# Patient Record
Sex: MALE | Race: WHITE | NOT HISPANIC OR LATINO | Employment: FULL TIME | URBAN - METROPOLITAN AREA
[De-identification: names, ages, dates, MRNs, and addresses within clinical notes are randomized per-mention and may not be internally consistent; named-entity substitution may affect disease eponyms.]

---

## 2017-01-03 ENCOUNTER — LAB VISIT (OUTPATIENT)
Dept: LAB | Facility: HOSPITAL | Age: 52
End: 2017-01-03
Attending: INTERNAL MEDICINE
Payer: COMMERCIAL

## 2017-01-03 LAB
ESTIMATED AVG GLUCOSE: 186 MG/DL
HBA1C MFR BLD HPLC: 8.1 %

## 2017-01-03 PROCEDURE — 83036 HEMOGLOBIN GLYCOSYLATED A1C: CPT

## 2017-01-03 PROCEDURE — 36415 COLL VENOUS BLD VENIPUNCTURE: CPT | Mod: PO

## 2017-01-08 ENCOUNTER — TELEPHONE (OUTPATIENT)
Dept: DIABETES | Facility: CLINIC | Age: 52
End: 2017-01-08

## 2017-01-09 NOTE — TELEPHONE ENCOUNTER
Please call patient to schedule diabetes empowerment f/u in 3 months with BMP and A1c 1 week before    Thanks

## 2017-01-09 NOTE — TELEPHONE ENCOUNTER
Left a message for the pt to return my call to schedule his diabetes empowerment and lab appt's. Phone number provided.

## 2017-01-13 ENCOUNTER — TELEPHONE (OUTPATIENT)
Dept: DIABETES | Facility: CLINIC | Age: 52
End: 2017-01-13

## 2017-01-13 ENCOUNTER — OFFICE VISIT (OUTPATIENT)
Dept: INTERNAL MEDICINE | Facility: CLINIC | Age: 52
End: 2017-01-13
Payer: COMMERCIAL

## 2017-01-13 VITALS
DIASTOLIC BLOOD PRESSURE: 84 MMHG | BODY MASS INDEX: 29.75 KG/M2 | WEIGHT: 219.38 LBS | OXYGEN SATURATION: 98 % | HEART RATE: 89 BPM | SYSTOLIC BLOOD PRESSURE: 124 MMHG

## 2017-01-13 DIAGNOSIS — I10 ESSENTIAL HYPERTENSION: Chronic | ICD-10-CM

## 2017-01-13 DIAGNOSIS — Z12.11 COLON CANCER SCREENING: ICD-10-CM

## 2017-01-13 PROCEDURE — 3074F SYST BP LT 130 MM HG: CPT | Mod: S$GLB,,, | Performed by: INTERNAL MEDICINE

## 2017-01-13 PROCEDURE — 3079F DIAST BP 80-89 MM HG: CPT | Mod: S$GLB,,, | Performed by: INTERNAL MEDICINE

## 2017-01-13 PROCEDURE — 2022F DILAT RTA XM EVC RTNOPTHY: CPT | Mod: S$GLB,,, | Performed by: INTERNAL MEDICINE

## 2017-01-13 PROCEDURE — 99999 PR PBB SHADOW E&M-EST. PATIENT-LVL III: CPT | Mod: PBBFAC,,, | Performed by: INTERNAL MEDICINE

## 2017-01-13 PROCEDURE — 3045F PR MOST RECENT HEMOGLOBIN A1C LEVEL 7.0-9.0%: CPT | Mod: S$GLB,,, | Performed by: INTERNAL MEDICINE

## 2017-01-13 PROCEDURE — 99214 OFFICE O/P EST MOD 30 MIN: CPT | Mod: S$GLB,,, | Performed by: INTERNAL MEDICINE

## 2017-01-13 PROCEDURE — 4010F ACE/ARB THERAPY RXD/TAKEN: CPT | Mod: S$GLB,,, | Performed by: INTERNAL MEDICINE

## 2017-01-13 PROCEDURE — 1159F MED LIST DOCD IN RCRD: CPT | Mod: S$GLB,,, | Performed by: INTERNAL MEDICINE

## 2017-01-13 RX ORDER — PEN NEEDLE, DIABETIC 32GX 5/32"
NEEDLE, DISPOSABLE MISCELLANEOUS
COMMUNITY
Start: 2016-12-07 | End: 2017-01-27

## 2017-01-13 NOTE — TELEPHONE ENCOUNTER
----- Message from Deb Li sent at 1/12/2017  4:15 PM CST -----  Contact: Patient  Patient is requesting a call.    Please call patient at [phone].

## 2017-01-13 NOTE — TELEPHONE ENCOUNTER
Spoke with the pt. He stated that he only has one dose left of his Trulicity 0.75 mg which he will take on Tuesday. He stated that his dose should be increasing to Trulicity 1.5 mg after he finishes his Trulicity 0.75 mg.

## 2017-01-13 NOTE — TELEPHONE ENCOUNTER
Left a message for the pt. Tracey sent trulicity 1.5 dose to your pharmacy. Also, please call to obtain BG logs from the past week  so that Tracey can determine if she can decrease your  insulin dose. Phone number provided.

## 2017-01-13 NOTE — MR AVS SNAPSHOT
[facility]  [address]  [place] [address]  Phone: [phone]  Fax: [phone]                  Yon Loyd   2017 1:00 PM   Office Visit    Description:  Male : 1965   Provider:  Audi Gutierrez MD   Department:  [facility]           Reason for Visit     Diabetes           Diagnoses this Visit        Comments    Uncontrolled type 2 diabetes mellitus with diabetic polyneuropathy, with long-term current use of insulin    -  Primary     Essential hypertension         Colon cancer screening                To Do List           Future Appointments        Provider Department Dept Phone    2017 7:00 AM LAB, [facility] [phone]    2017 2:00 PM DIABETES EMPOWERMENT PROGRAM [facility] [phone]      Goals (5 Years of Data)     None      Follow-Up and Disposition     Return in about 6 months (around 2017), or if symptoms worsen or fail to improve.      [facility]     [facility] Nurse Care Line -  Assistance  Registered nurses in the [facility] provide clinical advisement, health education, appointment booking, and other advisory services.  Call for this free service at [phone].             Medications           Message regarding Medications     Verify the changes and/or additions to your medication regime listed below are the same as discussed with your clinician today.  If any of these changes or additions are incorrect, please notify your healthcare provider.             Verify that the below list of medications is an accurate representation of the medications you are currently taking.  If none reported, the list may be blank. If incorrect, please contact your healthcare provider. Carry this list with you in case of emergency.           Current Medications     aspirin (ECOTRIN) 81 MG EC tablet Take 81 mg by mouth once daily.    BD INSULIN PEN NEEDLE UF  "MINI 31 gauge x 3/16" Ndle     blood sugar diagnostic Strp Freestyle Freedom Lite BG test strips & lancets. Check blood sugar 2-3 x/day.    dulaglutide (TRULICITY) 1.5 mg/0.5 mL PnIj Inject 1.5 mg into the skin every 7 days.    insulin detemir (LEVEMIR FLEXPEN) 100 unit/mL (3 mL) SubQ InPn pen Inject 70 Units into the skin once daily.    losartan (COZAAR) 100 MG tablet Take 1 tablet (100 mg total) by mouth once daily.    metformin (GLUCOPHAGE) 1000 MG tablet Take 1 tablet (1,000 mg total) by mouth 2 (two) times daily with meals.    simvastatin (ZOCOR) 40 MG tablet Take 1 tablet (40 mg total) by mouth once daily.    ULTICARE PEN NEEDLE 31 gauge x 1/4" Ndle            Clinical Reference Information           Vital Signs - Last Recorded  Most recent update: 1/13/2017  1:13 PM by Erica Cosby MA    BP Pulse Wt SpO2 BMI    124/84 89 99.5 kg (219 lb 5.7 oz) 98% 29.75 kg/m2      Blood Pressure          Most Recent Value    BP  124/84      Allergies as of 1/13/2017     Ace Inhibitors      Immunizations Administered on Date of Encounter - 1/13/2017     None      Orders Placed During Today's Visit      Normal Orders This Visit    Ambulatory consult to Gastroenterology       [facility] Sign-Up     Activating your [facility] account is as easy as 1-2-3!     1) Visit [email or web address], select Sign Up Now, enter this activation code and your date of birth, then select Next.  [email or web address]  Expires: 2/6/2017  3:03 PM      2) Create a username and password to use when you visit [facility] in the future and select a security question in case you lose your password and select Next.    3) Enter your e-mail address and click Sign Up!    Additional Information  If you have questions, please e-mail [email or web address] or call [phone] to talk to our [facility] staff. Remember, [facility] is NOT to be used for urgent needs. For medical emergencies, dial 911.         "

## 2017-01-13 NOTE — TELEPHONE ENCOUNTER
Sent trulicity 1.5 dose. Please obtain BG logs from the past week from pt so that I can determine if I can decrease his insulin dose.    Thanks

## 2017-01-13 NOTE — PROGRESS NOTES
Subjective:       Patient ID: Yon Loyd is a 51 y.o. male.    Chief Complaint: Diabetes    HPI Comments:   Pt here for f/u DM2 and other issues.     He is doing well with Trulicity.  No side effects.     He has not been tracking his blood sugars consistently lately.  He didn't bring his log in today.     No new c/o.  Feeling well today.             Diabetes       Review of Systems   Constitutional: Negative.    HENT: Negative.    Eyes: Negative.    Respiratory: Negative.    Cardiovascular: Negative.    Gastrointestinal: Negative.    Genitourinary: Negative.    Musculoskeletal: Negative.    Skin: Negative.    Neurological: Negative.    Psychiatric/Behavioral: Negative.        Objective:       Vitals:    01/13/17 1309   BP: 124/84   Pulse: 89   SpO2: 98%   Weight: 99.5 kg (219 lb 5.7 oz)     Physical Exam   Constitutional: He appears well-developed and well-nourished. No distress.   HENT:   Head: Normocephalic and atraumatic.   Eyes: Conjunctivae and EOM are normal. Pupils are equal, round, and reactive to light.   Skin: No rash noted. He is not diaphoretic.   Psychiatric: He has a normal mood and affect. His behavior is normal. Thought content normal.       Assessment:       1. Uncontrolled type 2 diabetes mellitus with diabetic polyneuropathy, with long-term current use of insulin    2. Essential hypertension    3. Colon cancer screening        Plan:           HTN - Adequate control.  Cont current tx.    DM2 - Uncontrolled but with A1c improving on recently started Trulicity.  Cont this and other meds.      HM - Referred for colon CA screening.

## 2017-01-23 ENCOUNTER — LAB VISIT (OUTPATIENT)
Dept: LAB | Facility: HOSPITAL | Age: 52
End: 2017-01-23
Attending: NURSE PRACTITIONER
Payer: COMMERCIAL

## 2017-01-23 LAB
ANION GAP SERPL CALC-SCNC: 9 MMOL/L
BUN SERPL-MCNC: 13 MG/DL
CALCIUM SERPL-MCNC: 9.8 MG/DL
CHLORIDE SERPL-SCNC: 102 MMOL/L
CHOLEST/HDLC SERPL: 3.6 {RATIO}
CO2 SERPL-SCNC: 27 MMOL/L
CREAT SERPL-MCNC: 1.1 MG/DL
EST. GFR  (AFRICAN AMERICAN): >60 ML/MIN/1.73 M^2
EST. GFR  (NON AFRICAN AMERICAN): >60 ML/MIN/1.73 M^2
GLUCOSE SERPL-MCNC: 216 MG/DL
HDL/CHOLESTEROL RATIO: 28 %
HDLC SERPL-MCNC: 161 MG/DL
HDLC SERPL-MCNC: 45 MG/DL
LDLC SERPL CALC-MCNC: 75.4 MG/DL
NONHDLC SERPL-MCNC: 116 MG/DL
POTASSIUM SERPL-SCNC: 4.3 MMOL/L
SODIUM SERPL-SCNC: 138 MMOL/L
TRIGL SERPL-MCNC: 203 MG/DL

## 2017-01-23 PROCEDURE — 80061 LIPID PANEL: CPT

## 2017-01-23 PROCEDURE — 36415 COLL VENOUS BLD VENIPUNCTURE: CPT | Mod: PO

## 2017-01-23 PROCEDURE — 80048 BASIC METABOLIC PNL TOTAL CA: CPT

## 2017-01-27 ENCOUNTER — CLINICAL SUPPORT (OUTPATIENT)
Dept: DIABETES | Facility: CLINIC | Age: 52
End: 2017-01-27
Payer: COMMERCIAL

## 2017-01-27 VITALS
WEIGHT: 220.44 LBS | DIASTOLIC BLOOD PRESSURE: 74 MMHG | BODY MASS INDEX: 29.86 KG/M2 | HEART RATE: 84 BPM | SYSTOLIC BLOOD PRESSURE: 130 MMHG | HEIGHT: 72 IN

## 2017-01-27 DIAGNOSIS — E66.9 OBESITY (BMI 30-39.9): Chronic | ICD-10-CM

## 2017-01-27 DIAGNOSIS — Z79.4 TYPE 2 DIABETES MELLITUS WITH HYPERGLYCEMIA, WITH LONG-TERM CURRENT USE OF INSULIN: Chronic | ICD-10-CM

## 2017-01-27 DIAGNOSIS — E11.42 DIABETIC POLYNEUROPATHY ASSOCIATED WITH TYPE 2 DIABETES MELLITUS: Primary | ICD-10-CM

## 2017-01-27 DIAGNOSIS — E11.29 PROTEINURIA DUE TO TYPE 2 DIABETES MELLITUS: Chronic | ICD-10-CM

## 2017-01-27 DIAGNOSIS — Z79.4 TYPE 2 DIABETES MELLITUS WITH DIABETIC NEPHROPATHY, WITH LONG-TERM CURRENT USE OF INSULIN: Chronic | ICD-10-CM

## 2017-01-27 DIAGNOSIS — E11.21 TYPE 2 DIABETES MELLITUS WITH DIABETIC NEPHROPATHY, WITH LONG-TERM CURRENT USE OF INSULIN: Chronic | ICD-10-CM

## 2017-01-27 DIAGNOSIS — E78.5 HYPERLIPIDEMIA LDL GOAL <100: Chronic | ICD-10-CM

## 2017-01-27 DIAGNOSIS — E11.65 TYPE 2 DIABETES MELLITUS WITH HYPERGLYCEMIA, WITH LONG-TERM CURRENT USE OF INSULIN: Chronic | ICD-10-CM

## 2017-01-27 DIAGNOSIS — R80.9 PROTEINURIA DUE TO TYPE 2 DIABETES MELLITUS: Chronic | ICD-10-CM

## 2017-01-27 DIAGNOSIS — I10 ESSENTIAL HYPERTENSION: Chronic | ICD-10-CM

## 2017-01-27 PROCEDURE — 99214 OFFICE O/P EST MOD 30 MIN: CPT | Mod: S$GLB,,, | Performed by: NURSE PRACTITIONER

## 2017-01-27 PROCEDURE — 99999 PR PBB SHADOW E&M-EST. PATIENT-LVL III: CPT | Mod: PBBFAC,,,

## 2017-01-27 PROCEDURE — G0108 DIAB MANAGE TRN  PER INDIV: HCPCS | Mod: S$GLB,,, | Performed by: DIETITIAN, REGISTERED

## 2017-01-27 RX ORDER — METFORMIN HYDROCHLORIDE 500 MG/1
2000 TABLET, EXTENDED RELEASE ORAL
Qty: 360 TABLET | Refills: 3 | Status: SHIPPED | OUTPATIENT
Start: 2017-01-27 | End: 2017-04-07

## 2017-01-27 RX ORDER — PEN NEEDLE, DIABETIC 30 GX3/16"
NEEDLE, DISPOSABLE MISCELLANEOUS
Qty: 60 EACH | Refills: 12 | Status: SHIPPED | OUTPATIENT
Start: 2017-01-27 | End: 2017-08-11 | Stop reason: SDUPTHER

## 2017-01-27 NOTE — PROGRESS NOTES
"   01/27/17 0000   Diabetes Type   Diabetes Type  Type II  (Pt present for empowerment visit #2 )   Nutrition   Meal Planning (reports no changes in deit, continues to eat out frequently, reports not eating large portions, but other DM edu notes reveal overwise)   Meal Plan 24 Hour Recall - Breakfast coffee, eggs, toast   Meal Plan 24 Hour Recall - Lunch plate lunch or sandwich   Meal Plan 24 Hour Recall - Dinner similar to lunch or burger   Meal Plan 24 Hour Recall - Snack snacks on popcorn   Monitoring    Blood Glucose Logs Yes  (see NP note)   Exercise    Frequency Never   Current Diabetes Treatment    Current Treatment Insulin;Oral Medicaiton;Injectable  (Levemir 70 units daily, Trulicity 1.5 mg weekly, Metformin 1000 mg BID)   Social History   Preferred Learning Method Face to Face;Demonstration;Hands On;Reading Materials   Primary Support Self   Barriers to Change   Barriers to Change None   Learning Challenges  Literacy  (Low HC literacy)   Readiness to Learn    Readiness to Learn  Acceptance   Diabetes Education Visit   Diabetes Education Record Assessment/Progress Initial/Comprehensive  (Seen for DSME in 2013 & 2014)   Diabetes Education Assessment/Progress   Acute Complications (preventing, detecting, and treating acute complications) DC;1;5;W;IS  (reviewed s/s of hypo and proper tx with "rule of 15")   Diabetes Disease Process (diabetes disease process and treatment options) DC;1;IS;5;W  (Per NP recommendation discussed and provided info on v-go insulin delivery device)   Medications (states correct name, dose, onset, peak, duration, side effects & timing of meds) DC;1;IS;5;W;D;R  (reviewed timing, dosing, and MOA of all meds. Also instructed pt that per NP that if BG is not WNL at visit in 6 weeks that pt will either need to start v-go or prandial insulin. Encouraged pt to have made a decision about what one he would prefer )   Monitoring (monitoring blood glucose/other parameters &using results) " "DC;1;IS;5;W  (Instructed to SMBG BID at alternating times and to bring logs to next visit)   Goal Setting and Problem Solving (verbalizes behavior change strategies & sets realistic goals) DC   Goals   Monitoring % Met  (SMBG BID & fax log to DMP weekly)   Medications In Progress  (I WILL TAKE LEVEMIR DOSES 12 HRS APART & NOVOLOG BOLUS 10-15" PRE DINNER MEALS, CONT. TO WORK ON  TAKING ALL DOSES PER RX.)   Reducing Risks Set   Other % Met  (pt will take new med as prescribed. Pt will continue and will determine next course of action he would prefer if BG is not at goal in 6 wks)   Met Percentage  100%   Start Date 01/27/17   Diabetes Self-Management Support Plan   Review Status Patient reviewed and agreed to support plan.   Diabetes Care Plan/Intervention   Education Plan/Intervention Diabetes Empowerment Program;In F/U DSMT   Education Units of Time    Time Spent 30 min     "

## 2017-01-27 NOTE — MR AVS SNAPSHOT
"    [facility]  [address]  [place] [address]  Phone: [phone]                  Yon Loyd   2017 2:00 PM   Clinical Support    Description:  Male : 1965   Provider:  DIABETES EMPOWERMENT PROGRAM   Department:  [facility]           Reason for Visit     Diabetes Mellitus           Diagnoses this Visit        Comments    Uncontrolled type 2 diabetes mellitus with diabetic polyneuropathy, with long-term current use of insulin         Type 2 diabetes mellitus with hyperglycemia, with long-term current use of insulin         Type 2 diabetes mellitus with diabetic nephropathy, with long-term current use of insulin         Proteinuria due to type 2 diabetes mellitus         Obesity (BMI 30-39.9)         Hyperlipidemia LDL goal <100         Essential hypertension                To Do List           Future Appointments        Provider Department Dept Phone    3/9/2017 1:00 PM DIABETES EDUCATOR, INT MED 1 [facility] [phone]    4/3/2017 9:15 AM LAB, [facility] [phone]    4/3/2017 9:30 AM LAB, [facility] [phone]    2017 3:30 PM DIABETES EMPOWERMENT PROGRAM [facility] [phone]      Goals (5 Years of Data)     None      Follow-Up and Disposition     Return in about 3 months (around 2017).       These Medications        Disp Refills Start End    pen needle, diabetic (BD ULTRA-FINE BLACK PEN NEEDLES) 32 gauge x 5/32" Ndle 60 each 12 2017     Uses 2  times daily, on injections. Dispense 4 mm needles only    Pharmacy: [email or web address] [address] Ph #: [phone]       metformin (GLUCOPHAGE-XR) 500 MG 24 hr tablet 360 tablet 3 2017     Take 4 tablets (2,000 mg total) by mouth daily with breakfast. - Oral    Pharmacy: [email or web address] [address] Ph #: [phone]       " "canagliflozin (INVOKANA) 100 mg Tab 30 tablet 0 1/27/2017     Take 1 tablet (100 mg total) by mouth once daily. - Oral    Pharmacy: [facility] [place], [place] - [address] Ph #: [phone]         [facility]     [facility] Care [facility] - 24/7 Assistance  Registered nurses in the [facility] provide clinical advisement, health education, appointment booking, and other advisory services.  Call for this free service at [phone].             Medications           Message regarding Medications     Verify the changes and/or additions to your medication regime listed below are the same as discussed with your clinician today.  If any of these changes or additions are incorrect, please notify your healthcare provider.        START taking these NEW medications        Refills    pen needle, diabetic (BD ULTRA-FINE BLACK PEN NEEDLES) 32 gauge x 5/32" Ndle 12    Sig: Uses 2  times daily, on injections. Dispense 4 mm needles only    Class: Print    metformin (GLUCOPHAGE-XR) 500 MG 24 hr tablet 3    Sig: Take 4 tablets (2,000 mg total) by mouth daily with breakfast.    Class: Print    Route: Oral    canagliflozin (INVOKANA) 100 mg Tab 0    Sig: Take 1 tablet (100 mg total) by mouth once daily.    Class: Normal    Route: Oral      STOP taking these medications     ULTICARE PEN NEEDLE 31 gauge x 1/4" Ndle     BD INSULIN PEN NEEDLE UF MINI 31 gauge x 3/16" Ndle            Verify that the below list of medications is an accurate representation of the medications you are currently taking.  If none reported, the list may be blank. If incorrect, please contact your healthcare provider. Carry this list with you in case of emergency.           Current Medications     aspirin (ECOTRIN) 81 MG EC tablet Take 81 mg by mouth once daily.    blood sugar diagnostic Strp Freestyle Freedom Lite BG test strips & lancets. Check blood sugar 2-3 x/day.    dulaglutide (TRULICITY) 1.5 mg/0.5 mL " "PnIj Inject 1.5 mg into the skin every 7 days.    insulin detemir (LEVEMIR FLEXPEN) 100 unit/mL (3 mL) SubQ InPn pen Inject 70 Units into the skin once daily.    losartan (COZAAR) 100 MG tablet Take 1 tablet (100 mg total) by mouth once daily.    metformin (GLUCOPHAGE) 1000 MG tablet Take 1 tablet (1,000 mg total) by mouth 2 (two) times daily with meals.    simvastatin (ZOCOR) 40 MG tablet Take 1 tablet (40 mg total) by mouth once daily.    canagliflozin (INVOKANA) 100 mg Tab Take 1 tablet (100 mg total) by mouth once daily.    metformin (GLUCOPHAGE-XR) 500 MG 24 hr tablet Take 4 tablets (2,000 mg total) by mouth daily with breakfast.    pen needle, diabetic (BD ULTRA-FINE BLACK PEN NEEDLES) 32 gauge x 5/32" Ndle Uses 2  times daily, on injections. Dispense 4 mm needles only           Clinical Reference Information           Vital Signs - Last Recorded  Most recent update: 1/27/2017  2:18 PM by Sanna Cheney MA    BP Pulse Ht Wt BMI    130/74 84 6' (1.829 m) 100 kg (220 lb 7.4 oz) 29.9 kg/m2      Blood Pressure          Most Recent Value    BP  130/74      Allergies as of 1/27/2017     Ace Inhibitors      Immunizations Administered on Date of Encounter - 1/27/2017     None      Orders Placed During Today's Visit     Future Labs/Procedures Expected by Expires    Basic metabolic panel  1/27/2017 1/27/2018    Hemoglobin A1c  1/27/2017 1/27/2018    Microalbumin/creatinine urine ratio  1/27/2017 3/28/2018      [facility] Sign-Up     Activating your [facility] account is as easy as 1-2-3!     1) Visit [email or web address], select Sign Up Now, enter this activation code and your date of birth, then select Next.  [email or web address]  Expires: 2/6/2017  3:03 PM      2) Create a username and password to use when you visit [facility] in the future and select a security question in case you lose your password and select Next.    3) Enter your e-mail address and click Sign Up!    Additional Information  If you have questions, please " e-mail [email or web address] or call [phone] to talk to our [facility] staff. Remember, [facility] is NOT to be used for urgent needs. For medical emergencies, dial 911.         Instructions    Continue Trulicity 1.5 mg weekly    Change to Metformin XR 2000 mg daily (if you wish) OR continue Metformin 1000 mg twice daily    For now, continue Levemir 70 units daily     Start Invokana 100 mg once a day  x1 month, then increase to 300 mg once a day indefinitely. Discussed MOA, possible side effects including yeast infection, UTI, dehydration and ketoacidosis, importance of maintaining hydration and avoiding low carb diets.    Once all of your readings are less than 140, email me to decrease your Levemir     See diabetes education in 6 weeks - if you are still on more than 40 units of Levemir at this time, I recommend considering the Vgo (patch)

## 2017-01-27 NOTE — PROGRESS NOTES
CC:  Diabetes.     HPI: Yon Loyd is a 51 y.o. male presents for visit in [facility]. The patient has had diabetes along with associated comorbidities indicated in the Visit Diagnosis section of this encounter. The patient was diagnosed with Type 2 diabetes in ~1993.  Been on insulin since ~2013.     VISIT #: 1    1st visit - Presents with no BG logs, not checking often, reports last BG 3 days after after meal was 185. Recently started on Novolog with largest meal by PCP    2nd visit - Today, presents with BG logs. BG remain elevated fasting.  Using much less insulin currently, BG remains slightly elevated.       DIABETES COMPLICATIONS: nephropathy and peripheral neuropathy     HOSPITALIZATIONS FOR DIABETES OR RELATED COMPLICATIONS:  No    CURRENT A1C:    Hemoglobin A1C   Date Value Ref Range Status   01/03/2017 8.1 (H) 4.5 - 6.2 % Final     Comment:     According to ADA guidelines, hemoglobin A1C <7.0% represents  optimal control in non-pregnant diabetic patients.  Different  metrics may apply to specific populations.   Standards of Medical Care in Diabetes - 2016.  For the purpose of screening for the presence of diabetes:  <5.7%     Consistent with the absence of diabetes  5.7-6.4%  Consistent with increasing risk for diabetes   (prediabetes)  >or=6.5%  Consistent with diabetes  Currently no consensus exists for use of hemoglobin A1C  for diagnosis of diabetes for children.     11/21/2016 8.4 (H) 4.5 - 6.2 % Final     Comment:     According to ADA guidelines, hemoglobin A1C <7.0% represents  optimal control in non-pregnant diabetic patients.  Different  metrics may apply to specific populations.   Standards of Medical Care in Diabetes - 2016.  For the purpose of screening for the presence of diabetes:  <5.7%     Consistent with the absence of diabetes  5.7-6.4%  Consistent with increasing risk for diabetes   (prediabetes)  >or=6.5%  Consistent with diabetes  Currently no consensus exists for  use of hemoglobin A1C  for diagnosis of diabetes for children.     09/26/2016 9.8 (H) 4.5 - 6.2 % Final     Comment:     According to ADA guidelines, hemoglobin A1C <7.0% represents  optimal control in non-pregnant diabetic patients.  Different  metrics may apply to specific populations.   Standards of Medical Care in Diabetes - 2016.  For the purpose of screening for the presence of diabetes:  <5.7%     Consistent with the absence of diabetes  5.7-6.4%  Consistent with increasing risk for diabetes   (prediabetes)  >or=6.5%  Consistent with diabetes  Currently no consensus exists for use of hemoglobin A1C  for diagnosis of diabetes for children.         GOAL A1C: <7%    DM MEDICATIONS USED IN THE PAST: Glyburide, Metformin, Januvia, Novolog, Levemir, Trulicity    MEDICATIONS UPON START OF PROGRAM: Levemir 65 units BID, Novolog 10 units with largest meal, Januvia 100 mg daily, Metformin 1000 mg BID  Rarely missing Levemir  Only taking Novolog once daily with largest meal    CURRENT DIABETES MEDICATIONS: Levemir 70 units daily, Trulicity 1.5 mg weekly, Metformin 1000 mg BID  Denies missing any doses of trulicity   Missing PM dose of Metformin     ANY DIFFICULTY AFFORDING YOUR CURRENT MEDICATION REGIMEN?  No    CGMS interpretation:  None     DO YOU USE THE [employer] EMAIL SYSTEM? No    STANDARDS of CARE:  Statin:  Yes - Simvastatin   ACEI or ARB:  Yes - Losartan   ASA:  Yes - 81 mg   Last eye exam - needs updated eye exam, pt to schedule at [facility] - still hasn't scheduled, instructed to fax results once done   Microalbumin/Creatinine ratio:  Lab Results   Component Value Date    MICALBCREAT 539.3 (H) 12/23/2016       BLOOD GLUCOSE MONITORING:  Checking BG 1-2 times daily                 HYPOGLYCEMIA:  No    CURRENT DAILY ROUTINE (meals/meds):   Eating 3 meals daily  Snacking on peanut butter crackers    Drinks diet coke    CURRENT EXERCISE:  No    OCCUPATION:      MEDICATIONS, ALLERGIES, LABS, VS's, MEDICAL,  SURGICAL, SOCIAL, AND FAMILY HISTORY reviewed and updated in the appropriate sections during this encounter    ROS:     Constitutional: Negative for weight change  Endocrine:  Denies polyuria, polydipsia, nocturia.  HENT: Denies neck swelling, lumps, horseness or trouble swallowing. Denies any personal or family history of thyroid cancer.    Eyes: Negative for visual disturbance.   Respiratory: Negative for cough or shortness or breath.  Cardiovascular: Negative for chest pain or claudication.   Gastrointestinal: Negative for nausea, diarrhea, vomiting, bloating.  No history of pancreatitis or gastroparesis.  Genitourinary: Negative for urgency, frequency, frequent urinary tract infections.  Skin: Denies prolonged wound healing.  Neurological: Negative for syncope, + improved numbness/burning of extremities.  Psychiatric/Behavioral: Negative for depression or anxiety      All other systems reviewed and are negative.      PE:  Constitutional:   Visit Vitals    /74    Pulse 84    Ht 6' (1.829 m)    Wt 100 kg (220 lb 7.4 oz)    BMI 29.9 kg/m2      Well developed, well nourished, NAD.    HENT:   External ears, nose: no masses. No significant findings.   Hearing: normal     Neck:  No trachial deviation present, No neck masses noticed   Thyroid:  No thyromegaly present.  No thyroid tenderness.      Cardiovascular:    Auscultation:  No murmurs or abnormal sounds   Lower extremities:  No edema or cyanosis.     Respiratory:    Effort:  Normal, no use of accessory muscles.   Auscultation: breath sounds normal, no adventitious sounds.  Abdomen:     Exam:  Soft, non-tender, no masses.      No hernia noted.  Skin:    Inspection: no rashes, lesion or ulcers, + acanthosis nigracans.   Palpation: no induration or nodules.   Insulin injection sites: no lipoatrophy or lipohypertrophy.  Psychiatric:  Judgement and insight good with normal mood and affect.  Musculoskeletal:  Gait steady. No gross abnormalities.        FOOT  EXAMINATION:   Protective Sensation (w/ 10 gram monofilament):  Right: Decreased  Left: Decreased    Visual Inspection:  Normal -  Bilateral, Nails Intact - without Evidence of Foot Deformity- Bilateral and Callus -  Bilateral    Pedal Pulses:   Right: Present  Left: Present    Posterior tibialis:   Right:Present  Left: Present    Decreased vibratory response to 128 Hz tuning fork bilaterally.     ______________________________________________________________________     ASSESSMENT and PLAN:    1- Type 2 diabetes with proteinuria neuropathy and hyperglycemia - A1c improving. Discussed tx options, including SGLT-2, and/or Vgo device. For now, for compliance reasons, change Metformin to XR 2000 mg daily, Levemir 70 units daily, Trulicity 1.5 mg weekly. Start Invokana 100 mg once a day  x1 month, then increase to 300 mg once a day indefinitely. Discussed MOA, possible side effects including yeast infection, UTI, dehydration and ketoacidosis, importance of maintaining hydration and avoiding low carb diets..     Instructed to monitor BG twice daily, document on BG logs, and bring complete BG logs to all visits - instructed to notify me when bG <140 so that Levemir can start to be decreased    Diabetes education in 6 weeks for log review, possible Vgo discussion - discussed with patient that if still requiring more than ~40 units of Levemir at this visit after on max dose Trulicity and Invokana, recommend changing to Vgo or adding prandial insulin with largest meal  Empowerment f/u in 3 months with BMP, A1c, and MAC before    2- Proteinuria - MAC worsened, if not improved significantly in 3 months, recommend nephrology referral. On max dose Losartan. Discussed correlation between uncontrolled DM and worsening nephropathy.     3 - Peripheral neuropathy - Educated patient to check feet daily for any foreign objects and/or wounds. Discussed with patient the importance of wearing appropriate footwear at all times, not to walk  barefoot ever, and to check shoes before putting them on feet. Instructed patient to keep feet dry by regularly changing shoes and socks and drying feet after baths and exercises. Also, instructed patient to report any new lesions, discolorations, or swelling to a healthcare professional.     4- Hypertension - goal < 140/80 mmHg -  At goal, on ARB, continue current medications   BP Readings from Last 3 Encounters:   01/27/17 130/74   01/13/17 124/84   12/23/16 130/72       5 Hyperlipidemia -  LFT's WNL. LDL at goal of <100. On Zocor. Continue current medications.     Lab Results   Component Value Date    LDLCALC 75.4 01/23/2017       6- Body mass index is 29.9 kg/(m^2). = Overweight. Modest weight loss (5-10%) may greatly improve BG control. May benefit from weight loss properties of Trulicity + Invokana

## 2017-01-27 NOTE — PATIENT INSTRUCTIONS
Continue Trulicity 1.5 mg weekly    Change to Metformin XR 2000 mg daily (if you wish) OR continue Metformin 1000 mg twice daily    For now, continue Levemir 70 units daily     Start Invokana 100 mg once a day  x1 month, then increase to 300 mg once a day indefinitely. Discussed MOA, possible side effects including yeast infection, UTI, dehydration and ketoacidosis, importance of maintaining hydration and avoiding low carb diets.    Once all of your readings are less than 140, email me to decrease your Levemir     See diabetes education in 6 weeks - if you are still on more than 40 units of Levemir at this time, I recommend considering the Vgo (patch)

## 2017-02-21 DIAGNOSIS — E11.65 TYPE 2 DIABETES MELLITUS WITH HYPERGLYCEMIA, WITH LONG-TERM CURRENT USE OF INSULIN: Chronic | ICD-10-CM

## 2017-02-21 DIAGNOSIS — Z79.4 TYPE 2 DIABETES MELLITUS WITH HYPERGLYCEMIA, WITH LONG-TERM CURRENT USE OF INSULIN: Chronic | ICD-10-CM

## 2017-02-21 NOTE — TELEPHONE ENCOUNTER
----- Message from Anushka Davidson sent at 2/21/2017 11:08 AM CST -----  Contact: Patient [phone]  Patient is asking for his refill to be called into the pharmacy on his canagliflozin (INVOKANA) 100 mg Tab.    Pharmacy is [address] [phone] (Phone) [phone] (Fax).    Patient can be reached at [phone].  Thanks

## 2017-02-21 NOTE — TELEPHONE ENCOUNTER
Please call patient, remind him Invokana dose to increase to 300 mg daily after 1st month. Rx for 300 mg sent to [facility].

## 2017-02-22 NOTE — TELEPHONE ENCOUNTER
Spoke to the patient. Reminded him Invokana dose to increase to 300 mg daily after 1st month. Tracey sent Rx for 300 mg to [facility]. Pt verbalized understanding.

## 2017-03-09 ENCOUNTER — TELEPHONE (OUTPATIENT)
Dept: DIABETES | Facility: CLINIC | Age: 52
End: 2017-03-09

## 2017-03-09 ENCOUNTER — CLINICAL SUPPORT (OUTPATIENT)
Dept: DIABETES | Facility: CLINIC | Age: 52
End: 2017-03-09
Payer: COMMERCIAL

## 2017-03-09 DIAGNOSIS — E11.42 DIABETIC POLYNEUROPATHY ASSOCIATED WITH TYPE 2 DIABETES MELLITUS: ICD-10-CM

## 2017-03-09 DIAGNOSIS — E11.21 TYPE 2 DIABETES MELLITUS WITH DIABETIC NEPHROPATHY, WITH LONG-TERM CURRENT USE OF INSULIN: Primary | Chronic | ICD-10-CM

## 2017-03-09 DIAGNOSIS — Z79.4 TYPE 2 DIABETES MELLITUS WITH DIABETIC NEPHROPATHY, WITH LONG-TERM CURRENT USE OF INSULIN: Chronic | ICD-10-CM

## 2017-03-09 DIAGNOSIS — Z79.4 TYPE 2 DIABETES MELLITUS WITH DIABETIC NEPHROPATHY, WITH LONG-TERM CURRENT USE OF INSULIN: Primary | Chronic | ICD-10-CM

## 2017-03-09 DIAGNOSIS — E11.21 TYPE 2 DIABETES MELLITUS WITH DIABETIC NEPHROPATHY, WITH LONG-TERM CURRENT USE OF INSULIN: Chronic | ICD-10-CM

## 2017-03-09 PROCEDURE — G0108 DIAB MANAGE TRN  PER INDIV: HCPCS | Mod: S$GLB,,, | Performed by: DIETITIAN, REGISTERED

## 2017-03-09 RX ORDER — INSULIN LISPRO 200 [IU]/ML
10 INJECTION, SOLUTION SUBCUTANEOUS
Qty: 3 SYRINGE | Refills: 3
Start: 2017-03-09 | End: 2017-04-07

## 2017-03-09 NOTE — PROGRESS NOTES
"   03/09/17 0000   Diabetes Education Visit   Diabetes Education Record Assessment/Progress Post Program/Follow-up  (Pt present for BG log review following empowerment visit. At last empowerment visit, Levemir was increased, Novolog and Jardiance were d/c and Trulicity was started)   Diabetes Type   Diabetes Type  Type II   Monitoring    Self Monitoring  (Per last visit with NP, based on BG logs today pt needs to decide between v-go insulin delivery device or prandial insulin)   Blood Glucose Logs Yes  (Logs present at visit show SMBG 1-2 times per day. Fasting readings mostly WNL, very few lunch readings but these are the lowest, dinner is mostly WNL and before bed is highest. See NP note for photo of logs)   Current Diabetes Treatment    Current Treatment Insulin;Injectable;Oral Medication  (Pt currently taking Levemir 70 units in the AM, Invokana 300 mg, and Trulicity weekly. Pt denies missed doses of meds)   Social History   Preferred Learning Method Face to Face;Demonstration;Hands On;Reading Materials   Primary Support Self   Barriers to Change   Barriers to Change None   Learning Challenges  Literacy  (Low HC literacy)   Diabetes Education Assessment/Progress   Acute Complications (preventing, detecting, and treating acute complications) DC;1;5;W;IS  (reviewed s/s of hypo and proper tx with "rule of 15")   Medications (states correct name, dose, onset, peak, duration, side effects & timing of meds) DC;1;IS;5  (Per NP review of BG logs she recommended pt d/c levemir and switch to v-go insulin delivery device - when this was presented to pt he stated that he was not ready to make that decision and that he would like another option - as a result, NP recommended to decrease Levemir to 60 units daily and add Novolog 10 units with biggest meal, which is dinner)   Monitoring (monitoring blood glucose/other parameters & using results) DC;1;IS;5;W  (Instructed to SMBG BID at alternating times and to bring logs to next " "visit. Pt was also scheduled for CGMS test to be performed 1 week before empowerment visit)   Goal Setting and Problem Solving (verbalizes behavior change strategies & sets realistic goals) DC   Goals   Monitoring % Met  (SMBG BID & fax log to DMP weekly)   Met Percentage  100%   Medications % Met  (I WILL TAKE LEVEMIR DOSES 12 HRS APART & NOVOLOG BOLUS 10-15" PRE DINNER MEALS, CONT. TO WORK ON  TAKING ALL DOSES PER RX.)   Met Percentage  100%   Other % Met  (pt will take new med as prescribed. Pt will continue and will determine next course of action he would prefer if BG is not at goal in 6 wks)   Met Percentage  75%   Diabetes Self-Management Support Plan   Review Status Patient has selected and agrees to support plan.   Diabetes Care Plan/Intervention   Education Plan/Intervention Diabetes Empowerment Program;In F/U DSMT   Education Units of Time    Time Spent 30 min     "

## 2017-03-09 NOTE — TELEPHONE ENCOUNTER
Received BG logs. Prandial excursions noted, will decrease Levemir to 60 units daily, start Humalog 10 units with largest meal. CGMS before empowerment f/u. Pt not interested in Vgo per diabetes educator MARLA Ro.

## 2017-03-29 ENCOUNTER — LAB VISIT (OUTPATIENT)
Dept: LAB | Facility: HOSPITAL | Age: 52
End: 2017-03-29
Attending: NURSE PRACTITIONER
Payer: COMMERCIAL

## 2017-03-29 ENCOUNTER — CLINICAL SUPPORT (OUTPATIENT)
Dept: ENDOCRINOLOGY | Facility: CLINIC | Age: 52
End: 2017-03-29
Payer: COMMERCIAL

## 2017-03-29 DIAGNOSIS — E11.21 TYPE 2 DIABETES MELLITUS WITH DIABETIC NEPHROPATHY, WITH LONG-TERM CURRENT USE OF INSULIN: Chronic | ICD-10-CM

## 2017-03-29 DIAGNOSIS — E11.65 TYPE 2 DIABETES MELLITUS WITH HYPERGLYCEMIA, WITH LONG-TERM CURRENT USE OF INSULIN: Chronic | ICD-10-CM

## 2017-03-29 DIAGNOSIS — Z79.4 TYPE 2 DIABETES MELLITUS WITH DIABETIC NEPHROPATHY, WITH LONG-TERM CURRENT USE OF INSULIN: Chronic | ICD-10-CM

## 2017-03-29 DIAGNOSIS — Z79.4 TYPE 2 DIABETES MELLITUS WITH HYPERGLYCEMIA, WITH LONG-TERM CURRENT USE OF INSULIN: Chronic | ICD-10-CM

## 2017-03-29 LAB
CREAT UR-MCNC: 100 MG/DL
MICROALBUMIN UR DL<=1MG/L-MCNC: 379 UG/ML
MICROALBUMIN/CREATININE RATIO: 379 UG/MG

## 2017-03-29 PROCEDURE — 95250 CONT GLUC MNTR PHYS/QHP EQP: CPT | Mod: S$GLB,,, | Performed by: NURSE PRACTITIONER

## 2017-03-29 PROCEDURE — 82570 ASSAY OF URINE CREATININE: CPT

## 2017-03-29 NOTE — PROGRESS NOTES
"DIABETES EDUCATOR NOTE   PLACEMENT OF FREESTYLE JERO PRO SENSOR  CONTINOUS GLUCOSE MONITORING SYSTEM (CGMS)    Patient is here in clinic today for placement of continuous glucose monitoring sensor.      Each patient verified that they were here for CGMS procedure ordered by their provider and that they have a working glucose meter and supplies at home.   Patient will be provided with a Freestyle Jero Sensor and a copy of the Continuous Glucose Monitoring Patient Log to fill out during the study.   A detailed  explanation of Continuous Glucose Monitoring was  provided. Patient informed that this is a blind procedure and that they will not actually see the blood sugar tracing in real time.  Reviewed with patient the [employer] patient education handout called "Your Freestyle Jero Pro sensor: What you need to know" to review self-care during the study to avoid sensor loosening or removal ie... Bathing, Swimming, dressing, and exercising.   Instructed patient to check blood sugar using home glucometer and to record the following on provided patient log sheets:Blood sugar taken at home, Meals and snacks, Activity, and Diabetes medications taken and dosage    Patient was brought to a private location.  Arm for insertion was selected and prepared and allowed to dry. Glucose Sensor Serial Number [id number]  was inserted to back of patient's left upper arm.    The following forms  were given and reviewed in detail with patient and all questions answered.   · Continuous Glucose Monitoring Patient Log #[id number]  · Freestyle [employer] Patient Handout "Your Freestyle Jero Pro Sensor: What you need to know"     Instructions held in a group: Time: 15 min   Insertion of sensor done individually in private:  Time: 5 minutes       "

## 2017-04-04 ENCOUNTER — CLINICAL SUPPORT (OUTPATIENT)
Dept: ENDOCRINOLOGY | Facility: CLINIC | Age: 52
End: 2017-04-04
Payer: COMMERCIAL

## 2017-04-04 DIAGNOSIS — Z79.4 TYPE 2 DIABETES MELLITUS WITH DIABETIC NEPHROPATHY, WITH LONG-TERM CURRENT USE OF INSULIN: Chronic | ICD-10-CM

## 2017-04-04 DIAGNOSIS — E11.21 TYPE 2 DIABETES MELLITUS WITH DIABETIC NEPHROPATHY, WITH LONG-TERM CURRENT USE OF INSULIN: Chronic | ICD-10-CM

## 2017-04-04 PROCEDURE — 95251 CONT GLUC MNTR ANALYSIS I&R: CPT | Mod: S$GLB,,, | Performed by: NURSE PRACTITIONER

## 2017-04-07 ENCOUNTER — CLINICAL SUPPORT (OUTPATIENT)
Dept: DIABETES | Facility: CLINIC | Age: 52
End: 2017-04-07
Payer: COMMERCIAL

## 2017-04-07 VITALS
DIASTOLIC BLOOD PRESSURE: 82 MMHG | SYSTOLIC BLOOD PRESSURE: 126 MMHG | WEIGHT: 211.63 LBS | HEART RATE: 86 BPM | HEIGHT: 72 IN | BODY MASS INDEX: 28.66 KG/M2

## 2017-04-07 DIAGNOSIS — Z79.4 TYPE 2 DIABETES MELLITUS WITH DIABETIC NEPHROPATHY, WITH LONG-TERM CURRENT USE OF INSULIN: Chronic | ICD-10-CM

## 2017-04-07 DIAGNOSIS — E78.5 HYPERLIPIDEMIA LDL GOAL <100: Chronic | ICD-10-CM

## 2017-04-07 DIAGNOSIS — R80.9 PROTEINURIA DUE TO TYPE 2 DIABETES MELLITUS: Chronic | ICD-10-CM

## 2017-04-07 DIAGNOSIS — I10 ESSENTIAL HYPERTENSION: Chronic | ICD-10-CM

## 2017-04-07 DIAGNOSIS — Z79.4 TYPE 2 DIABETES MELLITUS WITH DIABETIC POLYNEUROPATHY, WITH LONG-TERM CURRENT USE OF INSULIN: Primary | Chronic | ICD-10-CM

## 2017-04-07 DIAGNOSIS — E66.3 OVERWEIGHT (BMI 25.0-29.9): Chronic | ICD-10-CM

## 2017-04-07 DIAGNOSIS — E11.21 TYPE 2 DIABETES MELLITUS WITH DIABETIC NEPHROPATHY, WITH LONG-TERM CURRENT USE OF INSULIN: Chronic | ICD-10-CM

## 2017-04-07 DIAGNOSIS — E11.29 PROTEINURIA DUE TO TYPE 2 DIABETES MELLITUS: Chronic | ICD-10-CM

## 2017-04-07 DIAGNOSIS — E11.42 TYPE 2 DIABETES MELLITUS WITH DIABETIC POLYNEUROPATHY, WITH LONG-TERM CURRENT USE OF INSULIN: Primary | Chronic | ICD-10-CM

## 2017-04-07 PROCEDURE — 99214 OFFICE O/P EST MOD 30 MIN: CPT | Mod: S$GLB,,, | Performed by: NURSE PRACTITIONER

## 2017-04-07 PROCEDURE — 99999 PR PBB SHADOW E&M-EST. PATIENT-LVL III: CPT | Mod: PBBFAC,,,

## 2017-04-07 RX ORDER — INSULIN ASPART 100 [IU]/ML
10 INJECTION, SOLUTION INTRAVENOUS; SUBCUTANEOUS
Qty: 1 BOX | Refills: 4
Start: 2017-04-07 | End: 2017-08-11 | Stop reason: SDUPTHER

## 2017-04-07 RX ORDER — METFORMIN HYDROCHLORIDE 1000 MG/1
1000 TABLET ORAL 2 TIMES DAILY WITH MEALS
Qty: 180 TABLET | Refills: 2
Start: 2017-04-07 | End: 2017-08-11 | Stop reason: SDUPTHER

## 2017-04-07 RX ORDER — METFORMIN HYDROCHLORIDE 1000 MG/1
1000 TABLET ORAL 2 TIMES DAILY WITH MEALS
Qty: 180 TABLET | Refills: 2 | Status: SHIPPED | OUTPATIENT
Start: 2017-04-07 | End: 2017-04-07 | Stop reason: SDUPTHER

## 2017-04-07 RX ORDER — INSULIN ASPART 100 [IU]/ML
10 INJECTION, SOLUTION INTRAVENOUS; SUBCUTANEOUS
Qty: 1 BOX | Refills: 4 | Status: SHIPPED | OUTPATIENT
Start: 2017-04-07 | End: 2017-04-07 | Stop reason: SDUPTHER

## 2017-04-07 NOTE — PATIENT INSTRUCTIONS
Continue your Metformin twice daily, Invokana once daily and Trulicity 1.5 mg once weekly    Decrease your Levemir to 45 units daily    Start giving Novolog 10 units before breakfast AND before dinner    Diabetes education in 1 month for blood sugar logs review and teach you a scale

## 2017-04-07 NOTE — PROGRESS NOTES
CC:  Diabetes.     HPI: Yon Loyd is a 51 y.o. male presents for visit in [facility]. The patient has had diabetes along with associated comorbidities indicated in the Visit Diagnosis section of this encounter. The patient was diagnosed with Type 2 diabetes in ~1993.  Been on insulin since ~2013.     VISIT #: 1    1st visit - Presents with no BG logs, not checking often, reports last BG 3 days after after meal was 185. Recently started on Novolog with largest meal by PCP    2nd visit - Presented with BG logs. High FBG. At this visit, Metformin continued, Invokana started, Trulicity and basal insulin continued    3rd visit - Today, he presents post CGMS. Some hypo noted on CGMS, some uncontrolled prandial excursions. Missing Novolog doses       DIABETES COMPLICATIONS: nephropathy and peripheral neuropathy     HOSPITALIZATIONS FOR DIABETES OR RELATED COMPLICATIONS:  No    CURRENT A1C:    Hemoglobin A1C   Date Value Ref Range Status   03/29/2017 6.9 (H) 4.5 - 6.2 % Final     Comment:     According to ADA guidelines, hemoglobin A1C <7.0% represents  optimal control in non-pregnant diabetic patients.  Different  metrics may apply to specific populations.   Standards of Medical Care in Diabetes - 2016.  For the purpose of screening for the presence of diabetes:  <5.7%     Consistent with the absence of diabetes  5.7-6.4%  Consistent with increasing risk for diabetes   (prediabetes)  >or=6.5%  Consistent with diabetes  Currently no consensus exists for use of hemoglobin A1C  for diagnosis of diabetes for children.     01/03/2017 8.1 (H) 4.5 - 6.2 % Final     Comment:     According to ADA guidelines, hemoglobin A1C <7.0% represents  optimal control in non-pregnant diabetic patients.  Different  metrics may apply to specific populations.   Standards of Medical Care in Diabetes - 2016.  For the purpose of screening for the presence of diabetes:  <5.7%     Consistent with the absence of diabetes  5.7-6.4%   Consistent with increasing risk for diabetes   (prediabetes)  >or=6.5%  Consistent with diabetes  Currently no consensus exists for use of hemoglobin A1C  for diagnosis of diabetes for children.     11/21/2016 8.4 (H) 4.5 - 6.2 % Final     Comment:     According to ADA guidelines, hemoglobin A1C <7.0% represents  optimal control in non-pregnant diabetic patients.  Different  metrics may apply to specific populations.   Standards of Medical Care in Diabetes - 2016.  For the purpose of screening for the presence of diabetes:  <5.7%     Consistent with the absence of diabetes  5.7-6.4%  Consistent with increasing risk for diabetes   (prediabetes)  >or=6.5%  Consistent with diabetes  Currently no consensus exists for use of hemoglobin A1C  for diagnosis of diabetes for children.         GOAL A1C: <7%    DM MEDICATIONS USED IN THE PAST: Glyburide, Metformin, Januvia, Novolog, Levemir, Trulicity    MEDICATIONS UPON START OF PROGRAM: Levemir 65 units BID, Novolog 10 units with largest meal, Januvia 100 mg daily, Metformin 1000 mg BID  Rarely missing Levemir  Only taking Novolog once daily with largest meal    CURRENT DIABETES MEDICATIONS: Levemir 60 units daily, Trulicity 1.5 mg weekly, Metformin 1000 mg BID, Invokana 300 mg daily, Novolog 10 units with dinner   Missing Novolog dose intermittently  Did not change to Metformin XR    ANY DIFFICULTY AFFORDING YOUR CURRENT MEDICATION REGIMEN?  No    CGMS interpretation:  Done 4/2017  1. FBG at goal, some hypo overnight  2. Prandial excursions uncontrolled with some meals  3. Unbalanced basal/prandial doses    DO YOU USE THE [facility] EMAIL SYSTEM? No    STANDARDS of CARE:  Statin:  Yes - Simvastatin   ACEI or ARB:  Yes - Losartan   ASA:  Yes - 81 mg   Last eye exam - needs updated eye exam  Microalbumin/Creatinine ratio:  Lab Results   Component Value Date    MICALBCREAT 379.0 (H) 03/29/2017       BLOOD GLUCOSE MONITORING:  Checking BG 1-2 times daily, no logs, see CGMS  report    HYPOGLYCEMIA:  No    CURRENT DAILY ROUTINE (meals/meds):   Eating 3 meals daily  Snacking on peanut butter crackers, peanuts, chips    Drinks diet coke    CURRENT EXERCISE:  No    OCCUPATION:      MEDICATIONS, ALLERGIES, LABS, VS's, MEDICAL, SURGICAL, SOCIAL, AND FAMILY HISTORY reviewed and updated in the appropriate sections during this encounter    ROS:     Constitutional: Negative for weight change  Endocrine:  Denies polyuria, polydipsia, nocturia.  HENT: Denies neck swelling, lumps, horseness or trouble swallowing. Denies any personal or family history of thyroid cancer.    Eyes: Negative for visual disturbance.   Respiratory: Negative for cough or shortness or breath.  Cardiovascular: Negative for chest pain or claudication.   Gastrointestinal: Negative for nausea, diarrhea, vomiting, bloating.  No history of pancreatitis or gastroparesis.  Genitourinary: Negative for urgency, frequency, frequent urinary tract infections.  Skin: Denies prolonged wound healing.  Neurological: Negative for syncope, + intermittent numbness/burning of extremities.  Psychiatric/Behavioral: Negative for depression or anxiety      All other systems reviewed and are negative.      PE:  Constitutional:   /82  Pulse 86  Ht 6' (1.829 m)  Wt 96 kg (211 lb 10.3 oz)  BMI 28.7 kg/m2   Well developed, well nourished, NAD.    HENT:   External ears, nose: no masses. No significant findings.   Hearing: normal     Neck:  No trachial deviation present, No neck masses noticed   Thyroid:  No thyromegaly present.  No thyroid tenderness.      Cardiovascular:    Auscultation:  No murmurs or abnormal sounds   Lower extremities:  No edema or cyanosis.     Respiratory:    Effort:  Normal, no use of accessory muscles.   Auscultation: breath sounds normal, no adventitious sounds.  Abdomen:     Exam:  Soft, non-tender, no masses.      No hernia noted.  Skin:    Inspection: no rashes, lesion or ulcers, + acanthosis  nigracans.   Palpation: no induration or nodules.   Insulin injection sites: no lipoatrophy or lipohypertrophy.  Psychiatric:  Judgement and insight good with normal mood and affect.  Musculoskeletal:  Gait steady. No gross abnormalities.        FOOT EXAMINATION:   Protective Sensation (w/ 10 gram monofilament):  Right: Decreased  Left: Decreased    Visual Inspection:  Normal -  Bilateral, Nails Intact - without Evidence of Foot Deformity- Bilateral and Callus -  Bilateral    Pedal Pulses:   Right: Present  Left: Present    Posterior tibialis:   Right:Present  Left: Present    Decreased vibratory response to 128 Hz tuning fork bilaterally.     ______________________________________________________________________     ASSESSMENT and PLAN:    1- Type 2 diabetes with proteinuria, neuropathy and hyperglycemia - A1c at goal, but basal/prandial doses unbalanced and suspect overtreatment of basal insulin. Discussed tx options, including highly suggested changing to Vgo insulin delivery device to increase long term compliance and deliver balanced basal/prandial insulin doses. Patient does not wish to try Vgo at this time. Continue Metformin 1000 mg BID (did not change to XR at last visit), Trulicity 1.5 mg weekly, Invokana 300 mg daily. Decrease Levemir to 45 units daily, increase Novolog to be given 10 units with breakfast and with dinner     Instructed to monitor BG 3 times daily (before breakfast, dinner and bedtime), document on BG logs, and bring complete BG logs to all visits    Diabetes education in 4 weeks for log review, MDI titration, and instruction of correction scale (150 +1)  Reinforce Vgo suggestion at every visit - not sure patient will be able to administer MDI with long term success due to missed doses on current once daily Novolog   Empowerment f/u in 4 months with BMP, A1c, and MAC before    2- Proteinuria - MAC improving but remains elevated, if not improved significantly in 3 months, recommend  nephrology referral. On max dose Losartan. Discussed correlation between uncontrolled DM and worsening nephropathy.     3 - Peripheral neuropathy - Educated patient to check feet daily for any foreign objects and/or wounds. Discussed with patient the importance of wearing appropriate footwear at all times, not to walk barefoot ever, and to check shoes before putting them on feet. Instructed patient to keep feet dry by regularly changing shoes and socks and drying feet after baths and exercises. Also, instructed patient to report any new lesions, discolorations, or swelling to a healthcare professional.     4- Hypertension - goal < 140/80 mmHg -  At goal, on ARB, continue current medications   BP Readings from Last 3 Encounters:   04/07/17 126/82   01/27/17 130/74   01/13/17 124/84       5 Hyperlipidemia -  LFT's WNL. LDL at goal of <100. On Zocor. Continue current medications.     Lab Results   Component Value Date    LDLCALC 75.4 01/23/2017       6- Body mass index is 28.7 kg/(m^2). = Overweight. Modest weight loss (5-10%) may greatly improve BG control. May benefit from weight loss properties of Trulicity + Invokana

## 2017-04-07 NOTE — MR AVS SNAPSHOT
[facility]  [address]  [facility] [address]  Phone: [phone]                  Yon Loyd   2017 3:30 PM   Clinical Support    Description:  Male : 1965   Provider:  [facility]   Department:  [facility]           Reason for Visit     Diabetes           Diagnoses this Visit        Comments    Type 2 diabetes mellitus with diabetic polyneuropathy, with long-term current use of insulin    -  Primary     Type 2 diabetes mellitus with hyperglycemia, with long-term current use of insulin         Uncontrolled type 2 diabetes mellitus with polyneuropathy                To Do List           Future Appointments        Provider Department Dept Phone    2017 1:00 PM Rosa Mcrae RD [facility] [address] - Diabetes Management [phone]    2017 1:30 PM LAB, [facility] [phone]    2017 1:45 PM LAB, [facility] [phone]    2017 1:00 PM [facility] [phone]      Goals (5 Years of Data)     None       These Medications        Disp Refills Start End    canagliflozin (INVOKANA) 300 mg Tab tablet 30 tablet 5 2017     Take 1 tablet (300 mg total) by mouth once daily. - Oral    Pharmacy: [facility] [address] Ph #: [phone]       dulaglutide (TRULICITY) 1.5 mg/0.5 mL PnIj 2 mL 4 2017     Inject 1.5 mg into the skin every 7 days. - Subcutaneous    Pharmacy: [facility] [place], [place] - [address] Ph #: [phone]       metformin (GLUCOPHAGE) 1000 MG tablet 180 tablet 2 2017     Take 1 tablet (1,000 mg total) by mouth 2 (two) times daily with meals. - Oral    Pharmacy: [facility] [place], [place] - [address] Ph #: [phone]       insulin aspart (NOVOLOG FLEXPEN) 100 unit/mL InPn pen 1 Box 4 2017     Inject 10 Units into the skin 3 (three)  times daily with meals. Plus correction scale 45 units daily max - Subcutaneous    Pharmacy: [facility] [place], [place] - [address] Ph #: [phone]       insulin detemir (LEVEMIR FLEXPEN) 100 unit/mL (3 mL) SubQ InPn pen 2 Box 5 4/7/2017     Inject 45 Units into the skin once daily. - Subcutaneous    Pharmacy: [facility], [place] - [address] Ph #: [phone]         [facility]     [facility] Nurse Care Line - 24/7 Assistance  Unless otherwise directed by your provider, please contact [facility], our nurse care line that is available for 24/7 assistance.     Registered nurses in the [facility] provide: appointment scheduling, clinical advisement, health education, and other advisory services.  Call: [phone] (toll free)               Medications           Message regarding Medications     Verify the changes and/or additions to your medication regime listed below are the same as discussed with your clinician today.  If any of these changes or additions are incorrect, please notify your healthcare provider.        START taking these NEW medications        Refills    insulin aspart (NOVOLOG FLEXPEN) 100 unit/mL InPn pen 4    Sig: Inject 10 Units into the skin 3 (three) times daily with meals. Plus correction scale 45 units daily max    Class: Normal    Route: Subcutaneous      CHANGE how you are taking these medications     Start Taking Instead of    insulin detemir (LEVEMIR FLEXPEN) 100 unit/mL (3 mL) SubQ InPn pen insulin detemir (LEVEMIR FLEXPEN) 100 unit/mL (3 mL) SubQ InPn pen    Dosage:  Inject 45 Units into the skin once daily. Dosage:  Inject 60 Units into the skin once daily.    Reason for Change:  Reorder       STOP taking these medications     metformin (GLUCOPHAGE-XR) 500 MG 24 hr tablet Take 4 tablets (2,000 mg total) by mouth daily with breakfast.    HUMALOG KWIKPEN 200 unit/mL (3 mL) InPn Inject 10 Units into the skin daily with  "dinner or evening meal.    blood sugar diagnostic Strp Freestyle Freedom Lite BG test strips & lancets. Check blood sugar 2-3 x/day.           Verify that the below list of medications is an accurate representation of the medications you are currently taking.  If none reported, the list may be blank. If incorrect, please contact your healthcare provider. Carry this list with you in case of emergency.           Current Medications     aspirin (ECOTRIN) 81 MG EC tablet Take 81 mg by mouth once daily.    canagliflozin (INVOKANA) 300 mg Tab tablet Take 1 tablet (300 mg total) by mouth once daily.    dulaglutide (TRULICITY) 1.5 mg/0.5 mL PnIj Inject 1.5 mg into the skin every 7 days.    insulin detemir (LEVEMIR FLEXPEN) 100 unit/mL (3 mL) SubQ InPn pen Inject 45 Units into the skin once daily.    losartan (COZAAR) 100 MG tablet Take 1 tablet (100 mg total) by mouth once daily.    metformin (GLUCOPHAGE) 1000 MG tablet Take 1 tablet (1,000 mg total) by mouth 2 (two) times daily with meals.    pen needle, diabetic (BD ULTRA-FINE BLACK PEN NEEDLES) 32 gauge x 5/32" Ndle Uses 2  times daily, on injections. Dispense 4 mm needles only    simvastatin (ZOCOR) 40 MG tablet Take 1 tablet (40 mg total) by mouth once daily.    insulin aspart (NOVOLOG FLEXPEN) 100 unit/mL InPn pen Inject 10 Units into the skin 3 (three) times daily with meals. Plus correction scale 45 units daily max           Clinical Reference Information           Your Vitals Were     BP Pulse Height Weight BMI    126/82 86 6' (1.829 m) 96 kg (211 lb 10.3 oz) 28.7 kg/m2      Blood Pressure          Most Recent Value    BP  126/82      Allergies as of 4/7/2017     Ace Inhibitors      Immunizations Administered on Date of Encounter - 4/7/2017     None      Orders Placed During Today's Visit     Future Labs/Procedures Expected by Expires    Basic metabolic panel  4/7/2017 4/7/2018    Hemoglobin A1c  4/7/2017 4/7/2018    Microalbumin/creatinine urine ratio  4/7/2017 " 6/6/2018      [facility] Sign-Up     Activating your [facility] account is as easy as 1-2-3!     1) Visit [email or web address], select Sign Up Now, enter this activation code and your date of birth, then select Next.  [email or web address]  Expires: 5/22/2017  4:15 PM      2) Create a username and password to use when you visit [facility] in the future and select a security question in case you lose your password and select Next.    3) Enter your e-mail address and click Sign Up!    Additional Information  If you have questions, please e-mail [email or web address] or call [phone] to talk to our [facility] staff. Remember, [facility] is NOT to be used for urgent needs. For medical emergencies, dial 911.         Instructions    Continue your Metformin twice daily, Invokana once daily and Trulicity 1.5 mg once weekly    Decrease your Levemir to 45 units daily    Start giving Novolog 10 units before breakfast AND before dinner    Diabetes education in 1 month for blood sugar logs review and teach you a scale        Language Assistance Services     ATTENTION: Language assistance services are available, free of charge. Please call [phone].      ATENCIÓN: Si shelia beltránsulma, tiene a dawn disposición servicios gratuitos de asistencia lingüística. Llame al [phone].     JODY Ý: N?u b?n nói Ti?ng Vi?t, có các d?ch v? h? tr? ngôn ng? mi?n phí dành cho b?n. G?i s? [phone].         [facility]  [facility] complies with applicable Federal civil rights laws and does not discriminate on the basis of race, color, national origin, age, disability, or sex.

## 2017-04-10 RX ORDER — SIMVASTATIN 40 MG/1
TABLET, FILM COATED ORAL
Qty: 90 TABLET | Refills: 3 | Status: SHIPPED | OUTPATIENT
Start: 2017-04-10 | End: 2018-06-19 | Stop reason: SDUPTHER

## 2017-04-10 RX ORDER — LOSARTAN POTASSIUM 100 MG/1
TABLET ORAL
Qty: 90 TABLET | Refills: 3 | Status: SHIPPED | OUTPATIENT
Start: 2017-04-10 | End: 2018-06-19 | Stop reason: SDUPTHER

## 2017-05-19 ENCOUNTER — CLINICAL SUPPORT (OUTPATIENT)
Dept: DIABETES | Facility: CLINIC | Age: 52
End: 2017-05-19
Payer: COMMERCIAL

## 2017-05-19 DIAGNOSIS — Z79.4 TYPE 2 DIABETES MELLITUS WITH DIABETIC NEPHROPATHY, WITH LONG-TERM CURRENT USE OF INSULIN: Chronic | ICD-10-CM

## 2017-05-19 DIAGNOSIS — E11.21 TYPE 2 DIABETES MELLITUS WITH DIABETIC NEPHROPATHY, WITH LONG-TERM CURRENT USE OF INSULIN: Chronic | ICD-10-CM

## 2017-05-19 PROCEDURE — G0108 DIAB MANAGE TRN  PER INDIV: HCPCS | Mod: S$GLB,,, | Performed by: DIETITIAN, REGISTERED

## 2017-05-19 NOTE — PROGRESS NOTES
"Diabetes Education  Author: Rosa Mcrae RD  Date: 5/19/2017    Diabetes Education Visit  Diabetes Education Record Assessment/Progress: Post Program/Follow-up (Here for follow-up from empowerment. Vikram Webb, starting correction scale with Novolog at visit today. )    Diabetes Type  Diabetes Type : Type II         Nutrition  Meal Planning: 3 meals per day, water    Monitoring   Self Monitoring : SMBG 3 times daily fasting, before dinner and HS - breakfast 101-188, dinner - , and -240  Blood Glucose Logs: Yes         Current Diabetes Treatment   Current Treatment: Oral Medication, Injectable, Insulin (Metformin 1000mg BID, Invokana 300mg once daily, Trulicity 1.5mg once weekly, Novolog 10 units before breakfast and dinner (adding correction 1:50 target 150), Levemir 45 units once daily.)    Social History  Preferred Learning Method: Face to Face, Reading Materials  Primary Support: Self                             Barriers to Change  Barriers to Change: None    Readiness to Learn   Readiness to Learn : Acceptance    Cultural Influences  Cultural Influences: No    Diabetes Education Assessment/Progress    Acute Complications (preventing, detecting, and treating acute complications): Discussion, Individual Session, Written Materials Provided, Competent (verbalizes/demonstrates), Instructed (Reviewed s/s of hypoglycemia and appropriate treatment with "rule of 15.")    Nutrition (Incorporating nutritional management into one's lifestyle): Discussion, Individual Session, Written Materials Provided, Instructed, Requires Assistance (Pt is eating large snacks after dinner resulting in high HS readings. Sometimes snacks on 2 waffles or 1 bagel. Has been trying to strictly limit CHO at meal. Explained d/t insulin regimen, better to eat CHO at meal instead of at snacks. Discussed sources of CHO, serving sizes, and rec'd eating pattern with 45-60g CHO/meal, 3 meals daily and choosing mostly 0-5g CHO snacks. " Discussed example non-CHO snacks and provided sample meal plan.)    Medications (states correct name, dose, onset, peak, duration, side effects & timing of meds): Discussion, Individual Session, Written Materials Provided, Competent (verbalizes/demonstrates), Instructed (Reviewed timing, dosage and MOA of DM meds. Reports he has improved on taking meds - misses maybe 1 Novolog injection per week. Instructed on using correction scale 1:50 with target 150 with Novolog per Tracey Abigail, NP. Pt was able to appropriately verbalize correct usage of scale based on examples. )    Monitoring (monitoring blood glucose/other parameters & using results): Discussion, Individual Session, Written Materials Provided, Competent (verbalizes/demonstrates), Instructed (Brought logs today - reviewed with pt and will share with NP for her review and titration of meds. Encouraged continuing to SMBG 3-4 times daily and bring logs to appt. )    Goal Setting and Problem Solving (verbalizes behavior change strategies & sets realistic goals): Discussion, Individual Session    Behavior Change (developing personal strategies to health & behavior change): Discussion, Individual Session              Diabetes Care Plan/Intervention  Education Plan/Intervention: Diabetes Empowerment Program (Next empowerment appt 8/11.)    Diabetes Meal Plan  Carbohydrate Per Meal: 45-60g  Carbohydrate Per Snack :  (0-5g CHO)    Education Units of Time   Time Spent: 45 min      Health Maintenance Topics with due status: Not Due       Topic Last Completion Date    Pneumococcal PPSV23 (Medium Risk) 06/09/2016    Foot Exam 12/23/2016    Influenza Vaccine 01/13/2017    Lipid Panel 01/23/2017    Hemoglobin A1c 03/29/2017     Health Maintenance Due   Topic Date Due    TETANUS VACCINE  05/16/1982    Colonoscopy  05/16/2014    Eye Exam  06/09/2017

## 2017-05-25 ENCOUNTER — PATIENT OUTREACH (OUTPATIENT)
Dept: DIABETES | Facility: CLINIC | Age: 52
End: 2017-05-25
Payer: COMMERCIAL

## 2017-05-25 ENCOUNTER — TELEPHONE (OUTPATIENT)
Dept: DIABETES | Facility: CLINIC | Age: 52
End: 2017-05-25

## 2017-05-25 DIAGNOSIS — E11.42 TYPE 2 DIABETES MELLITUS WITH DIABETIC POLYNEUROPATHY, WITH LONG-TERM CURRENT USE OF INSULIN: Primary | Chronic | ICD-10-CM

## 2017-05-25 DIAGNOSIS — Z79.4 TYPE 2 DIABETES MELLITUS WITH DIABETIC POLYNEUROPATHY, WITH LONG-TERM CURRENT USE OF INSULIN: Primary | Chronic | ICD-10-CM

## 2017-05-25 NOTE — PROGRESS NOTES
Called pt regarding instructions per Tracey Hayes NP. Reviewed decrease Levemir to 40 units once daily and continue current dosages of Trulicity and Metformin as well as continue Novolog 10 units before breakfast and before dinner. Mr. Loyd repeated med change and verbalized acceptance. Also advised to SMBG 3 times daily before breakfast, before dinner and HS and send log in 2 weeks for NP to review and titrate insulin if needed. He verbalized understanding of all.

## 2017-05-25 NOTE — PROGRESS NOTES
Called pt regarding instructions for adjusting insulin per Tracey Hayes NP. No answer. Left voicemail message for pt to return call with my direct phone number to review instructions.

## 2017-05-25 NOTE — TELEPHONE ENCOUNTER
Bg logs received, not checking BG as frequently as he should, given he should be taking Novolog insulin twice daily before breakfast and before dinner.    Please instruct patient to decrease Levemir to 40 units daily. Continue Metformin and Trulicity at current doses, continue Novolog 10 units before breakfast and before dinner. Please instruct pt to send logs via mail in 2 weeks to me. If he does not have logs to mail, let me know and I can get my staff to mail him logs with my address    Thanks

## 2017-07-10 DIAGNOSIS — Z12.11 COLON CANCER SCREENING: ICD-10-CM

## 2017-08-01 ENCOUNTER — LAB VISIT (OUTPATIENT)
Dept: LAB | Facility: HOSPITAL | Age: 52
End: 2017-08-01
Attending: NURSE PRACTITIONER
Payer: COMMERCIAL

## 2017-08-01 DIAGNOSIS — Z79.4 TYPE 2 DIABETES MELLITUS WITH DIABETIC POLYNEUROPATHY, WITH LONG-TERM CURRENT USE OF INSULIN: Chronic | ICD-10-CM

## 2017-08-01 DIAGNOSIS — E11.42 TYPE 2 DIABETES MELLITUS WITH DIABETIC POLYNEUROPATHY, WITH LONG-TERM CURRENT USE OF INSULIN: Chronic | ICD-10-CM

## 2017-08-01 LAB
ANION GAP SERPL CALC-SCNC: 13 MMOL/L
BUN SERPL-MCNC: 20 MG/DL
CALCIUM SERPL-MCNC: 9.5 MG/DL
CHLORIDE SERPL-SCNC: 102 MMOL/L
CO2 SERPL-SCNC: 27 MMOL/L
CREAT SERPL-MCNC: 1.04 MG/DL
EST. GFR  (AFRICAN AMERICAN): >60 ML/MIN/1.73 M^2
EST. GFR  (NON AFRICAN AMERICAN): >60 ML/MIN/1.73 M^2
GLUCOSE SERPL-MCNC: 115 MG/DL
POTASSIUM SERPL-SCNC: 3.9 MMOL/L
SODIUM SERPL-SCNC: 142 MMOL/L

## 2017-08-01 PROCEDURE — 80048 BASIC METABOLIC PNL TOTAL CA: CPT

## 2017-08-01 PROCEDURE — 36415 COLL VENOUS BLD VENIPUNCTURE: CPT | Mod: PO

## 2017-08-01 PROCEDURE — 83036 HEMOGLOBIN GLYCOSYLATED A1C: CPT | Mod: PO

## 2017-08-02 LAB
ESTIMATED AVG GLUCOSE: 128 MG/DL
HBA1C MFR BLD HPLC: 6.1 %

## 2017-08-04 DIAGNOSIS — Z12.11 COLON CANCER SCREENING: ICD-10-CM

## 2017-08-11 ENCOUNTER — CLINICAL SUPPORT (OUTPATIENT)
Dept: DIABETES | Facility: CLINIC | Age: 52
End: 2017-08-11
Payer: COMMERCIAL

## 2017-08-11 ENCOUNTER — INITIAL CONSULT (OUTPATIENT)
Dept: OPTOMETRY | Facility: CLINIC | Age: 52
End: 2017-08-11
Payer: COMMERCIAL

## 2017-08-11 VITALS
DIASTOLIC BLOOD PRESSURE: 78 MMHG | HEART RATE: 88 BPM | HEIGHT: 72 IN | BODY MASS INDEX: 28.54 KG/M2 | WEIGHT: 210.75 LBS | SYSTOLIC BLOOD PRESSURE: 118 MMHG

## 2017-08-11 DIAGNOSIS — R80.9 PROTEINURIA DUE TO TYPE 2 DIABETES MELLITUS: Chronic | ICD-10-CM

## 2017-08-11 DIAGNOSIS — H25.13 NUCLEAR SCLEROTIC CATARACT OF BOTH EYES: ICD-10-CM

## 2017-08-11 DIAGNOSIS — Z79.4 TYPE 2 DIABETES MELLITUS WITH DIABETIC NEPHROPATHY, WITH LONG-TERM CURRENT USE OF INSULIN: Primary | Chronic | ICD-10-CM

## 2017-08-11 DIAGNOSIS — Z79.4 CONTROLLED TYPE 2 DIABETES MELLITUS WITH BOTH EYES AFFECTED BY MILD NONPROLIFERATIVE RETINOPATHY WITHOUT MACULAR EDEMA, WITH LONG-TERM CURRENT USE OF INSULIN: Primary | ICD-10-CM

## 2017-08-11 DIAGNOSIS — E66.3 OVERWEIGHT (BMI 25.0-29.9): Chronic | ICD-10-CM

## 2017-08-11 DIAGNOSIS — H52.223 REGULAR ASTIGMATISM OF BOTH EYES: ICD-10-CM

## 2017-08-11 DIAGNOSIS — Z79.4 TYPE 2 DIABETES MELLITUS WITH HYPERGLYCEMIA, WITH LONG-TERM CURRENT USE OF INSULIN: Chronic | ICD-10-CM

## 2017-08-11 DIAGNOSIS — E11.29 PROTEINURIA DUE TO TYPE 2 DIABETES MELLITUS: Chronic | ICD-10-CM

## 2017-08-11 DIAGNOSIS — E78.5 HYPERLIPIDEMIA LDL GOAL <100: Chronic | ICD-10-CM

## 2017-08-11 DIAGNOSIS — I10 ESSENTIAL HYPERTENSION: Chronic | ICD-10-CM

## 2017-08-11 DIAGNOSIS — E11.21 TYPE 2 DIABETES MELLITUS WITH DIABETIC NEPHROPATHY, WITH LONG-TERM CURRENT USE OF INSULIN: Primary | Chronic | ICD-10-CM

## 2017-08-11 DIAGNOSIS — E11.3293 CONTROLLED TYPE 2 DIABETES MELLITUS WITH BOTH EYES AFFECTED BY MILD NONPROLIFERATIVE RETINOPATHY WITHOUT MACULAR EDEMA, WITH LONG-TERM CURRENT USE OF INSULIN: Primary | ICD-10-CM

## 2017-08-11 DIAGNOSIS — E11.42 TYPE 2 DIABETES MELLITUS WITH DIABETIC POLYNEUROPATHY, WITH LONG-TERM CURRENT USE OF INSULIN: Chronic | ICD-10-CM

## 2017-08-11 DIAGNOSIS — Z79.4 TYPE 2 DIABETES MELLITUS WITH DIABETIC POLYNEUROPATHY, WITH LONG-TERM CURRENT USE OF INSULIN: Chronic | ICD-10-CM

## 2017-08-11 DIAGNOSIS — E11.65 TYPE 2 DIABETES MELLITUS WITH HYPERGLYCEMIA, WITH LONG-TERM CURRENT USE OF INSULIN: Chronic | ICD-10-CM

## 2017-08-11 PROCEDURE — 92015 DETERMINE REFRACTIVE STATE: CPT | Mod: S$GLB,,, | Performed by: OPTOMETRIST

## 2017-08-11 PROCEDURE — 92004 COMPRE OPH EXAM NEW PT 1/>: CPT | Mod: S$GLB,,, | Performed by: OPTOMETRIST

## 2017-08-11 PROCEDURE — 99214 OFFICE O/P EST MOD 30 MIN: CPT | Mod: S$GLB,,, | Performed by: NURSE PRACTITIONER

## 2017-08-11 PROCEDURE — 99999 PR PBB SHADOW E&M-EST. PATIENT-LVL II: CPT | Mod: PBBFAC,,, | Performed by: OPTOMETRIST

## 2017-08-11 PROCEDURE — 99999 PR PBB SHADOW E&M-EST. PATIENT-LVL IV: CPT | Mod: PBBFAC,,, | Performed by: NURSE PRACTITIONER

## 2017-08-11 RX ORDER — METFORMIN HYDROCHLORIDE 1000 MG/1
1000 TABLET ORAL 2 TIMES DAILY WITH MEALS
Qty: 180 TABLET | Refills: 2 | Status: SHIPPED | OUTPATIENT
Start: 2017-08-11 | End: 2017-11-15 | Stop reason: SDUPTHER

## 2017-08-11 RX ORDER — PEN NEEDLE, DIABETIC 30 GX3/16"
NEEDLE, DISPOSABLE MISCELLANEOUS
Qty: 200 EACH | Refills: 3 | Status: SHIPPED | OUTPATIENT
Start: 2017-08-11 | End: 2018-09-28 | Stop reason: SDUPTHER

## 2017-08-11 RX ORDER — INSULIN ASPART 100 [IU]/ML
INJECTION, SOLUTION INTRAVENOUS; SUBCUTANEOUS
Qty: 1 BOX | Refills: 4 | Status: SHIPPED | OUTPATIENT
Start: 2017-08-11 | End: 2017-11-15 | Stop reason: SDUPTHER

## 2017-08-11 NOTE — PATIENT INSTRUCTIONS
There are a few stages of diabetic retinopathy: mild, moderate, severe, and proliferative. You have mild stage in both eyes.    ==============================================        What Is Diabetic Retinopathy?  Diabetic retinopathy is the leading cause of blindness in adults. It occurs when diabetes harms blood vessels inside the eye. These weak vessels leak fluid into an area of the eye called the retina. Weak new vessels can break and bleed into the retina. Old vessels can leak and cause swelling. These vessels can damage areas of the retina, causing blurry, distorted vision.    What causes diabetic retinopathy?  Diabetes is the cause of this eye disease. Over time, diabetes makes blood vessels weaken all over the body, including in the eyes. Poor blood sugar control can make retinopathy worse. So can smoking, high cholesterol, or poorly controlled high blood pressure. Pregnancy can also cause retinopathy to worsen.  What are the symptoms?  You can have diabetic retinopathy without knowing it. Usually, there is no pain and no outward sign. Over time, you may notice gradual blurring or some vision loss. Symptoms may come and go. Early treatment and good control of risk factors may help prevent vision loss or blindness.  What you can do  Have your eyes examined regularly by an eye specialist. Your healthcare provider will tell you when and how often you need these exams. You can also help control your diabetes through exercise, diet, and medicine, as instructed by your healthcare provider. These same steps may also help control diabetic retinopathy.           Treating Diabetic Retinopathy  Treatment may help slow the progress of diabetic retinopathy. Your treatment plan depends on your condition. It may include frequent exams to monitor your condition, laser treatment, and other procedures.      Laser is one type of treatment that may help limit vision loss from diabetic retinopathy.      Monitoring Your  Vision  At first, your doctor may simply want to monitor your vision. In some cases, you may also have an angiogram. This test uses a special dye to create detailed images of the retina. These images help your doctor decide whether special treatments are needed. You may also have ocular coherence tomography (OCT) testing. This uses light waves to see if there is fluid leaking into certain parts of the eye. It can also measure the thickness of the retina.  Types of Treatment  Special treatments can help stop bleeding, slow or stop new vessel growth, and preserve vision. The type of treatment you get depends on your condition:  · Laser treatment can help stop leaks and limit abnormal vessel growth.  · Surgery can remove the vitreous or repair a retina that is damaged by scar tissue formation. This may help if the vitreous becomes filled with blood and obscures your vision.  · Cryotherapy shrinks blood vessels and repairs the retina.  · Medications injected in the eye can help decrease swelling of the retina or slow the abnormal growth of blood vessels.   ·   © 2415-8403 [employer]. [address] [place] [address]. All rights reserved. This information is not intended as a substitute for professional medical care. Always follow your healthcare professional's instructions.         Managing Type 2 Diabetes  Type 2 diabetes is a long-term (chronic) condition. Managing your diabetes means making some changes that may be hard. Your health care provider, nurse, diabetes educator, and others can help you.  Managing type 2 diabetes means balancing your medicine with diet and activity. Managing your diabetes may also include checking your blood sugar. And, working with your health care provider to prevent complications.  Take your medicine as prescribed  You may take pills (oral medicine) or give yourself shots (insulin injections) for diabetes. Or you may use both. Taking your medicines or giving  yourself insulin at the right times will help you control your blood sugar. Think about ways that will help you remember to take your medicines the right way every day. Ask your health care provider, nurse, or diabetes educator for ideas.  Although you may only take pills for your diabetes, this may change. Over time, most people with type 2 diabetes also use insulin.  Eat healthy  A healthy, well-planned diet helps control the amount of sugar in your blood. It also helps you stay at a healthy weight or lose weight, if you are overweight. Extra weight makes it harder to control diabetes.  Your health care provider, nurse, a dietitian, or diabetes educator will help you create a plan that works for you. You don't have to give up all the foods you like. Having meals and snacks with vegetables, fruits, lean meats, or other healthy proteins, whole grains, and low- or no-fat dairy products will help control your blood sugar.  Be physically active  Being active helps lower your blood sugar. It does this by helping your body use insulin to turn food into energy. Activity also helps you manage your weight:  Ask your health care provider to work with you to create an activity program that's right for you. Your activity programs is based on your age, general health, and types of activity you enjoy. You should start slowly, but aim for at least 30 minutes of exercise or activity on most days.  Check your blood sugar  Checking your own blood sugar may be a regular part of your care. Or you may only check your blood sugar from time to time. Your health care provider will give you instructions about checking your blood sugar at home. Checking it tells you if your blood sugar is in your target range. A target range means that you are managing your diabetes well.  If your blood sugar levels are too high or too low, your health care provider may suggest changes to your diet or activity level. He or she may also adjust your  medication.  Your health care provider may also tell you to check your blood sugar more often when you are sick. At certain times, for example, when you have a cold or the flu, you may need to check it more often.  Take care of yourself  When you have diabetes, you may be more likely to develop other health problems. They include foot, eye, heart, and kidney problems. By controlling your blood sugar, and taking good care of yourself, you can help prevent these problems. Your health care provider, nurse, diabetes educator, and others can assist you.  · Checkups. You should have regular checkups with your health care provider. At those visits, you will have a physical exam that includes checking your feet. Your health care provider will also check your blood pressure and weight.  · Other exams. You should also have complete eye, foot, and dental exams at least once every year.  · Lab tests. You will have blood and urine tests.  ¨ At least 2 times a year, your health care provider will check your hemoglobin A1C. This blood test shows how well you have been controlling your blood sugar over 2 to 3 months. The results help your health care provider manage your diabetes.    ¨ You will also have other lab tests. For example, to check for kidney problems and abnormal cholesterol levels.  · Smoking. If you smoke, you will need to quit. Smoking increases the chance that you will develop complications from diabetes. Ask your health care provider about ways to quit.  · Vaccines. Get a yearly flu shot. And, ask your health care provider about vaccines to prevent pneumonia and hepatitis B.  Stress and depression  Most people have challenges throughout their lives. Living with diabetes, or any serious condition, can increase your stress and make you feel a lot of different emotions. In diabetes, feeling stressed or depressed can actually affect your blood sugar levels.  If you are having trouble dealing with diabetes, tell your  health care provider. He or she can help or refer you to other health care providers or programs.  Support and resources  Know where you can get help. You can try the following:  · Support. Ask family and friends to support your effects to take care of yourself. Or, look for a diabetes support group locally or on the internet. (Check the Connect with Others link on www.diabetes.org)  · Counseling. Talk with a , psychologist, psychiatrist, or other counselor.  · Information. Contact the American Diabetes Association at 676-334-4368 or www.diabetes.org      © 0979-7197 [employer]. [address] [place] [address]. All rights reserved. This information is not intended as a substitute for professional medical care. Always follow your healthcare professional's instructions.    ==============================================    CATARACT    Symptoms and Signs:  A cataract starts out small, and at first has little effect on your vision. You may notice that your vision is blurred a little, like looking through a cloudy piece of glass or viewing an impressionist painting. A cataract may make light from the sun or a lamp seem too bright or glaring. Or you may notice when you drive at night that the oncoming headlights cause more glare than before. Colors may not appear as bright as they once did.  The type of cataract you have will affect exactly which symptoms you experience and how soon they will occur. When a nuclear cataract first develops it can bring about a temporary improvement in your near vision, called second sight. Unfortunately, the improved vision is short-lived and will disappear as the cataract worsens. Meanwhile, a sub-capsular cataract may not produce any symptoms until it's well-developed.    Causes:  No one knows for sure why the eye's lens changes as we age, forming cataracts. Researchers are gradually identifying factors that may cause cataracts - and information that  may help to prevent them.  Many studies suggest that exposure to ultraviolet light is associated with cataracts, so eye care practitioners recommend wearing sunglasses and a wide-brimmed hat to lessen your exposure.  Other studies suggest people with diabetes are at risk for developing a cataract.   Some eye care practitioners believe that a diet high in antioxidants, such as beta-carotene (vitamin A), selenium and vitamins C and E, may forestall cataracts.  The most important of these is probably vitamin C; it might be helpful to supplement the diet with an extra Vitamin C tablet.  Meanwhile, eating a lot of salt may increase your risk.  Other risk factors include cigarette smoke, air pollution and heavy alcohol consumption.  We simply recommend that you be careful to use sunglasses and to take Vitamin C.    Treatment:  When symptoms begin to appear, we can improve your vision for a while using new glasses, strong bifocals, magnification, appropriate lighting or other visual aids.  This is true in your case; your cataract does not impact your vision very much at this time. If you experience any of the symptoms we described you can return at any time. Otherwise it is fine to see you in 1 year.

## 2017-08-11 NOTE — LETTER
August 11, 2017      Tracey Hayes, NP  [address]  [facility] [address]           [facility]  [address]  [facility] [address]  Phone: [phone]          Patient: Yon Loyd   MR Number: [id number]   YOB: 1965   Date of Visit: 8/11/2017       Dear Tracey Hayes:    Thank you for referring Yon Loyd to me for evaluation. Attached you will find relevant portions of my assessment and plan of care.    If you have questions, please do not hesitate to call me. I look forward to following Yon Loyd along with you.    Sincerely,    Nathaly Malhotra, OD    Enclosure  CC:  No Recipients    If you would like to receive this communication electronically, please contact [email or web address] or [phone] to request more information on [employer] Link access.    For providers and/or their staff who would like to refer a patient to [facility], please contact us through our one-stop-shop provider referral line, [facility], at [phone].    If you feel you have received this communication in error or would no longer like to receive these types of communications, please e-mail [email or web address]

## 2017-08-11 NOTE — Clinical Note
Dear Tracey,  Thank you for referring Mr. Loyd for a diabetic eye examination. He has mild non-proliferative diabetic retinopathy in both eyes. I will check him again in 6 months. Please let me know if you have questions.  Sincerely, Nathaly Malhotra OD

## 2017-08-11 NOTE — PROGRESS NOTES
CC:  Diabetes.     HPI: Yon Loyd is a 52 y.o. male [facility]. The patient has had diabetes along with associated comorbidities indicated in the Visit Diagnosis section of this encounter. The patient was diagnosed with Type 2 diabetes in ~1993.  Been on insulin since ~2013.     VISIT #: 4    Last visit - Novolog changed to before breakfast and before dinner.     Today, presents alone. A1c at goal      DIABETES COMPLICATIONS: nephropathy and peripheral neuropathy     HOSPITALIZATIONS FOR DIABETES OR RELATED COMPLICATIONS:  No    CURRENT A1C:    Hemoglobin A1C   Date Value Ref Range Status   08/01/2017 6.1 (H) 4.0 - 5.6 % Final     Comment:     According to ADA guidelines, hemoglobin A1c <7.0% represents  optimal control in non-pregnant diabetic patients. Different  metrics may apply to specific patient populations.   Standards of Medical Care in Diabetes-2016.  For the purpose of screening for the presence of diabetes:  <5.7%     Consistent with the absence of diabetes  5.7-6.4%  Consistent with increasing risk for diabetes   (prediabetes)  >or=6.5%  Consistent with diabetes  Currently, no consensus exists for use of hemoglobin A1c  for diagnosis of diabetes for children.  This Hemoglobin A1c assay has significant interference with fetal   hemoglobin   (HbF). The results are invalid for patients with abnormal amounts of   HbF,   including those with known Hereditary Persistence   of Fetal Hemoglobin. Heterozygous hemoglobin variants (HbAS, HbAC,   HbAD, HbAE, HbA2) do not significantly interfere with this assay;   however, presence of multiple variants in a sample may impact the %   interference.     03/29/2017 6.9 (H) 4.5 - 6.2 % Final     Comment:     According to ADA guidelines, hemoglobin A1C <7.0% represents  optimal control in non-pregnant diabetic patients.  Different  metrics may apply to specific populations.   Standards of Medical Care in Diabetes - 2016.  For  the purpose of screening for the presence of diabetes:  <5.7%     Consistent with the absence of diabetes  5.7-6.4%  Consistent with increasing risk for diabetes   (prediabetes)  >or=6.5%  Consistent with diabetes  Currently no consensus exists for use of hemoglobin A1C  for diagnosis of diabetes for children.     01/03/2017 8.1 (H) 4.5 - 6.2 % Final     Comment:     According to ADA guidelines, hemoglobin A1C <7.0% represents  optimal control in non-pregnant diabetic patients.  Different  metrics may apply to specific populations.   Standards of Medical Care in Diabetes - 2016.  For the purpose of screening for the presence of diabetes:  <5.7%     Consistent with the absence of diabetes  5.7-6.4%  Consistent with increasing risk for diabetes   (prediabetes)  >or=6.5%  Consistent with diabetes  Currently no consensus exists for use of hemoglobin A1C  for diagnosis of diabetes for children.         GOAL A1C: <7%    DM MEDICATIONS USED IN THE PAST: Glyburide, Metformin, Januvia, Novolog, Levemir, Trulicity    MEDICATIONS UPON START OF PROGRAM: Levemir 65 units BID, Novolog 10 units with largest meal, Januvia 100 mg daily, Metformin 1000 mg BID  Rarely missing Levemir  Only taking Novolog once daily with largest meal    CURRENT DIABETES MEDICATIONS: Levemir 40 units daily, Trulicity 1.5 mg weekly, Metformin 1000 mg BID, Invokana 300 mg daily, Novolog 10 units with breakfast and dinner   Missing Novolog dose intermittently  Did not change to Metformin XR    ANY DIFFICULTY AFFORDING YOUR CURRENT MEDICATION REGIMEN?  No    CGMS interpretation:  Done 4/2017  1. FBG at goal, some hypo overnight  2. Prandial excursions uncontrolled with some meals  3. Unbalanced basal/prandial doses    DO YOU USE THE [facility] EMAIL SYSTEM? No    STANDARDS of CARE:  Statin:  Yes - Simvastatin   ACEI or ARB:  Yes - Losartan   ASA:  Yes - 81 mg   Last eye exam - needs updated eye exam  Microalbumin/Creatinine ratio:  Lab Results   Component  Value Date    MICALBCREAT 254.4 (H) 08/01/2017       BLOOD GLUCOSE MONITORING:  Checking BG 3 times daily, will send logs                 HYPOGLYCEMIA:  No    CURRENT DAILY ROUTINE (meals/meds):   Eating 3 meals daily  Snacking on peanut butter crackers, peanuts, chips    Drinks diet coke    CURRENT EXERCISE:  No    OCCUPATION:      MEDICATIONS, ALLERGIES, LABS, VS's, MEDICAL, SURGICAL, SOCIAL, AND FAMILY HISTORY reviewed and updated in the appropriate sections during this encounter    ROS:     Constitutional: Negative for weight change  Endocrine:  Denies polyuria, polydipsia, nocturia.  HENT: Denies neck swelling, lumps, horseness or trouble swallowing. Denies any personal or family history of thyroid cancer.    Eyes: Negative for visual disturbance.   Respiratory: Negative for cough or shortness or breath.  Cardiovascular: Negative for chest pain or claudication.   Gastrointestinal: Negative for nausea, diarrhea, vomiting, bloating.  No history of pancreatitis or gastroparesis.  Genitourinary: Negative for urgency, frequency, frequent urinary tract infections.  Skin: Denies prolonged wound healing.  Neurological: Negative for syncope, + improved numbness/burning of extremities.  Psychiatric/Behavioral: Negative for depression or anxiety      All other systems reviewed and are negative.      PE:  Constitutional:   Ht 6' (1.829 m)   Wt 95.6 kg (210 lb 12.2 oz)   BMI 28.58 kg/m²    Well developed, well nourished, NAD.    HENT:   External ears, nose: no masses. No significant findings.   Hearing: normal     Neck:  No trachial deviation present, No neck masses noticed   Thyroid:  No thyromegaly present.  No thyroid tenderness.      Cardiovascular:    Auscultation:  No murmurs or abnormal sounds   Lower extremities:  No edema or cyanosis.     Respiratory:    Effort:  Normal, no use of accessory muscles.   Auscultation: breath sounds normal, no adventitious sounds.  Abdomen:     Exam:  Soft, non-tender, no  masses.      No hernia noted.  Skin:    Inspection: no rashes, lesion or ulcers, + acanthosis nigracans.   Palpation: no induration or nodules.   Insulin injection sites: no lipoatrophy or lipohypertrophy.  Psychiatric:  Judgement and insight good with normal mood and affect.  Musculoskeletal:  Gait steady. No gross abnormalities.        FOOT EXAMINATION:   Protective Sensation (w/ 10 gram monofilament):  Right: Decreased  Left: Decreased    Visual Inspection:  Normal -  Bilateral, Nails Intact - without Evidence of Foot Deformity- Bilateral and Callus -  Bilateral    Pedal Pulses:   Right: Present  Left: Present    Posterior tibialis:   Right:Present  Left: Present    Decreased vibratory response to 128 Hz tuning fork bilaterally.     ______________________________________________________________________     ASSESSMENT and PLAN:    1- Type 2 diabetes with proteinuria, retinopathy, neuropathy and hyperglycemia - A1c at goal, Patient does not wish to try Vgo at this time. Continue Metformin 1000 mg BID, Trulicity 1.5 mg weekly, Levemir 40 units daily, Novolog 10 units before breakfast and dinner. Change Invokana to Jardiance 25 mg daily if insurance allows.   If not, discussed possible increased amputation risk with Invokana use    Instructed to monitor BG 3 times daily (before breakfast, dinner and bedtime), document on BG logs, and bring complete BG logs to all visits - will email me logs     RTC in chronic DM clinic with ME in 3 months with BMP, A1c, urine MAC before    Patient has completed the [facility] but would benefit from chronic DM care in the Endocrine department. Transition of care discussed with patient.      2 - Mild NPDR bilaterally - continue improved BG control     2- Proteinuria - MAC improving but remains elevated, nephrology referral. On max dose Losartan. Discussed correlation between uncontrolled DM and worsening nephropathy.     3 - Peripheral neuropathy - Educated patient to  check feet daily for any foreign objects and/or wounds. Discussed with patient the importance of wearing appropriate footwear at all times, not to walk barefoot ever, and to check shoes before putting them on feet. Instructed patient to keep feet dry by regularly changing shoes and socks and drying feet after baths and exercises. Also, instructed patient to report any new lesions, discolorations, or swelling to a healthcare professional.     4- Hypertension - goal < 140/80 mmHg -  At goal, on ARB, continue current medications   BP Readings from Last 3 Encounters:   04/07/17 126/82   01/27/17 130/74   01/13/17 124/84     5 Hyperlipidemia -  LFT's WNL. LDL at goal of <100. On Zocor. Continue current medications.     Lab Results   Component Value Date    LDLCALC 75.4 01/23/2017       6- Body mass index is 28.58 kg/m². = Overweight. Modest weight loss (5-10%) may greatly improve BG control. May benefit from weight loss properties of Trulicity + Invokana/Jardiance

## 2017-08-11 NOTE — Clinical Note
Yon Lannyham has completed the Diabetes Empowerment program and would benefit from chronic Endocrine follow up (with myself) for frequent insulin titration. An appointment has been scheduled in 3 months with an Endocrine NP for chronic management.   Thanks TOM Almeida Endocrinology Nurse Practitioner

## 2017-10-27 ENCOUNTER — OFFICE VISIT (OUTPATIENT)
Dept: NEPHROLOGY | Facility: CLINIC | Age: 52
End: 2017-10-27
Payer: COMMERCIAL

## 2017-10-27 VITALS
DIASTOLIC BLOOD PRESSURE: 80 MMHG | SYSTOLIC BLOOD PRESSURE: 140 MMHG | HEIGHT: 72 IN | OXYGEN SATURATION: 95 % | BODY MASS INDEX: 28.58 KG/M2 | HEART RATE: 91 BPM | WEIGHT: 211 LBS

## 2017-10-27 DIAGNOSIS — E11.29 CONTROLLED TYPE 2 DIABETES MELLITUS WITH MICROALBUMINURIA, WITHOUT LONG-TERM CURRENT USE OF INSULIN: Primary | ICD-10-CM

## 2017-10-27 DIAGNOSIS — R80.9 CONTROLLED TYPE 2 DIABETES MELLITUS WITH MICROALBUMINURIA, WITHOUT LONG-TERM CURRENT USE OF INSULIN: Primary | ICD-10-CM

## 2017-10-27 PROCEDURE — 99999 PR PBB SHADOW E&M-EST. PATIENT-LVL III: CPT | Mod: PBBFAC,,, | Performed by: INTERNAL MEDICINE

## 2017-10-27 PROCEDURE — 99203 OFFICE O/P NEW LOW 30 MIN: CPT | Mod: S$GLB,,, | Performed by: INTERNAL MEDICINE

## 2017-10-27 NOTE — LETTER
November 3, 2017      Tracey Hayes, NP  [address]  [facility] [address]           [facility]  [address]  [facility] [address]  Phone: [phone]  Fax: [phone]          Patient: Yon Loyd   MR Number: [id number]   YOB: 1965   Date of Visit: 10/27/2017       Dear Tracey Hayes:    Thank you for referring Yon Loyd to me for evaluation. Attached you will find relevant portions of my assessment and plan of care.    If you have questions, please do not hesitate to call me. I look forward to following Yon Loyd along with you.    Sincerely,    John Vasquez MD    Enclosure  CC:  No Recipients    If you would like to receive this communication electronically, please contact [email or web address] or [phone] to request more information on [employer] Link access.    For providers and/or their staff who would like to refer a patient to [facility], please contact us through our one-stop-shop provider referral line, [facility], at [phone].    If you feel you have received this communication in error or would no longer like to receive these types of communications, please e-mail [email or web address]

## 2017-11-03 NOTE — PROGRESS NOTES
Subjective:       Patient ID: Yon Loyd is a 52 y.o. White male who presents for new evaluation of Proteinuria    HPI  Mr. Loyd is a 52 year old man with medical history of diabetes, hypertension presenting for evaluation of microalbuminuria.  Patient with microalbuminuria up to ~530mg 12.16, now ~250mg.  Patient reports blood sugars much better controlled since the beginning of the year.  He denies any NSAID use.  He otherwise denies any fever, chest pain, shortness of breath, abdominal pain, diarrhea, dysuria/hematuria.     Review of Systems   Constitutional: Negative for appetite change, fatigue and fever.   Respiratory: Negative for cough and shortness of breath.    Cardiovascular: Negative for chest pain and leg swelling.   Gastrointestinal: Negative for abdominal pain, constipation, diarrhea, nausea and vomiting.   Genitourinary: Negative for dysuria, flank pain, frequency, hematuria and urgency.   Musculoskeletal: Negative for arthralgias, back pain and joint swelling.   Skin: Negative for rash.   Neurological: Negative for dizziness and light-headedness.   All other systems reviewed and are negative.      Objective:      Physical Exam   Constitutional: He appears well-developed and well-nourished.   Cardiovascular: Normal rate and regular rhythm.  Exam reveals no gallop and no friction rub.    No murmur heard.  Pulmonary/Chest: Effort normal and breath sounds normal. No respiratory distress. He has no wheezes. He has no rales.   Musculoskeletal: He exhibits no edema.   Neurological: He is alert.   Skin: Skin is warm and dry. No rash noted.   Vitals reviewed.      Assessment:       1. Controlled type 2 diabetes mellitus with microalbuminuria, without long-term current use of insulin        Plan:      Mr. Loyd is a 52 year old man with medical history of diabetes, hypertension presenting for evaluation of microalbuminuria.  Patient with microalbuminuria up to ~530mg 12.16, now ~250mg, suspect  continued improvement with continued glycemic control.  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  Stressed importance of blood pressure/glycemic control to prevent any further progression of kidney disease, patient voiced understanding.     Return to clinic in 12 months with renal/heme panel, iron/TIBC/ferritin, urinalysis/culture, urine protein/creatinine ratio prior to next visit

## 2017-11-09 ENCOUNTER — LAB VISIT (OUTPATIENT)
Dept: LAB | Facility: HOSPITAL | Age: 52
End: 2017-11-09
Attending: NURSE PRACTITIONER
Payer: COMMERCIAL

## 2017-11-09 DIAGNOSIS — E11.21 TYPE 2 DIABETES MELLITUS WITH DIABETIC NEPHROPATHY, WITH LONG-TERM CURRENT USE OF INSULIN: Chronic | ICD-10-CM

## 2017-11-09 DIAGNOSIS — Z79.4 TYPE 2 DIABETES MELLITUS WITH DIABETIC NEPHROPATHY, WITH LONG-TERM CURRENT USE OF INSULIN: Chronic | ICD-10-CM

## 2017-11-09 LAB
CREAT UR-MCNC: 65 MG/DL
MICROALBUMIN UR DL<=1MG/L-MCNC: 193 UG/ML
MICROALBUMIN/CREATININE RATIO: 296.9 UG/MG

## 2017-11-09 PROCEDURE — 82570 ASSAY OF URINE CREATININE: CPT | Mod: PO

## 2017-11-15 ENCOUNTER — OFFICE VISIT (OUTPATIENT)
Dept: ENDOCRINOLOGY | Facility: CLINIC | Age: 52
End: 2017-11-15
Payer: COMMERCIAL

## 2017-11-15 VITALS
SYSTOLIC BLOOD PRESSURE: 122 MMHG | BODY MASS INDEX: 28.63 KG/M2 | HEART RATE: 74 BPM | HEIGHT: 72 IN | WEIGHT: 211.38 LBS | DIASTOLIC BLOOD PRESSURE: 83 MMHG

## 2017-11-15 DIAGNOSIS — E78.5 HYPERLIPIDEMIA LDL GOAL <100: Chronic | ICD-10-CM

## 2017-11-15 DIAGNOSIS — R80.9 PROTEINURIA DUE TO TYPE 2 DIABETES MELLITUS: Chronic | ICD-10-CM

## 2017-11-15 DIAGNOSIS — E66.3 OVERWEIGHT (BMI 25.0-29.9): Chronic | ICD-10-CM

## 2017-11-15 DIAGNOSIS — Z79.4 TYPE 2 DIABETES MELLITUS WITH DIABETIC NEPHROPATHY, WITH LONG-TERM CURRENT USE OF INSULIN: Primary | Chronic | ICD-10-CM

## 2017-11-15 DIAGNOSIS — E11.3293 TYPE 2 DIABETES MELLITUS WITH BOTH EYES AFFECTED BY MILD NONPROLIFERATIVE RETINOPATHY WITHOUT MACULAR EDEMA, WITH LONG-TERM CURRENT USE OF INSULIN: Chronic | ICD-10-CM

## 2017-11-15 DIAGNOSIS — E11.21 TYPE 2 DIABETES MELLITUS WITH DIABETIC NEPHROPATHY, WITH LONG-TERM CURRENT USE OF INSULIN: Primary | Chronic | ICD-10-CM

## 2017-11-15 DIAGNOSIS — I10 ESSENTIAL HYPERTENSION: Chronic | ICD-10-CM

## 2017-11-15 DIAGNOSIS — Z79.4 TYPE 2 DIABETES MELLITUS WITH DIABETIC POLYNEUROPATHY, WITH LONG-TERM CURRENT USE OF INSULIN: Chronic | ICD-10-CM

## 2017-11-15 DIAGNOSIS — Z79.4 TYPE 2 DIABETES MELLITUS WITH BOTH EYES AFFECTED BY MILD NONPROLIFERATIVE RETINOPATHY WITHOUT MACULAR EDEMA, WITH LONG-TERM CURRENT USE OF INSULIN: Chronic | ICD-10-CM

## 2017-11-15 DIAGNOSIS — E11.29 PROTEINURIA DUE TO TYPE 2 DIABETES MELLITUS: Chronic | ICD-10-CM

## 2017-11-15 DIAGNOSIS — E11.42 TYPE 2 DIABETES MELLITUS WITH DIABETIC POLYNEUROPATHY, WITH LONG-TERM CURRENT USE OF INSULIN: Chronic | ICD-10-CM

## 2017-11-15 PROCEDURE — 99999 PR PBB SHADOW E&M-EST. PATIENT-LVL III: CPT | Mod: PBBFAC,,, | Performed by: NURSE PRACTITIONER

## 2017-11-15 PROCEDURE — 99213 OFFICE O/P EST LOW 20 MIN: CPT | Mod: S$GLB,,, | Performed by: NURSE PRACTITIONER

## 2017-11-15 RX ORDER — INSULIN ASPART 100 [IU]/ML
INJECTION, SOLUTION INTRAVENOUS; SUBCUTANEOUS
Qty: 1 BOX | Refills: 6 | Status: SHIPPED | OUTPATIENT
Start: 2017-11-15 | End: 2018-05-07 | Stop reason: SDUPTHER

## 2017-11-15 RX ORDER — METFORMIN HYDROCHLORIDE 1000 MG/1
1000 TABLET ORAL 2 TIMES DAILY WITH MEALS
Qty: 180 TABLET | Refills: 3 | Status: SHIPPED | OUTPATIENT
Start: 2017-11-15 | End: 2018-03-16

## 2017-11-15 NOTE — PROGRESS NOTES
CC:  Diabetes.     HPI: Yon Loyd is a 52 y.o. male presents for visit for Diabetes. The patient has had diabetes along with associated comorbidities indicated in the Visit Diagnosis section of this encounter. The patient was diagnosed with Type 2 diabetes in ~1993.  Been on insulin since ~2013.     Previous visits - added Novolog with a meal, Novolog changed to before breakfast and before dinner.     Today, presents alone. A1c at goal. Doing fabulously with DM care      DIABETES COMPLICATIONS: nephropathy, retinopathy and peripheral neuropathy     HOSPITALIZATIONS FOR DIABETES OR RELATED COMPLICATIONS:  No    CURRENT A1C:    Hemoglobin A1C   Date Value Ref Range Status   11/09/2017 6.2 (H) 4.0 - 5.6 % Final     Comment:     According to ADA guidelines, hemoglobin A1c <7.0% represents  optimal control in non-pregnant diabetic patients. Different  metrics may apply to specific patient populations.   Standards of Medical Care in Diabetes-2016.  For the purpose of screening for the presence of diabetes:  <5.7%     Consistent with the absence of diabetes  5.7-6.4%  Consistent with increasing risk for diabetes   (prediabetes)  >or=6.5%  Consistent with diabetes  Currently, no consensus exists for use of hemoglobin A1c  for diagnosis of diabetes for children.  This Hemoglobin A1c assay has significant interference with fetal   hemoglobin   (HbF). The results are invalid for patients with abnormal amounts of   HbF,   including those with known Hereditary Persistence   of Fetal Hemoglobin. Heterozygous hemoglobin variants (HbAS, HbAC,   HbAD, HbAE, HbA2) do not significantly interfere with this assay;   however, presence of multiple variants in a sample may impact the %   interference.     08/01/2017 6.1 (H) 4.0 - 5.6 % Final     Comment:     According to ADA guidelines, hemoglobin A1c <7.0% represents  optimal control in non-pregnant diabetic patients. Different  metrics may apply to specific patient populations.    Standards of Medical Care in Diabetes-2016.  For the purpose of screening for the presence of diabetes:  <5.7%     Consistent with the absence of diabetes  5.7-6.4%  Consistent with increasing risk for diabetes   (prediabetes)  >or=6.5%  Consistent with diabetes  Currently, no consensus exists for use of hemoglobin A1c  for diagnosis of diabetes for children.  This Hemoglobin A1c assay has significant interference with fetal   hemoglobin   (HbF). The results are invalid for patients with abnormal amounts of   HbF,   including those with known Hereditary Persistence   of Fetal Hemoglobin. Heterozygous hemoglobin variants (HbAS, HbAC,   HbAD, HbAE, HbA2) do not significantly interfere with this assay;   however, presence of multiple variants in a sample may impact the %   interference.     03/29/2017 6.9 (H) 4.5 - 6.2 % Final     Comment:     According to ADA guidelines, hemoglobin A1C <7.0% represents  optimal control in non-pregnant diabetic patients.  Different  metrics may apply to specific populations.   Standards of Medical Care in Diabetes - 2016.  For the purpose of screening for the presence of diabetes:  <5.7%     Consistent with the absence of diabetes  5.7-6.4%  Consistent with increasing risk for diabetes   (prediabetes)  >or=6.5%  Consistent with diabetes  Currently no consensus exists for use of hemoglobin A1C  for diagnosis of diabetes for children.         GOAL A1C: <7%    DM MEDICATIONS USED IN THE PAST: Glyburide, Metformin, Januvia, Novolog, Levemir, Trulicity    MEDICATIONS UPON START OF PROGRAM: Levemir 65 units BID, Novolog 10 units with largest meal, Januvia 100 mg daily, Metformin 1000 mg BID  Rarely missing Levemir  Only taking Novolog once daily with largest meal    CURRENT DIABETES MEDICATIONS: Levemir 40 units daily, Trulicity 1.5 mg weekly, Metformin 1000 mg BID, Jardiance 25 mg daily, Novolog 10 units with breakfast and dinner  Denies missing any doses of medications     ANY DIFFICULTY  AFFORDING YOUR CURRENT MEDICATION REGIMEN?  No    CGMS interpretation:  Done 4/2017  1. FBG at goal, some hypo overnight  2. Prandial excursions uncontrolled with some meals  3. Unbalanced basal/prandial doses    STANDARDS of CARE:  Statin:  Yes - Simvastatin   ACEI or ARB:  Yes - Losartan 100 mg   ASA:  Yes - 81 mg   Last eye exam - done 8/2017 + mild NPDR   Microalbumin/Creatinine ratio:  Lab Results   Component Value Date    MICALBCREAT 296.9 (H) 11/09/2017       BLOOD GLUCOSE MONITORING:  Checking BG 3 times daily              HYPOGLYCEMIA:  No    CURRENT DAILY ROUTINE (meals/meds):   Eating 3 meals daily  Snacking on bread, peanut butter crackers, peanuts, pickles or olives    Drinks diet coke    CURRENT EXERCISE:  No    OCCUPATION:      MEDICATIONS, ALLERGIES, LABS, VS's, MEDICAL, SURGICAL, SOCIAL, AND FAMILY HISTORY reviewed and updated in the appropriate sections during this encounter    ROS:     Constitutional: Negative for weight change  Endocrine:  Denies polyuria, polydipsia, nocturia.  HENT: Denies neck swelling, lumps, horseness or trouble swallowing. Denies any personal or family history of thyroid cancer.    Eyes: Negative for visual disturbance.   Gastrointestinal: Negative for nausea, diarrhea, vomiting, bloating.  No history of pancreatitis or gastroparesis.  Genitourinary: Negative for urgency, frequency, frequent urinary tract infections.  Neurological: Negative for syncope, + improved numbness/burning of extremitie      All other systems reviewed and are negative.      PE:  Constitutional:   /83 (BP Location: Right arm, Patient Position: Sitting)   Pulse 74   Ht 6' (1.829 m)   Wt 95.9 kg (211 lb 6.4 oz)   BMI 28.67 kg/m²    Well developed, well nourished, NAD.    Neck:  No trachial deviation present, No neck masses noticed   Thyroid:  No thyromegaly present.  No thyroid tenderness.      Cardiovascular:    Auscultation:  No murmurs or abnormal sounds   Lower extremities:  No  edema or cyanosis.     Respiratory:    Effort:  Normal, no use of accessory muscles.   Auscultation: breath sounds normal, no adventitious sounds.  Skin:    Inspection: no rashes, lesion or ulcers, + acanthosis nigracans.   Palpation: no induration or nodules.    Insulin injection sites: no lipoatrophy or lipohypertrophy.  Psychiatric:  Judgement and insight good with normal mood and affect.      FOOT EXAMINATION:   Protective Sensation (w/ 10 gram monofilament):  Right: Decreased  Left: Decreased    Visual Inspection:  Normal -  Bilateral, Nails Intact - without Evidence of Foot Deformity- Bilateral and Callus -  Bilateral    Pedal Pulses:   Right: Present  Left: Present    Posterior tibialis:   Right:Present  Left: Present    Decreased vibratory response to 128 Hz tuning fork bilaterally.     ______________________________________________________________________     ASSESSMENT and PLAN:    1- Type 2 diabetes with proteinuria, retinopathy, neuropathy  - A1c at goal, Patient does not wish to try Vgo at this time. Continue Metformin 1000 mg BID, Trulicity 1.5 mg weekly, Levemir 40 units daily, Novolog 10 units before breakfast and dinner, Jardiance 25 mg daily    Instructed to monitor BG 2-3 times daily (before breakfast, dinner and bedtime), document on BG logs, and bring complete BG logs to all visits     Return in about 4 months (around 3/15/2018). in [facility] (b/c of pts work schedule) with below labs before  Orders Placed This Encounter   Procedures    Comprehensive metabolic panel    Hemoglobin A1c    Lipid panel    TSH     2 - Mild NPDR bilaterally - continue improved BG control     3- Proteinuria - nephrology following. On max dose Losartan. Discussed correlation between uncontrolled DM and worsening nephropathy.     4 - Peripheral neuropathy - Educated patient to check feet daily for any foreign objects and/or wounds. Discussed with patient the importance of wearing appropriate footwear at all times,  not to walk barefoot ever, and to check shoes before putting them on feet. Instructed patient to keep feet dry by regularly changing shoes and socks and drying feet after baths and exercises. Also, instructed patient to report any new lesions, discolorations, or swelling to a healthcare professional.     5- Hypertension - goal < 140/80 mmHg -  At goal, on ARB, continue current medications   BP Readings from Last 3 Encounters:   11/15/17 122/83   10/27/17 (!) 140/80   08/11/17 118/78     6- Hyperlipidemia -  LFT's WNL. LDL at goal of <100. On Zocor. Continue current medications.     Lab Results   Component Value Date    LDLCALC 75.4 01/23/2017     7- Body mass index is 28.67 kg/m². = Overweight. Modest weight loss (5-10%) may greatly improve BG control. May benefit from weight loss properties of Trulicity + Jardiance

## 2017-11-16 ENCOUNTER — TELEPHONE (OUTPATIENT)
Dept: DIABETES | Facility: CLINIC | Age: 52
End: 2017-11-16

## 2017-11-16 NOTE — TELEPHONE ENCOUNTER
4 month follow up appointment with Tracey Hayes NP scheduled for March 16, 2017 in [facility].  Blood work scheduled for 03/13/2017 fasting.  Appointment reminder letters mailed to patient's home address on file.

## 2017-11-16 NOTE — TELEPHONE ENCOUNTER
Please call patient to schedule [facility] f/u in 4 months with CMP, lipid, A1c and TSH before     Pt wishes to be seen on a Friday in [facility] instead of chronic clinic because of work schedule    Thanks

## 2018-03-09 ENCOUNTER — LAB VISIT (OUTPATIENT)
Dept: LAB | Facility: HOSPITAL | Age: 53
End: 2018-03-09
Attending: NURSE PRACTITIONER
Payer: COMMERCIAL

## 2018-03-09 DIAGNOSIS — Z79.4 TYPE 2 DIABETES MELLITUS WITH DIABETIC NEPHROPATHY, WITH LONG-TERM CURRENT USE OF INSULIN: Chronic | ICD-10-CM

## 2018-03-09 DIAGNOSIS — E11.21 TYPE 2 DIABETES MELLITUS WITH DIABETIC NEPHROPATHY, WITH LONG-TERM CURRENT USE OF INSULIN: Chronic | ICD-10-CM

## 2018-03-09 LAB
ALBUMIN SERPL BCP-MCNC: 4.4 G/DL
ALP SERPL-CCNC: 92 U/L
ALT SERPL W/O P-5'-P-CCNC: 35 U/L
ANION GAP SERPL CALC-SCNC: 12 MMOL/L
AST SERPL-CCNC: 25 U/L
BILIRUB SERPL-MCNC: 0.3 MG/DL
BUN SERPL-MCNC: 16 MG/DL
CALCIUM SERPL-MCNC: 9.9 MG/DL
CHLORIDE SERPL-SCNC: 101 MMOL/L
CHOLEST SERPL-MCNC: 168 MG/DL
CHOLEST/HDLC SERPL: 3.3 {RATIO}
CO2 SERPL-SCNC: 28 MMOL/L
CREAT SERPL-MCNC: 1.01 MG/DL
EST. GFR  (AFRICAN AMERICAN): >60 ML/MIN/1.73 M^2
EST. GFR  (NON AFRICAN AMERICAN): >60 ML/MIN/1.73 M^2
ESTIMATED AVG GLUCOSE: 134 MG/DL
GLUCOSE SERPL-MCNC: 121 MG/DL
HBA1C MFR BLD HPLC: 6.3 %
HDLC SERPL-MCNC: 51 MG/DL
HDLC SERPL: 30.4 %
LDLC SERPL CALC-MCNC: 87.6 MG/DL
NONHDLC SERPL-MCNC: 117 MG/DL
POTASSIUM SERPL-SCNC: 4.6 MMOL/L
PROT SERPL-MCNC: 7.7 G/DL
SODIUM SERPL-SCNC: 141 MMOL/L
TRIGL SERPL-MCNC: 147 MG/DL
TSH SERPL DL<=0.005 MIU/L-ACNC: 0.7 UIU/ML

## 2018-03-09 PROCEDURE — 80053 COMPREHEN METABOLIC PANEL: CPT | Mod: PO

## 2018-03-09 PROCEDURE — 83036 HEMOGLOBIN GLYCOSYLATED A1C: CPT

## 2018-03-09 PROCEDURE — 36415 COLL VENOUS BLD VENIPUNCTURE: CPT | Mod: PO

## 2018-03-09 PROCEDURE — 80061 LIPID PANEL: CPT

## 2018-03-09 PROCEDURE — 84443 ASSAY THYROID STIM HORMONE: CPT | Mod: PO

## 2018-03-16 ENCOUNTER — OFFICE VISIT (OUTPATIENT)
Dept: ENDOCRINOLOGY | Facility: CLINIC | Age: 53
End: 2018-03-16
Payer: COMMERCIAL

## 2018-03-16 VITALS
BODY MASS INDEX: 28.31 KG/M2 | SYSTOLIC BLOOD PRESSURE: 123 MMHG | WEIGHT: 209 LBS | DIASTOLIC BLOOD PRESSURE: 84 MMHG | HEART RATE: 70 BPM | HEIGHT: 72 IN

## 2018-03-16 DIAGNOSIS — E11.21 TYPE 2 DIABETES MELLITUS WITH DIABETIC NEPHROPATHY, WITH LONG-TERM CURRENT USE OF INSULIN: Chronic | ICD-10-CM

## 2018-03-16 DIAGNOSIS — I10 ESSENTIAL HYPERTENSION: Chronic | ICD-10-CM

## 2018-03-16 DIAGNOSIS — E11.42 TYPE 2 DIABETES MELLITUS WITH DIABETIC POLYNEUROPATHY, WITH LONG-TERM CURRENT USE OF INSULIN: Chronic | ICD-10-CM

## 2018-03-16 DIAGNOSIS — R80.9 PROTEINURIA DUE TO TYPE 2 DIABETES MELLITUS: Chronic | ICD-10-CM

## 2018-03-16 DIAGNOSIS — E78.5 HYPERLIPIDEMIA LDL GOAL <100: Chronic | ICD-10-CM

## 2018-03-16 DIAGNOSIS — Z79.4 TYPE 2 DIABETES MELLITUS WITH BOTH EYES AFFECTED BY MILD NONPROLIFERATIVE RETINOPATHY WITHOUT MACULAR EDEMA, WITH LONG-TERM CURRENT USE OF INSULIN: Primary | Chronic | ICD-10-CM

## 2018-03-16 DIAGNOSIS — Z79.4 TYPE 2 DIABETES MELLITUS WITH DIABETIC POLYNEUROPATHY, WITH LONG-TERM CURRENT USE OF INSULIN: Chronic | ICD-10-CM

## 2018-03-16 DIAGNOSIS — E66.3 OVERWEIGHT (BMI 25.0-29.9): Chronic | ICD-10-CM

## 2018-03-16 DIAGNOSIS — E11.3293 TYPE 2 DIABETES MELLITUS WITH BOTH EYES AFFECTED BY MILD NONPROLIFERATIVE RETINOPATHY WITHOUT MACULAR EDEMA, WITH LONG-TERM CURRENT USE OF INSULIN: Primary | Chronic | ICD-10-CM

## 2018-03-16 DIAGNOSIS — E11.29 PROTEINURIA DUE TO TYPE 2 DIABETES MELLITUS: Chronic | ICD-10-CM

## 2018-03-16 DIAGNOSIS — Z79.4 TYPE 2 DIABETES MELLITUS WITH DIABETIC NEPHROPATHY, WITH LONG-TERM CURRENT USE OF INSULIN: Chronic | ICD-10-CM

## 2018-03-16 PROCEDURE — 3079F DIAST BP 80-89 MM HG: CPT | Mod: CPTII,S$GLB,, | Performed by: NURSE PRACTITIONER

## 2018-03-16 PROCEDURE — 3074F SYST BP LT 130 MM HG: CPT | Mod: CPTII,S$GLB,, | Performed by: NURSE PRACTITIONER

## 2018-03-16 PROCEDURE — 99999 PR PBB SHADOW E&M-EST. PATIENT-LVL IV: CPT | Mod: PBBFAC,,, | Performed by: NURSE PRACTITIONER

## 2018-03-16 PROCEDURE — 99214 OFFICE O/P EST MOD 30 MIN: CPT | Mod: S$GLB,,, | Performed by: NURSE PRACTITIONER

## 2018-03-16 PROCEDURE — 3044F HG A1C LEVEL LT 7.0%: CPT | Mod: CPTII,S$GLB,, | Performed by: NURSE PRACTITIONER

## 2018-03-16 NOTE — PROGRESS NOTES
CC:  Diabetes with complications    HPI: Yon Loyd is a 52 y.o. male presents for visit for Diabetes. The patient has had diabetes along with associated comorbidities indicated in the Visit Diagnosis section of this encounter. The patient was diagnosed with Type 2 diabetes in ~1993.  Been on insulin since ~2013.     Previous visits - added Novolog with a meal, Novolog changed to before breakfast and before dinner.   previously seen by BLAKE Hayes NP 11/2017 but is new to me today.     Today, presents alone. A1c at goal. Doing fabulously with DM care      DIABETES COMPLICATIONS: nephropathy, retinopathy and peripheral neuropathy     HOSPITALIZATIONS FOR DIABETES OR RELATED COMPLICATIONS:  No    CURRENT A1C:    Hemoglobin A1C   Date Value Ref Range Status   03/09/2018 6.3 (H) 4.0 - 5.6 % Final     Comment:     According to ADA guidelines, hemoglobin A1c <7.0% represents  optimal control in non-pregnant diabetic patients. Different  metrics may apply to specific patient populations.   Standards of Medical Care in Diabetes-2016.  For the purpose of screening for the presence of diabetes:  <5.7%     Consistent with the absence of diabetes  5.7-6.4%  Consistent with increasing risk for diabetes   (prediabetes)  >or=6.5%  Consistent with diabetes  Currently, no consensus exists for use of hemoglobin A1c  for diagnosis of diabetes for children.  This Hemoglobin A1c assay has significant interference with fetal   hemoglobin   (HbF). The results are invalid for patients with abnormal amounts of   HbF,   including those with known Hereditary Persistence   of Fetal Hemoglobin. Heterozygous hemoglobin variants (HbAS, HbAC,   HbAD, HbAE, HbA2) do not significantly interfere with this assay;   however, presence of multiple variants in a sample may impact the %   interference.     11/09/2017 6.2 (H) 4.0 - 5.6 % Final     Comment:     According to ADA guidelines, hemoglobin A1c <7.0% represents  optimal control in non-pregnant  diabetic patients. Different  metrics may apply to specific patient populations.   Standards of Medical Care in Diabetes-2016.  For the purpose of screening for the presence of diabetes:  <5.7%     Consistent with the absence of diabetes  5.7-6.4%  Consistent with increasing risk for diabetes   (prediabetes)  >or=6.5%  Consistent with diabetes  Currently, no consensus exists for use of hemoglobin A1c  for diagnosis of diabetes for children.  This Hemoglobin A1c assay has significant interference with fetal   hemoglobin   (HbF). The results are invalid for patients with abnormal amounts of   HbF,   including those with known Hereditary Persistence   of Fetal Hemoglobin. Heterozygous hemoglobin variants (HbAS, HbAC,   HbAD, HbAE, HbA2) do not significantly interfere with this assay;   however, presence of multiple variants in a sample may impact the %   interference.     08/01/2017 6.1 (H) 4.0 - 5.6 % Final     Comment:     According to ADA guidelines, hemoglobin A1c <7.0% represents  optimal control in non-pregnant diabetic patients. Different  metrics may apply to specific patient populations.   Standards of Medical Care in Diabetes-2016.  For the purpose of screening for the presence of diabetes:  <5.7%     Consistent with the absence of diabetes  5.7-6.4%  Consistent with increasing risk for diabetes   (prediabetes)  >or=6.5%  Consistent with diabetes  Currently, no consensus exists for use of hemoglobin A1c  for diagnosis of diabetes for children.  This Hemoglobin A1c assay has significant interference with fetal   hemoglobin   (HbF). The results are invalid for patients with abnormal amounts of   HbF,   including those with known Hereditary Persistence   of Fetal Hemoglobin. Heterozygous hemoglobin variants (HbAS, HbAC,   HbAD, HbAE, HbA2) do not significantly interfere with this assay;   however, presence of multiple variants in a sample may impact the %   interference.         GOAL A1C: <7%    DM MEDICATIONS  USED IN THE PAST: Glyburide, Metformin, Januvia, Novolog, Levemir, Trulicity    MEDICATIONS UPON START OF PROGRAM: Levemir 65 units BID, Novolog 10 units with largest meal, Januvia 100 mg daily, Metformin 1000 mg BID  Rarely missing Levemir  Only taking Novolog once daily with largest meal    CURRENT DIABETES MEDICATIONS: Levemir 40 units daily, Trulicity 1.5 mg weekly, Metformin 1000 mg BID, Jardiance 25 mg daily, Novolog 10 units with breakfast and dinner  Denies missing any doses of medications     ANY DIFFICULTY AFFORDING YOUR CURRENT MEDICATION REGIMEN?  No    CGMS interpretation:  Done 4/2017  1. FBG at goal, some hypo overnight  2. Prandial excursions uncontrolled with some meals  3. Unbalanced basal/prandial doses    STANDARDS of CARE:  Statin:  Yes - Simvastatin   ACEI or ARB:  Yes - Losartan 100 mg, cough on ACEi   ASA:  Yes - 81 mg   Last eye exam - done 8/2017 + mild NPDR   Microalbumin/Creatinine ratio:  Lab Results   Component Value Date    MICALBCREAT 296.9 (H) 11/09/2017       BLOOD GLUCOSE MONITORING:  Checking BG 3 times daily                    HYPOGLYCEMIA:  No    CURRENT DAILY ROUTINE (meals):   Eating 3 meals daily  Breakfast: sausage biscuits, removes 1/2 bread prior to eating  Lunch: Varies. Ham sand which, roast beef sandwiches, Hamburger  Dinner: Restaurants and fast food. Mexican tacos and soha male and chips.   Snacking on bread: Chips and popcorn, and pickles  Drinks diet coke    CURRENT EXERCISE:  No, walks at work    OCCUPATION:      ROS:   Constitutional: Lost two pounds of weight  Endocrine:  Denies polyuria, polydipsia, nocturia.  HENT: Denies neck swelling, lumps, horseness or trouble swallowing. Denies any personal or family history of thyroid cancer. Mouth is dry and wakes up thirsty.    Eyes: Negative for visual disturbance.   Gastrointestinal: Negative for diarrhea, vomiting, bloating.  No history of pancreatitis or gastroparesis. +nausea  Genitourinary: Negative for  urgency, frequency, frequent urinary tract infections.  Neurological: Negative for syncope, + improved numbness/burning of extremities  All other systems reviewed and are negative.    PE:  Constitutional:   /84 (BP Location: Left arm, Patient Position: Sitting)   Pulse 70   Ht 6' (1.829 m)   Wt 94.8 kg (209 lb)   BMI 28.35 kg/m²    Well developed, well nourished, NAD.    Neck:  No trachial deviation present, No neck masses noticed   Thyroid:  No thyromegaly present.  No thyroid tenderness.      Cardiovascular:    Auscultation:  No murmurs or abnormal sounds   Lower extremities:  No edema or cyanosis.     Respiratory:    Effort:  Normal, no use of accessory muscles.   Auscultation: breath sounds normal, no adventitious sounds.  Skin:    Inspection: no rashes, lesion or ulcers, + acanthosis nigracans.   Palpation: no induration or nodules.    Insulin injection sites: no lipoatrophy or lipohypertrophy.  Psychiatric:  Judgement and insight good with normal mood and affect.    Protective Sensation (w/ 10 gram monofilament):  Right: Decreased  Left: Decreased    Visual Inspection:  Dry Skin -  Bilateral    Pedal Pulses:   Right: Present  Left: Present    Posterior tibialis:   Right:Present  Left: Present    ______________________________________________________________________     ASSESSMENT and PLAN:    1- Type 2 diabetes with proteinuria, retinopathy, neuropathy  - A1c at goal, Patient does not wish to try Vgo. Change to Synjardy BID due to financial convience, continue Trulicity 1.5 mg weekly, Levemir 40 units daily, Novolog 10 units before breakfast and dinner, Instructed to monitor BG 2-3 times daily (before breakfast, dinner and bedtime), document on BG logs, and bring complete BG logs to all visits     2 - Mild NPDR bilaterally - continue improved BG control     3- Proteinuria - nephrology following. On max dose Losartan. Discussed correlation between uncontrolled DM and worsening nephropathy.     4 -  Peripheral neuropathy - Foot Exam preformed. Educated patient to check feet daily for any foreign objects and/or wounds. Discussed with patient the importance of wearing appropriate footwear at all times, not to walk barefoot ever, and to check shoes before putting them on feet. Instructed patient to keep feet dry by regularly changing shoes and socks and drying feet after baths and exercises. Also, instructed patient to report any new lesions, discolorations, or swelling to a healthcare professional.     5- Hypertension - goal < 140/80 mmHg -  At goal, on ARB, continue current medications   BP Readings from Last 3 Encounters:   03/16/18 123/84   11/15/17 122/83   10/27/17 (!) 140/80     6- Hyperlipidemia -  LFT's WNL. LDL at goal of <100. On Zocor. Continue current medications.     Lab Results   Component Value Date    LDLCALC 87.6 03/09/2018     7- Body mass index is 28.35 kg/m². = Overweight. Modest weight loss (5-10%) may greatly improve BG control. May benefit from weight loss properties of Trulicity + Jardiance        RTC in 6 months, prefers Friday afternoon appts.   U[facility] [place] [place] [facility]

## 2018-05-07 DIAGNOSIS — Z79.4 TYPE 2 DIABETES MELLITUS WITH DIABETIC NEPHROPATHY, WITH LONG-TERM CURRENT USE OF INSULIN: Chronic | ICD-10-CM

## 2018-05-07 DIAGNOSIS — E11.21 TYPE 2 DIABETES MELLITUS WITH DIABETIC NEPHROPATHY, WITH LONG-TERM CURRENT USE OF INSULIN: Chronic | ICD-10-CM

## 2018-05-07 RX ORDER — INSULIN ASPART 100 [IU]/ML
INJECTION, SOLUTION INTRAVENOUS; SUBCUTANEOUS
Qty: 1 BOX | Refills: 6 | Status: SHIPPED | OUTPATIENT
Start: 2018-05-07 | End: 2019-04-11 | Stop reason: SDUPTHER

## 2018-05-07 NOTE — TELEPHONE ENCOUNTER
----- Message from Rima Cha sent at 5/7/2018 11:20 AM CDT -----  Rx Refill/Request    Who Called: Pt    Refill or New Rx:  Refill    RX Name and Strength: losartan (COZAAR) 100 MG tablet , simvastatin (ZOCOR) 40 MG tablet ,  TRULICITY) 1.5 mg/0.5 ,  LEVEMIR FLEXPEN) , NOVOLOG FLEXPEN) , SYNJARDY) 12.5-1,000   How is the patient currently taking it? (ex. 1XDay):  Is this a 30 day or 90 day RX:  Preferred Pharmacy with phone number: [facility]   [phone]  Local or Mail Order:  Local    Ordering Provider: Mahsa   Communication Preference (Ginette response to Pt. (or) Call Back # and timeframe): [phone]  Additional Information:  Pt requesting an refill on medication , left several of message  . Pt is completely out

## 2018-06-06 ENCOUNTER — TELEPHONE (OUTPATIENT)
Dept: ENDOCRINOLOGY | Facility: CLINIC | Age: 53
End: 2018-06-06

## 2018-06-06 NOTE — TELEPHONE ENCOUNTER
----- Message from Bry Orantes sent at 6/6/2018 11:41 AM CDT -----  Contact: Pharmacy Ms. Wilson  Pt would like to be called back regarding refill on Rx simvastatin (ZOCOR) 40 MG tablet at [facility]     Pt can be reached at [phone]. [facility]    Thank You.

## 2018-06-19 DIAGNOSIS — E11.21 TYPE 2 DIABETES MELLITUS WITH DIABETIC NEPHROPATHY, WITH LONG-TERM CURRENT USE OF INSULIN: Chronic | ICD-10-CM

## 2018-06-19 DIAGNOSIS — Z79.4 TYPE 2 DIABETES MELLITUS WITH DIABETIC NEPHROPATHY, WITH LONG-TERM CURRENT USE OF INSULIN: Chronic | ICD-10-CM

## 2018-06-19 NOTE — TELEPHONE ENCOUNTER
----- Message from Lynn Ochoa sent at 6/19/2018  4:18 PM CDT -----  Contact: Self [phone]  PT is requesting a call from Tre to discuss a note she sent to the pharmacy for simvastatin (ZOCOR) 40 MG tablet & losartan (COZAAR) 100 MG tablet

## 2018-06-19 NOTE — TELEPHONE ENCOUNTER
Spoke with pt. Pt is asking if Tracey can fill this one time for him until he's able to get a new PCP. Informed pt that I would send her a message and get back with him on her recommendations.

## 2018-06-20 RX ORDER — SIMVASTATIN 40 MG/1
40 TABLET, FILM COATED ORAL DAILY
Qty: 90 TABLET | Refills: 3 | Status: SHIPPED | OUTPATIENT
Start: 2018-06-20 | End: 2018-11-16 | Stop reason: SDUPTHER

## 2018-06-20 RX ORDER — LOSARTAN POTASSIUM 100 MG/1
100 TABLET ORAL DAILY
Qty: 90 TABLET | Refills: 3 | Status: SHIPPED | OUTPATIENT
Start: 2018-06-20 | End: 2018-11-16 | Stop reason: SDUPTHER

## 2018-09-17 ENCOUNTER — TELEPHONE (OUTPATIENT)
Dept: ENDOCRINOLOGY | Facility: CLINIC | Age: 53
End: 2018-09-17

## 2018-09-17 NOTE — TELEPHONE ENCOUNTER
----- Message from Kym Franklin sent at 9/17/2018  1:49 PM CDT -----  Contact: [phone]  ..Needs Advice    Reason for call:        Communication Preference:phone     Additional Information: pt states would like to go to new facility in [facility] that do lab work he needs to be placed his apt is 09/28/2018 call patient when orders are placed.

## 2018-09-21 ENCOUNTER — LAB VISIT (OUTPATIENT)
Dept: LAB | Facility: HOSPITAL | Age: 53
End: 2018-09-21
Attending: NURSE PRACTITIONER
Payer: COMMERCIAL

## 2018-09-21 DIAGNOSIS — Z79.4 TYPE 2 DIABETES MELLITUS WITH BOTH EYES AFFECTED BY MILD NONPROLIFERATIVE RETINOPATHY WITHOUT MACULAR EDEMA, WITH LONG-TERM CURRENT USE OF INSULIN: Chronic | ICD-10-CM

## 2018-09-21 DIAGNOSIS — E11.3293 TYPE 2 DIABETES MELLITUS WITH BOTH EYES AFFECTED BY MILD NONPROLIFERATIVE RETINOPATHY WITHOUT MACULAR EDEMA, WITH LONG-TERM CURRENT USE OF INSULIN: Chronic | ICD-10-CM

## 2018-09-21 LAB
COMPLEXED PSA SERPL-MCNC: 0.57 NG/ML
ESTIMATED AVG GLUCOSE: 134 MG/DL
HBA1C MFR BLD HPLC: 6.3 %

## 2018-09-21 PROCEDURE — 84153 ASSAY OF PSA TOTAL: CPT

## 2018-09-21 PROCEDURE — 36415 COLL VENOUS BLD VENIPUNCTURE: CPT | Mod: PO

## 2018-09-21 PROCEDURE — 83036 HEMOGLOBIN GLYCOSYLATED A1C: CPT

## 2018-09-28 ENCOUNTER — OFFICE VISIT (OUTPATIENT)
Dept: ENDOCRINOLOGY | Facility: CLINIC | Age: 53
End: 2018-09-28
Payer: COMMERCIAL

## 2018-09-28 VITALS
HEART RATE: 84 BPM | SYSTOLIC BLOOD PRESSURE: 126 MMHG | DIASTOLIC BLOOD PRESSURE: 78 MMHG | WEIGHT: 209 LBS | BODY MASS INDEX: 28.31 KG/M2 | HEIGHT: 72 IN

## 2018-09-28 DIAGNOSIS — E11.42 TYPE 2 DIABETES MELLITUS WITH DIABETIC POLYNEUROPATHY, WITH LONG-TERM CURRENT USE OF INSULIN: Chronic | ICD-10-CM

## 2018-09-28 DIAGNOSIS — E11.3293 TYPE 2 DIABETES MELLITUS WITH BOTH EYES AFFECTED BY MILD NONPROLIFERATIVE RETINOPATHY WITHOUT MACULAR EDEMA, WITH LONG-TERM CURRENT USE OF INSULIN: Chronic | ICD-10-CM

## 2018-09-28 DIAGNOSIS — R80.9 PROTEINURIA DUE TO TYPE 2 DIABETES MELLITUS: Chronic | ICD-10-CM

## 2018-09-28 DIAGNOSIS — I10 ESSENTIAL HYPERTENSION: Chronic | ICD-10-CM

## 2018-09-28 DIAGNOSIS — Z79.4 TYPE 2 DIABETES MELLITUS WITH BOTH EYES AFFECTED BY MILD NONPROLIFERATIVE RETINOPATHY WITHOUT MACULAR EDEMA, WITH LONG-TERM CURRENT USE OF INSULIN: Chronic | ICD-10-CM

## 2018-09-28 DIAGNOSIS — Z79.4 TYPE 2 DIABETES MELLITUS WITH DIABETIC POLYNEUROPATHY, WITH LONG-TERM CURRENT USE OF INSULIN: Chronic | ICD-10-CM

## 2018-09-28 DIAGNOSIS — Z79.4 TYPE 2 DIABETES MELLITUS WITH DIABETIC NEPHROPATHY, WITH LONG-TERM CURRENT USE OF INSULIN: Primary | Chronic | ICD-10-CM

## 2018-09-28 DIAGNOSIS — E11.21 TYPE 2 DIABETES MELLITUS WITH DIABETIC NEPHROPATHY, WITH LONG-TERM CURRENT USE OF INSULIN: Primary | Chronic | ICD-10-CM

## 2018-09-28 DIAGNOSIS — E78.5 HYPERLIPIDEMIA LDL GOAL <100: Chronic | ICD-10-CM

## 2018-09-28 DIAGNOSIS — E11.29 PROTEINURIA DUE TO TYPE 2 DIABETES MELLITUS: Chronic | ICD-10-CM

## 2018-09-28 LAB
ALBUMIN/CREAT UR: 240 UG/MG
CREAT UR-MCNC: 70 MG/DL
MICROALBUMIN UR DL<=1MG/L-MCNC: 168 UG/ML

## 2018-09-28 PROCEDURE — 99999 PR PBB SHADOW E&M-EST. PATIENT-LVL III: CPT | Mod: PBBFAC,,, | Performed by: NURSE PRACTITIONER

## 2018-09-28 PROCEDURE — 82043 UR ALBUMIN QUANTITATIVE: CPT

## 2018-09-28 PROCEDURE — 99214 OFFICE O/P EST MOD 30 MIN: CPT | Mod: S$GLB,,, | Performed by: NURSE PRACTITIONER

## 2018-09-28 RX ORDER — PEN NEEDLE, DIABETIC 30 GX3/16"
NEEDLE, DISPOSABLE MISCELLANEOUS
Qty: 200 EACH | Refills: 3 | Status: SHIPPED | OUTPATIENT
Start: 2018-09-28 | End: 2021-03-05 | Stop reason: SDUPTHER

## 2018-09-28 NOTE — PROGRESS NOTES
CC:  Diabetes with complications    HPI: Yon Loyd is a 53 y.o. male presents for visit for Diabetes. The patient has had diabetes along with associated comorbidities indicated in the Visit Diagnosis section of this encounter. The patient was diagnosed with Type 2 diabetes in ~1993.  Been on insulin since ~2013. Started Trulicity 12/2016.     CGMS interpretation:  Done 4/2017  1. FBG at goal, some hypo overnight  2. Prandial excursions uncontrolled with some meals  3. Unbalanced basal/prandial doses    previously seen by BLAKE Hayes NP 11/2017, last seen by me 3/2018     Today, presents alone. A1c at goal.     DIABETES COMPLICATIONS: nephropathy, retinopathy and peripheral neuropathy     HOSPITALIZATIONS FOR DIABETES OR RELATED COMPLICATIONS:  No    CURRENT A1C:    Hemoglobin A1C   Date Value Ref Range Status   09/21/2018 6.3 (H) 4.0 - 5.6 % Final     Comment:     ADA Screening Guidelines:  5.7-6.4%  Consistent with prediabetes  >or=6.5%  Consistent with diabetes  High levels of fetal hemoglobin interfere with the HbA1C  assay. Heterozygous hemoglobin variants (HbS, HgC, etc)do  not significantly interfere with this assay.   However, presence of multiple variants may affect accuracy.     03/09/2018 6.3 (H) 4.0 - 5.6 % Final     Comment:     According to ADA guidelines, hemoglobin A1c <7.0% represents  optimal control in non-pregnant diabetic patients. Different  metrics may apply to specific patient populations.   Standards of Medical Care in Diabetes-2016.  For the purpose of screening for the presence of diabetes:  <5.7%     Consistent with the absence of diabetes  5.7-6.4%  Consistent with increasing risk for diabetes   (prediabetes)  >or=6.5%  Consistent with diabetes  Currently, no consensus exists for use of hemoglobin A1c  for diagnosis of diabetes for children.  This Hemoglobin A1c assay has significant interference with fetal   hemoglobin   (HbF). The results are invalid for patients with abnormal  amounts of   HbF,   including those with known Hereditary Persistence   of Fetal Hemoglobin. Heterozygous hemoglobin variants (HbAS, HbAC,   HbAD, HbAE, HbA2) do not significantly interfere with this assay;   however, presence of multiple variants in a sample may impact the %   interference.     11/09/2017 6.2 (H) 4.0 - 5.6 % Final     Comment:     According to ADA guidelines, hemoglobin A1c <7.0% represents  optimal control in non-pregnant diabetic patients. Different  metrics may apply to specific patient populations.   Standards of Medical Care in Diabetes-2016.  For the purpose of screening for the presence of diabetes:  <5.7%     Consistent with the absence of diabetes  5.7-6.4%  Consistent with increasing risk for diabetes   (prediabetes)  >or=6.5%  Consistent with diabetes  Currently, no consensus exists for use of hemoglobin A1c  for diagnosis of diabetes for children.  This Hemoglobin A1c assay has significant interference with fetal   hemoglobin   (HbF). The results are invalid for patients with abnormal amounts of   HbF,   including those with known Hereditary Persistence   of Fetal Hemoglobin. Heterozygous hemoglobin variants (HbAS, HbAC,   HbAD, HbAE, HbA2) do not significantly interfere with this assay;   however, presence of multiple variants in a sample may impact the %   interference.       GOAL A1C: <7%    DM MEDICATIONS USED IN THE PAST: Glyburide, Metformin, Januvia, Novolog, Levemir, Trulicity    CURRENT DIABETES MEDICATIONS:  Novolog 10 units with breakfast and dinner, Levemir 40 units daily, Trulicity 1.5 mg weekly, synjardy 12.5/ 1000mg BID  Denies missing any doses of medications     ANY DIFFICULTY AFFORDING YOUR CURRENT MEDICATION REGIMEN?  No    STANDARDS of CARE:  Statin:  Yes - Simvastatin   ACEI or ARB:  Yes - Losartan 100 mg, cough on ACEi   ASA:  Yes - 81 mg   Last eye exam - done 8/2017 + mild NPDR   Microalbumin/Creatinine ratio:  Lab Results   Component Value Date    MICALBCREAT  296.9 (H) 11/09/2017     BLOOD GLUCOSE MONITORING:  Not regularly since last appt    HYPOGLYCEMIA:  No    CURRENT DAILY ROUTINE (meals):   Eating 3 meals daily  Breakfast: sausage biscuits, removes 1/2 bread prior to eating  Lunch: sandwiches  Dinner: Restaurants and fast food.    Snacking peanut butter crackers during day and popcorn at night  Drinks diet coke    CURRENT EXERCISE:  No, walks at work    OCCUPATION:      ROS:   Constitutional: weight stable; initially lost wt with Trulicity  Endocrine:  Denies polyuria, polydipsia, nocturia.  HENT: Denies neck swelling, lumps, horseness or trouble swallowing. Denies any personal or family history of thyroid cancer. Mouth is dry and wakes up thirsty.    Eyes: Negative for visual disturbance.   Gastrointestinal: Negative for diarrhea, vomiting, bloating.  No history of pancreatitis or gastroparesis. + nausea  Genitourinary: Negative for urgency, frequency, frequent urinary tract infections.  Neurological: Negative for syncope, + improved numbness/burning of extremities  All other systems reviewed and are negative.    PE:  Constitutional:   /78 (BP Location: Right arm, Patient Position: Sitting, BP Method: Medium (Manual))   Pulse 84   Ht 6' (1.829 m)   Wt 94.8 kg (208 lb 15.9 oz)   BMI 28.34 kg/m²    Well developed, well nourished, NAD.    Neck:  No trachial deviation present, No neck masses noticed      Cardiovascular:    Auscultation:  No murmurs or abnormal sounds   Lower extremities:  No edema or cyanosis.     Respiratory:    Effort:  Normal, no use of accessory muscles.   Auscultation: breath sounds normal, no adventitious sounds.  Skin:    Inspection: no rashes, lesion or ulcers, + acanthosis nigracans.   Palpation: no induration or nodules.    Insulin injection sites: no lipoatrophy or lipohypertrophy.  Psychiatric:  Judgement and insight good with normal mood and affect.  As of 3/2018:   Protective Sensation (w/ 10 gram monofilament):  Right:  Decreased  Left: Decreased    Visual Inspection:  Dry Skin -  Bilateral    Pedal Pulses:   Right: Present  Left: Present    Posterior tibialis:   Right:Present  Left: Present    ______________________________________________________________________     ASSESSMENT and PLAN:    1- Type 2 diabetes with proteinuria, retinopathy, neuropathy  - A1c at goal, Continue Synjardy BID, Trulicity 1.5 mg weekly, Levemir 40 units daily, Novolog 10 units before breakfast and dinner.  Instructed to monitor BG 4 times daily (before meals and bedtime), document on BG logs, and bring complete BG logs to all visits   - takes ASA, ARB, statin  - noted allergy to ACEi    2 - Mild NPDR bilaterally - continue improved BG control     3- Proteinuria - nephrology following. On Losartan. Discussed correlation between uncontrolled DM and worsening nephropathy.     4 - Peripheral neuropathy - optimize DM control. Not currently on Rx    5- Hypertension - goal < 140/80 mmHg -  At goal, on ARB, continue current medications   BP Readings from Last 3 Encounters:   09/28/18 126/78   03/16/18 123/84   11/15/17 122/83     6- Hyperlipidemia -  LFT's WNL. LDL at goal of <100. On Zocor. Continue current medications.     Lab Results   Component Value Date    LDLCALC 87.6 03/09/2018     7- Body mass index is 28.34 kg/m². = Overweight. Modest weight loss (5-10%) may greatly improve BG control. May benefit from weight loss properties of Trulicity + Jardiance    RTC prn  May f/u with PCP for long term DM mgmt.

## 2018-11-16 ENCOUNTER — OFFICE VISIT (OUTPATIENT)
Dept: INTERNAL MEDICINE | Facility: CLINIC | Age: 53
End: 2018-11-16
Payer: COMMERCIAL

## 2018-11-16 ENCOUNTER — TELEPHONE (OUTPATIENT)
Dept: ENDOCRINOLOGY | Facility: CLINIC | Age: 53
End: 2018-11-16

## 2018-11-16 VITALS
WEIGHT: 211.63 LBS | SYSTOLIC BLOOD PRESSURE: 120 MMHG | DIASTOLIC BLOOD PRESSURE: 82 MMHG | HEIGHT: 72 IN | HEART RATE: 82 BPM | BODY MASS INDEX: 28.66 KG/M2

## 2018-11-16 DIAGNOSIS — Z00.00 ANNUAL PHYSICAL EXAM: Primary | ICD-10-CM

## 2018-11-16 DIAGNOSIS — E11.59 HYPERTENSION ASSOCIATED WITH DIABETES: Chronic | ICD-10-CM

## 2018-11-16 DIAGNOSIS — Z12.11 COLON CANCER SCREENING: ICD-10-CM

## 2018-11-16 DIAGNOSIS — Z23 NEED FOR INFLUENZA VACCINATION: ICD-10-CM

## 2018-11-16 DIAGNOSIS — I15.2 HYPERTENSION ASSOCIATED WITH DIABETES: Chronic | ICD-10-CM

## 2018-11-16 DIAGNOSIS — E78.5 HYPERLIPIDEMIA ASSOCIATED WITH TYPE 2 DIABETES MELLITUS: Chronic | ICD-10-CM

## 2018-11-16 DIAGNOSIS — E11.9 TYPE 2 DIABETES MELLITUS WITHOUT COMPLICATION, WITH LONG-TERM CURRENT USE OF INSULIN: Primary | ICD-10-CM

## 2018-11-16 DIAGNOSIS — E11.3293 TYPE 2 DIABETES MELLITUS WITH BOTH EYES AFFECTED BY MILD NONPROLIFERATIVE RETINOPATHY WITHOUT MACULAR EDEMA, WITH LONG-TERM CURRENT USE OF INSULIN: Chronic | ICD-10-CM

## 2018-11-16 DIAGNOSIS — Z79.4 TYPE 2 DIABETES MELLITUS WITH BOTH EYES AFFECTED BY MILD NONPROLIFERATIVE RETINOPATHY WITHOUT MACULAR EDEMA, WITH LONG-TERM CURRENT USE OF INSULIN: Chronic | ICD-10-CM

## 2018-11-16 DIAGNOSIS — E11.69 HYPERLIPIDEMIA ASSOCIATED WITH TYPE 2 DIABETES MELLITUS: Chronic | ICD-10-CM

## 2018-11-16 DIAGNOSIS — Z79.4 TYPE 2 DIABETES MELLITUS WITHOUT COMPLICATION, WITH LONG-TERM CURRENT USE OF INSULIN: Primary | ICD-10-CM

## 2018-11-16 PROCEDURE — 3044F HG A1C LEVEL LT 7.0%: CPT | Mod: CPTII,S$GLB,, | Performed by: INTERNAL MEDICINE

## 2018-11-16 PROCEDURE — 3079F DIAST BP 80-89 MM HG: CPT | Mod: CPTII,S$GLB,, | Performed by: INTERNAL MEDICINE

## 2018-11-16 PROCEDURE — 3074F SYST BP LT 130 MM HG: CPT | Mod: CPTII,S$GLB,, | Performed by: INTERNAL MEDICINE

## 2018-11-16 PROCEDURE — 99999 PR PBB SHADOW E&M-EST. PATIENT-LVL III: CPT | Mod: PBBFAC,,, | Performed by: INTERNAL MEDICINE

## 2018-11-16 PROCEDURE — 99396 PREV VISIT EST AGE 40-64: CPT | Mod: S$GLB,,, | Performed by: INTERNAL MEDICINE

## 2018-11-16 RX ORDER — LOSARTAN POTASSIUM 100 MG/1
100 TABLET ORAL DAILY
Qty: 90 TABLET | Refills: 3 | Status: SHIPPED | OUTPATIENT
Start: 2018-11-16 | End: 2019-11-18 | Stop reason: SDUPTHER

## 2018-11-16 RX ORDER — SIMVASTATIN 40 MG/1
40 TABLET, FILM COATED ORAL DAILY
Qty: 90 TABLET | Refills: 3 | Status: SHIPPED | OUTPATIENT
Start: 2018-11-16 | End: 2019-06-25 | Stop reason: SDUPTHER

## 2018-11-16 NOTE — Clinical Note
Wilmer Andersen,I'm having the schedulers make Mr Loyd a follow up visit with you. He asks if it would be covered under medical insurance since he doesn't have vision coverage. Given his dx of retinopathy I figured everything would be okay there, but I told him I'd give you a heads up in case there were other considerations from your end. Thanks!Nikos

## 2018-11-16 NOTE — PROGRESS NOTES
Subjective:       Patient ID: Yon Loyd is a 53 y.o. male.    Chief Complaint: Establish Care    HPI    Seen by internal medicine in the past.  Over the last year seen by Endocrinology for diabetes.    No new problems or issues. Pt in usual state of health.    Reviewed PMH, PSH, SH, FH, allergies, and medications.     Review of Systems   All other systems reviewed and are negative.      Objective:      Physical Exam   Constitutional: He is oriented to person, place, and time. No distress.   HENT:   Head: Atraumatic.   Right Ear: Tympanic membrane normal. No tenderness.   Left Ear: Tympanic membrane normal. No tenderness.   Mouth/Throat: Oropharynx is clear and moist. No oropharyngeal exudate.   Eyes: Pupils are equal, round, and reactive to light. Right eye exhibits no discharge. Left eye exhibits no discharge.   Neck: Normal range of motion. No thyromegaly present.   Cardiovascular: Normal rate, regular rhythm and normal heart sounds.   Pulmonary/Chest: Effort normal and breath sounds normal. No stridor. He has no wheezes. He has no rales.   Abdominal: Soft. There is no tenderness. There is no guarding.   Musculoskeletal: He exhibits no edema or tenderness.   Lymphadenopathy:     He has no cervical adenopathy.   Neurological: He is alert and oriented to person, place, and time.   Skin: Skin is warm and dry. No rash noted.   Psychiatric: He has a normal mood and affect. His behavior is normal.   Nursing note and vitals reviewed.      Vitals:    11/16/18 1515   BP: 120/82   BP Location: Right arm   Patient Position: Sitting   BP Method: Large (Manual)   Pulse: 82   Weight: 96 kg (211 lb 10.3 oz)   Height: 6' (1.829 m)     Body mass index is 28.7 kg/m².    RESULTS: Reviewed labs from last 12 months    Assessment:       1. Annual physical exam    2. Type 2 diabetes mellitus with both eyes affected by mild nonproliferative retinopathy without macular edema, with long-term current use of insulin    3. Colon cancer  screening    4. Hyperlipidemia associated with type 2 diabetes mellitus    5. Hypertension associated with diabetes    6. Need for influenza vaccination        Plan:   Yon was seen today for establish care.    Diagnoses and all orders for this visit:    Annual physical exam:  Age-appropriate health screening reviewed, indicated tests ordered.     Type 2 diabetes mellitus with both eyes affected by mild nonproliferative retinopathy without macular edema, with long-term current use of insulin:  Prior diagnosis, stable, well controlled on current management. No changes at this time, will continue to monitor. schedule follow up with Optometry.    Colon cancer screening  -     Case request GI: COLONOSCOPY    Hyperlipidemia associated with type 2 diabetes mellitus:  Prior diagnosis, stable, well controlled on current management. No changes at this time, will continue to monitor.   -     simvastatin (ZOCOR) 40 MG tablet; Take 1 tablet (40 mg total) by mouth once daily.    Hypertension associated with diabetes:  Prior diagnosis, stable, well controlled on current management. No changes at this time, will continue to monitor.   -     losartan (COZAAR) 100 MG tablet; Take 1 tablet (100 mg total) by mouth once daily.    Need for influenza vaccination:  Pt defers today but plans to get at local pharmacy.    Follow-up in about 1 year (around 11/16/2019) for EPP annual exam.  Nikos Norman MD  Internal Medicine    Portions of this note were completed using medical dictation software. Please excuse typographical or syntax errors that were missed on review.

## 2018-11-16 NOTE — TELEPHONE ENCOUNTER
----- Message from Lynn Ochoa sent at 11/16/2018  4:15 PM CST -----  Contact: Self [phone]  Pt is requesting to speak with Tre. No other information provided. He can be reached at [phone].

## 2018-11-20 NOTE — TELEPHONE ENCOUNTER
Last seen by me 9/2018. Needs RTC 6 months with endocrine NP.   Obtain A1c at [facility] lab prior to appt.

## 2018-11-28 ENCOUNTER — TELEPHONE (OUTPATIENT)
Dept: ENDOCRINOLOGY | Facility: CLINIC | Age: 53
End: 2018-11-28

## 2018-11-28 NOTE — TELEPHONE ENCOUNTER
----- Message from Kaiser Duval sent at 11/28/2018 12:08 PM CST -----  Contact: Patient [phone]  Patient calling stating would like to speak with Nurse regarding new nurses and Synjardy, requesting a call back.    Please call an advise  Thank you

## 2018-12-04 DIAGNOSIS — Z79.4 TYPE 2 DIABETES MELLITUS WITH DIABETIC NEPHROPATHY, WITH LONG-TERM CURRENT USE OF INSULIN: Chronic | ICD-10-CM

## 2018-12-04 DIAGNOSIS — E11.21 TYPE 2 DIABETES MELLITUS WITH DIABETIC NEPHROPATHY, WITH LONG-TERM CURRENT USE OF INSULIN: Chronic | ICD-10-CM

## 2018-12-04 NOTE — TELEPHONE ENCOUNTER
Spoke with patient he stated that he would like to speak you regarding a provider your recommended to him also needs a refill.

## 2018-12-05 NOTE — TELEPHONE ENCOUNTER
----- Message from Le Dillard sent at 12/5/2018 11:47 AM CST -----  Contact: Self  .Needs Advice    Reason for call: Pt would like to discuss who will be his provider.        Communication Preference: [phone]    Additional Information:

## 2018-12-05 NOTE — TELEPHONE ENCOUNTER
Spoke with patient he stated the provider on the letter he received does not match the providers Tracey recommended. Patient would like to speak with provider about this please address and advise.

## 2019-01-11 ENCOUNTER — OFFICE VISIT (OUTPATIENT)
Dept: OPTOMETRY | Facility: CLINIC | Age: 54
End: 2019-01-11
Payer: COMMERCIAL

## 2019-01-11 DIAGNOSIS — H52.223 REGULAR ASTIGMATISM WITH PRESBYOPIA, BILATERAL: ICD-10-CM

## 2019-01-11 DIAGNOSIS — H25.13 NUCLEAR SCLEROTIC CATARACT OF BOTH EYES: ICD-10-CM

## 2019-01-11 DIAGNOSIS — Z79.4 TYPE 2 DIABETES MELLITUS WITH RIGHT EYE AFFECTED BY MILD NONPROLIFERATIVE RETINOPATHY WITHOUT MACULAR EDEMA, WITH LONG-TERM CURRENT USE OF INSULIN: Primary | ICD-10-CM

## 2019-01-11 DIAGNOSIS — H52.4 REGULAR ASTIGMATISM WITH PRESBYOPIA, BILATERAL: ICD-10-CM

## 2019-01-11 DIAGNOSIS — E11.3291 TYPE 2 DIABETES MELLITUS WITH RIGHT EYE AFFECTED BY MILD NONPROLIFERATIVE RETINOPATHY WITHOUT MACULAR EDEMA, WITH LONG-TERM CURRENT USE OF INSULIN: Primary | ICD-10-CM

## 2019-01-11 PROCEDURE — 92015 PR REFRACTION: ICD-10-PCS | Mod: S$GLB,,, | Performed by: OPTOMETRIST

## 2019-01-11 PROCEDURE — 92014 COMPRE OPH EXAM EST PT 1/>: CPT | Mod: S$GLB,,, | Performed by: OPTOMETRIST

## 2019-01-11 PROCEDURE — 99999 PR PBB SHADOW E&M-EST. PATIENT-LVL III: ICD-10-PCS | Mod: PBBFAC,,, | Performed by: OPTOMETRIST

## 2019-01-11 PROCEDURE — 92015 DETERMINE REFRACTIVE STATE: CPT | Mod: S$GLB,,, | Performed by: OPTOMETRIST

## 2019-01-11 PROCEDURE — 99999 PR PBB SHADOW E&M-EST. PATIENT-LVL III: CPT | Mod: PBBFAC,,, | Performed by: OPTOMETRIST

## 2019-01-11 PROCEDURE — 92014 PR EYE EXAM, EST PATIENT,COMPREHESV: ICD-10-PCS | Mod: S$GLB,,, | Performed by: OPTOMETRIST

## 2019-01-11 NOTE — PATIENT INSTRUCTIONS
About once per week, please check your vision in each eye (cover one eye at a time). Please let me know if you notice any decline in your vision or blurriness.       What Is Diabetic Retinopathy?  Diabetic retinopathy is the leading cause of blindness in adults. It occurs when diabetes harms blood vessels inside the eye. These weak vessels leak fluid into an area of the eye called the retina. Weak new vessels can break and bleed into the retina. Old vessels can leak and cause swelling. These vessels can damage areas of the retina, causing blurry, distorted vision.    What causes diabetic retinopathy?  Diabetes is the cause of this eye disease. Over time, diabetes makes blood vessels weaken all over the body, including in the eyes. Poor blood sugar control can make retinopathy worse. So can smoking, high cholesterol, or poorly controlled high blood pressure. Pregnancy can also cause retinopathy to worsen.  What are the symptoms?  You can have diabetic retinopathy without knowing it. Usually, there is no pain and no outward sign. Over time, you may notice gradual blurring or some vision loss. Symptoms may come and go. Early treatment and good control of risk factors may help prevent vision loss or blindness.  What you can do  Have your eyes examined regularly by an eye specialist. Your healthcare provider will tell you when and how often you need these exams. You can also help control your diabetes through exercise, diet, and medicine, as instructed by your healthcare provider. These same steps may also help control diabetic retinopathy.           Treating Diabetic Retinopathy  Treatment may help slow the progress of diabetic retinopathy. Your treatment plan depends on your condition. It may include frequent exams to monitor your condition, laser treatment, and other procedures.      Laser is one type of treatment that may help limit vision loss from diabetic retinopathy.      Monitoring Your Vision  At first,  your doctor may simply want to monitor your vision. In some cases, you may also have an angiogram. This test uses a special dye to create detailed images of the retina. These images help your doctor decide whether special treatments are needed. You may also have ocular coherence tomography (OCT) testing. This uses light waves to see if there is fluid leaking into certain parts of the eye. It can also measure the thickness of the retina.  Types of Treatment  Special treatments can help stop bleeding, slow or stop new vessel growth, and preserve vision. The type of treatment you get depends on your condition:  · Laser treatment can help stop leaks and limit abnormal vessel growth.  · Surgery can remove the vitreous or repair a retina that is damaged by scar tissue formation. This may help if the vitreous becomes filled with blood and obscures your vision.  · Cryotherapy shrinks blood vessels and repairs the retina.  · Medications injected in the eye can help decrease swelling of the retina or slow the abnormal growth of blood vessels.   ·   © 1729-9361 [employer]. [address] [place] [address], [place] [address]. All rights reserved. This information is not intended as a substitute for professional medical care. Always follow your healthcare professional's instructions.         Managing Type 2 Diabetes  Type 2 diabetes is a long-term (chronic) condition. Managing your diabetes means making some changes that may be hard. Your health care provider, nurse, diabetes educator, and others can help you.  Managing type 2 diabetes means balancing your medicine with diet and activity. Managing your diabetes may also include checking your blood sugar. And, working with your health care provider to prevent complications.  Take your medicine as prescribed  You may take pills (oral medicine) or give yourself shots (insulin injections) for diabetes. Or you may use both. Taking your medicines or giving yourself insulin at the  right times will help you control your blood sugar. Think about ways that will help you remember to take your medicines the right way every day. Ask your health care provider, nurse, or diabetes educator for ideas.  Although you may only take pills for your diabetes, this may change. Over time, most people with type 2 diabetes also use insulin.  Eat healthy  A healthy, well-planned diet helps control the amount of sugar in your blood. It also helps you stay at a healthy weight or lose weight, if you are overweight. Extra weight makes it harder to control diabetes.  Your health care provider, nurse, a dietitian, or diabetes educator will help you create a plan that works for you. You don't have to give up all the foods you like. Having meals and snacks with vegetables, fruits, lean meats, or other healthy proteins, whole grains, and low- or no-fat dairy products will help control your blood sugar.  Be physically active  Being active helps lower your blood sugar. It does this by helping your body use insulin to turn food into energy. Activity also helps you manage your weight:  Ask your health care provider to work with you to create an activity program that's right for you. Your activity programs is based on your age, general health, and types of activity you enjoy. You should start slowly, but aim for at least 30 minutes of exercise or activity on most days.  Check your blood sugar  Checking your own blood sugar may be a regular part of your care. Or you may only check your blood sugar from time to time. Your health care provider will give you instructions about checking your blood sugar at home. Checking it tells you if your blood sugar is in your target range. A target range means that you are managing your diabetes well.  If your blood sugar levels are too high or too low, your health care provider may suggest changes to your diet or activity level. He or she may also adjust your medication.  Your health care  provider may also tell you to check your blood sugar more often when you are sick. At certain times, for example, when you have a cold or the flu, you may need to check it more often.  Take care of yourself  When you have diabetes, you may be more likely to develop other health problems. They include foot, eye, heart, and kidney problems. By controlling your blood sugar, and taking good care of yourself, you can help prevent these problems. Your health care provider, nurse, diabetes educator, and others can assist you.  · Checkups. You should have regular checkups with your health care provider. At those visits, you will have a physical exam that includes checking your feet. Your health care provider will also check your blood pressure and weight.  · Other exams. You should also have complete eye, foot, and dental exams at least once every year.  · Lab tests. You will have blood and urine tests.  ¨ At least 2 times a year, your health care provider will check your hemoglobin A1C. This blood test shows how well you have been controlling your blood sugar over 2 to 3 months. The results help your health care provider manage your diabetes.    ¨ You will also have other lab tests. For example, to check for kidney problems and abnormal cholesterol levels.  · Smoking. If you smoke, you will need to quit. Smoking increases the chance that you will develop complications from diabetes. Ask your health care provider about ways to quit.  · Vaccines. Get a yearly flu shot. And, ask your health care provider about vaccines to prevent pneumonia and hepatitis B.  Stress and depression  Most people have challenges throughout their lives. Living with diabetes, or any serious condition, can increase your stress and make you feel a lot of different emotions. In diabetes, feeling stressed or depressed can actually affect your blood sugar levels.  If you are having trouble dealing with diabetes, tell your health care provider. He or she  can help or refer you to other health care providers or programs.  Support and resources  Know where you can get help. You can try the following:  · Support. Ask family and friends to support your effects to take care of yourself. Or, look for a diabetes support group locally or on the internet. (Check the Connect with Others link on www.diabetes.org)  · Counseling. Talk with a , psychologist, psychiatrist, or other counselor.  · Information. Contact the American Diabetes Association at 166-695-5030 or www.diabetes.org      © 6751-3458 [employer]. [address] [place] [address]. All rights reserved. This information is not intended as a substitute for professional medical care. Always follow your healthcare professional's instructions.     ==============================================    CATARACT    Symptoms and Signs:  A cataract starts out small, and at first has little effect on your vision. You may notice that your vision is blurred a little, like looking through a cloudy piece of glass or viewing an impressionist painting. A cataract may make light from the sun or a lamp seem too bright or glaring. Or you may notice when you drive at night that the oncoming headlights cause more glare than before. Colors may not appear as bright as they once did.  The type of cataract you have will affect exactly which symptoms you experience and how soon they will occur. When a nuclear cataract first develops it can bring about a temporary improvement in your near vision, called second sight. Unfortunately, the improved vision is short-lived and will disappear as the cataract worsens. Meanwhile, a sub-capsular cataract may not produce any symptoms until it's well-developed.    Causes:  No one knows for sure why the eye's lens changes as we age, forming cataracts. Researchers are gradually identifying factors that may cause cataracts - and information that may help to prevent them.  Many  studies suggest that exposure to ultraviolet light is associated with cataracts, so eye care practitioners recommend wearing sunglasses and a wide-brimmed hat to lessen your exposure.  Other studies suggest people with diabetes are at risk for developing a cataract.   Some eye care practitioners believe that a diet high in antioxidants, such as beta-carotene (vitamin A), selenium and vitamins C and E, may forestall cataracts.  The most important of these is probably vitamin C; it might be helpful to supplement the diet with an extra Vitamin C tablet.  Meanwhile, eating a lot of salt may increase your risk.  Other risk factors include cigarette smoke, air pollution and heavy alcohol consumption.  We simply recommend that you be careful to use sunglasses and to take Vitamin C.    Treatment:  When symptoms begin to appear, we can improve your vision for a while using new glasses, strong bifocals, magnification, appropriate lighting or other visual aids.  This is true in your case; your cataract does not impact your vision very much at this time. If you experience any of the symptoms we described you can return at any time. Otherwise it is fine to see you in 1 year.

## 2019-01-11 NOTE — PROGRESS NOTES
HPI     MrJim Loyd was referred by Nikos Norman MD for a diabetic eye   exam.    No visual/ocular concerns.     Would patient like a refraction today? yes    (-)drops  (ongoing Ou)flashes  (Ongoing Ou)floaters  (-)diplopia    Diabetic: yes  Hemoglobin A1C       Date                     Value               Ref Range             Status           09/21/2018               6.3 (H)             4.0 - 5.6 %         Final  03/09/2018               6.3 (H)             4.0 - 5.6 %         Final  11/09/2017               6.2 (H)             4.0 - 5.6 %         Final    OCULAR HISTORY  Last Eye Exam: 08/11/17 with Dr. Malhotra  (-)eye surgery   Mild NPDR OU  Cataracts OU    FAMILY HISTORY  (-)Glaucoma         Last edited by Nathaly Malhotra, OD on 1/11/2019  3:24 PM. (History)            Assessment /Plan     For exam results, see Encounter Report.    Type 2 diabetes mellitus with right eye affected by mild nonproliferative retinopathy without macular edema, with long-term current use of insulin   Previously noted mild NPDR OS now resolved, OD stable. Monitor in 6 months, or RTC sooner with any vision changes.    Nuclear sclerotic cataract of both eyes   Not visually significant OU. Monitor.      Regular astigmatism with presbyopia, bilateral   Pt understands that refractive error changes with blood glucose fluctuations; pt opts to proceed with refraction today.    Hyperopic shift OU. New glasses prescription released, adaptation expected.    Eyeglass Final Rx     Eyeglass Final Rx       Sphere Cylinder Axis Add    Right -0.50 +2.25 010 +2.00    Left -0.25 +1.75 165 +2.00    Expiration Date:  1/12/2020                 RTC 6 months to recheck diabetic retinopathy (DFE OU)

## 2019-03-22 DIAGNOSIS — E11.9 TYPE 2 DIABETES MELLITUS WITHOUT COMPLICATION: ICD-10-CM

## 2019-04-02 ENCOUNTER — LAB VISIT (OUTPATIENT)
Dept: LAB | Facility: HOSPITAL | Age: 54
End: 2019-04-02
Attending: NURSE PRACTITIONER
Payer: COMMERCIAL

## 2019-04-02 DIAGNOSIS — E11.9 TYPE 2 DIABETES MELLITUS WITHOUT COMPLICATION, WITH LONG-TERM CURRENT USE OF INSULIN: ICD-10-CM

## 2019-04-02 DIAGNOSIS — Z79.4 TYPE 2 DIABETES MELLITUS WITHOUT COMPLICATION, WITH LONG-TERM CURRENT USE OF INSULIN: ICD-10-CM

## 2019-04-02 LAB
ESTIMATED AVG GLUCOSE: 174 MG/DL (ref 68–131)
HBA1C MFR BLD HPLC: 7.7 % (ref 4–5.6)

## 2019-04-02 PROCEDURE — 36415 COLL VENOUS BLD VENIPUNCTURE: CPT | Mod: PO

## 2019-04-02 PROCEDURE — 83036 HEMOGLOBIN GLYCOSYLATED A1C: CPT

## 2019-04-09 NOTE — PROGRESS NOTES
Subjective:      Patient ID: Yon Loyd is a 53 y.o. male.    Chief Complaint:  Diabetes    History of Present Illness  Yon Loyd presents today for follow up of T2DM. Previous patient of BLAKE Toledo NP. Last seen 9/28/18. This is his first visit with me.     With regards to the diabetes:    Diagnosed with Type 2 diabetes in ~1993.  Been on insulin since ~2013. Started Trulicity 12/2016.     DIABETES COMPLICATIONS: nephropathy, retinopathy and peripheral neuropathy     Current regimen:  Synjardy BID  Trulicity 1.5 mg weekly  Levemir 40 units daily in the morning  Novolog 10 units before breakfast and dinner    Missed doses: occasionally missing Novolog dinner dose (10x monthly) and missed Trulicity once.     Other medications tried:  None    0 times a day testing  Log reviewed: none    Eats 3 meals a day.   Snacks: crackers, pickles, olives        Drinks: Diet soda    Exercise: walks often for work    Hypoglycemic event? None   Knows how to correct with 15 grams of carbs- juice, coke, or a peppermint.      Education - last visit: none    Diabetes Management Status  Statin: Taking  ACE/ARB: Taking  Screening or Prevention Patient's value Goal Complete/Controlled?   HgA1C Testing and Control   Lab Results   Component Value Date    HGBA1C 7.7 (H) 04/02/2019      Annually/Less than 8% Yes   Lipid profile : 03/09/2018 Annually No   LDL control Lab Results   Component Value Date    LDLCALC 87.6 03/09/2018    Annually/Less than 100 mg/dl  No   Nephropathy screening Lab Results   Component Value Date    LABMICR 168.0 09/28/2018     No results found for: PROTEINUA Annually Yes   Blood pressure BP Readings from Last 1 Encounters:   04/11/19 (!) 140/82    Less than 140/90 Yes   Dilated retinal exam : 01/11/2019 Annually Yes   Foot exam   : 03/16/2018 Annually No     Review of Systems   Constitutional: Negative for unexpected weight change.   Eyes: Negative for visual disturbance.   Respiratory: Negative for shortness  of breath.    Cardiovascular: Negative for chest pain.   Gastrointestinal: Negative for abdominal pain.   Endocrine: Negative for cold intolerance, heat intolerance, polydipsia, polyphagia and polyuria.   Musculoskeletal: Negative for arthralgias.   Skin: Negative for wound.   Neurological: Negative for headaches.   Hematological: Does not bruise/bleed easily.   Psychiatric/Behavioral: Negative for sleep disturbance.     Objective:   Physical Exam   Neck: No thyromegaly present.   Cardiovascular: Normal rate.   No edema present   Pulmonary/Chest: Effort normal.   Abdominal: Soft.   Vitals reviewed.  Diabetes Foot Exam:   Appropriate footwear, Foot exam deferred, done by PCP. No ulcers or wounds    Body mass index is 28.85 kg/m².    Lab Review:   Lab Results   Component Value Date    HGBA1C 7.7 (H) 04/02/2019     Lab Results   Component Value Date    CHOL 168 03/09/2018    HDL 51 03/09/2018    LDLCALC 87.6 03/09/2018    TRIG 147 03/09/2018    CHOLHDL 30.4 03/09/2018     Lab Results   Component Value Date     03/09/2018    K 4.6 03/09/2018     03/09/2018    CO2 28 03/09/2018     (H) 03/09/2018    BUN 16 03/09/2018    CREATININE 1.01 03/09/2018    CALCIUM 9.9 03/09/2018    PROT 7.7 03/09/2018    ALBUMIN 4.4 03/09/2018    BILITOT 0.3 03/09/2018    ALKPHOS 92 03/09/2018    AST 25 03/09/2018    ALT 35 03/09/2018    ANIONGAP 12 03/09/2018    ESTGFRAFRICA >60.0 03/09/2018    EGFRNONAA >60.0 03/09/2018    TSH 0.699 03/09/2018       Assessment and Plan     1. Type 2 diabetes mellitus with diabetic nephropathy, with long-term current use of insulin  Hemoglobin A1c    Comprehensive metabolic panel   2. Overweight (BMI 25.0-29.9)     3. Hypertension associated with diabetes     4. Hyperlipidemia associated with type 2 diabetes mellitus       Type 2 diabetes mellitus with diabetic nephropathy, with long-term current use of insulin  -- Reviewed goals of therapy are to get the best control we can without  hypoglycemia  -- Labs prior to next appt.   Medication changes:   Continue: current medications.    -- In depth education on medication compliance.   -- Reviewed patient's current insulin regimen. Clarified proper insulin dose and timing in relation to meals, etc. Insulin injection sites and proper rotation instructed.    -- Advised frequent self blood glucose monitoring.  Patient encouraged to document glucose results and bring them to every clinic visit    -- Hypoglycemia precautions discussed. Instructed on precautions before driving.    -- Call for Bg repeatedly < 90 or > 180.   -- Close adherence to lifestyle changes recommended.   -- Periodic follow ups for eye evaluations, foot care and dental care suggested.    Overweight (BMI 25.0-29.9)  -- encouraged dietary and lifestyle modifications   -- emphasized weight loss goals     Hypertension associated with diabetes  -- on ARB  -- Controlled  -- Blood pressure goals discussed with patient    Hyperlipidemia associated with type 2 diabetes mellitus  -- Controlled   -- On statin per ADA recommendations      Follow up in about 3 months (around 7/11/2019).  Labs prior.

## 2019-04-11 ENCOUNTER — OFFICE VISIT (OUTPATIENT)
Dept: ENDOCRINOLOGY | Facility: CLINIC | Age: 54
End: 2019-04-11
Payer: COMMERCIAL

## 2019-04-11 VITALS
BODY MASS INDEX: 28.82 KG/M2 | SYSTOLIC BLOOD PRESSURE: 140 MMHG | WEIGHT: 212.75 LBS | HEART RATE: 78 BPM | HEIGHT: 72 IN | DIASTOLIC BLOOD PRESSURE: 82 MMHG

## 2019-04-11 DIAGNOSIS — E78.5 HYPERLIPIDEMIA ASSOCIATED WITH TYPE 2 DIABETES MELLITUS: Chronic | ICD-10-CM

## 2019-04-11 DIAGNOSIS — E11.59 HYPERTENSION ASSOCIATED WITH DIABETES: Chronic | ICD-10-CM

## 2019-04-11 DIAGNOSIS — E66.3 OVERWEIGHT (BMI 25.0-29.9): Chronic | ICD-10-CM

## 2019-04-11 DIAGNOSIS — E11.21 TYPE 2 DIABETES MELLITUS WITH DIABETIC NEPHROPATHY, WITH LONG-TERM CURRENT USE OF INSULIN: Primary | Chronic | ICD-10-CM

## 2019-04-11 DIAGNOSIS — E11.69 HYPERLIPIDEMIA ASSOCIATED WITH TYPE 2 DIABETES MELLITUS: Chronic | ICD-10-CM

## 2019-04-11 DIAGNOSIS — I15.2 HYPERTENSION ASSOCIATED WITH DIABETES: Chronic | ICD-10-CM

## 2019-04-11 DIAGNOSIS — Z79.4 TYPE 2 DIABETES MELLITUS WITH DIABETIC NEPHROPATHY, WITH LONG-TERM CURRENT USE OF INSULIN: Primary | Chronic | ICD-10-CM

## 2019-04-11 PROCEDURE — 3045F PR MOST RECENT HEMOGLOBIN A1C LEVEL 7.0-9.0%: CPT | Mod: CPTII,S$GLB,, | Performed by: NURSE PRACTITIONER

## 2019-04-11 PROCEDURE — 99999 PR PBB SHADOW E&M-EST. PATIENT-LVL III: ICD-10-PCS | Mod: PBBFAC,,, | Performed by: NURSE PRACTITIONER

## 2019-04-11 PROCEDURE — 3008F BODY MASS INDEX DOCD: CPT | Mod: CPTII,S$GLB,, | Performed by: NURSE PRACTITIONER

## 2019-04-11 PROCEDURE — 3077F SYST BP >= 140 MM HG: CPT | Mod: CPTII,S$GLB,, | Performed by: NURSE PRACTITIONER

## 2019-04-11 PROCEDURE — 3045F PR MOST RECENT HEMOGLOBIN A1C LEVEL 7.0-9.0%: ICD-10-PCS | Mod: CPTII,S$GLB,, | Performed by: NURSE PRACTITIONER

## 2019-04-11 PROCEDURE — 3079F DIAST BP 80-89 MM HG: CPT | Mod: CPTII,S$GLB,, | Performed by: NURSE PRACTITIONER

## 2019-04-11 PROCEDURE — 99214 OFFICE O/P EST MOD 30 MIN: CPT | Mod: S$GLB,,, | Performed by: NURSE PRACTITIONER

## 2019-04-11 PROCEDURE — 3008F PR BODY MASS INDEX (BMI) DOCUMENTED: ICD-10-PCS | Mod: CPTII,S$GLB,, | Performed by: NURSE PRACTITIONER

## 2019-04-11 PROCEDURE — 3077F PR MOST RECENT SYSTOLIC BLOOD PRESSURE >= 140 MM HG: ICD-10-PCS | Mod: CPTII,S$GLB,, | Performed by: NURSE PRACTITIONER

## 2019-04-11 PROCEDURE — 99214 PR OFFICE/OUTPT VISIT, EST, LEVL IV, 30-39 MIN: ICD-10-PCS | Mod: S$GLB,,, | Performed by: NURSE PRACTITIONER

## 2019-04-11 PROCEDURE — 3079F PR MOST RECENT DIASTOLIC BLOOD PRESSURE 80-89 MM HG: ICD-10-PCS | Mod: CPTII,S$GLB,, | Performed by: NURSE PRACTITIONER

## 2019-04-11 PROCEDURE — 99999 PR PBB SHADOW E&M-EST. PATIENT-LVL III: CPT | Mod: PBBFAC,,, | Performed by: NURSE PRACTITIONER

## 2019-04-11 RX ORDER — INSULIN ASPART 100 [IU]/ML
INJECTION, SOLUTION INTRAVENOUS; SUBCUTANEOUS
Qty: 1 BOX | Refills: 6 | Status: SHIPPED | OUTPATIENT
Start: 2019-04-11 | End: 2019-08-07 | Stop reason: SDUPTHER

## 2019-04-11 NOTE — ASSESSMENT & PLAN NOTE
-- Reviewed goals of therapy are to get the best control we can without hypoglycemia  -- Labs prior to next appt.   Medication changes:   Continue: current medications.    -- In depth education on medication compliance.   -- Reviewed patient's current insulin regimen. Clarified proper insulin dose and timing in relation to meals, etc. Insulin injection sites and proper rotation instructed.    -- Advised frequent self blood glucose monitoring.  Patient encouraged to document glucose results and bring them to every clinic visit    -- Hypoglycemia precautions discussed. Instructed on precautions before driving.    -- Call for Bg repeatedly < 90 or > 180.   -- Close adherence to lifestyle changes recommended.   -- Periodic follow ups for eye evaluations, foot care and dental care suggested.

## 2019-06-18 ENCOUNTER — TELEPHONE (OUTPATIENT)
Dept: ENDOCRINOLOGY | Facility: CLINIC | Age: 54
End: 2019-06-18

## 2019-06-18 NOTE — TELEPHONE ENCOUNTER
----- Message from Lynn Ochoa sent at 6/18/2019  3:04 PM CDT -----  Contact: Self [phone]  PT was seen in April and was asked to return in July, however we have a waitlist. Pt wants to know how Lizette wants to handle it.

## 2019-06-25 DIAGNOSIS — E78.5 HYPERLIPIDEMIA ASSOCIATED WITH TYPE 2 DIABETES MELLITUS: Chronic | ICD-10-CM

## 2019-06-25 DIAGNOSIS — E11.69 HYPERLIPIDEMIA ASSOCIATED WITH TYPE 2 DIABETES MELLITUS: Chronic | ICD-10-CM

## 2019-06-25 RX ORDER — SIMVASTATIN 40 MG/1
40 TABLET, FILM COATED ORAL DAILY
Qty: 90 TABLET | Refills: 3 | Status: SHIPPED | OUTPATIENT
Start: 2019-06-25 | End: 2019-11-18 | Stop reason: SDUPTHER

## 2019-07-19 ENCOUNTER — OFFICE VISIT (OUTPATIENT)
Dept: OPTOMETRY | Facility: CLINIC | Age: 54
End: 2019-07-19
Payer: COMMERCIAL

## 2019-07-19 DIAGNOSIS — H25.13 NUCLEAR SCLEROTIC CATARACT OF BOTH EYES: ICD-10-CM

## 2019-07-19 DIAGNOSIS — Z79.4 TYPE 2 DIABETES MELLITUS WITH BOTH EYES AFFECTED BY MILD NONPROLIFERATIVE RETINOPATHY WITHOUT MACULAR EDEMA, WITH LONG-TERM CURRENT USE OF INSULIN: Primary | ICD-10-CM

## 2019-07-19 DIAGNOSIS — E11.3293 TYPE 2 DIABETES MELLITUS WITH BOTH EYES AFFECTED BY MILD NONPROLIFERATIVE RETINOPATHY WITHOUT MACULAR EDEMA, WITH LONG-TERM CURRENT USE OF INSULIN: Primary | ICD-10-CM

## 2019-07-19 DIAGNOSIS — H52.7 REFRACTIVE ERROR: ICD-10-CM

## 2019-07-19 PROCEDURE — 99999 PR PBB SHADOW E&M-EST. PATIENT-LVL III: ICD-10-PCS | Mod: PBBFAC,,, | Performed by: OPTOMETRIST

## 2019-07-19 PROCEDURE — 92014 PR EYE EXAM, EST PATIENT,COMPREHESV: ICD-10-PCS | Mod: S$GLB,,, | Performed by: OPTOMETRIST

## 2019-07-19 PROCEDURE — 99999 PR PBB SHADOW E&M-EST. PATIENT-LVL III: CPT | Mod: PBBFAC,,, | Performed by: OPTOMETRIST

## 2019-07-19 PROCEDURE — 92014 COMPRE OPH EXAM EST PT 1/>: CPT | Mod: S$GLB,,, | Performed by: OPTOMETRIST

## 2019-07-19 NOTE — PATIENT INSTRUCTIONS
What Is Diabetic Retinopathy?  Diabetic retinopathy is the leading cause of blindness in adults. It occurs when diabetes harms blood vessels inside the eye. These weak vessels leak fluid into an area of the eye called the retina. Weak new vessels can break and bleed into the retina. Old vessels can leak and cause swelling. These vessels can damage areas of the retina, causing blurry, distorted vision.    What causes diabetic retinopathy?  Diabetes is the cause of this eye disease. Over time, diabetes makes blood vessels weaken all over the body, including in the eyes. Poor blood sugar control can make retinopathy worse. So can smoking, high cholesterol, or poorly controlled high blood pressure. Pregnancy can also cause retinopathy to worsen.  What are the symptoms?  You can have diabetic retinopathy without knowing it. Usually, there is no pain and no outward sign. Over time, you may notice gradual blurring or some vision loss. Symptoms may come and go. Early treatment and good control of risk factors may help prevent vision loss or blindness.  What you can do  Have your eyes examined regularly by an eye specialist. Your healthcare provider will tell you when and how often you need these exams. You can also help control your diabetes through exercise, diet, and medicine, as instructed by your healthcare provider. These same steps may also help control diabetic retinopathy.           Treating Diabetic Retinopathy  Treatment may help slow the progress of diabetic retinopathy. Your treatment plan depends on your condition. It may include frequent exams to monitor your condition, laser treatment, and other procedures.      Laser is one type of treatment that may help limit vision loss from diabetic retinopathy.      Monitoring Your Vision  At first, your doctor may simply want to monitor your vision. In some cases, you may also have an angiogram. This test uses a special dye to create detailed images of the retina.  These images help your doctor decide whether special treatments are needed. You may also have ocular coherence tomography (OCT) testing. This uses light waves to see if there is fluid leaking into certain parts of the eye. It can also measure the thickness of the retina.  Types of Treatment  Special treatments can help stop bleeding, slow or stop new vessel growth, and preserve vision. The type of treatment you get depends on your condition:  · Laser treatment can help stop leaks and limit abnormal vessel growth.  · Surgery can remove the vitreous or repair a retina that is damaged by scar tissue formation. This may help if the vitreous becomes filled with blood and obscures your vision.  · Cryotherapy shrinks blood vessels and repairs the retina.  · Medications injected in the eye can help decrease swelling of the retina or slow the abnormal growth of blood vessels.   ·   © 0092-3854 [employer]. [address] [place] [id number]. All rights reserved. This information is not intended as a substitute for professional medical care. Always follow your healthcare professional's instructions.         Managing Type 2 Diabetes  Type 2 diabetes is a long-term (chronic) condition. Managing your diabetes means making some changes that may be hard. Your health care provider, nurse, diabetes educator, and others can help you.  Managing type 2 diabetes means balancing your medicine with diet and activity. Managing your diabetes may also include checking your blood sugar. And, working with your health care provider to prevent complications.  Take your medicine as prescribed  You may take pills (oral medicine) or give yourself shots (insulin injections) for diabetes. Or you may use both. Taking your medicines or giving yourself insulin at the right times will help you control your blood sugar. Think about ways that will help you remember to take your medicines the right way every day. Ask your health care  provider, nurse, or diabetes educator for ideas.  Although you may only take pills for your diabetes, this may change. Over time, most people with type 2 diabetes also use insulin.  Eat healthy  A healthy, well-planned diet helps control the amount of sugar in your blood. It also helps you stay at a healthy weight or lose weight, if you are overweight. Extra weight makes it harder to control diabetes.  Your health care provider, nurse, a dietitian, or diabetes educator will help you create a plan that works for you. You don't have to give up all the foods you like. Having meals and snacks with vegetables, fruits, lean meats, or other healthy proteins, whole grains, and low- or no-fat dairy products will help control your blood sugar.  Be physically active  Being active helps lower your blood sugar. It does this by helping your body use insulin to turn food into energy. Activity also helps you manage your weight:  Ask your health care provider to work with you to create an activity program that's right for you. Your activity programs is based on your age, general health, and types of activity you enjoy. You should start slowly, but aim for at least 30 minutes of exercise or activity on most days.  Check your blood sugar  Checking your own blood sugar may be a regular part of your care. Or you may only check your blood sugar from time to time. Your health care provider will give you instructions about checking your blood sugar at home. Checking it tells you if your blood sugar is in your target range. A target range means that you are managing your diabetes well.  If your blood sugar levels are too high or too low, your health care provider may suggest changes to your diet or activity level. He or she may also adjust your medication.  Your health care provider may also tell you to check your blood sugar more often when you are sick. At certain times, for example, when you have a cold or the flu, you may need to check  it more often.  Take care of yourself  When you have diabetes, you may be more likely to develop other health problems. They include foot, eye, heart, and kidney problems. By controlling your blood sugar, and taking good care of yourself, you can help prevent these problems. Your health care provider, nurse, diabetes educator, and others can assist you.  · Checkups. You should have regular checkups with your health care provider. At those visits, you will have a physical exam that includes checking your feet. Your health care provider will also check your blood pressure and weight.  · Other exams. You should also have complete eye, foot, and dental exams at least once every year.  · Lab tests. You will have blood and urine tests.  ¨ At least 2 times a year, your health care provider will check your hemoglobin A1C. This blood test shows how well you have been controlling your blood sugar over 2 to 3 months. The results help your health care provider manage your diabetes.    ¨ You will also have other lab tests. For example, to check for kidney problems and abnormal cholesterol levels.  · Smoking. If you smoke, you will need to quit. Smoking increases the chance that you will develop complications from diabetes. Ask your health care provider about ways to quit.  · Vaccines. Get a yearly flu shot. And, ask your health care provider about vaccines to prevent pneumonia and hepatitis B.  Stress and depression  Most people have challenges throughout their lives. Living with diabetes, or any serious condition, can increase your stress and make you feel a lot of different emotions. In diabetes, feeling stressed or depressed can actually affect your blood sugar levels.  If you are having trouble dealing with diabetes, tell your health care provider. He or she can help or refer you to other health care providers or programs.  Support and resources  Know where you can get help. You can try the following:  · Support. Ask family  and friends to support your effects to take care of yourself. Or, look for a diabetes support group locally or on the internet. (Check the Connect with Others link on www.diabetes.org)  · Counseling. Talk with a , psychologist, psychiatrist, or other counselor.  · Information. Contact the American Diabetes Association at 090-431-5173 or www.diabetes.org      © 1303-1745 [employer]. [address] [place] [address], [place] [address]. All rights reserved. This information is not intended as a substitute for professional medical care. Always follow your healthcare professional's instructions.

## 2019-07-19 NOTE — PROGRESS NOTES
HPI     Mr. Yon Loyd is here for a 6 month diabetic retinopathy check.    Patient c/o of difficulty reading with current PALs (6yrs old). He had not   yet filled his rx from January 2019.     Would patient like a refraction today? No    (-)drops  (-)flashes  (+)floaters  (-)diplopia    (+)Diabetes  LBS: doesn't check   Hemoglobin A1C       Date                     Value               Ref Range             Status           04/02/2019               7.7 (H)             4.0 - 5.6 %         Final  09/21/2018               6.3 (H)             4.0 - 5.6 %         Final  03/09/2018               6.3 (H)             4.0 - 5.6 %         Final     OCULAR HISTORY  Last Eye Exam: 01/11/19 with Dr. Malhotra  (-)eye surgery   Mild NPDR OU  Cataracts OU    FAMILY HISTORY  (-)Glaucoma         Last edited by Nathaly Malhotra, OD on 7/19/2019  2:32 PM. (History)            Assessment /Plan     For exam results, see Encounter Report.    Type 2 diabetes mellitus with both eyes affected by mild nonproliferative retinopathy without macular edema, with long-term current use of insulin   Slight progression OU. Last A1c was elevated. Stressed blood glucose control, including diet/exercise. Recheck in 6 months, or RTC sooner with any monocular vision changes.    Nuclear sclerotic cataract of both eyes   Not visually significant OU. Monitor.      Refractive error   Hyperopic shift as previously noted. SRx from last visit re-printed.         RTC 6 months to recheck diabetic retinopathy (pt requests appointment in 4 months on same day as other appointments, 11/18/19)

## 2019-08-07 DIAGNOSIS — E11.21 TYPE 2 DIABETES MELLITUS WITH DIABETIC NEPHROPATHY, WITH LONG-TERM CURRENT USE OF INSULIN: Chronic | ICD-10-CM

## 2019-08-07 DIAGNOSIS — Z79.4 TYPE 2 DIABETES MELLITUS WITH DIABETIC NEPHROPATHY, WITH LONG-TERM CURRENT USE OF INSULIN: Chronic | ICD-10-CM

## 2019-08-07 RX ORDER — INSULIN ASPART 100 [IU]/ML
INJECTION, SOLUTION INTRAVENOUS; SUBCUTANEOUS
Qty: 4 BOX | Refills: 6 | Status: SHIPPED | OUTPATIENT
Start: 2019-08-07 | End: 2019-11-18 | Stop reason: SDUPTHER

## 2019-11-01 ENCOUNTER — LAB VISIT (OUTPATIENT)
Dept: LAB | Facility: HOSPITAL | Age: 54
End: 2019-11-01
Attending: NURSE PRACTITIONER
Payer: COMMERCIAL

## 2019-11-01 DIAGNOSIS — Z79.4 TYPE 2 DIABETES MELLITUS WITH DIABETIC NEPHROPATHY, WITH LONG-TERM CURRENT USE OF INSULIN: Chronic | ICD-10-CM

## 2019-11-01 DIAGNOSIS — E11.21 TYPE 2 DIABETES MELLITUS WITH DIABETIC NEPHROPATHY, WITH LONG-TERM CURRENT USE OF INSULIN: Chronic | ICD-10-CM

## 2019-11-01 DIAGNOSIS — Z12.39 BREAST CANCER SCREENING: ICD-10-CM

## 2019-11-01 LAB
ALBUMIN SERPL BCP-MCNC: 4.5 G/DL (ref 3.5–5.2)
ALP SERPL-CCNC: 99 U/L (ref 38–126)
ALT SERPL W/O P-5'-P-CCNC: 29 U/L (ref 10–44)
ANION GAP SERPL CALC-SCNC: 11 MMOL/L (ref 8–16)
AST SERPL-CCNC: 32 U/L (ref 15–46)
BILIRUB SERPL-MCNC: 0.6 MG/DL (ref 0.1–1)
BUN SERPL-MCNC: 16 MG/DL (ref 2–20)
CALCIUM SERPL-MCNC: 9.6 MG/DL (ref 8.7–10.5)
CHLORIDE SERPL-SCNC: 100 MMOL/L (ref 95–110)
CO2 SERPL-SCNC: 29 MMOL/L (ref 23–29)
CREAT SERPL-MCNC: 1.03 MG/DL (ref 0.5–1.4)
EST. GFR  (AFRICAN AMERICAN): >60 ML/MIN/1.73 M^2
EST. GFR  (NON AFRICAN AMERICAN): >60 ML/MIN/1.73 M^2
ESTIMATED AVG GLUCOSE: 146 MG/DL (ref 68–131)
GLUCOSE SERPL-MCNC: 94 MG/DL (ref 70–110)
HBA1C MFR BLD HPLC: 6.7 % (ref 4–5.6)
POTASSIUM SERPL-SCNC: 5 MMOL/L (ref 3.5–5.1)
PROT SERPL-MCNC: 7.8 G/DL (ref 6–8.4)
SODIUM SERPL-SCNC: 140 MMOL/L (ref 136–145)

## 2019-11-01 PROCEDURE — 36415 COLL VENOUS BLD VENIPUNCTURE: CPT | Mod: PO

## 2019-11-01 PROCEDURE — 83036 HEMOGLOBIN GLYCOSYLATED A1C: CPT

## 2019-11-01 PROCEDURE — 80053 COMPREHEN METABOLIC PANEL: CPT | Mod: PO

## 2019-11-04 ENCOUNTER — PATIENT OUTREACH (OUTPATIENT)
Dept: ADMINISTRATIVE | Facility: HOSPITAL | Age: 54
End: 2019-11-04

## 2019-11-14 NOTE — PROGRESS NOTES
"Subjective:      Patient ID: Yon Loyd is a 54 y.o. male.    Chief Complaint:  Diabetes    History of Present Illness  Yon Loyd presents today for follow up of T2DM.     With regards to the diabetes:    Diagnosed with Type 2 diabetes in ~1993.  Been on insulin since ~2013. Started Trulicity 12/2016.   Family history of type 2 diabetes in mother and father.     DIABETES COMPLICATIONS: nephropathy, retinopathy and peripheral neuropathy.     Current regimen:  Synjardy 12.5/1000 mg BID  Trulicity 1.5 mg weekly (Tuesday's)  Levemir 40 units daily in the morning  Novolog 10 units before breakfast and dinner    Missed doses: occasionally missing Novolog dinner dose about once weekly; he also missed Trulicity once since his last visit    Other medications tried:  Invokana but stopped due to fear of side effects    He is checking BG infrequently.   His glucometer is >2 years old.   Log reviewed: none    Hypoglycemic event? None; reports he has felt "low" BG recently but checked BG and was 115.   Knows how to correct with 15 grams of carbs- juice, coke, or a peppermint.      Eats 3 meals a day.   B: sausage biscuit from [employer] or at home  L: sandwich  D: eating out a lot; varies -burgers or chicken with fries  He does not feel that he is following diabetic diet but is portion control.   Snacks: peanut butter crackers, pickles, olives        Drinks: Diet soda and coffee with equal    Exercise: walks often for work. No formal exercise.    Education - last visit: none. He does not wish to go again.    Diabetes Management Status  Statin: Taking  ACE/ARB: Taking     Ref. Range 3/9/2018 07:10 9/21/2018 07:18 4/2/2019 07:33 11/1/2019 07:04   Hemoglobin A1C External Latest Ref Range: 4.0 - 5.6 % 6.3 (H) 6.3 (H) 7.7 (H) 6.7 (H)       Screening or Prevention Patient's value Goal Complete/Controlled?   HgA1C Testing and Control   Lab Results   Component Value Date    HGBA1C 6.7 (H) 11/01/2019      Annually/Less than 8% " Yes   Lipid profile : 03/09/2018 Annually No   LDL control Lab Results   Component Value Date    LDLCALC 87.6 03/09/2018    Annually/Less than 100 mg/dl  No   Nephropathy screening Lab Results   Component Value Date    LABMICR 168.0 09/28/2018     No results found for: PROTEINUA Annually Yes   Blood pressure BP Readings from Last 1 Encounters:   11/18/19 128/82    Less than 140/90 Yes   Dilated retinal exam : 07/19/2019 Annually Yes   Foot exam   : 11/18/2019 Annually No     Review of Systems   Constitutional: Negative for unexpected weight change.   Eyes: Negative for visual disturbance.   Respiratory: Negative for shortness of breath.    Cardiovascular: Negative for chest pain.   Gastrointestinal: Negative for abdominal pain.   Endocrine: Negative for cold intolerance, heat intolerance, polydipsia, polyphagia and polyuria.   Musculoskeletal: Negative for arthralgias.   Skin: Negative for wound.   Neurological: Negative for headaches.   Hematological: Does not bruise/bleed easily.   Psychiatric/Behavioral: Negative for sleep disturbance.     Objective:   Physical Exam   Neck: No thyromegaly present.   Cardiovascular: Normal rate.   No edema present   Pulmonary/Chest: Effort normal.   Abdominal: Soft.   Vitals reviewed.  Diabetes Foot Exam:  Feet no cuts or scratches  Shoes appropriate  sensation intact to vibration and monofilament  injection sites are ok. No lipo hypertropthy or atrophy      Body mass index is 29.18 kg/m².    Lab Review:   Lab Results   Component Value Date    HGBA1C 6.7 (H) 11/01/2019     Lab Results   Component Value Date    CHOL 168 03/09/2018    HDL 51 03/09/2018    LDLCALC 87.6 03/09/2018    TRIG 147 03/09/2018    CHOLHDL 30.4 03/09/2018     Lab Results   Component Value Date     11/01/2019    K 5.0 11/01/2019     11/01/2019    CO2 29 11/01/2019    GLU 94 11/01/2019    BUN 16 11/01/2019    CREATININE 1.03 11/01/2019    CALCIUM 9.6 11/01/2019    PROT 7.8 11/01/2019    ALBUMIN 4.5  11/01/2019    BILITOT 0.6 11/01/2019    ALKPHOS 99 11/01/2019    AST 32 11/01/2019    ALT 29 11/01/2019    ANIONGAP 11 11/01/2019    ESTGFRAFRICA >60.0 11/01/2019    EGFRNONAA >60.0 11/01/2019    TSH 0.699 03/09/2018       Assessment and Plan     1. Type 2 diabetes mellitus with both eyes affected by mild nonproliferative retinopathy without macular edema, with long-term current use of insulin     2. Type 2 diabetes mellitus with diabetic polyneuropathy, with long-term current use of insulin     3. Type 2 diabetes mellitus with diabetic nephropathy, with long-term current use of insulin  dulaglutide (TRULICITY) 1.5 mg/0.5 mL PnIj    empagliflozin-metformin (SYNJARDY) 12.5-1,000 mg Tab    insulin aspart U-100 (NOVOLOG FLEXPEN U-100 INSULIN) 100 unit/mL (3 mL) InPn pen    blood-glucose meter kit    lancets Misc    blood sugar diagnostic Strp    insulin degludec (TRESIBA FLEXTOUCH U-200) 200 unit/mL (3 mL) InPn    Comprehensive metabolic panel    Hemoglobin A1c   4. Proteinuria due to type 2 diabetes mellitus     5. Hyperlipidemia associated with type 2 diabetes mellitus  simvastatin (ZOCOR) 40 MG tablet    Lipid panel   6. Hypertension associated with diabetes  losartan (COZAAR) 100 MG tablet   7. Overweight (BMI 25.0-29.9)       Type 2 diabetes mellitus with both eyes affected by mild nonproliferative retinopathy without macular edema, with long-term current use of insulin  -- Reviewed goals of therapy are to get the best control we can without hypoglycemia  -- Labs prior to next appt.   Medication changes:   Continue: Synjardy 12.5/1000 mg BID  Trulicity 1.5 mg weekly (Tuesday's)  Novolog 10 units before breakfast and dinner  Change Levemir to Tresiba 40 units daily for less risk of hypoglycemia   --Discussed desirae today and he is not interested yet (does not wish to wear anything); will call in new glucometer as his is >2 years old  -- Discussed importance of checking BG prior to giving insulin to prevent  hypoglycemia   -- In depth education on medication compliance.   -- Reviewed patient's current insulin regimen. Clarified proper insulin dose and timing in relation to meals, etc. Insulin injection sites and proper rotation instructed.    -- Advised frequent self blood glucose monitoring.  Patient encouraged to document glucose results and bring them to every clinic visit    -- Hypoglycemia precautions discussed. Instructed on precautions before driving.    -- Call for Bg repeatedly < 90 or > 180.   -- Close adherence to lifestyle changes recommended.   -- Periodic follow ups for eye evaluations, foot care and dental care suggested.  -- Will bring back in 3 months to assess compliance with BG management       Type 2 diabetes mellitus with diabetic polyneuropathy, with long-term current use of insulin  -Optimize BG control     Type 2 diabetes mellitus with diabetic nephropathy, with long-term current use of insulin  -See above  -Optimize BG control  - Has not seen neprho in 2 years; encouraged to make appt    Proteinuria due to type 2 diabetes mellitus  -Optimize BG control  - Encouraged appt with nephro     Hyperlipidemia associated with type 2 diabetes mellitus  -- Controlled   -- On statin per ADA recommendations      Hypertension associated with diabetes  -- on ARB  -- Controlled  -- Blood pressure goals discussed with patient    Overweight (BMI 25.0-29.9)  -- encouraged dietary and lifestyle modifications   -- emphasized weight loss goals     Follow up in about 3 months (around 2/18/2020).    Labs prior to next appointment with me

## 2019-11-18 ENCOUNTER — OFFICE VISIT (OUTPATIENT)
Dept: ENDOCRINOLOGY | Facility: CLINIC | Age: 54
End: 2019-11-18
Payer: COMMERCIAL

## 2019-11-18 VITALS
HEART RATE: 68 BPM | SYSTOLIC BLOOD PRESSURE: 128 MMHG | BODY MASS INDEX: 29.15 KG/M2 | HEIGHT: 72 IN | WEIGHT: 215.19 LBS | DIASTOLIC BLOOD PRESSURE: 82 MMHG

## 2019-11-18 DIAGNOSIS — E11.59 HYPERTENSION ASSOCIATED WITH DIABETES: Chronic | ICD-10-CM

## 2019-11-18 DIAGNOSIS — E11.69 HYPERLIPIDEMIA ASSOCIATED WITH TYPE 2 DIABETES MELLITUS: Chronic | ICD-10-CM

## 2019-11-18 DIAGNOSIS — I15.2 HYPERTENSION ASSOCIATED WITH DIABETES: Chronic | ICD-10-CM

## 2019-11-18 DIAGNOSIS — Z79.4 TYPE 2 DIABETES MELLITUS WITH BOTH EYES AFFECTED BY MILD NONPROLIFERATIVE RETINOPATHY WITHOUT MACULAR EDEMA, WITH LONG-TERM CURRENT USE OF INSULIN: ICD-10-CM

## 2019-11-18 DIAGNOSIS — E11.21 TYPE 2 DIABETES MELLITUS WITH DIABETIC NEPHROPATHY, WITH LONG-TERM CURRENT USE OF INSULIN: Chronic | ICD-10-CM

## 2019-11-18 DIAGNOSIS — Z79.4 TYPE 2 DIABETES MELLITUS WITH DIABETIC NEPHROPATHY, WITH LONG-TERM CURRENT USE OF INSULIN: Chronic | ICD-10-CM

## 2019-11-18 DIAGNOSIS — E11.3293 TYPE 2 DIABETES MELLITUS WITH BOTH EYES AFFECTED BY MILD NONPROLIFERATIVE RETINOPATHY WITHOUT MACULAR EDEMA, WITH LONG-TERM CURRENT USE OF INSULIN: ICD-10-CM

## 2019-11-18 DIAGNOSIS — E66.3 OVERWEIGHT (BMI 25.0-29.9): Chronic | ICD-10-CM

## 2019-11-18 DIAGNOSIS — E11.29 PROTEINURIA DUE TO TYPE 2 DIABETES MELLITUS: Chronic | ICD-10-CM

## 2019-11-18 DIAGNOSIS — R80.9 PROTEINURIA DUE TO TYPE 2 DIABETES MELLITUS: Chronic | ICD-10-CM

## 2019-11-18 DIAGNOSIS — E11.42 TYPE 2 DIABETES MELLITUS WITH DIABETIC POLYNEUROPATHY, WITH LONG-TERM CURRENT USE OF INSULIN: Chronic | ICD-10-CM

## 2019-11-18 DIAGNOSIS — E78.5 HYPERLIPIDEMIA ASSOCIATED WITH TYPE 2 DIABETES MELLITUS: Chronic | ICD-10-CM

## 2019-11-18 DIAGNOSIS — Z79.4 TYPE 2 DIABETES MELLITUS WITH DIABETIC POLYNEUROPATHY, WITH LONG-TERM CURRENT USE OF INSULIN: Chronic | ICD-10-CM

## 2019-11-18 PROCEDURE — 3044F PR MOST RECENT HEMOGLOBIN A1C LEVEL <7.0%: ICD-10-PCS | Mod: CPTII,S$GLB,, | Performed by: NURSE PRACTITIONER

## 2019-11-18 PROCEDURE — 3008F PR BODY MASS INDEX (BMI) DOCUMENTED: ICD-10-PCS | Mod: CPTII,S$GLB,, | Performed by: NURSE PRACTITIONER

## 2019-11-18 PROCEDURE — 3074F SYST BP LT 130 MM HG: CPT | Mod: CPTII,S$GLB,, | Performed by: NURSE PRACTITIONER

## 2019-11-18 PROCEDURE — 99999 PR PBB SHADOW E&M-EST. PATIENT-LVL III: CPT | Mod: PBBFAC,,, | Performed by: NURSE PRACTITIONER

## 2019-11-18 PROCEDURE — 99214 OFFICE O/P EST MOD 30 MIN: CPT | Mod: S$GLB,,, | Performed by: NURSE PRACTITIONER

## 2019-11-18 PROCEDURE — 3008F BODY MASS INDEX DOCD: CPT | Mod: CPTII,S$GLB,, | Performed by: NURSE PRACTITIONER

## 2019-11-18 PROCEDURE — 3074F PR MOST RECENT SYSTOLIC BLOOD PRESSURE < 130 MM HG: ICD-10-PCS | Mod: CPTII,S$GLB,, | Performed by: NURSE PRACTITIONER

## 2019-11-18 PROCEDURE — 3044F HG A1C LEVEL LT 7.0%: CPT | Mod: CPTII,S$GLB,, | Performed by: NURSE PRACTITIONER

## 2019-11-18 PROCEDURE — 3079F DIAST BP 80-89 MM HG: CPT | Mod: CPTII,S$GLB,, | Performed by: NURSE PRACTITIONER

## 2019-11-18 PROCEDURE — 99214 PR OFFICE/OUTPT VISIT, EST, LEVL IV, 30-39 MIN: ICD-10-PCS | Mod: S$GLB,,, | Performed by: NURSE PRACTITIONER

## 2019-11-18 PROCEDURE — 3079F PR MOST RECENT DIASTOLIC BLOOD PRESSURE 80-89 MM HG: ICD-10-PCS | Mod: CPTII,S$GLB,, | Performed by: NURSE PRACTITIONER

## 2019-11-18 PROCEDURE — 99999 PR PBB SHADOW E&M-EST. PATIENT-LVL III: ICD-10-PCS | Mod: PBBFAC,,, | Performed by: NURSE PRACTITIONER

## 2019-11-18 RX ORDER — SIMVASTATIN 40 MG/1
40 TABLET, FILM COATED ORAL DAILY
Qty: 90 TABLET | Refills: 3 | Status: SHIPPED | OUTPATIENT
Start: 2019-11-18 | End: 2020-06-16 | Stop reason: SDUPTHER

## 2019-11-18 RX ORDER — INSULIN DEGLUDEC 200 U/ML
40 INJECTION, SOLUTION SUBCUTANEOUS DAILY
Qty: 15 SYRINGE | Refills: 3 | Status: SHIPPED | OUTPATIENT
Start: 2019-11-18 | End: 2019-12-18

## 2019-11-18 RX ORDER — INSULIN ASPART 100 [IU]/ML
INJECTION, SOLUTION INTRAVENOUS; SUBCUTANEOUS
Qty: 4 BOX | Refills: 6 | Status: SHIPPED | OUTPATIENT
Start: 2019-11-18 | End: 2020-12-04 | Stop reason: SDUPTHER

## 2019-11-18 RX ORDER — LOSARTAN POTASSIUM 100 MG/1
100 TABLET ORAL DAILY
Qty: 90 TABLET | Refills: 3 | Status: SHIPPED | OUTPATIENT
Start: 2019-11-18 | End: 2020-12-07

## 2019-11-18 RX ORDER — LANCETS
EACH MISCELLANEOUS
Qty: 100 EACH | Refills: 0 | Status: SHIPPED | OUTPATIENT
Start: 2019-11-18

## 2019-11-18 RX ORDER — INSULIN PUMP SYRINGE, 3 ML
EACH MISCELLANEOUS
Qty: 1 EACH | Refills: 0 | Status: SHIPPED | OUTPATIENT
Start: 2019-11-18

## 2019-11-18 NOTE — PATIENT INSTRUCTIONS
Change Levemir to Tresiba 40 units daily for less risk of hypoglycemia   Make appt with Dr. Vasquez        Diabetic drinks we recommend:   Water -BEST  Crystal light sugar free packets  Gatorade zero   Powerade zero  Tea without sweetener    Diabetic drinks we do not recommend:  Coke or any sugary regular soft drink  Coffee with sugar  Milk  Juice  Beer  Liquor    Sweeteners we recommend:  Claudio Rosenbaum    Note: Caffeine can increase your blood sugar   Snacks can be an important part of a balanced, healthy meal plan. They allow you to eat more frequently, feeling full and satisfied throughout the day. Also, they allow you to spread carbohydrates evenly, which may stabilize blood sugars.  Plus, snacks are enjoyable!     The amount of carbohydrate needed at snacks varies. Generally, about 15-30 grams of carbohydrate per snack is recommended.  Below you will find some tasty treats.       0-5 gm carb   Crystal Light   Vitamin Water Zero   Herbal tea, unsweetened   2 tsp peanut butter on celery   1./2 cup sugar-free jell-o   1 sugar-free popsicle   ¼ cup blueberries   8oz Blue Nisa unsweetened almond milk   5 baby carrots & celery sticks, cucumbers, bell peppers dipped in ¼ cup salsa, 2Tbsp light ranch dressing or 2Tbsp plain Greek yogurt   10 Goldfish crackers   ½ oz low-fat cheese or string cheese   1 closed handful of nuts, unsalted   1 Tbsp of sunflower seeds, unsalted   1 cup Smart Pop popcorn   1 whole grain brown rice cake        15 gm carb   1 small piece of fruit or ½ banana or 1/2 cup lite canned fruit   3 boogie cracker squares   3 cups Smart Pop popcorn, top spray butter, Landaverde lite salt or cinnamon and Truvia   5 Vanilla Wafers   ½ cup low fat, no added sugar ice cream or frozen yogurt (Blue bell, Blue Bunny, Weight Watchers, Skinny Cow)   ½ turkey, ham, or chicken sandwich   ½ c fruit with ½ c Cottage cheese   4-6 unsalted wheat crackers with 1 oz low fat cheese or 1 tbsp  peanut butter    30-45 goldfish crackers (depending on flavor)    7-8 Faith mini brown rice cakes (caramel, apple cinnamon, chocolate)    12 Faith mini brown rice cakes (cheddar, bbq, ranch)    1/3 cup hummus dip with raw veg   1/2 whole wheat donna, 1Tbsp hummus   Mini Pizza (1/2 whole wheat English muffin, low-fat  cheese, tomato sauce)   100 calorie snack pack (Oreo, Chips Ahoy, Ritz Mix, Baked Cheetos)   4-6 oz. light or Greek Style yogurt (Chobani, Yoplait, Okios, [place]oneyfield)   ½ cup sugar-free pudding     6 in. wheat tortilla or donna oven toasted chips (topped with spray butter flavoring, cinnamon, Truvia OR spray butter, garlic powder, chili powder)    18 BBQ Popchips (available at [employer], [employer], [employer] Market)

## 2019-11-18 NOTE — ASSESSMENT & PLAN NOTE
-- Reviewed goals of therapy are to get the best control we can without hypoglycemia  -- Labs prior to next appt.   Medication changes:   Continue: Synjardy 12.5/1000 mg BID  Trulicity 1.5 mg weekly (Tuesday's)  Novolog 10 units before breakfast and dinner  Change Levemir to Tresiba 40 units daily for less risk of hypoglycemia   --Discussed desirae today and he is not interested yet (does not wish to wear anything); will call in new glucometer as his is >2 years old  -- Discussed importance of checking BG prior to giving insulin to prevent hypoglycemia   -- In depth education on medication compliance.   -- Reviewed patient's current insulin regimen. Clarified proper insulin dose and timing in relation to meals, etc. Insulin injection sites and proper rotation instructed.    -- Advised frequent self blood glucose monitoring.  Patient encouraged to document glucose results and bring them to every clinic visit    -- Hypoglycemia precautions discussed. Instructed on precautions before driving.    -- Call for Bg repeatedly < 90 or > 180.   -- Close adherence to lifestyle changes recommended.   -- Periodic follow ups for eye evaluations, foot care and dental care suggested.  -- Will bring back in 3 months to assess compliance with BG management

## 2019-11-25 ENCOUNTER — PATIENT OUTREACH (OUTPATIENT)
Dept: ADMINISTRATIVE | Facility: OTHER | Age: 54
End: 2019-11-25

## 2019-11-25 ENCOUNTER — OFFICE VISIT (OUTPATIENT)
Dept: INTERNAL MEDICINE | Facility: CLINIC | Age: 54
End: 2019-11-25
Payer: COMMERCIAL

## 2019-11-25 ENCOUNTER — OFFICE VISIT (OUTPATIENT)
Dept: OPTOMETRY | Facility: CLINIC | Age: 54
End: 2019-11-25
Payer: COMMERCIAL

## 2019-11-25 VITALS
WEIGHT: 211 LBS | HEIGHT: 72 IN | BODY MASS INDEX: 28.58 KG/M2 | DIASTOLIC BLOOD PRESSURE: 82 MMHG | SYSTOLIC BLOOD PRESSURE: 142 MMHG | HEART RATE: 81 BPM

## 2019-11-25 DIAGNOSIS — H52.203 ASTIGMATISM WITH PRESBYOPIA, BILATERAL: ICD-10-CM

## 2019-11-25 DIAGNOSIS — H52.4 ASTIGMATISM WITH PRESBYOPIA, BILATERAL: ICD-10-CM

## 2019-11-25 DIAGNOSIS — Z79.4 TYPE 2 DIABETES MELLITUS WITH BOTH EYES AFFECTED BY MILD NONPROLIFERATIVE RETINOPATHY WITHOUT MACULAR EDEMA, WITH LONG-TERM CURRENT USE OF INSULIN: Primary | ICD-10-CM

## 2019-11-25 DIAGNOSIS — E11.3293 TYPE 2 DIABETES MELLITUS WITH BOTH EYES AFFECTED BY MILD NONPROLIFERATIVE RETINOPATHY WITHOUT MACULAR EDEMA, WITH LONG-TERM CURRENT USE OF INSULIN: Primary | ICD-10-CM

## 2019-11-25 DIAGNOSIS — I15.2 HYPERTENSION ASSOCIATED WITH DIABETES: Chronic | ICD-10-CM

## 2019-11-25 DIAGNOSIS — E11.59 HYPERTENSION ASSOCIATED WITH DIABETES: Chronic | ICD-10-CM

## 2019-11-25 DIAGNOSIS — Z00.00 ANNUAL PHYSICAL EXAM: Primary | ICD-10-CM

## 2019-11-25 DIAGNOSIS — Z12.11 COLON CANCER SCREENING: ICD-10-CM

## 2019-11-25 PROCEDURE — 92250 FUNDUS PHOTOGRAPHY W/I&R: CPT | Mod: S$GLB,,, | Performed by: OPTOMETRIST

## 2019-11-25 PROCEDURE — 99999 PR PBB SHADOW E&M-EST. PATIENT-LVL III: CPT | Mod: PBBFAC,,, | Performed by: INTERNAL MEDICINE

## 2019-11-25 PROCEDURE — 3077F SYST BP >= 140 MM HG: CPT | Mod: CPTII,S$GLB,, | Performed by: INTERNAL MEDICINE

## 2019-11-25 PROCEDURE — 3079F PR MOST RECENT DIASTOLIC BLOOD PRESSURE 80-89 MM HG: ICD-10-PCS | Mod: CPTII,S$GLB,, | Performed by: INTERNAL MEDICINE

## 2019-11-25 PROCEDURE — 99999 PR PBB SHADOW E&M-EST. PATIENT-LVL II: CPT | Mod: PBBFAC,,, | Performed by: OPTOMETRIST

## 2019-11-25 PROCEDURE — 3079F DIAST BP 80-89 MM HG: CPT | Mod: CPTII,S$GLB,, | Performed by: INTERNAL MEDICINE

## 2019-11-25 PROCEDURE — 99396 PR PREVENTIVE VISIT,EST,40-64: ICD-10-PCS | Mod: S$GLB,,, | Performed by: INTERNAL MEDICINE

## 2019-11-25 PROCEDURE — 99396 PREV VISIT EST AGE 40-64: CPT | Mod: S$GLB,,, | Performed by: INTERNAL MEDICINE

## 2019-11-25 PROCEDURE — 92014 PR EYE EXAM, EST PATIENT,COMPREHESV: ICD-10-PCS | Mod: S$GLB,,, | Performed by: OPTOMETRIST

## 2019-11-25 PROCEDURE — 99999 PR PBB SHADOW E&M-EST. PATIENT-LVL II: ICD-10-PCS | Mod: PBBFAC,,, | Performed by: OPTOMETRIST

## 2019-11-25 PROCEDURE — 92250 COLOR FUNDUS PHOTOGRAPHY - OU - BOTH EYES: ICD-10-PCS | Mod: S$GLB,,, | Performed by: OPTOMETRIST

## 2019-11-25 PROCEDURE — 3077F PR MOST RECENT SYSTOLIC BLOOD PRESSURE >= 140 MM HG: ICD-10-PCS | Mod: CPTII,S$GLB,, | Performed by: INTERNAL MEDICINE

## 2019-11-25 PROCEDURE — 3044F HG A1C LEVEL LT 7.0%: CPT | Mod: CPTII,S$GLB,, | Performed by: INTERNAL MEDICINE

## 2019-11-25 PROCEDURE — 99999 PR PBB SHADOW E&M-EST. PATIENT-LVL III: ICD-10-PCS | Mod: PBBFAC,,, | Performed by: INTERNAL MEDICINE

## 2019-11-25 PROCEDURE — 92014 COMPRE OPH EXAM EST PT 1/>: CPT | Mod: S$GLB,,, | Performed by: OPTOMETRIST

## 2019-11-25 PROCEDURE — 3044F PR MOST RECENT HEMOGLOBIN A1C LEVEL <7.0%: ICD-10-PCS | Mod: CPTII,S$GLB,, | Performed by: INTERNAL MEDICINE

## 2019-11-25 NOTE — PATIENT INSTRUCTIONS
The Endoscopy team should be contacting you to schedule your appointment. You can also call them directly at [phone] to schedule your colonoscopy.     When you go for the flu shot, please see if they are also able to give the shingles vaccine (Shingrix).    Blood Pressure Measurement:    -- Please record your blood pressure daily for the next 2 weeks. When checking, make sure you have been sitting for about 5 minutes, your legs are uncrossed, and the blood pressure cuff at the level of your heart. Record your blood pressure with the date and time in a log.  -- Goal blood pressure is top number in the 120s (or lower) and the bottom close to 80 (or below). If the top number is in the 130s, please continue with healthy diet and exercise to try to bring it down. If the top number consistently is 140 or above, or the bottom number consistently 90 or above, we will need to start/adjust medication treatment.  -- Dr Norman's office will call in 2 weeks for recent blood pressure readings. We can determine if any other further steps are needed at that time.

## 2019-11-25 NOTE — LETTER
November 27, 2019      Nathaly Malhotra, OD  [address]  [facility] [address]           [facility]  [address]  [facility] [address]  Phone: [phone]  Fax: [phone]          Patient: Yon Loyd   MR Number: [id number]   YOB: 1965   Date of Visit: 11/25/2019       Dear Dr. Nathaly Malhotra:    Thank you for referring Yon Loyd to me for evaluation. Attached you will find relevant portions of my assessment and plan of care.    If you have questions, please do not hesitate to call me. I look forward to following Yon Loyd along with you.    Sincerely,    Christal Vasquez, OD    Enclosure  CC:  No Recipients    If you would like to receive this communication electronically, please contact [email or web address] or [phone] to request more information on [facility] access.    For providers and/or their staff who would like to refer a patient to [facility], please contact us through our one-stop-shop provider referral line, [facility], at [phone].    If you feel you have received this communication in error or would no longer like to receive these types of communications, please e-mail [email or web address]

## 2019-11-25 NOTE — PROGRESS NOTES
Subjective:       Patient ID: Yon Loyd is a 54 y.o. male.    Chief Complaint: Annual Exam    HPI    Last visit with me 1 year ago.  Since then seen by Optometry and Endocrinology. Pt doing well and in usual state of health.    Reviewed PMH, PSH, SH, FH, allergies, and medications.     Review of Systems   All other systems reviewed and are negative.      Objective:      Physical Exam   Constitutional: He is oriented to person, place, and time. No distress.   HENT:   Head: Atraumatic.   Right Ear: Tympanic membrane normal. No tenderness.   Left Ear: Tympanic membrane normal. No tenderness.   Mouth/Throat: Oropharynx is clear and moist. No oropharyngeal exudate.   Eyes: Pupils are equal, round, and reactive to light. Right eye exhibits no discharge. Left eye exhibits no discharge.   Neck: Normal range of motion. No thyromegaly present.   Cardiovascular: Normal rate, regular rhythm and normal heart sounds.   Pulmonary/Chest: Effort normal and breath sounds normal. No stridor. He has no wheezes. He has no rales.   Abdominal: Soft. There is no tenderness. There is no guarding.   Musculoskeletal: He exhibits no edema or tenderness.   Lymphadenopathy:     He has no cervical adenopathy.   Neurological: He is alert and oriented to person, place, and time.   Skin: Skin is warm and dry. No rash noted.   Psychiatric: He has a normal mood and affect. His behavior is normal.   Nursing note and vitals reviewed.      Vitals:    11/25/19 0836 11/25/19 0929   BP: (!) 142/84 (!) 142/82   BP Location: Right arm Right arm   Patient Position: Sitting Sitting   BP Method: Large (Manual) Large (Manual)   Pulse: 81    Weight: 95.7 kg (211 lb)    Height: 6' (1.829 m)      Body mass index is 28.62 kg/m².    I have personally checked the blood pressure and documented my findings.     RESULTS: Reviewed labs from last 12 months    Assessment:       1. Annual physical exam    2. Colon cancer screening    3. Hypertension associated with  diabetes        Plan:   Yon was seen today for annual exam.    Diagnoses and all orders for this visit:    Annual physical exam:  Age-appropriate health screening reviewed, indicated tests ordered. Pt will go for flu and Shingrix soon.    Colon cancer screening  -     Case request GI: COLONOSCOPY    Hypertension associated with diabetes:  Prior diagnosis, today elevated. Check at home daily for next 2 wks and call to get results.    Follow up in about 11 months (around 10/25/2020) for follow up visit.  Nikos Norman MD  Internal Medicine    Portions of this note were completed using medical dictation software. Please excuse typographical or syntax errors that were missed on review.

## 2019-11-27 NOTE — PROGRESS NOTES
HPI     Last eye exam was 7/19/19 with Dr. Malhotra.  Patient states didn't update glasses after last exam. Wanted to get an   updated glasses rx today. Has noticed vision changed since last exam.   Occasionally sees floaters OU.  Patient denies diplopia, headaches, flashes, and pain.    Hemoglobin A1C       Date                     Value               Ref Range             Status                11/01/2019               6.7 (H)             4.0 - 5.6 %           Final                  Last edited by Carmen Diaz on 11/25/2019  2:37 PM. (History)            Assessment /Plan     For exam results, see Encounter Report.    Type 2 diabetes mellitus with both eyes affected by mild nonproliferative retinopathy without macular edema, with long-term current use of insulin  -     Color Fundus Photography - OU - Both Eyes    Astigmatism with presbyopia, bilateral          1.  Patient reports A1C has improved.  Retinopathy has improved.  Photos taken today.  Monitor 6 months.  2.  Patient wants me to refract him.  Unable to get good refraction due to dilation.  RTC 1 weeks for refraction.

## 2019-12-04 ENCOUNTER — PATIENT OUTREACH (OUTPATIENT)
Dept: ADMINISTRATIVE | Facility: OTHER | Age: 54
End: 2019-12-04

## 2019-12-06 ENCOUNTER — OFFICE VISIT (OUTPATIENT)
Dept: OPTOMETRY | Facility: CLINIC | Age: 54
End: 2019-12-06
Payer: COMMERCIAL

## 2019-12-06 DIAGNOSIS — H52.203 ASTIGMATISM WITH PRESBYOPIA, BILATERAL: Primary | ICD-10-CM

## 2019-12-06 DIAGNOSIS — H52.4 ASTIGMATISM WITH PRESBYOPIA, BILATERAL: Primary | ICD-10-CM

## 2019-12-06 PROCEDURE — 99999 PR PBB SHADOW E&M-EST. PATIENT-LVL II: ICD-10-PCS | Mod: PBBFAC,,, | Performed by: OPTOMETRIST

## 2019-12-06 PROCEDURE — 99999 PR PBB SHADOW E&M-EST. PATIENT-LVL II: CPT | Mod: PBBFAC,,, | Performed by: OPTOMETRIST

## 2019-12-06 PROCEDURE — 99499 NO LOS: ICD-10-PCS | Mod: S$GLB,,, | Performed by: OPTOMETRIST

## 2019-12-06 PROCEDURE — 99499 UNLISTED E&M SERVICE: CPT | Mod: S$GLB,,, | Performed by: OPTOMETRIST

## 2019-12-06 NOTE — PROGRESS NOTES
HPI     Concerns About Ocular Health      Additional comments: 1 wk MR cardozo              Comments     Last eye exam was 11/25/19 with Dr. Vasquez.  Patient here for MR cardozo.          Last edited by Carmen Diaz on 12/6/2019  1:07 PM. (History)            Assessment /Plan     For exam results, see Encounter Report.    Astigmatism with presbyopia, bilateral            1.  Bifocal rx given.  RTC as scheduled.

## 2019-12-09 ENCOUNTER — TELEPHONE (OUTPATIENT)
Dept: INTERNAL MEDICINE | Facility: CLINIC | Age: 54
End: 2019-12-09

## 2019-12-09 NOTE — TELEPHONE ENCOUNTER
----- Message from Nikos Norman MD sent at 11/25/2019  9:31 AM CST -----  (This is an automated message composed on 11/25/2019 that will be delivered at a future date)    Please call the the patient to get the last 3 blood pressure measurements from home and let me know the numbers (patient was asked on 11/25/2019 to record home blood pressure once daily for 2 weeks). Only the most recent 3 values are needed.

## 2020-01-30 ENCOUNTER — TELEPHONE (OUTPATIENT)
Dept: ENDOCRINOLOGY | Facility: CLINIC | Age: 55
End: 2020-01-30

## 2020-02-05 DIAGNOSIS — E11.21 TYPE 2 DIABETES MELLITUS WITH DIABETIC NEPHROPATHY, WITH LONG-TERM CURRENT USE OF INSULIN: Chronic | ICD-10-CM

## 2020-02-05 DIAGNOSIS — Z79.4 TYPE 2 DIABETES MELLITUS WITH DIABETIC NEPHROPATHY, WITH LONG-TERM CURRENT USE OF INSULIN: Chronic | ICD-10-CM

## 2020-02-07 ENCOUNTER — LAB VISIT (OUTPATIENT)
Dept: LAB | Facility: HOSPITAL | Age: 55
End: 2020-02-07
Attending: NURSE PRACTITIONER
Payer: COMMERCIAL

## 2020-02-07 DIAGNOSIS — E11.21 TYPE 2 DIABETES MELLITUS WITH DIABETIC NEPHROPATHY, WITH LONG-TERM CURRENT USE OF INSULIN: Chronic | ICD-10-CM

## 2020-02-07 DIAGNOSIS — E78.5 HYPERLIPIDEMIA ASSOCIATED WITH TYPE 2 DIABETES MELLITUS: Chronic | ICD-10-CM

## 2020-02-07 DIAGNOSIS — E11.69 HYPERLIPIDEMIA ASSOCIATED WITH TYPE 2 DIABETES MELLITUS: Chronic | ICD-10-CM

## 2020-02-07 DIAGNOSIS — Z79.4 TYPE 2 DIABETES MELLITUS WITH DIABETIC NEPHROPATHY, WITH LONG-TERM CURRENT USE OF INSULIN: Chronic | ICD-10-CM

## 2020-02-07 LAB
ALBUMIN SERPL BCP-MCNC: 4.4 G/DL (ref 3.5–5.2)
ALP SERPL-CCNC: 89 U/L (ref 38–126)
ALT SERPL W/O P-5'-P-CCNC: 34 U/L (ref 10–44)
ANION GAP SERPL CALC-SCNC: 8 MMOL/L (ref 8–16)
AST SERPL-CCNC: 32 U/L (ref 15–46)
BILIRUB SERPL-MCNC: 0.5 MG/DL (ref 0.1–1)
BUN SERPL-MCNC: 18 MG/DL (ref 2–20)
CALCIUM SERPL-MCNC: 9.6 MG/DL (ref 8.7–10.5)
CHLORIDE SERPL-SCNC: 102 MMOL/L (ref 95–110)
CHOLEST SERPL-MCNC: 188 MG/DL (ref 120–199)
CHOLEST/HDLC SERPL: 3.2 {RATIO} (ref 2–5)
CO2 SERPL-SCNC: 27 MMOL/L (ref 23–29)
CREAT SERPL-MCNC: 1.1 MG/DL (ref 0.5–1.4)
EST. GFR  (AFRICAN AMERICAN): >60 ML/MIN/1.73 M^2
EST. GFR  (NON AFRICAN AMERICAN): >60 ML/MIN/1.73 M^2
ESTIMATED AVG GLUCOSE: 134 MG/DL (ref 68–131)
GLUCOSE SERPL-MCNC: 111 MG/DL (ref 70–110)
HBA1C MFR BLD HPLC: 6.3 % (ref 4–5.6)
HDLC SERPL-MCNC: 58 MG/DL (ref 40–75)
HDLC SERPL: 30.9 % (ref 20–50)
LDLC SERPL CALC-MCNC: 89.8 MG/DL (ref 63–159)
NONHDLC SERPL-MCNC: 130 MG/DL
POTASSIUM SERPL-SCNC: 5 MMOL/L (ref 3.5–5.1)
PROT SERPL-MCNC: 7.9 G/DL (ref 6–8.4)
SODIUM SERPL-SCNC: 137 MMOL/L (ref 136–145)
TRIGL SERPL-MCNC: 201 MG/DL (ref 30–150)

## 2020-02-07 PROCEDURE — 80053 COMPREHEN METABOLIC PANEL: CPT | Mod: PO

## 2020-02-07 PROCEDURE — 36415 COLL VENOUS BLD VENIPUNCTURE: CPT | Mod: PO

## 2020-02-07 PROCEDURE — 83036 HEMOGLOBIN GLYCOSYLATED A1C: CPT

## 2020-02-07 PROCEDURE — 80061 LIPID PANEL: CPT

## 2020-02-10 ENCOUNTER — PATIENT OUTREACH (OUTPATIENT)
Dept: ADMINISTRATIVE | Facility: OTHER | Age: 55
End: 2020-02-10

## 2020-02-10 NOTE — PROGRESS NOTES
Chart reviewed.   Immunizations: updated  Orders placed: n/a  Upcoming appts to satisfy WINNIE topics: 02/14/2020 Endo appt with Dr. Rodriguez  Open case for Colonoscopy 11/25/2019 Dr. Nikos Norman

## 2020-02-11 NOTE — PROGRESS NOTES
Subjective:      Patient ID: Yon Loyd is a 54 y.o. male.    Chief Complaint:  Diabetes    History of Present Illness  Yon Loyd with Type 2 DM, DLP and HTN presents today for follow up of T2DM.  Last visit in Nov 2019 with DK Oscar NP and myself.     With regards to the diabetes:    Diagnosed with Type 2 diabetes in ~1993.  Been on insulin since ~2013. Started Trulicity 12/2016.   Family history of type 2 diabetes in mother and father.     DIABETES COMPLICATIONS:   Nephropathy +; sees nephrology and needs appt  Retinopathy +; up to date with eye exam  Peripheral neuropathy +; needs appt with podiatry  CAD: -     Current regimen:  Synjardy 12.5/1000 mg BID  Trulicity 1.5 mg weekly (Tuesday's)  Tresiba 40 units daily in the morning  Novolog 10 units before breakfast and dinner     Missed doses: occasionally missing Novolog dinner dose about once weekly     Other medications tried:  Invokana but stopped due to fear of side effects    He is checking BG 2-3 times daily. See log below.       Hypoglycemic event? Denies.   Knows how to correct with 15 grams of carbs- juice, coke, or a peppermint.      Eats 3 meals a day. He feels that he is not always following diabetic diet but better than he has been in the past.   B: sausage biscuit from [employer] or at home  L: sandwich  D: eating out a lot; varies -burgers or chicken with fries  Snacks: peanut butter crackers, pickles, olives        Drinks: Diet soda and coffee with equal    Exercise: walks often for work. No formal exercise.    Education - last visit: none. He does not wish to go again.    Diabetes Management Status  Statin: Taking  ACE/ARB: Taking    Lab Results   Component Value Date    HGBA1C 6.3 (H) 02/07/2020    HGBA1C 6.7 (H) 11/01/2019    HGBA1C 7.7 (H) 04/02/2019     Screening or Prevention Patient's value Goal Complete/Controlled?   HgA1C Testing and Control   Lab Results   Component Value Date    HGBA1C 6.3 (H) 02/07/2020      Annually/Less than  8% Yes   Lipid profile : 02/07/2020 Annually Yes   LDL control Lab Results   Component Value Date    LDLCALC 89.8 02/07/2020    Annually/Less than 100 mg/dl  Yes   Nephropathy screening Lab Results   Component Value Date    LABMICR 168.0 09/28/2018     No results found for: PROTEINUA Annually No   Blood pressure BP Readings from Last 1 Encounters:   02/14/20 (!) 140/80    Less than 140/90 No   Dilated retinal exam : 11/25/2019 Annually Yes   Foot exam   : 11/18/2019 Annually Yes       Review of Systems   Constitutional: Negative for unexpected weight change.   Eyes: Negative for visual disturbance.   Respiratory: Negative for shortness of breath.    Cardiovascular: Negative for chest pain.   Gastrointestinal: Negative for abdominal pain.   Endocrine: Negative for cold intolerance, heat intolerance, polydipsia, polyphagia and polyuria.   Musculoskeletal: Negative for arthralgias.   Skin: Negative for wound.   Neurological: Negative for headaches.   Hematological: Does not bruise/bleed easily.   Psychiatric/Behavioral: Negative for sleep disturbance.     Objective:   Physical Exam   Neck: No thyromegaly present.   Cardiovascular: Normal rate.   No edema present   Pulmonary/Chest: Effort normal.   Abdominal: Soft.   Vitals reviewed.  injection sites are without edema or erythema. No lipo hypertropthy or atrophy  Diabetes Foot Exam:  Feet no cuts or scratches  Shoes appropriate  DM foot exam deferred; done in Nov 2019    Body mass index is 29.91 kg/m².    Lab Review:   Lab Results   Component Value Date    HGBA1C 6.3 (H) 02/07/2020     Lab Results   Component Value Date    CHOL 188 02/07/2020    HDL 58 02/07/2020    LDLCALC 89.8 02/07/2020    TRIG 201 (H) 02/07/2020    CHOLHDL 30.9 02/07/2020     Lab Results   Component Value Date     02/07/2020    K 5.0 02/07/2020     02/07/2020    CO2 27 02/07/2020     (H) 02/07/2020    BUN 18 02/07/2020    CREATININE 1.10 02/07/2020    CALCIUM 9.6 02/07/2020     PROT 7.9 02/07/2020    ALBUMIN 4.4 02/07/2020    BILITOT 0.5 02/07/2020    ALKPHOS 89 02/07/2020    AST 32 02/07/2020    ALT 34 02/07/2020    ANIONGAP 8 02/07/2020    ESTGFRAFRICA >60.0 02/07/2020    EGFRNONAA >60.0 02/07/2020    TSH 0.699 03/09/2018       Assessment and Plan     1. Type 2 diabetes mellitus with both eyes affected by mild nonproliferative retinopathy without macular edema, with long-term current use of insulin  GLUCOSE MONITORING CONTINUOUS MIN 72 HOURS    Comprehensive metabolic panel    Hemoglobin A1c    Ambulatory referral/consult to Nephrology    Lipid panel   2. Type 2 diabetes mellitus with diabetic nephropathy, with long-term current use of insulin     3. Type 2 diabetes mellitus with diabetic polyneuropathy, with long-term current use of insulin     4. Hypertension associated with diabetes     5. Hyperlipidemia associated with type 2 diabetes mellitus     6. Overweight (BMI 25.0-29.9)     7. Proteinuria due to type 2 diabetes mellitus       Type 2 diabetes mellitus with both eyes affected by mild nonproliferative retinopathy without macular edema, with long-term current use of insulin  -- Reviewed goals of therapy are to get the best control we can without hypoglycemia  -- Labs prior to next appt.   Medication changes:   Continue: Synjardy 12.5/1000 mg BID  Trulicity 1.5 mg weekly (Tuesday's)  Novolog 10 units before breakfast and dinner  Decrease Tresiba to 30 units daily   -- Considering he is waking up with frequent BGs in the 80-90's, I believe he is having hypoglycemia overnight. Discussed I would like to do professional CGM to assess trends and patterns, highs, and lows and monitor and adjust treatment plan. Additionally, he was shocked today that his A1c dropped with the holidays. Considering the he has eaten badly over the last 3 months and A1c is still dropping, I believe he is having hypoglycemia. Discussed A1c is an average of BGs. Discussed A1c will average the high and low blood  sugars which may contribute to the decreased A1c this time. He verbalizes understanding and would like to move forward with CGM. He may also require novolog at lunch.    --Discussed desirae and dexcom today and he is not interested yet (does not wish to wear anything)  -- Discussed importance of checking BG prior to giving insulin to prevent hypoglycemia   -- Reviewed patient's current insulin regimen. Clarified proper insulin dose and timing in relation to meals, etc. Insulin injection sites and proper rotation instructed.  -- Advised frequent self blood glucose monitoring.  Patient encouraged to document glucose results and bring them to every clinic visit    -- Hypoglycemia precautions discussed. Instructed on precautions before driving.    -- Call for Bg repeatedly < 90 or > 180.   -- Close adherence to lifestyle changes recommended.   -- Periodic follow ups for eye evaluations, foot care and dental care suggested.      Type 2 diabetes mellitus with diabetic nephropathy, with long-term current use of insulin  -See above  -Optimize BG control  - Has not seen neprho in 2 years; will refer    Type 2 diabetes mellitus with diabetic polyneuropathy, with long-term current use of insulin  -Optimize BG control    Hypertension associated with diabetes  -- on ARB  -- Controlled  -- Blood pressure goals discussed with patient    Hyperlipidemia associated with type 2 diabetes mellitus  -- Controlled   -- On statin per ADA recommendations      Overweight (BMI 25.0-29.9)  -- encouraged dietary and lifestyle modifications   -- emphasized weight loss goals     Proteinuria due to type 2 diabetes mellitus  -Optimize BG control  - Referred to nephro     Follow up in about 6 months (around 8/14/2020).      Betzaida Rodriguez NP

## 2020-02-14 ENCOUNTER — OFFICE VISIT (OUTPATIENT)
Dept: ENDOCRINOLOGY | Facility: CLINIC | Age: 55
End: 2020-02-14
Payer: COMMERCIAL

## 2020-02-14 VITALS
HEART RATE: 84 BPM | DIASTOLIC BLOOD PRESSURE: 80 MMHG | WEIGHT: 220.56 LBS | BODY MASS INDEX: 29.87 KG/M2 | SYSTOLIC BLOOD PRESSURE: 140 MMHG | HEIGHT: 72 IN

## 2020-02-14 DIAGNOSIS — E11.21 TYPE 2 DIABETES MELLITUS WITH DIABETIC NEPHROPATHY, WITH LONG-TERM CURRENT USE OF INSULIN: Chronic | ICD-10-CM

## 2020-02-14 DIAGNOSIS — Z79.4 TYPE 2 DIABETES MELLITUS WITH DIABETIC NEPHROPATHY, WITH LONG-TERM CURRENT USE OF INSULIN: Chronic | ICD-10-CM

## 2020-02-14 DIAGNOSIS — R80.9 PROTEINURIA DUE TO TYPE 2 DIABETES MELLITUS: Chronic | ICD-10-CM

## 2020-02-14 DIAGNOSIS — E11.3293 TYPE 2 DIABETES MELLITUS WITH BOTH EYES AFFECTED BY MILD NONPROLIFERATIVE RETINOPATHY WITHOUT MACULAR EDEMA, WITH LONG-TERM CURRENT USE OF INSULIN: Primary | ICD-10-CM

## 2020-02-14 DIAGNOSIS — E66.3 OVERWEIGHT (BMI 25.0-29.9): Chronic | ICD-10-CM

## 2020-02-14 DIAGNOSIS — Z79.4 TYPE 2 DIABETES MELLITUS WITH BOTH EYES AFFECTED BY MILD NONPROLIFERATIVE RETINOPATHY WITHOUT MACULAR EDEMA, WITH LONG-TERM CURRENT USE OF INSULIN: Primary | ICD-10-CM

## 2020-02-14 DIAGNOSIS — E11.59 HYPERTENSION ASSOCIATED WITH DIABETES: Chronic | ICD-10-CM

## 2020-02-14 DIAGNOSIS — I15.2 HYPERTENSION ASSOCIATED WITH DIABETES: Chronic | ICD-10-CM

## 2020-02-14 DIAGNOSIS — E11.42 TYPE 2 DIABETES MELLITUS WITH DIABETIC POLYNEUROPATHY, WITH LONG-TERM CURRENT USE OF INSULIN: Chronic | ICD-10-CM

## 2020-02-14 DIAGNOSIS — E11.29 PROTEINURIA DUE TO TYPE 2 DIABETES MELLITUS: Chronic | ICD-10-CM

## 2020-02-14 DIAGNOSIS — E11.69 HYPERLIPIDEMIA ASSOCIATED WITH TYPE 2 DIABETES MELLITUS: Chronic | ICD-10-CM

## 2020-02-14 DIAGNOSIS — E78.5 HYPERLIPIDEMIA ASSOCIATED WITH TYPE 2 DIABETES MELLITUS: Chronic | ICD-10-CM

## 2020-02-14 DIAGNOSIS — Z79.4 TYPE 2 DIABETES MELLITUS WITH DIABETIC POLYNEUROPATHY, WITH LONG-TERM CURRENT USE OF INSULIN: Chronic | ICD-10-CM

## 2020-02-14 PROCEDURE — 99214 OFFICE O/P EST MOD 30 MIN: CPT | Mod: S$GLB,,, | Performed by: NURSE PRACTITIONER

## 2020-02-14 PROCEDURE — 99999 PR PBB SHADOW E&M-EST. PATIENT-LVL IV: ICD-10-PCS | Mod: PBBFAC,,, | Performed by: NURSE PRACTITIONER

## 2020-02-14 PROCEDURE — 99999 PR PBB SHADOW E&M-EST. PATIENT-LVL IV: CPT | Mod: PBBFAC,,, | Performed by: NURSE PRACTITIONER

## 2020-02-14 PROCEDURE — 99214 PR OFFICE/OUTPT VISIT, EST, LEVL IV, 30-39 MIN: ICD-10-PCS | Mod: S$GLB,,, | Performed by: NURSE PRACTITIONER

## 2020-02-14 RX ORDER — INSULIN DEGLUDEC 100 U/ML
40 INJECTION, SOLUTION SUBCUTANEOUS DAILY
COMMUNITY
End: 2020-12-04 | Stop reason: SDUPTHER

## 2020-02-14 NOTE — ASSESSMENT & PLAN NOTE
-- Reviewed goals of therapy are to get the best control we can without hypoglycemia  -- Labs prior to next appt.   Medication changes:   Continue: Synjardy 12.5/1000 mg BID  Trulicity 1.5 mg weekly (Tuesday's)  Novolog 10 units before breakfast and dinner  Decrease Tresiba to 30 units daily   -- Considering he is waking up with frequent BGs in the 80-90's, I believe he is having hypoglycemia overnight. Discussed I would like to do professional CGM to assess trends and patterns, highs, and lows and monitor and adjust treatment plan. Additionally, he was shocked today that his A1c dropped with the holidays. Considering the he has eaten badly over the last 3 months and A1c is still dropping, I believe he is having hypoglycemia. Discussed A1c is an average of BGs. Discussed A1c will average the high and low blood sugars which may contribute to the decreased A1c this time. He verbalizes understanding and would like to move forward with CGM. He may also require novolog at lunch.    --Discussed desirae and dexcom today and he is not interested yet (does not wish to wear anything)  -- Discussed importance of checking BG prior to giving insulin to prevent hypoglycemia   -- Reviewed patient's current insulin regimen. Clarified proper insulin dose and timing in relation to meals, etc. Insulin injection sites and proper rotation instructed.  -- Advised frequent self blood glucose monitoring.  Patient encouraged to document glucose results and bring them to every clinic visit    -- Hypoglycemia precautions discussed. Instructed on precautions before driving.    -- Call for Bg repeatedly < 90 or > 180.   -- Close adherence to lifestyle changes recommended.   -- Periodic follow ups for eye evaluations, foot care and dental care suggested.

## 2020-06-11 ENCOUNTER — CLINICAL SUPPORT (OUTPATIENT)
Dept: ENDOCRINOLOGY | Facility: CLINIC | Age: 55
End: 2020-06-11
Payer: COMMERCIAL

## 2020-06-11 DIAGNOSIS — E11.3293 TYPE 2 DIABETES MELLITUS WITH BOTH EYES AFFECTED BY MILD NONPROLIFERATIVE RETINOPATHY WITHOUT MACULAR EDEMA, WITH LONG-TERM CURRENT USE OF INSULIN: ICD-10-CM

## 2020-06-11 DIAGNOSIS — Z79.4 TYPE 2 DIABETES MELLITUS WITH BOTH EYES AFFECTED BY MILD NONPROLIFERATIVE RETINOPATHY WITHOUT MACULAR EDEMA, WITH LONG-TERM CURRENT USE OF INSULIN: ICD-10-CM

## 2020-06-11 NOTE — PROGRESS NOTES
"DIABETES EDUCATOR NOTE   PLACEMENT OF FREESTYLE JERO PRO SENSOR  CONTINOUS GLUCOSE MONITORING SYSTEM (CGMS)    Patient is here in clinic today for placement of continuous glucose monitoring sensor.      Each patient verified that they were here for CGMS procedure ordered by their provider and that they have a working glucose meter and supplies at home.   Patient will be provided with a Freestyle Jero Sensor and a copy of the Continuous Glucose Monitoring Patient Log to fill out during the study.   A detailed  explanation of Continuous Glucose Monitoring was  provided. Patient informed that this is a blind procedure and that they will not actually see the blood sugar tracing in real time.  Reviewed with patient the [employer] patient education handout called "Your Freestyle Jero Pro sensor: What you need to know" to review self-care during the study to avoid sensor loosening or removal ie... Bathing, Swimming, dressing, and exercising.   Instructed patient to check blood sugar using home glucometer and to record the following on provided patient log sheets:Blood sugar taken at home, Meals and snacks, Activity, and Diabetes medications taken and dosage    Patient was brought to a private location.  Arm for insertion was selected and prepared and allowed to dry. Glucose Sensor Serial Number [id number]  was inserted to back of patient's RIGHT upper arm.    The following forms  were given and reviewed in detail with patient and all questions answered.   · Continuous Glucose Monitoring Patient Log #[id number]  · Freestyle [employer] Patient Handout "Your Freestyle Jero Pro Sensor: What you need to know"     Instructions held in a group: Time: 15 min   Insertion of sensor done individually in private:  Time: 5 minutes         "

## 2020-06-16 DIAGNOSIS — E11.69 HYPERLIPIDEMIA ASSOCIATED WITH TYPE 2 DIABETES MELLITUS: Chronic | ICD-10-CM

## 2020-06-16 DIAGNOSIS — E78.5 HYPERLIPIDEMIA ASSOCIATED WITH TYPE 2 DIABETES MELLITUS: Chronic | ICD-10-CM

## 2020-06-16 RX ORDER — SIMVASTATIN 40 MG/1
40 TABLET, FILM COATED ORAL DAILY
Qty: 90 TABLET | Refills: 3 | Status: SHIPPED | OUTPATIENT
Start: 2020-06-16 | End: 2021-03-05

## 2020-06-17 ENCOUNTER — CLINICAL SUPPORT (OUTPATIENT)
Dept: ENDOCRINOLOGY | Facility: CLINIC | Age: 55
End: 2020-06-17
Payer: COMMERCIAL

## 2020-06-17 DIAGNOSIS — Z79.4 TYPE 2 DIABETES MELLITUS WITH BOTH EYES AFFECTED BY MILD NONPROLIFERATIVE RETINOPATHY WITHOUT MACULAR EDEMA, WITH LONG-TERM CURRENT USE OF INSULIN: ICD-10-CM

## 2020-06-17 DIAGNOSIS — E11.3293 TYPE 2 DIABETES MELLITUS WITH BOTH EYES AFFECTED BY MILD NONPROLIFERATIVE RETINOPATHY WITHOUT MACULAR EDEMA, WITH LONG-TERM CURRENT USE OF INSULIN: ICD-10-CM

## 2020-06-17 PROCEDURE — 95251 CONT GLUC MNTR ANALYSIS I&R: CPT | Mod: S$GLB,,, | Performed by: INTERNAL MEDICINE

## 2020-06-17 PROCEDURE — 95250 CONT GLUC MNTR PHYS/QHP EQP: CPT | Mod: S$GLB,,, | Performed by: DIETITIAN, REGISTERED

## 2020-06-17 PROCEDURE — 95250 PR GLUCOSE MONITORING,72 HRS,SUB-Q SENSOR: ICD-10-PCS | Mod: S$GLB,,, | Performed by: DIETITIAN, REGISTERED

## 2020-06-17 PROCEDURE — 95251 PR GLUCOSE MONITOR, 72 HOUR, PHYS INTERP: ICD-10-PCS | Mod: S$GLB,,, | Performed by: INTERNAL MEDICINE

## 2020-06-17 NOTE — PROGRESS NOTES
DIABETES EDUCATOR NOTE   Return of the Freestyle Louis Pro Sensor and Patient Log.    Patient returned to clinic today to return Glucose Sensor and signed patient log form used in [facility] procedure.    The CGMS Sensor will be scanned and downloaded. All reports will be imported into the patient's electronic medical record.    Endocrine Provider will complete data interpretation and make recommendations; will forward recommendations to the ordering provider for follow up with patient.

## 2020-06-17 NOTE — Clinical Note
His download was interesting and with almost no variability. I question accuracy a bit although it matches his previous logs and A1c pretty well. If he is interested he may do well on a personal CGM

## 2020-06-22 ENCOUNTER — TELEPHONE (OUTPATIENT)
Dept: ENDOCRINOLOGY | Facility: CLINIC | Age: 55
End: 2020-06-22

## 2020-06-22 NOTE — TELEPHONE ENCOUNTER
Called patient and let him know results of CGM. We discussed that CGM data shows he needs less Tresiba-will decrease to 26 units daily and almost no variability. We discussed option of personal CGM and he does not wish to wear anything. Will follow up in Sept as scheduled.

## 2020-08-26 ENCOUNTER — TELEPHONE (OUTPATIENT)
Dept: ENDOCRINOLOGY | Facility: CLINIC | Age: 55
End: 2020-08-26

## 2020-08-26 NOTE — TELEPHONE ENCOUNTER
----- Message from Vale Edwards sent at 8/26/2020  9:02 AM CDT -----  Pt needs another order submitted for his lab, he realized he had already ate. Please submit a new order so pt can go and have this done tomorrow. Please call back.      Contact Info [phone]

## 2020-08-27 DIAGNOSIS — E11.9 TYPE 2 DIABETES MELLITUS WITHOUT COMPLICATION: ICD-10-CM

## 2020-08-28 DIAGNOSIS — E11.3293 TYPE 2 DIABETES MELLITUS WITH BOTH EYES AFFECTED BY MILD NONPROLIFERATIVE RETINOPATHY WITHOUT MACULAR EDEMA, WITH LONG-TERM CURRENT USE OF INSULIN: Primary | ICD-10-CM

## 2020-08-28 DIAGNOSIS — Z79.4 TYPE 2 DIABETES MELLITUS WITH BOTH EYES AFFECTED BY MILD NONPROLIFERATIVE RETINOPATHY WITHOUT MACULAR EDEMA, WITH LONG-TERM CURRENT USE OF INSULIN: Primary | ICD-10-CM

## 2020-08-28 DIAGNOSIS — E78.5 HYPERLIPIDEMIA LDL GOAL <100: ICD-10-CM

## 2020-09-01 ENCOUNTER — LAB VISIT (OUTPATIENT)
Dept: LAB | Facility: HOSPITAL | Age: 55
End: 2020-09-01
Attending: NURSE PRACTITIONER
Payer: COMMERCIAL

## 2020-09-01 DIAGNOSIS — Z79.4 TYPE 2 DIABETES MELLITUS WITH BOTH EYES AFFECTED BY MILD NONPROLIFERATIVE RETINOPATHY WITHOUT MACULAR EDEMA, WITH LONG-TERM CURRENT USE OF INSULIN: ICD-10-CM

## 2020-09-01 DIAGNOSIS — E11.3293 TYPE 2 DIABETES MELLITUS WITH BOTH EYES AFFECTED BY MILD NONPROLIFERATIVE RETINOPATHY WITHOUT MACULAR EDEMA, WITH LONG-TERM CURRENT USE OF INSULIN: ICD-10-CM

## 2020-09-01 DIAGNOSIS — E78.5 HYPERLIPIDEMIA LDL GOAL <100: ICD-10-CM

## 2020-09-01 LAB
ALBUMIN SERPL BCP-MCNC: 4.6 G/DL (ref 3.5–5.2)
ALP SERPL-CCNC: 94 U/L (ref 38–126)
ALT SERPL W/O P-5'-P-CCNC: 26 U/L (ref 10–44)
ANION GAP SERPL CALC-SCNC: 10 MMOL/L (ref 8–16)
AST SERPL-CCNC: 30 U/L (ref 15–46)
BILIRUB SERPL-MCNC: 0.6 MG/DL (ref 0.1–1)
BUN SERPL-MCNC: 19 MG/DL (ref 2–20)
CALCIUM SERPL-MCNC: 9.5 MG/DL (ref 8.7–10.5)
CHLORIDE SERPL-SCNC: 104 MMOL/L (ref 95–110)
CHOLEST SERPL-MCNC: 193 MG/DL (ref 120–199)
CHOLEST/HDLC SERPL: 3.7 {RATIO} (ref 2–5)
CO2 SERPL-SCNC: 26 MMOL/L (ref 23–29)
CREAT SERPL-MCNC: 1.06 MG/DL (ref 0.5–1.4)
EST. GFR  (AFRICAN AMERICAN): >60 ML/MIN/1.73 M^2
EST. GFR  (NON AFRICAN AMERICAN): >60 ML/MIN/1.73 M^2
ESTIMATED AVG GLUCOSE: 151 MG/DL (ref 68–131)
GLUCOSE SERPL-MCNC: 109 MG/DL (ref 70–110)
HBA1C MFR BLD HPLC: 6.9 % (ref 4–5.6)
HDLC SERPL-MCNC: 52 MG/DL (ref 40–75)
HDLC SERPL: 26.9 % (ref 20–50)
LDLC SERPL CALC-MCNC: 99.6 MG/DL (ref 63–159)
NONHDLC SERPL-MCNC: 141 MG/DL
POTASSIUM SERPL-SCNC: 4.3 MMOL/L (ref 3.5–5.1)
PROT SERPL-MCNC: 8.2 G/DL (ref 6–8.4)
SODIUM SERPL-SCNC: 140 MMOL/L (ref 136–145)
TRIGL SERPL-MCNC: 207 MG/DL (ref 30–150)

## 2020-09-01 PROCEDURE — 36415 COLL VENOUS BLD VENIPUNCTURE: CPT | Mod: PO

## 2020-09-01 PROCEDURE — 83036 HEMOGLOBIN GLYCOSYLATED A1C: CPT

## 2020-09-01 PROCEDURE — 80053 COMPREHEN METABOLIC PANEL: CPT | Mod: PO

## 2020-09-01 PROCEDURE — 80061 LIPID PANEL: CPT

## 2020-09-02 ENCOUNTER — PATIENT OUTREACH (OUTPATIENT)
Dept: ADMINISTRATIVE | Facility: OTHER | Age: 55
End: 2020-09-02

## 2020-09-02 DIAGNOSIS — Z12.11 ENCOUNTER FOR FIT (FECAL IMMUNOCHEMICAL TEST) SCREENING: Primary | ICD-10-CM

## 2020-09-02 NOTE — PROGRESS NOTES
Care Everywhere:   Immunization: updated  Health Maintenance: updated  Media Review: review for outside colon cancer report  Legacy Review:   Order placed: fecal immunochemical test   Upcoming appts:

## 2020-09-03 NOTE — PROGRESS NOTES
Subjective:      Patient ID: Yon Loyd is a 55 y.o. male.    Chief Complaint:  No chief complaint on file.    History of Present Illness  Yon Loyd with Type 2 DM complicated by retinopathy, CKD and proteinuria, DLP and HTN presents today for follow up of T2DM. Last visit in Feb 2020.     With regards to the diabetes:    Diagnosed with Type 2 diabetes in ~1993.  Been on insulin since ~2013. Started Trulicity 12/2016.   Family history of type 2 diabetes in mother and father.     DIABETES COMPLICATIONS:   Nephropathy +; sees nephrology and needs appt  Retinopathy +; up to date with eye exam  Peripheral neuropathy +; tolerable; does not wish to see podiatry  CAD: -     Current regimen:  Synjardy 12.5/1000 mg BID  Trulicity 1.5 mg weekly (Tuesday's)  Tresiba 26 units daily in the morning  Novolog 10 units before breakfast and dinner     Missed doses: he was missing novolog before dinner a lot over a 2 week period-has been doing better     Other medications tried:  Invokana but stopped due to fear of side effects    He is reports he has not been checking in months.       Hypoglycemic event-Denies.   Knows how to correct with 15 grams of carbs- juice, coke, or a peppermint.      Eats 3 meals a day. He feels that he is not always following diabetic diet but better than he has been in the past.   B: sausage biscuit from [employer] or at home  L: sandwich  D: varies -burgers or chicken with fries   Snacks: peanut butter crackers, pickles, olives        Drinks: Diet soda and coffee with equal    Exercise: walks often for work. No formal exercise.    Education - last visit: none. Politely declines     Diabetes Management Status  Statin: Taking  ACE/ARB: Taking    Lab Results   Component Value Date    HGBA1C 6.9 (H) 09/01/2020    HGBA1C 6.3 (H) 02/07/2020    HGBA1C 6.7 (H) 11/01/2019     Screening or Prevention Patient's value Goal Complete/Controlled?   HgA1C Testing and Control   Lab Results   Component Value Date     HGBA1C 6.9 (H) 09/01/2020      Annually/Less than 8% Yes   Lipid profile : 09/01/2020 Annually Yes   LDL control Lab Results   Component Value Date    LDLCALC 99.6 09/01/2020    Annually/Less than 100 mg/dl  Yes   Nephropathy screening Lab Results   Component Value Date    LABMICR 195.0 09/01/2020     No results found for: PROTEINUA Annually No   Blood pressure BP Readings from Last 1 Encounters:   09/04/20 125/74    Less than 140/90 No   Dilated retinal exam : 11/25/2019 Annually Yes   Foot exam   : 11/18/2019 Annually Yes     Review of Systems   Constitutional: Negative for unexpected weight change.   Eyes: Negative for visual disturbance.   Respiratory: Negative for shortness of breath.    Cardiovascular: Negative for chest pain.   Gastrointestinal: Negative for abdominal pain.   Endocrine: Negative for cold intolerance, heat intolerance, polydipsia, polyphagia and polyuria.   Musculoskeletal: Positive for arthralgias.   Skin: Negative for wound.   Neurological: Negative for headaches.   Hematological: Does not bruise/bleed easily.   Psychiatric/Behavioral: Negative for sleep disturbance.     Objective:   Physical Exam  Vitals signs reviewed.   Neck:      Thyroid: No thyromegaly.   Cardiovascular:      Rate and Rhythm: Normal rate.      Comments: No edema present  Pulmonary:      Effort: Pulmonary effort is normal.   Abdominal:      Palpations: Abdomen is soft.     injection sites are without edema or erythema. No lipo hypertropthy or atrophy  Diabetes Foot Exam:  Feet no cuts or scratches  Shoes appropriate  DM foot exam deferred; done in Nov 2019    Body mass index is 29.06 kg/m².    Lab Review:   Lab Results   Component Value Date    HGBA1C 6.9 (H) 09/01/2020     Lab Results   Component Value Date    CHOL 193 09/01/2020    HDL 52 09/01/2020    LDLCALC 99.6 09/01/2020    TRIG 207 (H) 09/01/2020    CHOLHDL 26.9 09/01/2020     Lab Results   Component Value Date     09/01/2020    K 4.3 09/01/2020    CL  104 09/01/2020    CO2 26 09/01/2020     09/01/2020    BUN 19 09/01/2020    CREATININE 1.06 09/01/2020    CALCIUM 9.5 09/01/2020    PROT 8.2 09/01/2020    ALBUMIN 4.6 09/01/2020    BILITOT 0.6 09/01/2020    ALKPHOS 94 09/01/2020    AST 30 09/01/2020    ALT 26 09/01/2020    ANIONGAP 10 09/01/2020    ESTGFRAFRICA >60.0 09/01/2020    EGFRNONAA >60.0 09/01/2020    TSH 0.699 03/09/2018       Assessment and Plan     1. Type 2 diabetes mellitus with both eyes affected by mild nonproliferative retinopathy without macular edema, with long-term current use of insulin  nateglinide (STARLIX) 60 MG tablet    Comprehensive metabolic panel    Hemoglobin A1C   2. Hyperlipidemia associated with type 2 diabetes mellitus     3. Hypertension associated with diabetes     4. Proteinuria due to type 2 diabetes mellitus     5. Type 2 diabetes mellitus with diabetic polyneuropathy, with long-term current use of insulin     6. Type 2 diabetes mellitus with diabetic nephropathy, with long-term current use of insulin       Type 2 diabetes mellitus with both eyes affected by mild nonproliferative retinopathy without macular edema, with long-term current use of insulin  -- Reviewed goals of therapy are to get the best control we can without hypoglycemia  -- Labs prior to next appt.   -- Discussed spike in A1c likely due to lack of low normal BGs bring A1c average down coupled with missed doses of insulin (novolog at dinner)  Medication changes:   Continue  Synjardy 12.5/1000 mg BID  Trulicity 1.5 mg weekly (Tuesday's)  Tresiba 26 units daily in the morning  Novolog 10 units before breakfast and dinner    Start   Starlix 60 mg before lunch   -- Encouraged to check BG AC and HS.   --Discussed desirae and dexcom today and he is not interested yet (does not wish to wear anything)  -- Discussed importance of checking BG prior to giving insulin to prevent hypoglycemia   -- Reviewed patient's current insulin regimen. Clarified proper insulin dose  and timing in relation to meals, etc. Insulin injection sites and proper rotation instructed.  -- Advised frequent self blood glucose monitoring.  Patient encouraged to document glucose results and bring them to every clinic visit    -- Hypoglycemia precautions discussed. Instructed on precautions before driving.    -- Call for Bg repeatedly < 90 or > 180.   -- Close adherence to lifestyle changes recommended.   -- Periodic follow ups for eye evaluations, foot care and dental care suggested.      Hyperlipidemia associated with type 2 diabetes mellitus  -- Controlled LDL  -- TG above goal-start OTC Fish Oil 2000 IU daily  -- On statin per ADA recommendations      Hypertension associated with diabetes  -- on ARB  -- Controlled  -- Blood pressure goals discussed with patient    Proteinuria due to type 2 diabetes mellitus  -Optimize BG control  - Referred to nephro     Type 2 diabetes mellitus with diabetic polyneuropathy, with long-term current use of insulin  -Optimize BG control    Type 2 diabetes mellitus with diabetic nephropathy, with long-term current use of insulin  -See above  -Optimize BG control  - Has not seen neprho in 2 years; will refer    Follow up in about 3 months (around 12/4/2020).      Betzaida Rodriguez NP

## 2020-09-04 ENCOUNTER — OFFICE VISIT (OUTPATIENT)
Dept: ENDOCRINOLOGY | Facility: CLINIC | Age: 55
End: 2020-09-04
Payer: COMMERCIAL

## 2020-09-04 VITALS
BODY MASS INDEX: 29.03 KG/M2 | HEART RATE: 72 BPM | SYSTOLIC BLOOD PRESSURE: 125 MMHG | HEIGHT: 72 IN | WEIGHT: 214.31 LBS | DIASTOLIC BLOOD PRESSURE: 74 MMHG

## 2020-09-04 DIAGNOSIS — E11.29 PROTEINURIA DUE TO TYPE 2 DIABETES MELLITUS: Chronic | ICD-10-CM

## 2020-09-04 DIAGNOSIS — E11.59 HYPERTENSION ASSOCIATED WITH DIABETES: Chronic | ICD-10-CM

## 2020-09-04 DIAGNOSIS — E11.42 TYPE 2 DIABETES MELLITUS WITH DIABETIC POLYNEUROPATHY, WITH LONG-TERM CURRENT USE OF INSULIN: Chronic | ICD-10-CM

## 2020-09-04 DIAGNOSIS — E11.69 HYPERLIPIDEMIA ASSOCIATED WITH TYPE 2 DIABETES MELLITUS: Chronic | ICD-10-CM

## 2020-09-04 DIAGNOSIS — E78.5 HYPERLIPIDEMIA ASSOCIATED WITH TYPE 2 DIABETES MELLITUS: Chronic | ICD-10-CM

## 2020-09-04 DIAGNOSIS — Z79.4 TYPE 2 DIABETES MELLITUS WITH BOTH EYES AFFECTED BY MILD NONPROLIFERATIVE RETINOPATHY WITHOUT MACULAR EDEMA, WITH LONG-TERM CURRENT USE OF INSULIN: Primary | ICD-10-CM

## 2020-09-04 DIAGNOSIS — Z79.4 TYPE 2 DIABETES MELLITUS WITH DIABETIC NEPHROPATHY, WITH LONG-TERM CURRENT USE OF INSULIN: Chronic | ICD-10-CM

## 2020-09-04 DIAGNOSIS — I15.2 HYPERTENSION ASSOCIATED WITH DIABETES: Chronic | ICD-10-CM

## 2020-09-04 DIAGNOSIS — R80.9 PROTEINURIA DUE TO TYPE 2 DIABETES MELLITUS: Chronic | ICD-10-CM

## 2020-09-04 DIAGNOSIS — E11.3293 TYPE 2 DIABETES MELLITUS WITH BOTH EYES AFFECTED BY MILD NONPROLIFERATIVE RETINOPATHY WITHOUT MACULAR EDEMA, WITH LONG-TERM CURRENT USE OF INSULIN: Primary | ICD-10-CM

## 2020-09-04 DIAGNOSIS — E11.21 TYPE 2 DIABETES MELLITUS WITH DIABETIC NEPHROPATHY, WITH LONG-TERM CURRENT USE OF INSULIN: Chronic | ICD-10-CM

## 2020-09-04 DIAGNOSIS — Z79.4 TYPE 2 DIABETES MELLITUS WITH DIABETIC POLYNEUROPATHY, WITH LONG-TERM CURRENT USE OF INSULIN: Chronic | ICD-10-CM

## 2020-09-04 PROCEDURE — 99214 OFFICE O/P EST MOD 30 MIN: CPT | Mod: S$GLB,,, | Performed by: NURSE PRACTITIONER

## 2020-09-04 PROCEDURE — 99999 PR PBB SHADOW E&M-EST. PATIENT-LVL IV: ICD-10-PCS | Mod: PBBFAC,,, | Performed by: NURSE PRACTITIONER

## 2020-09-04 PROCEDURE — 99999 PR PBB SHADOW E&M-EST. PATIENT-LVL IV: CPT | Mod: PBBFAC,,, | Performed by: NURSE PRACTITIONER

## 2020-09-04 PROCEDURE — 99214 PR OFFICE/OUTPT VISIT, EST, LEVL IV, 30-39 MIN: ICD-10-PCS | Mod: S$GLB,,, | Performed by: NURSE PRACTITIONER

## 2020-09-04 RX ORDER — NATEGLINIDE 60 MG/1
60 TABLET ORAL
Qty: 270 TABLET | Refills: 3 | Status: SHIPPED | OUTPATIENT
Start: 2020-09-04 | End: 2021-10-04

## 2020-09-04 NOTE — ASSESSMENT & PLAN NOTE
-- Reviewed goals of therapy are to get the best control we can without hypoglycemia  -- Labs prior to next appt.   -- Discussed spike in A1c likely due to lack of low normal BGs bring A1c average down coupled with missed doses of insulin (novolog at dinner)  Medication changes:   Continue  Synjardy 12.5/1000 mg BID  Trulicity 1.5 mg weekly (Tuesday's)  Tresiba 26 units daily in the morning  Novolog 10 units before breakfast and dinner    Start   Starlix 60 mg before lunch   -- Encouraged to check BG AC and HS.   --Discussed desirae and dexcom today and he is not interested yet (does not wish to wear anything)  -- Discussed importance of checking BG prior to giving insulin to prevent hypoglycemia   -- Reviewed patient's current insulin regimen. Clarified proper insulin dose and timing in relation to meals, etc. Insulin injection sites and proper rotation instructed.  -- Advised frequent self blood glucose monitoring.  Patient encouraged to document glucose results and bring them to every clinic visit    -- Hypoglycemia precautions discussed. Instructed on precautions before driving.    -- Call for Bg repeatedly < 90 or > 180.   -- Close adherence to lifestyle changes recommended.   -- Periodic follow ups for eye evaluations, foot care and dental care suggested.

## 2020-09-04 NOTE — ASSESSMENT & PLAN NOTE
-- Controlled LDL  -- TG above goal-start OTC Fish Oil 2000 IU daily  -- On statin per ADA recommendations

## 2020-09-04 NOTE — PATIENT INSTRUCTIONS
"Continue  Synjardy 12.5/1000 mg BID  Trulicity 1.5 mg weekly (Tuesday's)  Tresiba 26 units daily in the morning  Novolog 10 units before breakfast and dinner    Start   Starlix 60 mg before lunch     High cholesterol foods to avoid/limit:     C: Cheese, milk, dairy  A: Animal Fats: hot dogs and hamburgers  G: "Getting it away from home": going out to eat a lot  E: Extra Fat: cookies, candy, and sweets  F: Family History    Remember high cholesterol can clog your arteries and increase risk for heart attack or stroke.     Start OTC Fish Oil 2000 IU daily for triglycerides  "

## 2020-09-08 ENCOUNTER — TELEPHONE (OUTPATIENT)
Dept: NEPHROLOGY | Facility: CLINIC | Age: 55
End: 2020-09-08

## 2020-09-08 NOTE — TELEPHONE ENCOUNTER
Prefer Friday appt late afternoon c dr lynch     Spoke to pt in regards to nae that was schd incorrectly c  pt stated that he was just trying to get something but does not want to switch providers, jacinda appt on 9/11. Pt agreed to new d/t

## 2020-09-11 ENCOUNTER — OFFICE VISIT (OUTPATIENT)
Dept: NEPHROLOGY | Facility: CLINIC | Age: 55
End: 2020-09-11
Payer: COMMERCIAL

## 2020-09-11 VITALS
HEIGHT: 72 IN | HEART RATE: 88 BPM | SYSTOLIC BLOOD PRESSURE: 130 MMHG | WEIGHT: 217.81 LBS | BODY MASS INDEX: 29.5 KG/M2 | DIASTOLIC BLOOD PRESSURE: 78 MMHG | OXYGEN SATURATION: 99 %

## 2020-09-11 DIAGNOSIS — R80.1 PERSISTENT PROTEINURIA: Primary | ICD-10-CM

## 2020-09-11 PROCEDURE — 99213 OFFICE O/P EST LOW 20 MIN: CPT | Mod: S$GLB,,, | Performed by: INTERNAL MEDICINE

## 2020-09-11 PROCEDURE — 99213 PR OFFICE/OUTPT VISIT, EST, LEVL III, 20-29 MIN: ICD-10-PCS | Mod: S$GLB,,, | Performed by: INTERNAL MEDICINE

## 2020-09-11 PROCEDURE — 99999 PR PBB SHADOW E&M-EST. PATIENT-LVL III: CPT | Mod: PBBFAC,,, | Performed by: INTERNAL MEDICINE

## 2020-09-11 PROCEDURE — 99999 PR PBB SHADOW E&M-EST. PATIENT-LVL III: ICD-10-PCS | Mod: PBBFAC,,, | Performed by: INTERNAL MEDICINE

## 2020-09-15 NOTE — PROGRESS NOTES
Subjective:       Patient ID: Yon Loyd is a 55 y.o. White male who presents for follow up of Proteinuria    HPI  Mr. Loyd is a 55 year old man with medical history of diabetes, hypertension presenting for follow up of microalbuminuria.  Patient with microalbuminuria up to ~530mg 12.16, now ~234mg.  Patient reports blood sugars well controlled since last visit in 2017.  He denies any NSAID use.  He otherwise denies any fever, chest pain, shortness of breath, abdominal pain, diarrhea, dysuria/hematuria.     Review of Systems   Constitutional: Negative for appetite change, fatigue and fever.   Respiratory: Negative for cough and shortness of breath.    Cardiovascular: Negative for chest pain and leg swelling.   Gastrointestinal: Negative for abdominal pain, constipation, diarrhea, nausea and vomiting.   Genitourinary: Negative for dysuria, flank pain, frequency, hematuria and urgency.   Musculoskeletal: Negative for arthralgias, back pain and joint swelling.   Skin: Negative for rash.   Neurological: Negative for dizziness and light-headedness.   All other systems reviewed and are negative.      Objective:      Physical Exam  Vitals signs reviewed.   Constitutional:       General: He is not in acute distress.     Appearance: He is well-developed.   Pulmonary:      Effort: Pulmonary effort is normal. No respiratory distress.   Musculoskeletal:         General: No swelling.   Neurological:      Mental Status: He is alert.         Assessment:       1. Persistent proteinuria        Plan:      Mr. Loyd is a 55 year old man with medical history of diabetes, hypertension presenting for follow of microalbuminuria.  Patient with microalbuminuria up to ~530mg 12.16, improving since, now ~234mg, suspect continued improvement with continued glycemic control.  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  Stressed importance of blood pressure/glycemic control  to prevent any further progression of kidney disease, patient voiced understanding.     Return to clinic in 12 months with renal/heme panel, iron/TIBC/ferritin, urinalysis/culture, urine protein/creatinine ratio prior to next visit

## 2020-11-23 ENCOUNTER — LAB VISIT (OUTPATIENT)
Dept: LAB | Facility: HOSPITAL | Age: 55
End: 2020-11-23
Attending: NURSE PRACTITIONER
Payer: COMMERCIAL

## 2020-11-23 DIAGNOSIS — Z79.4 TYPE 2 DIABETES MELLITUS WITH BOTH EYES AFFECTED BY MILD NONPROLIFERATIVE RETINOPATHY WITHOUT MACULAR EDEMA, WITH LONG-TERM CURRENT USE OF INSULIN: ICD-10-CM

## 2020-11-23 DIAGNOSIS — E11.3293 TYPE 2 DIABETES MELLITUS WITH BOTH EYES AFFECTED BY MILD NONPROLIFERATIVE RETINOPATHY WITHOUT MACULAR EDEMA, WITH LONG-TERM CURRENT USE OF INSULIN: ICD-10-CM

## 2020-11-23 LAB
ALBUMIN SERPL BCP-MCNC: 4.4 G/DL (ref 3.5–5.2)
ALP SERPL-CCNC: 102 U/L (ref 38–126)
ALT SERPL W/O P-5'-P-CCNC: 32 U/L (ref 10–44)
ANION GAP SERPL CALC-SCNC: 9 MMOL/L (ref 8–16)
AST SERPL-CCNC: 29 U/L (ref 15–46)
BILIRUB SERPL-MCNC: 0.5 MG/DL (ref 0.1–1)
BUN SERPL-MCNC: 16 MG/DL (ref 2–20)
CALCIUM SERPL-MCNC: 9.9 MG/DL (ref 8.7–10.5)
CHLORIDE SERPL-SCNC: 103 MMOL/L (ref 95–110)
CO2 SERPL-SCNC: 29 MMOL/L (ref 23–29)
CREAT SERPL-MCNC: 1.02 MG/DL (ref 0.5–1.4)
EST. GFR  (AFRICAN AMERICAN): >60 ML/MIN/1.73 M^2
EST. GFR  (NON AFRICAN AMERICAN): >60 ML/MIN/1.73 M^2
ESTIMATED AVG GLUCOSE: 146 MG/DL (ref 68–131)
GLUCOSE SERPL-MCNC: 129 MG/DL (ref 70–110)
HBA1C MFR BLD HPLC: 6.7 % (ref 4–5.6)
POTASSIUM SERPL-SCNC: 4.3 MMOL/L (ref 3.5–5.1)
PROT SERPL-MCNC: 7.8 G/DL (ref 6–8.4)
SODIUM SERPL-SCNC: 141 MMOL/L (ref 136–145)

## 2020-11-23 PROCEDURE — 83036 HEMOGLOBIN GLYCOSYLATED A1C: CPT

## 2020-11-23 PROCEDURE — 80053 COMPREHEN METABOLIC PANEL: CPT | Mod: PO

## 2020-11-23 PROCEDURE — 36415 COLL VENOUS BLD VENIPUNCTURE: CPT | Mod: PO

## 2020-12-03 NOTE — PROGRESS NOTES
Subjective:      Patient ID: Yon Loyd is a 55 y.o. male.    Chief Complaint:  Diabetes    History of Present Illness  Yon Loyd with Type 2 DM complicated by retinopathy, CKD and proteinuria, DLP and HTN presents today for follow up of T2DM. Last visit in Sept 2020.     CGM done in June 2020     With regards to the diabetes:    Diagnosed with Type 2 diabetes in ~1993.  Been on insulin since ~2013. Started Trulicity 12/2016.   Family history of type 2 diabetes in mother and father.     DIABETES COMPLICATIONS:   Nephropathy +; sees nephrology   Retinopathy +; up to date with eye exam (saw today)  Peripheral neuropathy +; tolerable; does not wish to see podiatry  CAD: -     Current regimen:  Synjardy 12.5/1000 mg twice daily  Trulicity 1.5 mg weekly (Tuesday's)  Tresiba 26 units daily in the morning  Novolog 10 units before breakfast and dinner  Starlix 60 mg before lunch     Missed doses: he missed an injection for 4 days of Trulicity and has been missing doses of Starlix at lunch at time.    Other medications tried:  Invokana but stopped due to fear of side effects    He is reports he has not been checking in months.       Hypoglycemic event-Denies.   Knows how to correct with 15 grams of carbs- juice, coke, or a peppermint.      Eats 3 meals a day. He feels that he is not always following diabetic diet but better than he has been in the past.   B: sausage biscuit from [employer] or at home  L: sandwich  D: varies -burgers or chicken with fries   Snacks: peanut butter crackers, pickles, olives        Drinks: Diet soda and coffee with equal    Exercise: walks often for work. No formal exercise.    Education - last visit: none. Politely declines     Diabetes Management Status  Statin: Taking  ACE/ARB: Taking    Lab Results   Component Value Date    HGBA1C 6.7 (H) 11/23/2020    HGBA1C 6.9 (H) 09/01/2020    HGBA1C 6.3 (H) 02/07/2020     Screening or Prevention Patient's value Goal Complete/Controlled?   HgA1C  Testing and Control   Lab Results   Component Value Date    HGBA1C 6.7 (H) 11/23/2020      Annually/Less than 8% Yes   Lipid profile : 09/01/2020 Annually Yes   LDL control Lab Results   Component Value Date    LDLCALC 99.6 09/01/2020    Annually/Less than 100 mg/dl  Yes   Nephropathy screening Lab Results   Component Value Date    LABMICR 195.0 09/01/2020     No results found for: PROTEINUA Annually No   Blood pressure BP Readings from Last 1 Encounters:   12/04/20 128/72    Less than 140/90 No   Dilated retinal exam : 11/25/2019 Annually Yes   Foot exam   : 12/04/2020 Annually Yes     Review of Systems   Constitutional: Negative for unexpected weight change.   Eyes: Negative for visual disturbance.   Respiratory: Negative for shortness of breath.    Cardiovascular: Negative for chest pain.   Gastrointestinal: Negative for abdominal pain.   Endocrine: Negative for cold intolerance, heat intolerance, polydipsia, polyphagia and polyuria.   Musculoskeletal: Positive for arthralgias.   Skin: Negative for wound.   Neurological: Negative for headaches.   Hematological: Does not bruise/bleed easily.   Psychiatric/Behavioral: Negative for sleep disturbance.     Objective:   Physical Exam  Vitals signs reviewed.   Neck:      Thyroid: No thyromegaly.   Cardiovascular:      Rate and Rhythm: Normal rate.      Comments: No edema present  Pulmonary:      Effort: Pulmonary effort is normal.   Abdominal:      Palpations: Abdomen is soft.     injection sites are without edema or erythema. No lipo hypertropthy or atrophy  Diabetes Foot Exam:  Feet no cuts or scratches  Shoes appropriate  Sensation and monofilament intact to vibation    Body mass index is 29.02 kg/m².    Lab Review:   Lab Results   Component Value Date    HGBA1C 6.7 (H) 11/23/2020     Lab Results   Component Value Date    CHOL 193 09/01/2020    HDL 52 09/01/2020    LDLCALC 99.6 09/01/2020    TRIG 207 (H) 09/01/2020    CHOLHDL 26.9 09/01/2020     Lab Results    Component Value Date     11/23/2020    K 4.3 11/23/2020     11/23/2020    CO2 29 11/23/2020     (H) 11/23/2020    BUN 16 11/23/2020    CREATININE 1.02 11/23/2020    CALCIUM 9.9 11/23/2020    PROT 7.8 11/23/2020    ALBUMIN 4.4 11/23/2020    BILITOT 0.5 11/23/2020    ALKPHOS 102 11/23/2020    AST 29 11/23/2020    ALT 32 11/23/2020    ANIONGAP 9 11/23/2020    ESTGFRAFRICA >60.0 11/23/2020    EGFRNONAA >60.0 11/23/2020    TSH 0.699 03/09/2018       Assessment and Plan     1. Type 2 diabetes mellitus with diabetic nephropathy, with long-term current use of insulin  empagliflozin-metformin (SYNJARDY) 12.5-1,000 mg Tab    dulaglutide (TRULICITY) 3 mg/0.5 mL pen injector    insulin aspart U-100 (NOVOLOG FLEXPEN U-100 INSULIN) 100 unit/mL (3 mL) InPn pen    insulin degludec (TRESIBA FLEXTOUCH U-100) 100 unit/mL (3 mL) InPn    Lipid Panel    Comprehensive Metabolic Panel    Hemoglobin A1C   2. Hyperlipidemia associated with type 2 diabetes mellitus     3. Hypertension associated with diabetes       Type 2 diabetes mellitus with diabetic nephropathy, with long-term current use of insulin  Complicated by nephropathy and neuropatyhy  -- Reviewed goals of therapy are to get the best control we can without hypoglycemia  -- Labs prior to next appt.   Medication changes:   Continue   Synjardy 12.5/1000 mg twice daily  Tresiba 26 units daily in the morning  Novolog 10 units before breakfast and dinner  Starlix 60 mg before lunch     Increase Trulicity to 3 mg weekly (Tuesday's)    Consider CGMs next time as he may need less insulin with increase of Trulicity     -- Encouraged to check BG AC and HS.   --Discussed desirae and dexcom today and he is not interested yet (does not wish to wear anything)  -- Discussed importance of checking BG prior to giving insulin to prevent hypoglycemia   -- Reviewed patient's current insulin regimen. Clarified proper insulin dose and timing in relation to meals, etc. Insulin  injection sites and proper rotation instructed.  -- Advised frequent self blood glucose monitoring.  Patient encouraged to document glucose results and bring them to every clinic visit    -- Hypoglycemia precautions discussed. Instructed on precautions before driving.    -- Call for Bg repeatedly < 90 or > 180.   -- Close adherence to lifestyle changes recommended.   -- Periodic follow ups for eye evaluations, foot care and dental care suggested.      Hyperlipidemia associated with type 2 diabetes mellitus  -- LDL goal <70  -- Check LP on next visit  -- TG above goal-start OTC Fish Oil 2000 IU daily  -- On statin per ADA recommendations      Hypertension associated with diabetes  -- on ARB  -- Controlled  -- Blood pressure goals discussed with patient    Follow up in about 3 months (around 3/4/2021).      Betzaida Rodriguez NP

## 2020-12-04 ENCOUNTER — OFFICE VISIT (OUTPATIENT)
Dept: OPTOMETRY | Facility: CLINIC | Age: 55
End: 2020-12-04
Payer: COMMERCIAL

## 2020-12-04 ENCOUNTER — OFFICE VISIT (OUTPATIENT)
Dept: ENDOCRINOLOGY | Facility: CLINIC | Age: 55
End: 2020-12-04
Payer: COMMERCIAL

## 2020-12-04 VITALS
SYSTOLIC BLOOD PRESSURE: 128 MMHG | HEIGHT: 72 IN | WEIGHT: 213.94 LBS | DIASTOLIC BLOOD PRESSURE: 72 MMHG | BODY MASS INDEX: 28.98 KG/M2

## 2020-12-04 DIAGNOSIS — E11.59 HYPERTENSION ASSOCIATED WITH DIABETES: Chronic | ICD-10-CM

## 2020-12-04 DIAGNOSIS — H52.4 ASTIGMATISM WITH PRESBYOPIA, BILATERAL: ICD-10-CM

## 2020-12-04 DIAGNOSIS — Z79.4 TYPE 2 DIABETES MELLITUS WITH BOTH EYES AFFECTED BY MILD NONPROLIFERATIVE RETINOPATHY WITHOUT MACULAR EDEMA, WITH LONG-TERM CURRENT USE OF INSULIN: Primary | ICD-10-CM

## 2020-12-04 DIAGNOSIS — Z79.4 TYPE 2 DIABETES MELLITUS WITH DIABETIC NEPHROPATHY, WITH LONG-TERM CURRENT USE OF INSULIN: Primary | Chronic | ICD-10-CM

## 2020-12-04 DIAGNOSIS — E11.3293 TYPE 2 DIABETES MELLITUS WITH BOTH EYES AFFECTED BY MILD NONPROLIFERATIVE RETINOPATHY WITHOUT MACULAR EDEMA, WITH LONG-TERM CURRENT USE OF INSULIN: Primary | ICD-10-CM

## 2020-12-04 DIAGNOSIS — E78.5 HYPERLIPIDEMIA ASSOCIATED WITH TYPE 2 DIABETES MELLITUS: Chronic | ICD-10-CM

## 2020-12-04 DIAGNOSIS — E11.21 TYPE 2 DIABETES MELLITUS WITH DIABETIC NEPHROPATHY, WITH LONG-TERM CURRENT USE OF INSULIN: Primary | Chronic | ICD-10-CM

## 2020-12-04 DIAGNOSIS — I15.2 HYPERTENSION ASSOCIATED WITH DIABETES: Chronic | ICD-10-CM

## 2020-12-04 DIAGNOSIS — E11.69 HYPERLIPIDEMIA ASSOCIATED WITH TYPE 2 DIABETES MELLITUS: Chronic | ICD-10-CM

## 2020-12-04 DIAGNOSIS — H52.203 ASTIGMATISM WITH PRESBYOPIA, BILATERAL: ICD-10-CM

## 2020-12-04 PROCEDURE — 99999 PR PBB SHADOW E&M-EST. PATIENT-LVL III: ICD-10-PCS | Mod: PBBFAC,,, | Performed by: NURSE PRACTITIONER

## 2020-12-04 PROCEDURE — 99214 OFFICE O/P EST MOD 30 MIN: CPT | Mod: S$GLB,,, | Performed by: NURSE PRACTITIONER

## 2020-12-04 PROCEDURE — 99999 PR PBB SHADOW E&M-EST. PATIENT-LVL III: CPT | Mod: PBBFAC,,, | Performed by: OPTOMETRIST

## 2020-12-04 PROCEDURE — 99999 PR PBB SHADOW E&M-EST. PATIENT-LVL III: CPT | Mod: PBBFAC,,, | Performed by: NURSE PRACTITIONER

## 2020-12-04 PROCEDURE — 92014 PR EYE EXAM, EST PATIENT,COMPREHESV: ICD-10-PCS | Mod: S$GLB,,, | Performed by: OPTOMETRIST

## 2020-12-04 PROCEDURE — 92014 COMPRE OPH EXAM EST PT 1/>: CPT | Mod: S$GLB,,, | Performed by: OPTOMETRIST

## 2020-12-04 PROCEDURE — 99999 PR PBB SHADOW E&M-EST. PATIENT-LVL III: ICD-10-PCS | Mod: PBBFAC,,, | Performed by: OPTOMETRIST

## 2020-12-04 PROCEDURE — 99214 PR OFFICE/OUTPT VISIT, EST, LEVL IV, 30-39 MIN: ICD-10-PCS | Mod: S$GLB,,, | Performed by: NURSE PRACTITIONER

## 2020-12-04 RX ORDER — INSULIN ASPART 100 [IU]/ML
INJECTION, SOLUTION INTRAVENOUS; SUBCUTANEOUS
Qty: 4 BOX | Refills: 6 | Status: SHIPPED | OUTPATIENT
Start: 2020-12-04 | End: 2021-06-15

## 2020-12-04 RX ORDER — DULAGLUTIDE 3 MG/.5ML
3 INJECTION, SOLUTION SUBCUTANEOUS
Qty: 2 ML | Refills: 3 | Status: SHIPPED | OUTPATIENT
Start: 2020-12-04 | End: 2021-01-03

## 2020-12-04 RX ORDER — EMPAGLIFLOZIN AND METFORMIN HYDROCHLORIDE 12.5; 1 MG/1; MG/1
1 TABLET ORAL 2 TIMES DAILY
Qty: 60 TABLET | Refills: 5 | Status: SHIPPED | OUTPATIENT
Start: 2020-12-04 | End: 2021-07-13

## 2020-12-04 RX ORDER — INSULIN DEGLUDEC 100 U/ML
40 INJECTION, SOLUTION SUBCUTANEOUS DAILY
Qty: 4 SYRINGE | Refills: 3 | Status: SHIPPED | OUTPATIENT
Start: 2020-12-04 | End: 2021-03-05

## 2020-12-04 NOTE — PROGRESS NOTES
HPI     Last eye exam was 12/6/19 with Dr. Vasquez.  Patient states no vision changes since last exam. Happy with most recent   glasses. Occasionally sees floaters OU.  Patient denies diplopia, headaches, flashes, and pain.    Hemoglobin A1C       Date                     Value               Ref Range             Status                11/23/2020               6.7 (H)             4.0 - 5.6 %           Final                  Last edited by Carmen Diaz on 12/4/2020  1:13 PM. (History)            Assessment /Plan     For exam results, see Encounter Report.    Type 2 diabetes mellitus with both eyes affected by mild nonproliferative retinopathy without macular edema, with long-term current use of insulin    Astigmatism with presbyopia, bilateral            1.  A few random MAs OU otherwise retina unremarkable.   Retina flat and intact OU--no holes, tears, breaks, or RDs.  Monitor yearly.  2.   Continue w/ current rx

## 2020-12-04 NOTE — ASSESSMENT & PLAN NOTE
Complicated by nephropathy and neuropatyhy  -- Reviewed goals of therapy are to get the best control we can without hypoglycemia  -- Labs prior to next appt.   Medication changes:   Continue   Synjardy 12.5/1000 mg twice daily  Tresiba 26 units daily in the morning  Novolog 10 units before breakfast and dinner  Starlix 60 mg before lunch     Increase Trulicity to 3 mg weekly (Tuesday's)    Consider CGMs next time as he may need less insulin with increase of Trulicity     -- Encouraged to check BG AC and HS.   --Discussed desirae and dexcom today and he is not interested yet (does not wish to wear anything)  -- Discussed importance of checking BG prior to giving insulin to prevent hypoglycemia   -- Reviewed patient's current insulin regimen. Clarified proper insulin dose and timing in relation to meals, etc. Insulin injection sites and proper rotation instructed.  -- Advised frequent self blood glucose monitoring.  Patient encouraged to document glucose results and bring them to every clinic visit    -- Hypoglycemia precautions discussed. Instructed on precautions before driving.    -- Call for Bg repeatedly < 90 or > 180.   -- Close adherence to lifestyle changes recommended.   -- Periodic follow ups for eye evaluations, foot care and dental care suggested.

## 2020-12-04 NOTE — PATIENT INSTRUCTIONS
Diabetes  Continue   Synjardy 12.5/1000 mg twice daily  Tresiba 26 units daily in the morning  Novolog 10 units before breakfast and dinner  Starlix 60 mg before lunch     Increase Trulicity to 3 mg weekly (Tuesday's)    Consider CGMs next time as he may need less insulin with increase of Trulicity

## 2020-12-04 NOTE — ASSESSMENT & PLAN NOTE
-- LDL goal <70  -- Check LP on next visit  -- TG above goal-start OTC Fish Oil 2000 IU daily  -- On statin per ADA recommendations

## 2021-02-25 ENCOUNTER — LAB VISIT (OUTPATIENT)
Dept: LAB | Facility: HOSPITAL | Age: 56
End: 2021-02-25
Attending: NURSE PRACTITIONER
Payer: COMMERCIAL

## 2021-02-25 DIAGNOSIS — E11.21 TYPE 2 DIABETES MELLITUS WITH DIABETIC NEPHROPATHY, WITH LONG-TERM CURRENT USE OF INSULIN: Chronic | ICD-10-CM

## 2021-02-25 DIAGNOSIS — Z79.4 TYPE 2 DIABETES MELLITUS WITH DIABETIC NEPHROPATHY, WITH LONG-TERM CURRENT USE OF INSULIN: Chronic | ICD-10-CM

## 2021-02-25 LAB
ALBUMIN SERPL BCP-MCNC: 4.5 G/DL (ref 3.5–5.2)
ALP SERPL-CCNC: 106 U/L (ref 38–126)
ALT SERPL W/O P-5'-P-CCNC: 39 U/L (ref 10–44)
ANION GAP SERPL CALC-SCNC: 8 MMOL/L (ref 8–16)
AST SERPL-CCNC: 34 U/L (ref 15–46)
BILIRUB SERPL-MCNC: 0.8 MG/DL (ref 0.1–1)
CALCIUM SERPL-MCNC: 10.9 MG/DL (ref 8.7–10.5)
CHLORIDE SERPL-SCNC: 102 MMOL/L (ref 95–110)
CHOLEST SERPL-MCNC: 185 MG/DL (ref 120–199)
CHOLEST/HDLC SERPL: 3.4 {RATIO} (ref 2–5)
CO2 SERPL-SCNC: 30 MMOL/L (ref 23–29)
CREAT SERPL-MCNC: 1.03 MG/DL (ref 0.5–1.4)
EST. GFR  (AFRICAN AMERICAN): >60 ML/MIN/1.73 M^2
EST. GFR  (NON AFRICAN AMERICAN): >60 ML/MIN/1.73 M^2
ESTIMATED AVG GLUCOSE: 154 MG/DL (ref 68–131)
GLUCOSE SERPL-MCNC: 119 MG/DL (ref 70–110)
HBA1C MFR BLD: 7 % (ref 4–5.6)
HDLC SERPL-MCNC: 54 MG/DL (ref 40–75)
HDLC SERPL: 29.2 % (ref 20–50)
LDLC SERPL CALC-MCNC: 98 MG/DL (ref 63–159)
NONHDLC SERPL-MCNC: 131 MG/DL
POTASSIUM SERPL-SCNC: 4.4 MMOL/L (ref 3.5–5.1)
PROT SERPL-MCNC: 7.9 G/DL (ref 6–8.4)
SODIUM SERPL-SCNC: 140 MMOL/L (ref 136–145)
TRIGL SERPL-MCNC: 165 MG/DL (ref 30–150)
UUN UR-MCNC: 18 MG/DL (ref 2–20)

## 2021-02-25 PROCEDURE — 36415 COLL VENOUS BLD VENIPUNCTURE: CPT | Mod: PO

## 2021-02-25 PROCEDURE — 83036 HEMOGLOBIN GLYCOSYLATED A1C: CPT

## 2021-02-25 PROCEDURE — 80053 COMPREHEN METABOLIC PANEL: CPT | Mod: PO

## 2021-02-25 PROCEDURE — 80061 LIPID PANEL: CPT

## 2021-03-05 ENCOUNTER — LAB VISIT (OUTPATIENT)
Dept: LAB | Facility: HOSPITAL | Age: 56
End: 2021-03-05
Payer: COMMERCIAL

## 2021-03-05 ENCOUNTER — TELEPHONE (OUTPATIENT)
Dept: ENDOCRINOLOGY | Facility: CLINIC | Age: 56
End: 2021-03-05

## 2021-03-05 ENCOUNTER — OFFICE VISIT (OUTPATIENT)
Dept: ENDOCRINOLOGY | Facility: CLINIC | Age: 56
End: 2021-03-05
Payer: COMMERCIAL

## 2021-03-05 VITALS
HEIGHT: 72 IN | BODY MASS INDEX: 28.93 KG/M2 | SYSTOLIC BLOOD PRESSURE: 134 MMHG | WEIGHT: 213.63 LBS | DIASTOLIC BLOOD PRESSURE: 80 MMHG

## 2021-03-05 DIAGNOSIS — Z79.4 TYPE 2 DIABETES MELLITUS WITH DIABETIC NEPHROPATHY, WITH LONG-TERM CURRENT USE OF INSULIN: Chronic | ICD-10-CM

## 2021-03-05 DIAGNOSIS — E11.69 HYPERLIPIDEMIA ASSOCIATED WITH TYPE 2 DIABETES MELLITUS: Chronic | ICD-10-CM

## 2021-03-05 DIAGNOSIS — E11.42 TYPE 2 DIABETES MELLITUS WITH DIABETIC POLYNEUROPATHY, WITH LONG-TERM CURRENT USE OF INSULIN: Chronic | ICD-10-CM

## 2021-03-05 DIAGNOSIS — E11.21 TYPE 2 DIABETES MELLITUS WITH DIABETIC NEPHROPATHY, WITH LONG-TERM CURRENT USE OF INSULIN: Chronic | ICD-10-CM

## 2021-03-05 DIAGNOSIS — Z79.4 TYPE 2 DIABETES MELLITUS WITH DIABETIC POLYNEUROPATHY, WITH LONG-TERM CURRENT USE OF INSULIN: Chronic | ICD-10-CM

## 2021-03-05 DIAGNOSIS — E78.5 HYPERLIPIDEMIA ASSOCIATED WITH TYPE 2 DIABETES MELLITUS: Chronic | ICD-10-CM

## 2021-03-05 DIAGNOSIS — I15.2 HYPERTENSION ASSOCIATED WITH DIABETES: Chronic | ICD-10-CM

## 2021-03-05 DIAGNOSIS — E11.59 HYPERTENSION ASSOCIATED WITH DIABETES: Chronic | ICD-10-CM

## 2021-03-05 LAB
C PEPTIDE SERPL-MCNC: 2.3 NG/ML (ref 0.78–5.19)
GLUCOSE SERPL-MCNC: 164 MG/DL (ref 70–110)

## 2021-03-05 PROCEDURE — 99214 OFFICE O/P EST MOD 30 MIN: CPT | Mod: S$GLB,,, | Performed by: NURSE PRACTITIONER

## 2021-03-05 PROCEDURE — 84681 ASSAY OF C-PEPTIDE: CPT | Performed by: NURSE PRACTITIONER

## 2021-03-05 PROCEDURE — 82947 ASSAY GLUCOSE BLOOD QUANT: CPT | Performed by: NURSE PRACTITIONER

## 2021-03-05 PROCEDURE — 36415 COLL VENOUS BLD VENIPUNCTURE: CPT | Performed by: NURSE PRACTITIONER

## 2021-03-05 PROCEDURE — 86341 ISLET CELL ANTIBODY: CPT | Performed by: NURSE PRACTITIONER

## 2021-03-05 PROCEDURE — 99214 PR OFFICE/OUTPT VISIT, EST, LEVL IV, 30-39 MIN: ICD-10-PCS | Mod: S$GLB,,, | Performed by: NURSE PRACTITIONER

## 2021-03-05 PROCEDURE — 99999 PR PBB SHADOW E&M-EST. PATIENT-LVL III: ICD-10-PCS | Mod: PBBFAC,,, | Performed by: NURSE PRACTITIONER

## 2021-03-05 PROCEDURE — 99999 PR PBB SHADOW E&M-EST. PATIENT-LVL III: CPT | Mod: PBBFAC,,, | Performed by: NURSE PRACTITIONER

## 2021-03-05 RX ORDER — INSULIN DEGLUDEC 200 U/ML
26 INJECTION, SOLUTION SUBCUTANEOUS DAILY
Qty: 5 SYRINGE | Refills: 3 | Status: SHIPPED | OUTPATIENT
Start: 2021-03-05 | End: 2021-04-04

## 2021-03-05 RX ORDER — PEN NEEDLE, DIABETIC 30 GX3/16"
NEEDLE, DISPOSABLE MISCELLANEOUS
Qty: 200 EACH | Refills: 3 | Status: SHIPPED | OUTPATIENT
Start: 2021-03-05

## 2021-03-05 RX ORDER — DULAGLUTIDE 3 MG/.5ML
0.5 INJECTION, SOLUTION SUBCUTANEOUS WEEKLY
COMMUNITY
Start: 2021-02-17 | End: 2021-03-05

## 2021-03-05 RX ORDER — DULAGLUTIDE 4.5 MG/.5ML
4.5 INJECTION, SOLUTION SUBCUTANEOUS
Qty: 2 ML | Refills: 3 | Status: SHIPPED | OUTPATIENT
Start: 2021-03-05 | End: 2021-04-04

## 2021-03-05 RX ORDER — ATORVASTATIN CALCIUM 20 MG/1
20 TABLET, FILM COATED ORAL DAILY
Qty: 90 TABLET | Refills: 3 | Status: SHIPPED | OUTPATIENT
Start: 2021-03-05 | End: 2021-11-12

## 2021-03-11 LAB — GAD65 AB SER-SCNC: 0 NMOL/L

## 2021-06-15 ENCOUNTER — TELEPHONE (OUTPATIENT)
Dept: ENDOCRINOLOGY | Facility: CLINIC | Age: 56
End: 2021-06-15

## 2021-06-15 DIAGNOSIS — Z79.4 TYPE 2 DIABETES MELLITUS WITH DIABETIC POLYNEUROPATHY, WITH LONG-TERM CURRENT USE OF INSULIN: Primary | ICD-10-CM

## 2021-06-15 DIAGNOSIS — E11.42 TYPE 2 DIABETES MELLITUS WITH DIABETIC POLYNEUROPATHY, WITH LONG-TERM CURRENT USE OF INSULIN: Primary | ICD-10-CM

## 2021-06-15 RX ORDER — INSULIN LISPRO-AABC 200 [IU]/ML
10 INJECTION, SOLUTION SUBCUTANEOUS
Qty: 3 ML | Refills: 3 | Status: SHIPPED | OUTPATIENT
Start: 2021-06-15 | End: 2021-07-09 | Stop reason: SDUPTHER

## 2021-06-15 RX ORDER — INSULIN DEGLUDEC 200 U/ML
26 INJECTION, SOLUTION SUBCUTANEOUS DAILY
Qty: 5 PEN | Refills: 3 | Status: SHIPPED | OUTPATIENT
Start: 2021-06-15 | End: 2021-07-09

## 2021-06-15 RX ORDER — INSULIN GLARGINE 300 [IU]/ML
26 INJECTION, SOLUTION SUBCUTANEOUS NIGHTLY
Qty: 2 PEN | Refills: 3 | Status: SHIPPED | OUTPATIENT
Start: 2021-06-15 | End: 2021-07-09 | Stop reason: SDUPTHER

## 2021-06-24 ENCOUNTER — LAB VISIT (OUTPATIENT)
Dept: LAB | Facility: HOSPITAL | Age: 56
End: 2021-06-24
Attending: NURSE PRACTITIONER
Payer: COMMERCIAL

## 2021-06-24 DIAGNOSIS — E11.42 TYPE 2 DIABETES MELLITUS WITH DIABETIC POLYNEUROPATHY, WITH LONG-TERM CURRENT USE OF INSULIN: Chronic | ICD-10-CM

## 2021-06-24 DIAGNOSIS — Z79.4 TYPE 2 DIABETES MELLITUS WITH DIABETIC POLYNEUROPATHY, WITH LONG-TERM CURRENT USE OF INSULIN: Chronic | ICD-10-CM

## 2021-06-24 LAB
ALBUMIN SERPL BCP-MCNC: 4.4 G/DL (ref 3.5–5.2)
ALP SERPL-CCNC: 99 U/L (ref 38–126)
ALT SERPL W/O P-5'-P-CCNC: 25 U/L (ref 10–44)
ANION GAP SERPL CALC-SCNC: 11 MMOL/L (ref 8–16)
AST SERPL-CCNC: 34 U/L (ref 15–46)
BILIRUB SERPL-MCNC: 0.6 MG/DL (ref 0.1–1)
CALCIUM SERPL-MCNC: 9.5 MG/DL (ref 8.7–10.5)
CHLORIDE SERPL-SCNC: 101 MMOL/L (ref 95–110)
CO2 SERPL-SCNC: 27 MMOL/L (ref 23–29)
CREAT SERPL-MCNC: 1.02 MG/DL (ref 0.5–1.4)
EST. GFR  (AFRICAN AMERICAN): >60 ML/MIN/1.73 M^2
EST. GFR  (NON AFRICAN AMERICAN): >60 ML/MIN/1.73 M^2
ESTIMATED AVG GLUCOSE: 148 MG/DL (ref 68–131)
GLUCOSE SERPL-MCNC: 159 MG/DL (ref 70–110)
HBA1C MFR BLD: 6.8 % (ref 4–5.6)
POTASSIUM SERPL-SCNC: 3.9 MMOL/L (ref 3.5–5.1)
PROT SERPL-MCNC: 7.7 G/DL (ref 6–8.4)
SODIUM SERPL-SCNC: 139 MMOL/L (ref 136–145)
UUN UR-MCNC: 19 MG/DL (ref 2–20)

## 2021-06-24 PROCEDURE — 36415 COLL VENOUS BLD VENIPUNCTURE: CPT | Mod: PO | Performed by: NURSE PRACTITIONER

## 2021-06-24 PROCEDURE — 83036 HEMOGLOBIN GLYCOSYLATED A1C: CPT | Performed by: NURSE PRACTITIONER

## 2021-06-24 PROCEDURE — 80053 COMPREHEN METABOLIC PANEL: CPT | Mod: PO | Performed by: NURSE PRACTITIONER

## 2021-07-09 ENCOUNTER — OFFICE VISIT (OUTPATIENT)
Dept: ENDOCRINOLOGY | Facility: CLINIC | Age: 56
End: 2021-07-09
Payer: COMMERCIAL

## 2021-07-09 VITALS
DIASTOLIC BLOOD PRESSURE: 84 MMHG | HEIGHT: 72 IN | WEIGHT: 216.06 LBS | SYSTOLIC BLOOD PRESSURE: 142 MMHG | BODY MASS INDEX: 29.26 KG/M2

## 2021-07-09 DIAGNOSIS — E11.42 TYPE 2 DIABETES MELLITUS WITH DIABETIC POLYNEUROPATHY, WITH LONG-TERM CURRENT USE OF INSULIN: ICD-10-CM

## 2021-07-09 DIAGNOSIS — E78.5 HYPERLIPIDEMIA ASSOCIATED WITH TYPE 2 DIABETES MELLITUS: Chronic | ICD-10-CM

## 2021-07-09 DIAGNOSIS — E11.59 HYPERTENSION ASSOCIATED WITH DIABETES: Chronic | ICD-10-CM

## 2021-07-09 DIAGNOSIS — Z79.4 TYPE 2 DIABETES MELLITUS WITH DIABETIC NEPHROPATHY, WITH LONG-TERM CURRENT USE OF INSULIN: Chronic | ICD-10-CM

## 2021-07-09 DIAGNOSIS — Z79.4 TYPE 2 DIABETES MELLITUS WITH DIABETIC POLYNEUROPATHY, WITH LONG-TERM CURRENT USE OF INSULIN: ICD-10-CM

## 2021-07-09 DIAGNOSIS — E11.21 TYPE 2 DIABETES MELLITUS WITH DIABETIC NEPHROPATHY, WITH LONG-TERM CURRENT USE OF INSULIN: Chronic | ICD-10-CM

## 2021-07-09 DIAGNOSIS — I15.2 HYPERTENSION ASSOCIATED WITH DIABETES: Chronic | ICD-10-CM

## 2021-07-09 DIAGNOSIS — E11.69 HYPERLIPIDEMIA ASSOCIATED WITH TYPE 2 DIABETES MELLITUS: Chronic | ICD-10-CM

## 2021-07-09 PROCEDURE — 99214 OFFICE O/P EST MOD 30 MIN: CPT | Mod: S$GLB,,, | Performed by: NURSE PRACTITIONER

## 2021-07-09 PROCEDURE — 99999 PR PBB SHADOW E&M-EST. PATIENT-LVL III: ICD-10-PCS | Mod: PBBFAC,,, | Performed by: NURSE PRACTITIONER

## 2021-07-09 PROCEDURE — 99214 PR OFFICE/OUTPT VISIT, EST, LEVL IV, 30-39 MIN: ICD-10-PCS | Mod: S$GLB,,, | Performed by: NURSE PRACTITIONER

## 2021-07-09 PROCEDURE — 99999 PR PBB SHADOW E&M-EST. PATIENT-LVL III: CPT | Mod: PBBFAC,,, | Performed by: NURSE PRACTITIONER

## 2021-07-09 RX ORDER — DULAGLUTIDE 4.5 MG/.5ML
4.5 INJECTION, SOLUTION SUBCUTANEOUS
Qty: 4 PEN | Refills: 8 | Status: SHIPPED | OUTPATIENT
Start: 2021-07-09 | End: 2021-08-08

## 2021-07-09 RX ORDER — INSULIN LISPRO-AABC 200 [IU]/ML
10 INJECTION, SOLUTION SUBCUTANEOUS
Qty: 3 ML | Refills: 3 | Status: SHIPPED | OUTPATIENT
Start: 2021-07-09 | End: 2021-08-08

## 2021-07-09 RX ORDER — INSULIN GLARGINE 300 [IU]/ML
26 INJECTION, SOLUTION SUBCUTANEOUS NIGHTLY
Qty: 2 PEN | Refills: 3 | Status: SHIPPED | OUTPATIENT
Start: 2021-07-09 | End: 2021-08-08

## 2021-07-16 ENCOUNTER — PATIENT OUTREACH (OUTPATIENT)
Dept: ADMINISTRATIVE | Facility: HOSPITAL | Age: 56
End: 2021-07-16

## 2021-07-30 ENCOUNTER — OFFICE VISIT (OUTPATIENT)
Dept: FAMILY MEDICINE | Facility: CLINIC | Age: 56
End: 2021-07-30
Payer: COMMERCIAL

## 2021-07-30 ENCOUNTER — LAB VISIT (OUTPATIENT)
Dept: LAB | Facility: HOSPITAL | Age: 56
End: 2021-07-30
Attending: FAMILY MEDICINE
Payer: COMMERCIAL

## 2021-07-30 VITALS
BODY MASS INDEX: 29.11 KG/M2 | HEIGHT: 72 IN | OXYGEN SATURATION: 95 % | HEART RATE: 87 BPM | WEIGHT: 214.94 LBS | SYSTOLIC BLOOD PRESSURE: 130 MMHG | DIASTOLIC BLOOD PRESSURE: 82 MMHG

## 2021-07-30 DIAGNOSIS — Z79.4 TYPE 2 DIABETES MELLITUS WITH BOTH EYES AFFECTED BY MILD NONPROLIFERATIVE RETINOPATHY WITHOUT MACULAR EDEMA, WITH LONG-TERM CURRENT USE OF INSULIN: ICD-10-CM

## 2021-07-30 DIAGNOSIS — R35.0 URINARY FREQUENCY: ICD-10-CM

## 2021-07-30 DIAGNOSIS — I15.2 HYPERTENSION ASSOCIATED WITH DIABETES: Chronic | ICD-10-CM

## 2021-07-30 DIAGNOSIS — R80.9 PROTEINURIA DUE TO TYPE 2 DIABETES MELLITUS: Chronic | ICD-10-CM

## 2021-07-30 DIAGNOSIS — E11.3293 TYPE 2 DIABETES MELLITUS WITH BOTH EYES AFFECTED BY MILD NONPROLIFERATIVE RETINOPATHY WITHOUT MACULAR EDEMA, WITH LONG-TERM CURRENT USE OF INSULIN: ICD-10-CM

## 2021-07-30 DIAGNOSIS — K42.9 UMBILICAL HERNIA WITHOUT OBSTRUCTION AND WITHOUT GANGRENE: ICD-10-CM

## 2021-07-30 DIAGNOSIS — E11.69 HYPERLIPIDEMIA ASSOCIATED WITH TYPE 2 DIABETES MELLITUS: Chronic | ICD-10-CM

## 2021-07-30 DIAGNOSIS — Z00.00 VISIT FOR WELL MAN HEALTH CHECK: Primary | ICD-10-CM

## 2021-07-30 DIAGNOSIS — E11.59 HYPERTENSION ASSOCIATED WITH DIABETES: Chronic | ICD-10-CM

## 2021-07-30 DIAGNOSIS — E78.5 HYPERLIPIDEMIA ASSOCIATED WITH TYPE 2 DIABETES MELLITUS: Chronic | ICD-10-CM

## 2021-07-30 DIAGNOSIS — Z76.89 ENCOUNTER TO ESTABLISH CARE WITH NEW DOCTOR: ICD-10-CM

## 2021-07-30 DIAGNOSIS — Z12.5 SCREENING PSA (PROSTATE SPECIFIC ANTIGEN): ICD-10-CM

## 2021-07-30 DIAGNOSIS — Z12.11 COLON CANCER SCREENING: ICD-10-CM

## 2021-07-30 DIAGNOSIS — E11.29 PROTEINURIA DUE TO TYPE 2 DIABETES MELLITUS: Chronic | ICD-10-CM

## 2021-07-30 DIAGNOSIS — M62.08 DIASTASIS RECTI: ICD-10-CM

## 2021-07-30 LAB
BACTERIA #/AREA URNS AUTO: NORMAL /HPF
BILIRUB UR QL STRIP: NEGATIVE
CLARITY UR REFRACT.AUTO: CLEAR
COLOR UR AUTO: ABNORMAL
GLUCOSE UR QL STRIP: ABNORMAL
HGB UR QL STRIP: NEGATIVE
KETONES UR QL STRIP: NEGATIVE
LEUKOCYTE ESTERASE UR QL STRIP: NEGATIVE
MICROSCOPIC COMMENT: NORMAL
NITRITE UR QL STRIP: NEGATIVE
PH UR STRIP: 5 [PH] (ref 5–8)
PROT UR QL STRIP: NEGATIVE
RBC #/AREA URNS AUTO: 0 /HPF (ref 0–4)
SP GR UR STRIP: 1.02 (ref 1–1.03)
URN SPEC COLLECT METH UR: ABNORMAL
WBC #/AREA URNS AUTO: 0 /HPF (ref 0–5)
YEAST UR QL AUTO: NORMAL

## 2021-07-30 PROCEDURE — 99999 PR PBB SHADOW E&M-EST. PATIENT-LVL IV: CPT | Mod: PBBFAC,,, | Performed by: FAMILY MEDICINE

## 2021-07-30 PROCEDURE — 87086 URINE CULTURE/COLONY COUNT: CPT | Performed by: FAMILY MEDICINE

## 2021-07-30 PROCEDURE — 99396 PR PREVENTIVE VISIT,EST,40-64: ICD-10-PCS | Mod: S$GLB,,, | Performed by: FAMILY MEDICINE

## 2021-07-30 PROCEDURE — 81001 URINALYSIS AUTO W/SCOPE: CPT | Performed by: FAMILY MEDICINE

## 2021-07-30 PROCEDURE — 99396 PREV VISIT EST AGE 40-64: CPT | Mod: S$GLB,,, | Performed by: FAMILY MEDICINE

## 2021-07-30 PROCEDURE — 99999 PR PBB SHADOW E&M-EST. PATIENT-LVL IV: ICD-10-PCS | Mod: PBBFAC,,, | Performed by: FAMILY MEDICINE

## 2021-07-30 RX ORDER — INSULIN LISPRO 100 [IU]/ML
INJECTION, SOLUTION INTRAVENOUS; SUBCUTANEOUS
COMMUNITY
Start: 2021-05-27 | End: 2021-07-30

## 2021-07-30 RX ORDER — METFORMIN HYDROCHLORIDE 500 MG/1
TABLET ORAL
COMMUNITY
Start: 2021-07-16 | End: 2021-07-30

## 2021-08-01 LAB — BACTERIA UR CULT: NO GROWTH

## 2021-08-02 ENCOUNTER — TELEPHONE (OUTPATIENT)
Dept: FAMILY MEDICINE | Facility: CLINIC | Age: 56
End: 2021-08-02

## 2021-10-12 ENCOUNTER — TELEPHONE (OUTPATIENT)
Dept: ENDOSCOPY | Facility: HOSPITAL | Age: 56
End: 2021-10-12

## 2021-10-12 RX ORDER — SODIUM, POTASSIUM,MAG SULFATES 17.5-3.13G
1 SOLUTION, RECONSTITUTED, ORAL ORAL DAILY
Qty: 1 KIT | Refills: 0 | Status: SHIPPED | OUTPATIENT
Start: 2021-10-12 | End: 2021-10-12

## 2021-10-27 ENCOUNTER — TELEPHONE (OUTPATIENT)
Dept: ENDOSCOPY | Facility: HOSPITAL | Age: 56
End: 2021-10-27
Payer: COMMERCIAL

## 2021-10-29 ENCOUNTER — HOSPITAL ENCOUNTER (OUTPATIENT)
Facility: HOSPITAL | Age: 56
Discharge: HOME | End: 2021-10-29
Attending: INTERNAL MEDICINE | Admitting: INTERNAL MEDICINE
Payer: COMMERCIAL

## 2021-10-29 ENCOUNTER — ANESTHESIA EVENT (OUTPATIENT)
Dept: ENDOSCOPY | Facility: HOSPITAL | Age: 56
End: 2021-10-29
Payer: COMMERCIAL

## 2021-10-29 ENCOUNTER — ANESTHESIA (OUTPATIENT)
Dept: ENDOSCOPY | Facility: HOSPITAL | Age: 56
End: 2021-10-29
Payer: COMMERCIAL

## 2021-10-29 VITALS
HEIGHT: 72 IN | DIASTOLIC BLOOD PRESSURE: 78 MMHG | WEIGHT: 215 LBS | RESPIRATION RATE: 17 BRPM | OXYGEN SATURATION: 96 % | BODY MASS INDEX: 29.12 KG/M2 | SYSTOLIC BLOOD PRESSURE: 139 MMHG | TEMPERATURE: 98 F | HEART RATE: 86 BPM

## 2021-10-29 DIAGNOSIS — Z12.11 SCREEN FOR COLON CANCER: ICD-10-CM

## 2021-10-29 LAB
GLUCOSE SERPL-MCNC: 68 MG/DL (ref 70–110)
POCT GLUCOSE: 68 MG/DL (ref 70–110)

## 2021-10-29 PROCEDURE — 88305 TISSUE EXAM BY PATHOLOGIST: ICD-10-PCS | Mod: 26,,, | Performed by: PATHOLOGY

## 2021-10-29 PROCEDURE — 25000003 PHARM REV CODE 250: Performed by: NURSE ANESTHETIST, CERTIFIED REGISTERED

## 2021-10-29 PROCEDURE — 82962 GLUCOSE BLOOD TEST: CPT | Performed by: INTERNAL MEDICINE

## 2021-10-29 PROCEDURE — 37000009 HC ANESTHESIA EA ADD 15 MINS: Performed by: INTERNAL MEDICINE

## 2021-10-29 PROCEDURE — 63600175 PHARM REV CODE 636 W HCPCS: Performed by: NURSE ANESTHETIST, CERTIFIED REGISTERED

## 2021-10-29 PROCEDURE — 88305 TISSUE EXAM BY PATHOLOGIST: CPT | Mod: 26,,, | Performed by: PATHOLOGY

## 2021-10-29 PROCEDURE — 27201089 HC SNARE, DISP (ANY): Performed by: INTERNAL MEDICINE

## 2021-10-29 PROCEDURE — 37000008 HC ANESTHESIA 1ST 15 MINUTES: Performed by: INTERNAL MEDICINE

## 2021-10-29 PROCEDURE — 88305 TISSUE EXAM BY PATHOLOGIST: CPT | Performed by: PATHOLOGY

## 2021-10-29 PROCEDURE — 45385 COLONOSCOPY W/LESION REMOVAL: CPT | Mod: PT,,, | Performed by: INTERNAL MEDICINE

## 2021-10-29 PROCEDURE — 45385 PR COLONOSCOPY,REMV LESN,SNARE: ICD-10-PCS | Mod: PT,,, | Performed by: INTERNAL MEDICINE

## 2021-10-29 PROCEDURE — 45385 COLONOSCOPY W/LESION REMOVAL: CPT | Mod: PT | Performed by: INTERNAL MEDICINE

## 2021-10-29 RX ORDER — LIDOCAINE HCL/PF 100 MG/5ML
SYRINGE (ML) INTRAVENOUS
Status: DISCONTINUED | OUTPATIENT
Start: 2021-10-29 | End: 2021-10-29

## 2021-10-29 RX ORDER — SODIUM CHLORIDE 9 MG/ML
INJECTION, SOLUTION INTRAVENOUS CONTINUOUS
Status: DISCONTINUED | OUTPATIENT
Start: 2021-10-29 | End: 2021-10-29 | Stop reason: HOSPADM

## 2021-10-29 RX ORDER — PROPOFOL 10 MG/ML
VIAL (ML) INTRAVENOUS CONTINUOUS PRN
Status: DISCONTINUED | OUTPATIENT
Start: 2021-10-29 | End: 2021-10-29

## 2021-10-29 RX ORDER — SODIUM CHLORIDE 0.9 % (FLUSH) 0.9 %
10 SYRINGE (ML) INJECTION
Status: DISCONTINUED | OUTPATIENT
Start: 2021-10-29 | End: 2021-10-29 | Stop reason: HOSPADM

## 2021-10-29 RX ORDER — PROPOFOL 10 MG/ML
VIAL (ML) INTRAVENOUS
Status: DISCONTINUED | OUTPATIENT
Start: 2021-10-29 | End: 2021-10-29

## 2021-10-29 RX ADMIN — PROPOFOL 150 MCG/KG/MIN: 10 INJECTION, EMULSION INTRAVENOUS at 02:10

## 2021-10-29 RX ADMIN — LIDOCAINE HYDROCHLORIDE 100 MG: 20 INJECTION, SOLUTION INTRAVENOUS at 02:10

## 2021-10-29 RX ADMIN — PROPOFOL 100 MG: 10 INJECTION, EMULSION INTRAVENOUS at 02:10

## 2021-11-04 LAB
FINAL PATHOLOGIC DIAGNOSIS: NORMAL
GROSS: NORMAL
Lab: NORMAL

## 2021-11-05 ENCOUNTER — LAB VISIT (OUTPATIENT)
Dept: LAB | Facility: HOSPITAL | Age: 56
End: 2021-11-05
Attending: NURSE PRACTITIONER
Payer: COMMERCIAL

## 2021-11-05 DIAGNOSIS — Z12.5 SCREENING PSA (PROSTATE SPECIFIC ANTIGEN): ICD-10-CM

## 2021-11-05 DIAGNOSIS — Z00.00 VISIT FOR WELL MAN HEALTH CHECK: ICD-10-CM

## 2021-11-05 DIAGNOSIS — E11.21 TYPE 2 DIABETES MELLITUS WITH DIABETIC NEPHROPATHY, WITH LONG-TERM CURRENT USE OF INSULIN: Chronic | ICD-10-CM

## 2021-11-05 DIAGNOSIS — Z79.4 TYPE 2 DIABETES MELLITUS WITH DIABETIC NEPHROPATHY, WITH LONG-TERM CURRENT USE OF INSULIN: Chronic | ICD-10-CM

## 2021-11-05 LAB
25(OH)D3+25(OH)D2 SERPL-MCNC: 28 NG/ML (ref 30–96)
ALBUMIN SERPL BCP-MCNC: 4.5 G/DL (ref 3.5–5.2)
ALP SERPL-CCNC: 101 U/L (ref 38–126)
ALT SERPL W/O P-5'-P-CCNC: 31 U/L (ref 10–44)
ANION GAP SERPL CALC-SCNC: 12 MMOL/L (ref 8–16)
AST SERPL-CCNC: 32 U/L (ref 15–46)
BASOPHILS # BLD AUTO: 0.04 K/UL (ref 0–0.2)
BASOPHILS NFR BLD: 0.6 % (ref 0–1.9)
BILIRUB SERPL-MCNC: 0.6 MG/DL (ref 0.1–1)
CALCIUM SERPL-MCNC: 9.2 MG/DL (ref 8.7–10.5)
CHLORIDE SERPL-SCNC: 103 MMOL/L (ref 95–110)
CHOLEST SERPL-MCNC: 192 MG/DL (ref 120–199)
CHOLEST/HDLC SERPL: 4 {RATIO} (ref 2–5)
CO2 SERPL-SCNC: 27 MMOL/L (ref 23–29)
COMPLEXED PSA SERPL-MCNC: 0.94 NG/ML (ref 0–4)
CREAT SERPL-MCNC: 1.06 MG/DL (ref 0.5–1.4)
DIFFERENTIAL METHOD: ABNORMAL
EOSINOPHIL # BLD AUTO: 0.1 K/UL (ref 0–0.5)
EOSINOPHIL NFR BLD: 1.9 % (ref 0–8)
ERYTHROCYTE [DISTWIDTH] IN BLOOD BY AUTOMATED COUNT: 12.9 % (ref 11.5–14.5)
EST. GFR  (AFRICAN AMERICAN): >60 ML/MIN/1.73 M^2
EST. GFR  (NON AFRICAN AMERICAN): >60 ML/MIN/1.73 M^2
ESTIMATED AVG GLUCOSE: 154 MG/DL (ref 68–131)
GLUCOSE SERPL-MCNC: 117 MG/DL (ref 70–110)
HBA1C MFR BLD: 7 % (ref 4–5.6)
HCT VFR BLD AUTO: 53.8 % (ref 40–54)
HDLC SERPL-MCNC: 48 MG/DL (ref 40–75)
HDLC SERPL: 25 % (ref 20–50)
HGB BLD-MCNC: 17.9 G/DL (ref 14–18)
IMM GRANULOCYTES # BLD AUTO: 0.02 K/UL (ref 0–0.04)
IMM GRANULOCYTES NFR BLD AUTO: 0.3 % (ref 0–0.5)
LDLC SERPL CALC-MCNC: 90 MG/DL (ref 63–159)
LYMPHOCYTES # BLD AUTO: 1.9 K/UL (ref 1–4.8)
LYMPHOCYTES NFR BLD: 30.1 % (ref 18–48)
MCH RBC QN AUTO: 31.2 PG (ref 27–31)
MCHC RBC AUTO-ENTMCNC: 33.3 G/DL (ref 32–36)
MCV RBC AUTO: 94 FL (ref 82–98)
MONOCYTES # BLD AUTO: 0.7 K/UL (ref 0.3–1)
MONOCYTES NFR BLD: 11.4 % (ref 4–15)
NEUTROPHILS # BLD AUTO: 3.6 K/UL (ref 1.8–7.7)
NEUTROPHILS NFR BLD: 55.7 % (ref 38–73)
NONHDLC SERPL-MCNC: 144 MG/DL
NRBC BLD-RTO: 0 /100 WBC
PLATELET # BLD AUTO: 180 K/UL (ref 150–450)
PMV BLD AUTO: 10.1 FL (ref 9.2–12.9)
POTASSIUM SERPL-SCNC: 4 MMOL/L (ref 3.5–5.1)
PROT SERPL-MCNC: 7.7 G/DL (ref 6–8.4)
RBC # BLD AUTO: 5.73 M/UL (ref 4.6–6.2)
SODIUM SERPL-SCNC: 142 MMOL/L (ref 136–145)
TRIGL SERPL-MCNC: 270 MG/DL (ref 30–150)
TSH SERPL DL<=0.005 MIU/L-ACNC: 0.92 UIU/ML (ref 0.4–4)
UUN UR-MCNC: 20 MG/DL (ref 2–20)
WBC # BLD AUTO: 6.42 K/UL (ref 3.9–12.7)

## 2021-11-05 PROCEDURE — 85025 COMPLETE CBC W/AUTO DIFF WBC: CPT | Mod: PO | Performed by: FAMILY MEDICINE

## 2021-11-05 PROCEDURE — 36415 COLL VENOUS BLD VENIPUNCTURE: CPT | Mod: PO | Performed by: FAMILY MEDICINE

## 2021-11-05 PROCEDURE — 80061 LIPID PANEL: CPT | Performed by: NURSE PRACTITIONER

## 2021-11-05 PROCEDURE — 80053 COMPREHEN METABOLIC PANEL: CPT | Mod: PO | Performed by: NURSE PRACTITIONER

## 2021-11-05 PROCEDURE — 82306 VITAMIN D 25 HYDROXY: CPT | Mod: PO | Performed by: FAMILY MEDICINE

## 2021-11-05 PROCEDURE — 83036 HEMOGLOBIN GLYCOSYLATED A1C: CPT | Performed by: NURSE PRACTITIONER

## 2021-11-05 PROCEDURE — 84153 ASSAY OF PSA TOTAL: CPT | Performed by: FAMILY MEDICINE

## 2021-11-05 PROCEDURE — 84443 ASSAY THYROID STIM HORMONE: CPT | Mod: PO | Performed by: FAMILY MEDICINE

## 2021-11-08 ENCOUNTER — TELEPHONE (OUTPATIENT)
Dept: FAMILY MEDICINE | Facility: CLINIC | Age: 56
End: 2021-11-08
Payer: COMMERCIAL

## 2021-11-11 ENCOUNTER — PATIENT OUTREACH (OUTPATIENT)
Dept: ADMINISTRATIVE | Facility: OTHER | Age: 56
End: 2021-11-11
Payer: COMMERCIAL

## 2021-11-12 ENCOUNTER — OFFICE VISIT (OUTPATIENT)
Dept: ENDOCRINOLOGY | Facility: CLINIC | Age: 56
End: 2021-11-12
Payer: COMMERCIAL

## 2021-11-12 VITALS
SYSTOLIC BLOOD PRESSURE: 130 MMHG | HEART RATE: 88 BPM | HEIGHT: 72 IN | DIASTOLIC BLOOD PRESSURE: 76 MMHG | OXYGEN SATURATION: 94 % | WEIGHT: 211 LBS | BODY MASS INDEX: 28.58 KG/M2

## 2021-11-12 DIAGNOSIS — E11.69 HYPERLIPIDEMIA ASSOCIATED WITH TYPE 2 DIABETES MELLITUS: Chronic | ICD-10-CM

## 2021-11-12 DIAGNOSIS — I15.2 HYPERTENSION ASSOCIATED WITH DIABETES: Chronic | ICD-10-CM

## 2021-11-12 DIAGNOSIS — Z79.4 TYPE 2 DIABETES MELLITUS WITH DIABETIC NEPHROPATHY, WITH LONG-TERM CURRENT USE OF INSULIN: Chronic | ICD-10-CM

## 2021-11-12 DIAGNOSIS — E11.59 HYPERTENSION ASSOCIATED WITH DIABETES: Chronic | ICD-10-CM

## 2021-11-12 DIAGNOSIS — E11.21 TYPE 2 DIABETES MELLITUS WITH DIABETIC NEPHROPATHY, WITH LONG-TERM CURRENT USE OF INSULIN: Chronic | ICD-10-CM

## 2021-11-12 DIAGNOSIS — E78.5 HYPERLIPIDEMIA ASSOCIATED WITH TYPE 2 DIABETES MELLITUS: Chronic | ICD-10-CM

## 2021-11-12 PROCEDURE — 99999 PR PBB SHADOW E&M-EST. PATIENT-LVL III: CPT | Mod: PBBFAC,,, | Performed by: NURSE PRACTITIONER

## 2021-11-12 PROCEDURE — 99214 OFFICE O/P EST MOD 30 MIN: CPT | Mod: S$GLB,,, | Performed by: NURSE PRACTITIONER

## 2021-11-12 PROCEDURE — 99214 PR OFFICE/OUTPT VISIT, EST, LEVL IV, 30-39 MIN: ICD-10-PCS | Mod: S$GLB,,, | Performed by: NURSE PRACTITIONER

## 2021-11-12 PROCEDURE — 99999 PR PBB SHADOW E&M-EST. PATIENT-LVL III: ICD-10-PCS | Mod: PBBFAC,,, | Performed by: NURSE PRACTITIONER

## 2021-11-12 RX ORDER — INSULIN LISPRO 100 [IU]/ML
10 INJECTION, SOLUTION INTRAVENOUS; SUBCUTANEOUS
Qty: 9 ML | Refills: 8 | Status: SHIPPED | OUTPATIENT
Start: 2021-11-12 | End: 2021-12-12

## 2021-11-12 RX ORDER — INSULIN LISPRO 100 [IU]/ML
10 INJECTION, SOLUTION INTRAVENOUS; SUBCUTANEOUS
COMMUNITY
Start: 2021-09-07 | End: 2021-11-12 | Stop reason: SDUPTHER

## 2021-11-12 RX ORDER — ATORVASTATIN CALCIUM 40 MG/1
40 TABLET, FILM COATED ORAL DAILY
Qty: 90 TABLET | Refills: 3 | Status: ON HOLD | OUTPATIENT
Start: 2021-11-12 | End: 2021-12-28 | Stop reason: HOSPADM

## 2021-11-12 RX ORDER — DULAGLUTIDE 4.5 MG/.5ML
4.5 INJECTION, SOLUTION SUBCUTANEOUS
COMMUNITY
Start: 2021-10-27 | End: 2021-11-12 | Stop reason: SDUPTHER

## 2021-11-12 RX ORDER — DULAGLUTIDE 4.5 MG/.5ML
4.5 INJECTION, SOLUTION SUBCUTANEOUS
Qty: 4 PEN | Refills: 8 | Status: SHIPPED | OUTPATIENT
Start: 2021-11-12 | End: 2022-02-02 | Stop reason: SDUPTHER

## 2021-11-12 RX ORDER — INSULIN GLARGINE 300 U/ML
26 INJECTION, SOLUTION SUBCUTANEOUS DAILY
COMMUNITY
Start: 2021-09-17 | End: 2021-11-12 | Stop reason: SDUPTHER

## 2021-11-12 RX ORDER — INSULIN GLARGINE 300 U/ML
26 INJECTION, SOLUTION SUBCUTANEOUS DAILY
Qty: 3 PEN | Refills: 8 | Status: SHIPPED | OUTPATIENT
Start: 2021-11-12 | End: 2021-12-12

## 2021-12-26 ENCOUNTER — HOSPITAL ENCOUNTER (INPATIENT)
Facility: HOSPITAL | Age: 56
LOS: 1 days | Discharge: HOME | DRG: 247 | End: 2021-12-28
Attending: EMERGENCY MEDICINE | Admitting: INTERNAL MEDICINE
Payer: COMMERCIAL

## 2021-12-26 DIAGNOSIS — R07.9 CHEST PAIN: ICD-10-CM

## 2021-12-26 DIAGNOSIS — I21.4 NSTEMI (NON-ST ELEVATED MYOCARDIAL INFARCTION): Primary | ICD-10-CM

## 2021-12-26 LAB
ALBUMIN SERPL BCP-MCNC: 4.5 G/DL (ref 3.5–5.2)
ALP SERPL-CCNC: 109 U/L (ref 38–126)
ALT SERPL W/O P-5'-P-CCNC: 36 U/L (ref 10–44)
ANION GAP SERPL CALC-SCNC: 12 MMOL/L (ref 8–16)
AST SERPL-CCNC: 30 U/L (ref 15–46)
BASOPHILS # BLD AUTO: 0.05 K/UL (ref 0–0.2)
BASOPHILS NFR BLD: 0.6 % (ref 0–1.9)
BILIRUB SERPL-MCNC: 0.6 MG/DL (ref 0.1–1)
CALCIUM SERPL-MCNC: 9.1 MG/DL (ref 8.7–10.5)
CHLORIDE SERPL-SCNC: 100 MMOL/L (ref 95–110)
CO2 SERPL-SCNC: 27 MMOL/L (ref 23–29)
CREAT SERPL-MCNC: 1.08 MG/DL (ref 0.5–1.4)
DIFFERENTIAL METHOD: ABNORMAL
EOSINOPHIL # BLD AUTO: 0.2 K/UL (ref 0–0.5)
EOSINOPHIL NFR BLD: 2 % (ref 0–8)
ERYTHROCYTE [DISTWIDTH] IN BLOOD BY AUTOMATED COUNT: 12.6 % (ref 11.5–14.5)
EST. GFR  (AFRICAN AMERICAN): >60 ML/MIN/1.73 M^2
EST. GFR  (NON AFRICAN AMERICAN): >60 ML/MIN/1.73 M^2
GLUCOSE SERPL-MCNC: 293 MG/DL (ref 70–110)
HCT VFR BLD AUTO: 51.3 % (ref 40–54)
HGB BLD-MCNC: 17.4 G/DL (ref 14–18)
IMM GRANULOCYTES # BLD AUTO: 0.03 K/UL (ref 0–0.04)
IMM GRANULOCYTES NFR BLD AUTO: 0.4 % (ref 0–0.5)
LYMPHOCYTES # BLD AUTO: 3 K/UL (ref 1–4.8)
LYMPHOCYTES NFR BLD: 35.3 % (ref 18–48)
MCH RBC QN AUTO: 31.7 PG (ref 27–31)
MCHC RBC AUTO-ENTMCNC: 33.9 G/DL (ref 32–36)
MCV RBC AUTO: 93 FL (ref 82–98)
MONOCYTES # BLD AUTO: 1.2 K/UL (ref 0.3–1)
MONOCYTES NFR BLD: 13.9 % (ref 4–15)
NEUTROPHILS # BLD AUTO: 4.1 K/UL (ref 1.8–7.7)
NEUTROPHILS NFR BLD: 47.8 % (ref 38–73)
NRBC BLD-RTO: 0 /100 WBC
PLATELET # BLD AUTO: 174 K/UL (ref 150–450)
PMV BLD AUTO: 9.7 FL (ref 9.2–12.9)
POTASSIUM SERPL-SCNC: 3.2 MMOL/L (ref 3.5–5.1)
PROT SERPL-MCNC: 7.9 G/DL (ref 6–8.4)
RBC # BLD AUTO: 5.49 M/UL (ref 4.6–6.2)
SARS-COV-2 RDRP RESP QL NAA+PROBE: NEGATIVE
SODIUM SERPL-SCNC: 139 MMOL/L (ref 136–145)
TROPONIN I SERPL-MCNC: 0.06 NG/ML (ref 0.01–0.03)
UUN UR-MCNC: 17 MG/DL (ref 2–20)
WBC # BLD AUTO: 8.5 K/UL (ref 3.9–12.7)

## 2021-12-26 PROCEDURE — 25000003 PHARM REV CODE 250: Mod: ER | Performed by: EMERGENCY MEDICINE

## 2021-12-26 PROCEDURE — 93010 ELECTROCARDIOGRAM REPORT: CPT | Mod: ,,, | Performed by: INTERNAL MEDICINE

## 2021-12-26 PROCEDURE — 25000242 PHARM REV CODE 250 ALT 637 W/ HCPCS: Mod: ER | Performed by: EMERGENCY MEDICINE

## 2021-12-26 PROCEDURE — U0002 COVID-19 LAB TEST NON-CDC: HCPCS | Mod: ER | Performed by: EMERGENCY MEDICINE

## 2021-12-26 PROCEDURE — 99285 EMERGENCY DEPT VISIT HI MDM: CPT | Mod: 25,ER

## 2021-12-26 PROCEDURE — 93010 EKG 12-LEAD: ICD-10-PCS | Mod: ,,, | Performed by: INTERNAL MEDICINE

## 2021-12-26 PROCEDURE — 84484 ASSAY OF TROPONIN QUANT: CPT | Mod: ER | Performed by: EMERGENCY MEDICINE

## 2021-12-26 PROCEDURE — 93005 ELECTROCARDIOGRAM TRACING: CPT | Mod: ER

## 2021-12-26 PROCEDURE — 80053 COMPREHEN METABOLIC PANEL: CPT | Mod: ER | Performed by: EMERGENCY MEDICINE

## 2021-12-26 PROCEDURE — 85025 COMPLETE CBC W/AUTO DIFF WBC: CPT | Mod: ER | Performed by: EMERGENCY MEDICINE

## 2021-12-26 RX ORDER — ASPIRIN 325 MG
325 TABLET ORAL
Status: COMPLETED | OUTPATIENT
Start: 2021-12-26 | End: 2021-12-26

## 2021-12-26 RX ORDER — LIDOCAINE HYDROCHLORIDE 20 MG/ML
5 SOLUTION OROPHARYNGEAL
Status: COMPLETED | OUTPATIENT
Start: 2021-12-26 | End: 2021-12-26

## 2021-12-26 RX ORDER — MAG HYDROX/ALUMINUM HYD/SIMETH 200-200-20
5 SUSPENSION, ORAL (FINAL DOSE FORM) ORAL
Status: COMPLETED | OUTPATIENT
Start: 2021-12-26 | End: 2021-12-26

## 2021-12-26 RX ORDER — NITROGLYCERIN 0.4 MG/1
0.4 TABLET SUBLINGUAL
Status: COMPLETED | OUTPATIENT
Start: 2021-12-26 | End: 2021-12-26

## 2021-12-26 RX ADMIN — ALUMINUM HYDROXIDE, MAGNESIUM HYDROXIDE, AND SIMETHICONE 5 ML: 200; 200; 20 SUSPENSION ORAL at 11:12

## 2021-12-26 RX ADMIN — LIDOCAINE HYDROCHLORIDE 5 ML: 20 SOLUTION ORAL; TOPICAL at 11:12

## 2021-12-26 RX ADMIN — NITROGLYCERIN 0.4 MG: 0.4 TABLET, ORALLY DISINTEGRATING SUBLINGUAL at 11:12

## 2021-12-26 RX ADMIN — ASPIRIN 325 MG ORAL TABLET 325 MG: 325 PILL ORAL at 11:12

## 2021-12-27 PROBLEM — E87.6 HYPOKALEMIA: Status: ACTIVE | Noted: 2021-12-27

## 2021-12-27 PROBLEM — I21.4 NSTEMI (NON-ST ELEVATED MYOCARDIAL INFARCTION): Status: ACTIVE | Noted: 2021-12-27

## 2021-12-27 LAB
AORTIC ROOT ANNULUS: 3.21 CM
APTT BLDCRRT: 24.5 SEC (ref 21–32)
AV INDEX (PROSTH): 0.85
AV MEAN GRADIENT: 4 MMHG
AV PEAK GRADIENT: 6 MMHG
AV VALVE AREA: 2.8 CM2
AV VELOCITY RATIO: 0.78
BASOPHILS # BLD AUTO: 0.04 K/UL (ref 0–0.2)
BASOPHILS NFR BLD: 0.5 % (ref 0–1.9)
BNP SERPL-MCNC: <10 PG/ML (ref 0–99)
BSA FOR ECHO PROCEDURE: 2.24 M2
CV ECHO LV RWT: 0.29 CM
DIFFERENTIAL METHOD: ABNORMAL
DOP CALC AO PEAK VEL: 1.2 M/S
DOP CALC AO VTI: 22.31 CM
DOP CALC LVOT AREA: 3.3 CM2
DOP CALC LVOT DIAMETER: 2.05 CM
DOP CALC LVOT PEAK VEL: 0.93 M/S
DOP CALC LVOT STROKE VOLUME: 62.38 CM3
DOP CALC MV VTI: 26.63 CM
DOP CALCLVOT PEAK VEL VTI: 18.91 CM
E WAVE DECELERATION TIME: 175.46 MSEC
E/A RATIO: 0.84
E/E' RATIO: 12 M/S
ECHO LV POSTERIOR WALL: 0.73 CM (ref 0.6–1.1)
EJECTION FRACTION: 65 %
EOSINOPHIL # BLD AUTO: 0.1 K/UL (ref 0–0.5)
EOSINOPHIL NFR BLD: 1.8 % (ref 0–8)
ERYTHROCYTE [DISTWIDTH] IN BLOOD BY AUTOMATED COUNT: 12.8 % (ref 11.5–14.5)
FRACTIONAL SHORTENING: 33 % (ref 28–44)
HCT VFR BLD AUTO: 49.9 % (ref 40–54)
HGB BLD-MCNC: 17.1 G/DL (ref 14–18)
IMM GRANULOCYTES # BLD AUTO: 0.05 K/UL (ref 0–0.04)
IMM GRANULOCYTES NFR BLD AUTO: 0.6 % (ref 0–0.5)
INR PPP: 1 (ref 0.8–1.2)
INTERVENTRICULAR SEPTUM: 0.94 CM (ref 0.6–1.1)
IVC DIAMETER: 2.2 CM
IVRT: 85.63 MSEC
LA MAJOR: 5.88 CM
LA MINOR: 5.35 CM
LA WIDTH: 3.59 CM
LEFT ATRIUM SIZE: 3.19 CM
LEFT ATRIUM VOLUME INDEX MOD: 17.1 ML/M2
LEFT ATRIUM VOLUME INDEX: 24.7 ML/M2
LEFT ATRIUM VOLUME MOD: 37.88 CM3
LEFT ATRIUM VOLUME: 54.54 CM3
LEFT INTERNAL DIMENSION IN SYSTOLE: 3.36 CM (ref 2.1–4)
LEFT VENTRICLE DIASTOLIC VOLUME INDEX: 54.15 ML/M2
LEFT VENTRICLE DIASTOLIC VOLUME: 119.67 ML
LEFT VENTRICLE MASS INDEX: 66 G/M2
LEFT VENTRICLE SYSTOLIC VOLUME INDEX: 20.9 ML/M2
LEFT VENTRICLE SYSTOLIC VOLUME: 46.24 ML
LEFT VENTRICULAR INTERNAL DIMENSION IN DIASTOLE: 5.03 CM (ref 3.5–6)
LEFT VENTRICULAR MASS: 144.95 G
LV LATERAL E/E' RATIO: 10.67 M/S
LV SEPTAL E/E' RATIO: 13.71 M/S
LYMPHOCYTES # BLD AUTO: 2.1 K/UL (ref 1–4.8)
LYMPHOCYTES NFR BLD: 27 % (ref 18–48)
MAGNESIUM SERPL-MCNC: 2 MG/DL (ref 1.6–2.6)
MCH RBC QN AUTO: 31.5 PG (ref 27–31)
MCHC RBC AUTO-ENTMCNC: 34.3 G/DL (ref 32–36)
MCV RBC AUTO: 92 FL (ref 82–98)
MONOCYTES # BLD AUTO: 1 K/UL (ref 0.3–1)
MONOCYTES NFR BLD: 13.4 % (ref 4–15)
MV PEAK A VEL: 1.14 M/S
MV PEAK E VEL: 0.96 M/S
MV PEAK GRADIENT: 5 MMHG
MV STENOSIS PRESSURE HALF TIME: 50.88 MS
MV VALVE AREA BY CONTINUITY EQUATION: 2.34 CM2
MV VALVE AREA P 1/2 METHOD: 4.32 CM2
NEUTROPHILS # BLD AUTO: 4.4 K/UL (ref 1.8–7.7)
NEUTROPHILS NFR BLD: 56.7 % (ref 38–73)
NRBC BLD-RTO: 0 /100 WBC
PISA MRMAX VEL: 0.03 M/S
PISA TR MAX VEL: 1.89 M/S
PLATELET # BLD AUTO: 157 K/UL (ref 150–450)
PMV BLD AUTO: 9.7 FL (ref 9.2–12.9)
POC ACTIVATED CLOTTING TIME K: 279 SEC (ref 74–137)
POC ACTIVATED CLOTTING TIME K: 291 SEC (ref 74–137)
POC ACTIVATED CLOTTING TIME K: 303 SEC (ref 74–137)
POCT GLUCOSE: 135 MG/DL (ref 70–110)
POCT GLUCOSE: 149 MG/DL (ref 70–110)
POCT GLUCOSE: 184 MG/DL (ref 70–110)
POCT GLUCOSE: 251 MG/DL (ref 70–110)
PROTHROMBIN TIME: 10.5 SEC (ref 9–12.5)
PULM VEIN S/D RATIO: 1.56
PV PEAK D VEL: 0.27 M/S
PV PEAK S VEL: 0.42 M/S
PV PEAK VELOCITY: 0.91 CM/S
RA MAJOR: 4.11 CM
RA PRESSURE: 8 MMHG
RA WIDTH: 2.82 CM
RBC # BLD AUTO: 5.43 M/UL (ref 4.6–6.2)
RIGHT VENTRICULAR END-DIASTOLIC DIMENSION: 2.7 CM
RV TISSUE DOPPLER FREE WALL SYSTOLIC VELOCITY 1 (APICAL 4 CHAMBER VIEW): 11.45 CM/S
SAMPLE: ABNORMAL
STJ: 2.95 CM
TDI LATERAL: 0.09 M/S
TDI SEPTAL: 0.07 M/S
TDI: 0.08 M/S
TR MAX PG: 14 MMHG
TROPONIN I SERPL DL<=0.01 NG/ML-MCNC: 0.46 NG/ML (ref 0–0.03)
TROPONIN I SERPL DL<=0.01 NG/ML-MCNC: 0.52 NG/ML (ref 0–0.03)
TV REST PULMONARY ARTERY PRESSURE: 22 MMHG
WBC # BLD AUTO: 7.7 K/UL (ref 3.9–12.7)

## 2021-12-27 PROCEDURE — 27201423 OPTIME MED/SURG SUP & DEVICES STERILE SUPPLY: Performed by: INTERNAL MEDICINE

## 2021-12-27 PROCEDURE — 84484 ASSAY OF TROPONIN QUANT: CPT | Mod: 91 | Performed by: NURSE PRACTITIONER

## 2021-12-27 PROCEDURE — 93005 ELECTROCARDIOGRAM TRACING: CPT

## 2021-12-27 PROCEDURE — C9399 UNCLASSIFIED DRUGS OR BIOLOG: HCPCS | Performed by: PHYSICIAN ASSISTANT

## 2021-12-27 PROCEDURE — C1894 INTRO/SHEATH, NON-LASER: HCPCS | Performed by: INTERNAL MEDICINE

## 2021-12-27 PROCEDURE — 92928 PR STENT: ICD-10-PCS | Mod: LC,,, | Performed by: INTERNAL MEDICINE

## 2021-12-27 PROCEDURE — 92979 PR INTRAVASC CORONARY SO2,ADDN VESSEL: ICD-10-PCS | Mod: 26,LC,, | Performed by: INTERNAL MEDICINE

## 2021-12-27 PROCEDURE — C9600 PERC DRUG-EL COR STENT SING: HCPCS | Mod: LD | Performed by: INTERNAL MEDICINE

## 2021-12-27 PROCEDURE — 85610 PROTHROMBIN TIME: CPT | Performed by: NURSE PRACTITIONER

## 2021-12-27 PROCEDURE — 93010 ELECTROCARDIOGRAM REPORT: CPT | Mod: ,,, | Performed by: INTERNAL MEDICINE

## 2021-12-27 PROCEDURE — 85730 THROMBOPLASTIN TIME PARTIAL: CPT | Performed by: NURSE PRACTITIONER

## 2021-12-27 PROCEDURE — 92978 ENDOLUMINL IVUS OCT C 1ST: CPT | Mod: 26,LD,, | Performed by: INTERNAL MEDICINE

## 2021-12-27 PROCEDURE — 94760 N-INVAS EAR/PLS OXIMETRY 1: CPT

## 2021-12-27 PROCEDURE — 99152 MOD SED SAME PHYS/QHP 5/>YRS: CPT | Mod: ,,, | Performed by: INTERNAL MEDICINE

## 2021-12-27 PROCEDURE — C1769 GUIDE WIRE: HCPCS | Performed by: INTERNAL MEDICINE

## 2021-12-27 PROCEDURE — 25000003 PHARM REV CODE 250: Performed by: PHYSICIAN ASSISTANT

## 2021-12-27 PROCEDURE — C1725 CATH, TRANSLUMIN NON-LASER: HCPCS | Performed by: INTERNAL MEDICINE

## 2021-12-27 PROCEDURE — 25000003 PHARM REV CODE 250: Performed by: INTERNAL MEDICINE

## 2021-12-27 PROCEDURE — 92928 PRQ TCAT PLMT NTRAC ST 1 LES: CPT | Mod: LD,,, | Performed by: INTERNAL MEDICINE

## 2021-12-27 PROCEDURE — 85347 COAGULATION TIME ACTIVATED: CPT | Performed by: INTERNAL MEDICINE

## 2021-12-27 PROCEDURE — 99223 1ST HOSP IP/OBS HIGH 75: CPT | Mod: ,,, | Performed by: INTERNAL MEDICINE

## 2021-12-27 PROCEDURE — 99223 PR INITIAL HOSPITAL CARE,LEVL III: ICD-10-PCS | Mod: ,,, | Performed by: INTERNAL MEDICINE

## 2021-12-27 PROCEDURE — 63600175 PHARM REV CODE 636 W HCPCS: Performed by: NURSE PRACTITIONER

## 2021-12-27 PROCEDURE — 92979 ENDOLUMINL IVUS OCT C EA: CPT | Mod: 26,LC,, | Performed by: INTERNAL MEDICINE

## 2021-12-27 PROCEDURE — 93458 L HRT ARTERY/VENTRICLE ANGIO: CPT | Mod: 26,59,51, | Performed by: INTERNAL MEDICINE

## 2021-12-27 PROCEDURE — C1874 STENT, COATED/COV W/DEL SYS: HCPCS | Performed by: INTERNAL MEDICINE

## 2021-12-27 PROCEDURE — 63600175 PHARM REV CODE 636 W HCPCS: Performed by: INTERNAL MEDICINE

## 2021-12-27 PROCEDURE — 92979 ENDOLUMINL IVUS OCT C EA: CPT | Mod: LC | Performed by: INTERNAL MEDICINE

## 2021-12-27 PROCEDURE — 99152 PR MOD CONSCIOUS SEDATION, SAME PHYS, 5+ YRS, FIRST 15 MIN: ICD-10-PCS | Mod: ,,, | Performed by: INTERNAL MEDICINE

## 2021-12-27 PROCEDURE — 11000001 HC ACUTE MED/SURG PRIVATE ROOM

## 2021-12-27 PROCEDURE — 93458 L HRT ARTERY/VENTRICLE ANGIO: CPT | Mod: 59 | Performed by: INTERNAL MEDICINE

## 2021-12-27 PROCEDURE — 99152 MOD SED SAME PHYS/QHP 5/>YRS: CPT | Performed by: INTERNAL MEDICINE

## 2021-12-27 PROCEDURE — 93010 EKG 12-LEAD: ICD-10-PCS | Mod: 76,,, | Performed by: INTERNAL MEDICINE

## 2021-12-27 PROCEDURE — 25000003 PHARM REV CODE 250: Mod: ER | Performed by: EMERGENCY MEDICINE

## 2021-12-27 PROCEDURE — 93005 ELECTROCARDIOGRAM TRACING: CPT | Mod: ER

## 2021-12-27 PROCEDURE — 85025 COMPLETE CBC W/AUTO DIFF WBC: CPT | Performed by: NURSE PRACTITIONER

## 2021-12-27 PROCEDURE — 36415 COLL VENOUS BLD VENIPUNCTURE: CPT | Performed by: NURSE PRACTITIONER

## 2021-12-27 PROCEDURE — 25500020 PHARM REV CODE 255: Performed by: INTERNAL MEDICINE

## 2021-12-27 PROCEDURE — 25000003 PHARM REV CODE 250: Performed by: NURSE PRACTITIONER

## 2021-12-27 PROCEDURE — 92978 PR IVUS, CORONARY, 1ST VESSEL: ICD-10-PCS | Mod: 26,LD,, | Performed by: INTERNAL MEDICINE

## 2021-12-27 PROCEDURE — 83735 ASSAY OF MAGNESIUM: CPT | Performed by: NURSE PRACTITIONER

## 2021-12-27 PROCEDURE — 92978 ENDOLUMINL IVUS OCT C 1ST: CPT | Mod: LD | Performed by: INTERNAL MEDICINE

## 2021-12-27 PROCEDURE — C1887 CATHETER, GUIDING: HCPCS | Performed by: INTERNAL MEDICINE

## 2021-12-27 PROCEDURE — 94761 N-INVAS EAR/PLS OXIMETRY MLT: CPT

## 2021-12-27 PROCEDURE — 93010 ELECTROCARDIOGRAM REPORT: CPT | Mod: 76,,, | Performed by: INTERNAL MEDICINE

## 2021-12-27 PROCEDURE — 99153 MOD SED SAME PHYS/QHP EA: CPT | Performed by: INTERNAL MEDICINE

## 2021-12-27 PROCEDURE — C1753 CATH, INTRAVAS ULTRASOUND: HCPCS | Performed by: INTERNAL MEDICINE

## 2021-12-27 PROCEDURE — 93458 PR CATH PLACE/CORON ANGIO, IMG SUPER/INTERP,W LEFT HEART VENTRICULOGRAPHY: ICD-10-PCS | Mod: 26,59,51, | Performed by: INTERNAL MEDICINE

## 2021-12-27 PROCEDURE — 83880 ASSAY OF NATRIURETIC PEPTIDE: CPT | Performed by: NURSE PRACTITIONER

## 2021-12-27 DEVICE — STENT RONYX25018UX RESOLUTE ONYX 2.50X18
Type: IMPLANTABLE DEVICE | Site: CORONARY | Status: FUNCTIONAL
Brand: RESOLUTE ONYX™

## 2021-12-27 DEVICE — STENT RONYX35015UX RESOLUTE ONYX 3.50X15
Type: IMPLANTABLE DEVICE | Site: CORONARY | Status: FUNCTIONAL
Brand: RESOLUTE ONYX™

## 2021-12-27 RX ORDER — HEPARIN SODIUM 200 [USP'U]/100ML
INJECTION, SOLUTION INTRAVENOUS
Status: DISCONTINUED | OUTPATIENT
Start: 2021-12-27 | End: 2021-12-28 | Stop reason: HOSPADM

## 2021-12-27 RX ORDER — CARVEDILOL 6.25 MG/1
6.25 TABLET ORAL 2 TIMES DAILY WITH MEALS
Qty: 60 TABLET | Refills: 11 | Status: SHIPPED | OUTPATIENT
Start: 2021-12-27 | End: 2021-12-28 | Stop reason: HOSPADM

## 2021-12-27 RX ORDER — SODIUM CHLORIDE 9 MG/ML
INJECTION, SOLUTION INTRAVENOUS CONTINUOUS
Status: ACTIVE | OUTPATIENT
Start: 2021-12-27 | End: 2021-12-27

## 2021-12-27 RX ORDER — SODIUM CHLORIDE 0.9 % (FLUSH) 0.9 %
10 SYRINGE (ML) INJECTION EVERY 8 HOURS PRN
Status: DISCONTINUED | OUTPATIENT
Start: 2021-12-27 | End: 2021-12-28 | Stop reason: HOSPADM

## 2021-12-27 RX ORDER — LIDOCAINE HYDROCHLORIDE 10 MG/ML
INJECTION, SOLUTION EPIDURAL; INFILTRATION; INTRACAUDAL; PERINEURAL
Status: DISCONTINUED | OUTPATIENT
Start: 2021-12-27 | End: 2021-12-27 | Stop reason: HOSPADM

## 2021-12-27 RX ORDER — ATORVASTATIN CALCIUM 40 MG/1
40 TABLET, FILM COATED ORAL DAILY
Status: CANCELLED | OUTPATIENT
Start: 2021-12-27

## 2021-12-27 RX ORDER — FENTANYL CITRATE 50 UG/ML
INJECTION, SOLUTION INTRAMUSCULAR; INTRAVENOUS
Status: DISCONTINUED | OUTPATIENT
Start: 2021-12-27 | End: 2021-12-27 | Stop reason: HOSPADM

## 2021-12-27 RX ORDER — NALOXONE HCL 0.4 MG/ML
0.02 VIAL (ML) INJECTION
Status: DISCONTINUED | OUTPATIENT
Start: 2021-12-27 | End: 2021-12-28 | Stop reason: HOSPADM

## 2021-12-27 RX ORDER — LOSARTAN POTASSIUM 50 MG/1
100 TABLET ORAL DAILY
Status: DISCONTINUED | OUTPATIENT
Start: 2021-12-27 | End: 2021-12-28 | Stop reason: HOSPADM

## 2021-12-27 RX ORDER — INSULIN DEGLUDEC 100 U/ML
26 INJECTION, SOLUTION SUBCUTANEOUS DAILY
COMMUNITY
End: 2022-01-08

## 2021-12-27 RX ORDER — HEPARIN SODIUM 1000 [USP'U]/ML
INJECTION, SOLUTION INTRAVENOUS; SUBCUTANEOUS
Status: DISCONTINUED | OUTPATIENT
Start: 2021-12-27 | End: 2021-12-27 | Stop reason: HOSPADM

## 2021-12-27 RX ORDER — DIPHENHYDRAMINE HYDROCHLORIDE 50 MG/ML
INJECTION INTRAMUSCULAR; INTRAVENOUS
Status: DISCONTINUED | OUTPATIENT
Start: 2021-12-27 | End: 2021-12-27 | Stop reason: HOSPADM

## 2021-12-27 RX ORDER — TALC
6 POWDER (GRAM) TOPICAL NIGHTLY PRN
Status: DISCONTINUED | OUTPATIENT
Start: 2021-12-27 | End: 2021-12-28 | Stop reason: HOSPADM

## 2021-12-27 RX ORDER — INSULIN LISPRO 100 [IU]/ML
10 INJECTION, SOLUTION INTRAVENOUS; SUBCUTANEOUS 2 TIMES DAILY WITH MEALS
Status: ON HOLD | COMMUNITY
End: 2021-12-28

## 2021-12-27 RX ORDER — LOSARTAN POTASSIUM 50 MG/1
100 TABLET ORAL DAILY
Status: CANCELLED | OUTPATIENT
Start: 2021-12-27

## 2021-12-27 RX ORDER — ONDANSETRON 8 MG/1
8 TABLET, ORALLY DISINTEGRATING ORAL EVERY 8 HOURS PRN
Status: DISCONTINUED | OUTPATIENT
Start: 2021-12-27 | End: 2021-12-28 | Stop reason: HOSPADM

## 2021-12-27 RX ORDER — ASPIRIN 81 MG/1
81 TABLET ORAL DAILY
Status: CANCELLED | OUTPATIENT
Start: 2021-12-27

## 2021-12-27 RX ORDER — IODIXANOL 320 MG/ML
INJECTION, SOLUTION INTRAVASCULAR
Status: DISCONTINUED | OUTPATIENT
Start: 2021-12-27 | End: 2021-12-27 | Stop reason: HOSPADM

## 2021-12-27 RX ORDER — IBUPROFEN 200 MG
24 TABLET ORAL
Status: DISCONTINUED | OUTPATIENT
Start: 2021-12-27 | End: 2021-12-28 | Stop reason: HOSPADM

## 2021-12-27 RX ORDER — GLUCAGON 1 MG
1 KIT INJECTION
Status: DISCONTINUED | OUTPATIENT
Start: 2021-12-27 | End: 2021-12-28 | Stop reason: HOSPADM

## 2021-12-27 RX ORDER — POTASSIUM CHLORIDE 20 MEQ/1
40 TABLET, EXTENDED RELEASE ORAL ONCE
Status: COMPLETED | OUTPATIENT
Start: 2021-12-27 | End: 2021-12-27

## 2021-12-27 RX ORDER — AMOXICILLIN 250 MG
1 CAPSULE ORAL DAILY PRN
Status: DISCONTINUED | OUTPATIENT
Start: 2021-12-27 | End: 2021-12-28 | Stop reason: HOSPADM

## 2021-12-27 RX ORDER — VERAPAMIL HYDROCHLORIDE 2.5 MG/ML
INJECTION, SOLUTION INTRAVENOUS
Status: DISCONTINUED | OUTPATIENT
Start: 2021-12-27 | End: 2021-12-27 | Stop reason: HOSPADM

## 2021-12-27 RX ORDER — INSULIN ASPART 100 [IU]/ML
1-10 INJECTION, SOLUTION INTRAVENOUS; SUBCUTANEOUS
Status: DISCONTINUED | OUTPATIENT
Start: 2021-12-27 | End: 2021-12-28 | Stop reason: HOSPADM

## 2021-12-27 RX ORDER — ATORVASTATIN CALCIUM 40 MG/1
80 TABLET, FILM COATED ORAL DAILY
Status: DISCONTINUED | OUTPATIENT
Start: 2021-12-28 | End: 2021-12-28 | Stop reason: HOSPADM

## 2021-12-27 RX ORDER — ONDANSETRON 2 MG/ML
4 INJECTION INTRAMUSCULAR; INTRAVENOUS EVERY 8 HOURS PRN
Status: DISCONTINUED | OUTPATIENT
Start: 2021-12-27 | End: 2021-12-28 | Stop reason: HOSPADM

## 2021-12-27 RX ORDER — ASPIRIN 81 MG/1
81 TABLET ORAL DAILY
Status: DISCONTINUED | OUTPATIENT
Start: 2021-12-27 | End: 2021-12-28 | Stop reason: HOSPADM

## 2021-12-27 RX ORDER — MIDAZOLAM HYDROCHLORIDE 1 MG/ML
INJECTION, SOLUTION INTRAMUSCULAR; INTRAVENOUS
Status: DISCONTINUED | OUTPATIENT
Start: 2021-12-27 | End: 2021-12-27 | Stop reason: HOSPADM

## 2021-12-27 RX ORDER — ATORVASTATIN CALCIUM 40 MG/1
40 TABLET, FILM COATED ORAL DAILY
Status: DISCONTINUED | OUTPATIENT
Start: 2021-12-27 | End: 2021-12-27

## 2021-12-27 RX ORDER — HEPARIN SODIUM,PORCINE/D5W 25000/250
0-40 INTRAVENOUS SOLUTION INTRAVENOUS CONTINUOUS
Status: DISCONTINUED | OUTPATIENT
Start: 2021-12-27 | End: 2021-12-27

## 2021-12-27 RX ORDER — IBUPROFEN 200 MG
16 TABLET ORAL
Status: DISCONTINUED | OUTPATIENT
Start: 2021-12-27 | End: 2021-12-28 | Stop reason: HOSPADM

## 2021-12-27 RX ORDER — ACETAMINOPHEN 325 MG/1
650 TABLET ORAL EVERY 8 HOURS PRN
Status: DISCONTINUED | OUTPATIENT
Start: 2021-12-27 | End: 2021-12-28 | Stop reason: HOSPADM

## 2021-12-27 RX ADMIN — MELATONIN TAB 3 MG 6 MG: 3 TAB at 08:12

## 2021-12-27 RX ADMIN — TICAGRELOR 90 MG: 90 TABLET ORAL at 08:12

## 2021-12-27 RX ADMIN — INSULIN DETEMIR 10 UNITS: 100 INJECTION, SOLUTION SUBCUTANEOUS at 08:12

## 2021-12-27 RX ADMIN — ATORVASTATIN CALCIUM 40 MG: 40 TABLET, FILM COATED ORAL at 08:12

## 2021-12-27 RX ADMIN — POTASSIUM CHLORIDE 40 MEQ: 1500 TABLET, EXTENDED RELEASE ORAL at 08:12

## 2021-12-27 RX ADMIN — TICAGRELOR 180 MG: 90 TABLET ORAL at 12:12

## 2021-12-27 RX ADMIN — ASPIRIN 81 MG: 81 TABLET, COATED ORAL at 08:12

## 2021-12-27 RX ADMIN — LOSARTAN POTASSIUM 100 MG: 50 TABLET, FILM COATED ORAL at 09:12

## 2021-12-27 RX ADMIN — ACETAMINOPHEN 650 MG: 325 TABLET ORAL at 05:12

## 2021-12-27 RX ADMIN — METOPROLOL SUCCINATE 12.5 MG: 25 TABLET, EXTENDED RELEASE ORAL at 05:12

## 2021-12-27 RX ADMIN — INSULIN ASPART 1 UNITS: 100 INJECTION, SOLUTION INTRAVENOUS; SUBCUTANEOUS at 08:12

## 2021-12-28 VITALS
HEIGHT: 72 IN | TEMPERATURE: 98 F | BODY MASS INDEX: 29.39 KG/M2 | WEIGHT: 217 LBS | SYSTOLIC BLOOD PRESSURE: 165 MMHG | DIASTOLIC BLOOD PRESSURE: 94 MMHG | OXYGEN SATURATION: 94 % | RESPIRATION RATE: 20 BRPM | HEART RATE: 83 BPM

## 2021-12-28 PROBLEM — E87.6 HYPOKALEMIA: Status: RESOLVED | Noted: 2021-12-27 | Resolved: 2021-12-28

## 2021-12-28 LAB
ALBUMIN SERPL BCP-MCNC: 3.7 G/DL (ref 3.5–5.2)
ALP SERPL-CCNC: 100 U/L (ref 55–135)
ALT SERPL W/O P-5'-P-CCNC: 27 U/L (ref 10–44)
ANION GAP SERPL CALC-SCNC: 10 MMOL/L (ref 8–16)
AST SERPL-CCNC: 28 U/L (ref 10–40)
BASOPHILS # BLD AUTO: 0.04 K/UL (ref 0–0.2)
BASOPHILS NFR BLD: 0.5 % (ref 0–1.9)
BILIRUB SERPL-MCNC: 1 MG/DL (ref 0.1–1)
BUN SERPL-MCNC: 14 MG/DL (ref 6–20)
CALCIUM SERPL-MCNC: 9.2 MG/DL (ref 8.7–10.5)
CATH EF ESTIMATED: 60 %
CHLORIDE SERPL-SCNC: 103 MMOL/L (ref 95–110)
CO2 SERPL-SCNC: 23 MMOL/L (ref 23–29)
CREAT SERPL-MCNC: 1.1 MG/DL (ref 0.5–1.4)
DIFFERENTIAL METHOD: ABNORMAL
EOSINOPHIL # BLD AUTO: 0.2 K/UL (ref 0–0.5)
EOSINOPHIL NFR BLD: 2.3 % (ref 0–8)
ERYTHROCYTE [DISTWIDTH] IN BLOOD BY AUTOMATED COUNT: 12.8 % (ref 11.5–14.5)
EST. GFR  (AFRICAN AMERICAN): >60 ML/MIN/1.73 M^2
EST. GFR  (NON AFRICAN AMERICAN): >60 ML/MIN/1.73 M^2
GLUCOSE SERPL-MCNC: 173 MG/DL (ref 70–110)
HCT VFR BLD AUTO: 48.4 % (ref 40–54)
HGB BLD-MCNC: 16.7 G/DL (ref 14–18)
IMM GRANULOCYTES # BLD AUTO: 0.04 K/UL (ref 0–0.04)
IMM GRANULOCYTES NFR BLD AUTO: 0.5 % (ref 0–0.5)
LYMPHOCYTES # BLD AUTO: 2.2 K/UL (ref 1–4.8)
LYMPHOCYTES NFR BLD: 24.7 % (ref 18–48)
MAGNESIUM SERPL-MCNC: 1.9 MG/DL (ref 1.6–2.6)
MCH RBC QN AUTO: 31.9 PG (ref 27–31)
MCHC RBC AUTO-ENTMCNC: 34.5 G/DL (ref 32–36)
MCV RBC AUTO: 92 FL (ref 82–98)
MONOCYTES # BLD AUTO: 1 K/UL (ref 0.3–1)
MONOCYTES NFR BLD: 11.9 % (ref 4–15)
NEUTROPHILS # BLD AUTO: 5.3 K/UL (ref 1.8–7.7)
NEUTROPHILS NFR BLD: 60.1 % (ref 38–73)
NRBC BLD-RTO: 0 /100 WBC
PHOSPHATE SERPL-MCNC: 2.7 MG/DL (ref 2.7–4.5)
PLATELET # BLD AUTO: 163 K/UL (ref 150–450)
PMV BLD AUTO: 10 FL (ref 9.2–12.9)
POCT GLUCOSE: 161 MG/DL (ref 70–110)
POTASSIUM SERPL-SCNC: 4.5 MMOL/L (ref 3.5–5.1)
PROT SERPL-MCNC: 6.6 G/DL (ref 6–8.4)
RBC # BLD AUTO: 5.24 M/UL (ref 4.6–6.2)
SODIUM SERPL-SCNC: 136 MMOL/L (ref 136–145)
WBC # BLD AUTO: 8.76 K/UL (ref 3.9–12.7)

## 2021-12-28 PROCEDURE — 25000003 PHARM REV CODE 250: Performed by: PHYSICIAN ASSISTANT

## 2021-12-28 PROCEDURE — 25000003 PHARM REV CODE 250: Performed by: INTERNAL MEDICINE

## 2021-12-28 PROCEDURE — 99233 PR SUBSEQUENT HOSPITAL CARE,LEVL III: ICD-10-PCS | Mod: ,,, | Performed by: NURSE PRACTITIONER

## 2021-12-28 PROCEDURE — 99233 SBSQ HOSP IP/OBS HIGH 50: CPT | Mod: ,,, | Performed by: INTERNAL MEDICINE

## 2021-12-28 PROCEDURE — 25000003 PHARM REV CODE 250: Performed by: NURSE PRACTITIONER

## 2021-12-28 PROCEDURE — 99233 PR SUBSEQUENT HOSPITAL CARE,LEVL III: ICD-10-PCS | Mod: ,,, | Performed by: INTERNAL MEDICINE

## 2021-12-28 PROCEDURE — 36415 COLL VENOUS BLD VENIPUNCTURE: CPT | Performed by: NURSE PRACTITIONER

## 2021-12-28 PROCEDURE — 94761 N-INVAS EAR/PLS OXIMETRY MLT: CPT

## 2021-12-28 PROCEDURE — 83735 ASSAY OF MAGNESIUM: CPT | Performed by: NURSE PRACTITIONER

## 2021-12-28 PROCEDURE — 85025 COMPLETE CBC W/AUTO DIFF WBC: CPT | Performed by: NURSE PRACTITIONER

## 2021-12-28 PROCEDURE — 99233 SBSQ HOSP IP/OBS HIGH 50: CPT | Mod: ,,, | Performed by: NURSE PRACTITIONER

## 2021-12-28 PROCEDURE — 84100 ASSAY OF PHOSPHORUS: CPT | Performed by: NURSE PRACTITIONER

## 2021-12-28 PROCEDURE — 80053 COMPREHEN METABOLIC PANEL: CPT | Performed by: NURSE PRACTITIONER

## 2021-12-28 RX ORDER — METOPROLOL SUCCINATE 25 MG/1
25 TABLET, EXTENDED RELEASE ORAL DAILY
Status: DISCONTINUED | OUTPATIENT
Start: 2021-12-28 | End: 2021-12-28 | Stop reason: HOSPADM

## 2021-12-28 RX ORDER — ATORVASTATIN CALCIUM 80 MG/1
80 TABLET, FILM COATED ORAL DAILY
Qty: 90 TABLET | Refills: 3 | Status: SHIPPED | OUTPATIENT
Start: 2021-12-29 | End: 2022-08-17 | Stop reason: SDUPTHER

## 2021-12-28 RX ORDER — METOPROLOL SUCCINATE 25 MG/1
25 TABLET, EXTENDED RELEASE ORAL DAILY
Qty: 30 TABLET | Refills: 11 | Status: SHIPPED | OUTPATIENT
Start: 2021-12-29 | End: 2022-01-08 | Stop reason: SDUPTHER

## 2021-12-28 RX ADMIN — TICAGRELOR 90 MG: 90 TABLET ORAL at 08:12

## 2021-12-28 RX ADMIN — ATORVASTATIN CALCIUM 80 MG: 40 TABLET, FILM COATED ORAL at 08:12

## 2021-12-28 RX ADMIN — INSULIN ASPART 2 UNITS: 100 INJECTION, SOLUTION INTRAVENOUS; SUBCUTANEOUS at 07:12

## 2021-12-28 RX ADMIN — ACETAMINOPHEN 650 MG: 325 TABLET ORAL at 08:12

## 2021-12-28 RX ADMIN — ASPIRIN 81 MG: 81 TABLET, COATED ORAL at 08:12

## 2021-12-28 RX ADMIN — METOPROLOL SUCCINATE 25 MG: 25 TABLET, EXTENDED RELEASE ORAL at 08:12

## 2021-12-28 RX ADMIN — LOSARTAN POTASSIUM 100 MG: 50 TABLET, FILM COATED ORAL at 08:12

## 2022-01-08 ENCOUNTER — OFFICE VISIT (OUTPATIENT)
Dept: FAMILY MEDICINE | Facility: CLINIC | Age: 57
End: 2022-01-08
Payer: COMMERCIAL

## 2022-01-08 VITALS
HEIGHT: 72 IN | BODY MASS INDEX: 28.99 KG/M2 | DIASTOLIC BLOOD PRESSURE: 74 MMHG | HEART RATE: 84 BPM | WEIGHT: 214.06 LBS | OXYGEN SATURATION: 97 % | SYSTOLIC BLOOD PRESSURE: 144 MMHG

## 2022-01-08 DIAGNOSIS — Z79.4 TYPE 2 DIABETES MELLITUS WITH BOTH EYES AFFECTED BY MILD NONPROLIFERATIVE RETINOPATHY WITHOUT MACULAR EDEMA, WITH LONG-TERM CURRENT USE OF INSULIN: Primary | ICD-10-CM

## 2022-01-08 DIAGNOSIS — E11.29 PROTEINURIA DUE TO TYPE 2 DIABETES MELLITUS: Chronic | ICD-10-CM

## 2022-01-08 DIAGNOSIS — Z79.4 TYPE 2 DIABETES MELLITUS WITH DIABETIC NEPHROPATHY, WITH LONG-TERM CURRENT USE OF INSULIN: Chronic | ICD-10-CM

## 2022-01-08 DIAGNOSIS — E11.3293 TYPE 2 DIABETES MELLITUS WITH BOTH EYES AFFECTED BY MILD NONPROLIFERATIVE RETINOPATHY WITHOUT MACULAR EDEMA, WITH LONG-TERM CURRENT USE OF INSULIN: Primary | ICD-10-CM

## 2022-01-08 DIAGNOSIS — R80.9 PROTEINURIA DUE TO TYPE 2 DIABETES MELLITUS: Chronic | ICD-10-CM

## 2022-01-08 DIAGNOSIS — E11.21 TYPE 2 DIABETES MELLITUS WITH DIABETIC NEPHROPATHY, WITH LONG-TERM CURRENT USE OF INSULIN: Chronic | ICD-10-CM

## 2022-01-08 DIAGNOSIS — Z79.4 TYPE 2 DIABETES MELLITUS WITH DIABETIC POLYNEUROPATHY, WITH LONG-TERM CURRENT USE OF INSULIN: Chronic | ICD-10-CM

## 2022-01-08 DIAGNOSIS — E11.42 TYPE 2 DIABETES MELLITUS WITH DIABETIC POLYNEUROPATHY, WITH LONG-TERM CURRENT USE OF INSULIN: Chronic | ICD-10-CM

## 2022-01-08 DIAGNOSIS — Z23 NEED FOR INFLUENZA VACCINATION: ICD-10-CM

## 2022-01-08 PROCEDURE — 90686 IIV4 VACC NO PRSV 0.5 ML IM: CPT | Mod: S$GLB,,, | Performed by: FAMILY MEDICINE

## 2022-01-08 PROCEDURE — 99999 PR PBB SHADOW E&M-EST. PATIENT-LVL IV: ICD-10-PCS | Mod: PBBFAC,,, | Performed by: FAMILY MEDICINE

## 2022-01-08 PROCEDURE — 99214 PR OFFICE/OUTPT VISIT, EST, LEVL IV, 30-39 MIN: ICD-10-PCS | Mod: 25,S$GLB,, | Performed by: FAMILY MEDICINE

## 2022-01-08 PROCEDURE — 3008F BODY MASS INDEX DOCD: CPT | Mod: CPTII,S$GLB,, | Performed by: FAMILY MEDICINE

## 2022-01-08 PROCEDURE — 1111F DSCHRG MED/CURRENT MED MERGE: CPT | Mod: CPTII,S$GLB,, | Performed by: FAMILY MEDICINE

## 2022-01-08 PROCEDURE — 3008F PR BODY MASS INDEX (BMI) DOCUMENTED: ICD-10-PCS | Mod: CPTII,S$GLB,, | Performed by: FAMILY MEDICINE

## 2022-01-08 PROCEDURE — 90471 FLU VACCINE (QUAD) GREATER THAN OR EQUAL TO 3YO PRESERVATIVE FREE IM: ICD-10-PCS | Mod: S$GLB,,, | Performed by: FAMILY MEDICINE

## 2022-01-08 PROCEDURE — 90471 IMMUNIZATION ADMIN: CPT | Mod: S$GLB,,, | Performed by: FAMILY MEDICINE

## 2022-01-08 PROCEDURE — 1159F PR MEDICATION LIST DOCUMENTED IN MEDICAL RECORD: ICD-10-PCS | Mod: CPTII,S$GLB,, | Performed by: FAMILY MEDICINE

## 2022-01-08 PROCEDURE — 1160F PR REVIEW ALL MEDS BY PRESCRIBER/CLIN PHARMACIST DOCUMENTED: ICD-10-PCS | Mod: CPTII,S$GLB,, | Performed by: FAMILY MEDICINE

## 2022-01-08 PROCEDURE — 99999 PR PBB SHADOW E&M-EST. PATIENT-LVL IV: CPT | Mod: PBBFAC,,, | Performed by: FAMILY MEDICINE

## 2022-01-08 PROCEDURE — 3077F SYST BP >= 140 MM HG: CPT | Mod: CPTII,S$GLB,, | Performed by: FAMILY MEDICINE

## 2022-01-08 PROCEDURE — 3078F PR MOST RECENT DIASTOLIC BLOOD PRESSURE < 80 MM HG: ICD-10-PCS | Mod: CPTII,S$GLB,, | Performed by: FAMILY MEDICINE

## 2022-01-08 PROCEDURE — 3051F HG A1C>EQUAL 7.0%<8.0%: CPT | Mod: CPTII,S$GLB,, | Performed by: FAMILY MEDICINE

## 2022-01-08 PROCEDURE — 1159F MED LIST DOCD IN RCRD: CPT | Mod: CPTII,S$GLB,, | Performed by: FAMILY MEDICINE

## 2022-01-08 PROCEDURE — 1111F PR DISCHARGE MEDS RECONCILED W/ CURRENT OUTPATIENT MED LIST: ICD-10-PCS | Mod: CPTII,S$GLB,, | Performed by: FAMILY MEDICINE

## 2022-01-08 PROCEDURE — 3077F PR MOST RECENT SYSTOLIC BLOOD PRESSURE >= 140 MM HG: ICD-10-PCS | Mod: CPTII,S$GLB,, | Performed by: FAMILY MEDICINE

## 2022-01-08 PROCEDURE — 99214 OFFICE O/P EST MOD 30 MIN: CPT | Mod: 25,S$GLB,, | Performed by: FAMILY MEDICINE

## 2022-01-08 PROCEDURE — 3078F DIAST BP <80 MM HG: CPT | Mod: CPTII,S$GLB,, | Performed by: FAMILY MEDICINE

## 2022-01-08 PROCEDURE — 90686 FLU VACCINE (QUAD) GREATER THAN OR EQUAL TO 3YO PRESERVATIVE FREE IM: ICD-10-PCS | Mod: S$GLB,,, | Performed by: FAMILY MEDICINE

## 2022-01-08 PROCEDURE — 3051F PR MOST RECENT HEMOGLOBIN A1C LEVEL 7.0 - < 8.0%: ICD-10-PCS | Mod: CPTII,S$GLB,, | Performed by: FAMILY MEDICINE

## 2022-01-08 PROCEDURE — 1160F RVW MEDS BY RX/DR IN RCRD: CPT | Mod: CPTII,S$GLB,, | Performed by: FAMILY MEDICINE

## 2022-01-08 RX ORDER — INSULIN GLARGINE 300 U/ML
26 INJECTION, SOLUTION SUBCUTANEOUS DAILY
COMMUNITY
End: 2022-08-17 | Stop reason: SDUPTHER

## 2022-01-08 RX ORDER — METOPROLOL SUCCINATE 50 MG/1
50 TABLET, EXTENDED RELEASE ORAL DAILY
Qty: 90 TABLET | Refills: 0 | Status: SHIPPED | OUTPATIENT
Start: 2022-01-08 | End: 2022-04-12

## 2022-01-08 NOTE — PROGRESS NOTES
Subjective:       Patient ID: Yon Loyd is a 56 y.o. male.    Chief Complaint: Follow-up    Yon is a 56 y.o. male who presents today for a f/u    Last seen about 6 months ago.   Was on atorvastatin 20 mg  Losartan 100 mg  a1c well controlled on regimen per endocrine    Went to the ED with chest pain. Troponin was elevated.   Had cath 12/27/2021  s/p successful PTCA and stent to mLAD and LCX as above  Residual distal PDA small vessel disease     Seeing Dr. De Souza 1/18/2022    LM:  ERIKA  III flow  LAD: ERIKA- flow 99%  LCx:  ERIKA- flow mid-distal lcx 95%  RCA: ERIKA- flow distal small PDA subtotaled  LVgram: LVEDP8, LVEF 55 %  Intervention:      S/p Successful PTCA to mLAD with 3.5 x 15 mm JOE post dilated with 3.5 NC balloon.  S/p Successful PTCA to mid/distal LCX  2.5 x 18 JOE post dilated with 3.0 NC balloon     Currently feeling well. Has scheduled f/u with me in aprox 1 month.     BP slightly high. Increased meds.     Review of Systems   Constitutional: Negative for chills and fever.   Respiratory: Negative for cough and shortness of breath.    Cardiovascular: Negative for chest pain.   Gastrointestinal: Negative for nausea and vomiting.   Genitourinary: Negative for difficulty urinating.   Neurological: Negative for dizziness, light-headedness and headaches.                  Lab Results   Component Value Date    HGBA1C 7.0 (H) 11/05/2021    HGBA1C 6.8 (H) 06/24/2021    HGBA1C 7.0 (H) 02/25/2021       Lab Results   Component Value Date    LDLCALC 90.0 11/05/2021    LDLCALC 98.0 02/25/2021    LDLCALC 99.6 09/01/2020           Objective:     Vitals:    01/08/22 0904   BP: (!) 144/74   Pulse: 84        Physical Exam  Vitals and nursing note reviewed.   Constitutional:       General: He is not in acute distress.     Appearance: He is not ill-appearing, toxic-appearing or diaphoretic.   Cardiovascular:      Rate and Rhythm: Normal rate and regular rhythm.   Pulmonary:      Effort: Pulmonary effort is normal.       Breath sounds: Normal breath sounds.   Musculoskeletal:         General: No swelling.   Psychiatric:         Mood and Affect: Mood normal.         Behavior: Behavior normal.         Thought Content: Thought content normal.         Judgment: Judgment normal.         Assessment:       1. Type 2 diabetes mellitus with both eyes affected by mild nonproliferative retinopathy without macular edema, with long-term current use of insulin    2. Type 2 diabetes mellitus with diabetic nephropathy, with long-term current use of insulin    3. Type 2 diabetes mellitus with diabetic polyneuropathy, with long-term current use of insulin    4. Proteinuria due to type 2 diabetes mellitus    5. Need for influenza vaccination        Plan:       Continue synjardy  jardiance component can help the heart  Insulin management per endocrine  Continue trulicity    Can continue starlix? Contraindicated with heart failure? Will defer to endocrine and cardiology. I think can continue.     Continue atorvastatin 80 mg    Continue metoprolol, but at 50 mg. Double up 25 mg. Start 50 mg when this runs out  Continue losartan    Continue brilinta and aspirin. Antiplatelet management per cardiology.     Keep scheduled f/u, will move labs prior to my apt.          Type 2 diabetes mellitus with both eyes affected by mild nonproliferative retinopathy without macular edema, with long-term current use of insulin  -     CBC Auto Differential; Future; Expected date: 01/08/2022    Type 2 diabetes mellitus with diabetic nephropathy, with long-term current use of insulin    Type 2 diabetes mellitus with diabetic polyneuropathy, with long-term current use of insulin    Proteinuria due to type 2 diabetes mellitus    Need for influenza vaccination  -     Influenza - Quadrivalent (PF)    Other orders  -     metoprolol succinate (TOPROL-XL) 50 MG 24 hr tablet; Take 1 tablet (50 mg total) by mouth once daily.  Dispense: 90 tablet; Refill: 0          Warning signs  discussed, patient to call with any further issues or worsening of symptoms.

## 2022-01-08 NOTE — PATIENT INSTRUCTIONS
Continue synjardy  jardiance component can help the heart  Insulin management per endocrine  Continue trulicity    Can continue starlix? Contraindicated with heart failure? Will defer to endocrine. I think can continue.     Continue atorvastatin 80 mg    Continue metoprolol, but at 50 mg. Double up 25 mg. Start 50 mg when this runs out  Continue losartan    Continue brilinta and aspirin. Antiplatelet management per cardiology.     Keep scheduled f/u, will move labs prior.

## 2022-01-14 ENCOUNTER — PATIENT OUTREACH (OUTPATIENT)
Dept: ADMINISTRATIVE | Facility: OTHER | Age: 57
End: 2022-01-14
Payer: COMMERCIAL

## 2022-01-14 NOTE — PROGRESS NOTES
Health Maintenance Due   Topic Date Due    HIV Screening  Never done    TETANUS VACCINE  Never done    Shingles Vaccine (1 of 2) Never done    Diabetes Urine Screening  09/01/2021    Foot Exam  12/04/2021    Eye Exam  12/04/2021     Updates were requested from care everywhere.  Chart was reviewed for overdue Proactive [facility] Encounters ([facility]) topics (CRS, Breast Cancer Screening, Eye exam)  Health Maintenance has been updated.  LIN[place] immunization registry triggered.  Immunizations were reconciled.

## 2022-01-18 ENCOUNTER — OFFICE VISIT (OUTPATIENT)
Dept: CARDIOLOGY | Facility: CLINIC | Age: 57
End: 2022-01-18
Payer: COMMERCIAL

## 2022-01-18 VITALS
DIASTOLIC BLOOD PRESSURE: 80 MMHG | HEART RATE: 63 BPM | OXYGEN SATURATION: 97 % | HEIGHT: 72 IN | WEIGHT: 215.38 LBS | SYSTOLIC BLOOD PRESSURE: 130 MMHG | BODY MASS INDEX: 29.17 KG/M2

## 2022-01-18 DIAGNOSIS — E78.5 HYPERLIPIDEMIA ASSOCIATED WITH TYPE 2 DIABETES MELLITUS: Chronic | ICD-10-CM

## 2022-01-18 DIAGNOSIS — E11.3293 TYPE 2 DIABETES MELLITUS WITH BOTH EYES AFFECTED BY MILD NONPROLIFERATIVE RETINOPATHY WITHOUT MACULAR EDEMA, WITH LONG-TERM CURRENT USE OF INSULIN: ICD-10-CM

## 2022-01-18 DIAGNOSIS — Z79.4 TYPE 2 DIABETES MELLITUS WITH BOTH EYES AFFECTED BY MILD NONPROLIFERATIVE RETINOPATHY WITHOUT MACULAR EDEMA, WITH LONG-TERM CURRENT USE OF INSULIN: ICD-10-CM

## 2022-01-18 DIAGNOSIS — E11.69 HYPERLIPIDEMIA ASSOCIATED WITH TYPE 2 DIABETES MELLITUS: Chronic | ICD-10-CM

## 2022-01-18 DIAGNOSIS — E78.2 MIXED HYPERLIPIDEMIA: ICD-10-CM

## 2022-01-18 DIAGNOSIS — Z95.5 STENTED CORONARY ARTERY: Primary | Chronic | ICD-10-CM

## 2022-01-18 DIAGNOSIS — I15.2 HYPERTENSION ASSOCIATED WITH DIABETES: ICD-10-CM

## 2022-01-18 DIAGNOSIS — Z79.4 TYPE 2 DIABETES MELLITUS WITH DIABETIC NEPHROPATHY, WITH LONG-TERM CURRENT USE OF INSULIN: Chronic | ICD-10-CM

## 2022-01-18 DIAGNOSIS — E11.59 HYPERTENSION ASSOCIATED WITH DIABETES: ICD-10-CM

## 2022-01-18 DIAGNOSIS — E11.21 TYPE 2 DIABETES MELLITUS WITH DIABETIC NEPHROPATHY, WITH LONG-TERM CURRENT USE OF INSULIN: Chronic | ICD-10-CM

## 2022-01-18 DIAGNOSIS — I21.4 NSTEMI (NON-ST ELEVATED MYOCARDIAL INFARCTION): ICD-10-CM

## 2022-01-18 DIAGNOSIS — I73.9 PAD (PERIPHERAL ARTERY DISEASE): ICD-10-CM

## 2022-01-18 PROCEDURE — 99214 PR OFFICE/OUTPT VISIT, EST, LEVL IV, 30-39 MIN: ICD-10-PCS | Mod: S$GLB,,, | Performed by: INTERNAL MEDICINE

## 2022-01-18 PROCEDURE — 3075F SYST BP GE 130 - 139MM HG: CPT | Mod: CPTII,S$GLB,, | Performed by: INTERNAL MEDICINE

## 2022-01-18 PROCEDURE — 1111F DSCHRG MED/CURRENT MED MERGE: CPT | Mod: CPTII,S$GLB,, | Performed by: INTERNAL MEDICINE

## 2022-01-18 PROCEDURE — 3008F BODY MASS INDEX DOCD: CPT | Mod: CPTII,S$GLB,, | Performed by: INTERNAL MEDICINE

## 2022-01-18 PROCEDURE — 4010F ACE/ARB THERAPY RXD/TAKEN: CPT | Mod: CPTII,S$GLB,, | Performed by: INTERNAL MEDICINE

## 2022-01-18 PROCEDURE — 99214 OFFICE O/P EST MOD 30 MIN: CPT | Mod: S$GLB,,, | Performed by: INTERNAL MEDICINE

## 2022-01-18 PROCEDURE — 4010F PR ACE/ARB THEARPY RXD/TAKEN: ICD-10-PCS | Mod: CPTII,S$GLB,, | Performed by: INTERNAL MEDICINE

## 2022-01-18 PROCEDURE — 1111F PR DISCHARGE MEDS RECONCILED W/ CURRENT OUTPATIENT MED LIST: ICD-10-PCS | Mod: CPTII,S$GLB,, | Performed by: INTERNAL MEDICINE

## 2022-01-18 PROCEDURE — 3075F PR MOST RECENT SYSTOLIC BLOOD PRESS GE 130-139MM HG: ICD-10-PCS | Mod: CPTII,S$GLB,, | Performed by: INTERNAL MEDICINE

## 2022-01-18 PROCEDURE — 3079F PR MOST RECENT DIASTOLIC BLOOD PRESSURE 80-89 MM HG: ICD-10-PCS | Mod: CPTII,S$GLB,, | Performed by: INTERNAL MEDICINE

## 2022-01-18 PROCEDURE — 3051F PR MOST RECENT HEMOGLOBIN A1C LEVEL 7.0 - < 8.0%: ICD-10-PCS | Mod: CPTII,S$GLB,, | Performed by: INTERNAL MEDICINE

## 2022-01-18 PROCEDURE — 99999 PR PBB SHADOW E&M-EST. PATIENT-LVL III: ICD-10-PCS | Mod: PBBFAC,,, | Performed by: INTERNAL MEDICINE

## 2022-01-18 PROCEDURE — 3051F HG A1C>EQUAL 7.0%<8.0%: CPT | Mod: CPTII,S$GLB,, | Performed by: INTERNAL MEDICINE

## 2022-01-18 PROCEDURE — 3008F PR BODY MASS INDEX (BMI) DOCUMENTED: ICD-10-PCS | Mod: CPTII,S$GLB,, | Performed by: INTERNAL MEDICINE

## 2022-01-18 PROCEDURE — 99999 PR PBB SHADOW E&M-EST. PATIENT-LVL III: CPT | Mod: PBBFAC,,, | Performed by: INTERNAL MEDICINE

## 2022-01-18 PROCEDURE — 3079F DIAST BP 80-89 MM HG: CPT | Mod: CPTII,S$GLB,, | Performed by: INTERNAL MEDICINE

## 2022-01-18 NOTE — PROGRESS NOTES
Subjective:   @Patient ID:  Yon Loyd is a 56 y.o. male who presents for evaluation of CAD    HPI:   Here for f/u post hospital discharge   Accompanied by his sister   He is doing very well  Stamina improved post PCI. He is more energetic   Stopped tobacco  Compliant with DAPT       Prior cardiovascular  Hx  --------------------------------    - Heart Catheterization    12/27/2021  · The ejection fraction was greater than 55% by visual estimate.  · The pre-procedure left ventricular end diastolic pressure was 8.  · The Mid LAD lesion was 99% stenosed with 0% stenosis post-intervention.  · A STENT RESOLUTE DONI [id number] stent was successfully placed. Post dilated with 3.5 NC balloon. IVUS guided  · The Mid Cx to Dist Cx lesion was 95% stenosed with 0% stenosis post-intervention.  · A STENT RESOLUTE DONI [id number] stent was successfully placed to mid/distal LCX post dilated with 3.0 NC balloon. IVUS guided  · The RPDA lesion was distal 99% stenosed. Small to moderate caliber vs. Very distal lesion. Will be treated medically.  · The estimated blood loss was none.     - ASA/Brilinta  - Statin   - Add Coreg   - Risk factors modifications  - Medical therapy for residual distal small Rt PDA lesion.          - ECHO 12/27/2021  · The left ventricle is normal in size with normal systolic function.  · The estimated ejection fraction is 65%.  · Normal left ventricular diastolic function.  · Normal right ventricular size with normal right ventricular systolic function.  · Mild mitral regurgitation.  · Intermediate central venous pressure (8 mmHg).  · The estimated PA systolic pressure is 22 mmHg.          Patient Active Problem List    Diagnosis Date Noted    Stented coronary artery 01/18/2022    NSTEMI (non-ST elevated myocardial infarction) 12/27/2021    Umbilical hernia without obstruction and without gangrene 07/30/2021    Diastasis recti 07/30/2021    Type 2 diabetes mellitus with both eyes affected by mild  nonproliferative retinopathy without macular edema, with long-term current use of insulin 08/11/2017    BMI 29.0-29.9,adult 12/23/2016    Proteinuria due to type 2 diabetes mellitus 12/23/2016    Type 2 diabetes mellitus with diabetic polyneuropathy, with long-term current use of insulin 10/04/2016    Type 2 diabetes mellitus with diabetic nephropathy, with long-term current use of insulin 12/03/2014    Hypertension associated with diabetes 10/24/2012    Hyperlipidemia associated with type 2 diabetes mellitus 10/24/2012                  LAST HbA1c  Lab Results   Component Value Date    HGBA1C 7.0 (H) 11/05/2021       Lipid panel  Lab Results   Component Value Date    CHOL 192 11/05/2021    CHOL 185 02/25/2021    CHOL 193 09/01/2020     Lab Results   Component Value Date    HDL 48 11/05/2021    HDL 54 02/25/2021    HDL 52 09/01/2020     Lab Results   Component Value Date    LDLCALC 90.0 11/05/2021    LDLCALC 98.0 02/25/2021    LDLCALC 99.6 09/01/2020     Lab Results   Component Value Date    TRIG 270 (H) 11/05/2021    TRIG 165 (H) 02/25/2021    TRIG 207 (H) 09/01/2020     Lab Results   Component Value Date    CHOLHDL 25.0 11/05/2021    CHOLHDL 29.2 02/25/2021    CHOLHDL 26.9 09/01/2020            Review of Systems   Constitutional: Negative for chills and fever.   HENT: Negative for hearing loss and nosebleeds.    Eyes: Negative for blurred vision.   Cardiovascular: Negative for chest pain, leg swelling and palpitations.   Respiratory: Negative for hemoptysis and shortness of breath.    Hematologic/Lymphatic: Negative for bleeding problem.   Skin: Negative for itching.   Musculoskeletal: Negative for falls.   Gastrointestinal: Negative for abdominal pain and hematochezia.   Genitourinary: Negative for hematuria.   Neurological: Negative for dizziness and loss of balance.   Psychiatric/Behavioral: Negative for altered mental status and depression.       Objective:   Physical Exam  Constitutional:        Appearance: He is well-developed and well-nourished.   HENT:      Head: Normocephalic and atraumatic.   Eyes:      Conjunctiva/sclera: Conjunctivae normal.   Neck:      Vascular: No carotid bruit or JVD.   Cardiovascular:      Rate and Rhythm: Normal rate and regular rhythm.      Pulses:           Carotid pulses are 2+ on the right side and 2+ on the left side.       Radial pulses are 2+ on the right side and 2+ on the left side.      Heart sounds: Normal heart sounds. No murmur heard.  No friction rub. No gallop.    Pulmonary:      Effort: Pulmonary effort is normal. No respiratory distress.      Breath sounds: Normal breath sounds. No stridor. No wheezing.   Musculoskeletal:      Cervical back: Neck supple.   Skin:     General: Skin is warm and dry.   Neurological:      Mental Status: He is alert and oriented to person, place, and time.   Psychiatric:         Mood and Affect: Mood and affect normal.         Behavior: Behavior normal.         Assessment:     1. Stented coronary artery    2. PAD (peripheral artery disease)    3. Hypertension associated with diabetes    4. Type 2 diabetes mellitus with both eyes affected by mild nonproliferative retinopathy without macular edema, with long-term current use of insulin    5. Mixed hyperlipidemia    6. BMI 29.0-29.9,adult    7. Hyperlipidemia associated with type 2 diabetes mellitus    8. NSTEMI (non-ST elevated myocardial infarction)    9. Type 2 diabetes mellitus with diabetic nephropathy, with long-term current use of insulin        Plan:   - s/p PCI as above  - Residual small vs disease in PDA. Medical tx  - Continue ASA/Brilinta  - BB  - High intense statin   - Cardiac rehab phase II  - NORIS/ PAD per physical exam   - Lipid panel before next visit        Pertinent cardiac images and EKG reviewed independently.    Continue with current medical plan and lifestyle changes.  Return sooner for concerns or questions. If symptoms persist go to the ED  I have reviewed all  pertinent data including patient's medical history in detail and updated the computerized patient record.     Orders Placed This Encounter   Procedures    Lipid Panel     fasting     Standing Status:   Future     Standing Expiration Date:   1/18/2023    Cardiac Rehab Phase II     Standing Status:   Future     Standing Expiration Date:   1/18/2023     Order Specific Question:   Department     Answer:   [facility] CARDIAC REHAB     Order Specific Question:   Select qualifying diagnosis:     Answer:   I21.4 - Non-ST elevation (NSTEMI) myocardial infarction    Ankle Brachial Indices (NORIS)     Standing Status:   Future     Standing Expiration Date:   1/18/2023     Order Specific Question:   Exam Type:     Answer:   Exercise     Order Specific Question:   Release to patient     Answer:   Immediate       Follow up as scheduled.     He expressed verbal understanding and agreed with the plan    Patient's Medications   New Prescriptions    No medications on file   Previous Medications    ASPIRIN (ECOTRIN) 81 MG EC TABLET    Take 81 mg by mouth once daily.    ATORVASTATIN (LIPITOR) 80 MG TABLET    Take 1 tablet (80 mg total) by mouth once daily.    BLOOD SUGAR DIAGNOSTIC STRP    To check BG 4 times daily, to use with insurance preferred meter    BLOOD-GLUCOSE METER KIT    To check BG 4 times daily, to use with insurance preferred meter    INSULIN GLARGINE U-300 CONC (TOUJEO MAX U-300 SOLOSTAR) 300 UNIT/ML (3 ML) INSULIN PEN    Inject 26 Units into the skin once daily.    INSULIN LISPRO PROTAMIN-LISPRO 100 UNIT/ML (50-50) INPN    Inject 10 Units into the skin before meals as needed.    LANCETS MISC    To check BG 4 times daily, to use with insurance preferred meter    LOSARTAN (COZAAR) 100 MG TABLET    TAKE 1 TABLET BY MOUTH EVERY DAY    METOPROLOL SUCCINATE (TOPROL-XL) 50 MG 24 HR TABLET    Take 1 tablet (50 mg total) by mouth once daily.    MULTIVITAMIN-MINERALS-LUTEIN TAB    Take 1 tablet by mouth once daily.    NATEGLINIDE  "(STARLIX) 60 MG TABLET    TAKE 1 TABLET BY MOUTH THREE TIMES DAILY BEFORE MEALS    PEN NEEDLE, DIABETIC (BD ULTRA-FINE BLACK PEN NEEDLE) 32 GAUGE X 5/32" NDLE    Uses 2  times daily, on injections. Dispense 4 mm needles only    SYNJARDY 12.5-1,000 MG TAB    TAKE 1 TABLET BY MOUTH TWICE DAILY    TICAGRELOR (BRILINTA) 90 MG TABLET    Take 1 tablet (90 mg total) by mouth 2 (two) times a day.    TRULICITY 4.5 MG/0.5 ML PEN INJECTOR    Inject 4.5 mg into the skin every 7 days.   Modified Medications    No medications on file   Discontinued Medications    No medications on file        "

## 2022-01-19 ENCOUNTER — TELEPHONE (OUTPATIENT)
Dept: CARDIAC REHAB | Facility: CLINIC | Age: 57
End: 2022-01-19
Payer: COMMERCIAL

## 2022-01-19 NOTE — TELEPHONE ENCOUNTER
Received an order for Phase II cardiac rehab.  Contacted pt to see where he would like to attend & wants to attend at [facility].  Message sent to Jayden, along with MRN #.

## 2022-01-28 ENCOUNTER — HOSPITAL ENCOUNTER (OUTPATIENT)
Dept: CARDIOLOGY | Facility: HOSPITAL | Age: 57
Discharge: HOME | End: 2022-01-28
Attending: INTERNAL MEDICINE
Payer: COMMERCIAL

## 2022-01-28 ENCOUNTER — PATIENT MESSAGE (OUTPATIENT)
Dept: ENDOCRINOLOGY | Facility: CLINIC | Age: 57
End: 2022-01-28
Payer: COMMERCIAL

## 2022-01-28 DIAGNOSIS — I73.9 PAD (PERIPHERAL ARTERY DISEASE): ICD-10-CM

## 2022-01-28 PROCEDURE — 93924 LWR XTR VASC STDY BILAT: CPT | Mod: PO

## 2022-01-28 PROCEDURE — 93924 ANKLE BRACHIAL INDICES (ABI): ICD-10-PCS | Mod: 26,,, | Performed by: INTERNAL MEDICINE

## 2022-01-28 PROCEDURE — 93924 LWR XTR VASC STDY BILAT: CPT | Mod: 26,,, | Performed by: INTERNAL MEDICINE

## 2022-01-31 ENCOUNTER — PATIENT MESSAGE (OUTPATIENT)
Dept: ENDOCRINOLOGY | Facility: CLINIC | Age: 57
End: 2022-01-31
Payer: COMMERCIAL

## 2022-02-01 LAB
ABI CLAUDICATION TIME: 1 MIN
IMMEDIATE ARM BP: 128 MMHG
IMMEDIATE LEFT ABI: 1.36
IMMEDIATE LEFT TIBIAL: 174 MMHG
IMMEDIATE RIGHT ABI: 1.5
IMMEDIATE RIGHT TIBIAL: 192 MMHG
LEFT ABI: 1.33
LEFT ARM BP: 100 MMHG
LEFT DORSALIS PEDIS: 133 MMHG
LEFT POSTERIOR TIBIAL: 161 MMHG
LEFT TBI: 0.85
LEFT TOE PRESSURE: 103 MMHG
POST1 ARM BP: 124 MMHG
POST1 LEFT ABI: 1.29
POST1 LEFT TIBIAL: 160 MMHG
POST1 RIGHT ABI: 1.37
POST1 RIGHT TIBIAL: 170 MMHG
POST2 ARM BP: 121 MMHG
POST2 LEFT ABI: 1.2
POST2 LEFT TIBIAL: 145 MMHG
POST2 RIGHT ABI: 1.3
POST2 RIGHT TIBIAL: 157 MMHG
RIGHT ABI: 1.24
RIGHT ARM BP: 121 MMHG
RIGHT DORSALIS PEDIS: 123 MMHG
RIGHT POSTERIOR TIBIAL: 150 MMHG
RIGHT TBI: 1.02
RIGHT TOE PRESSURE: 124 MMHG
TREADMILL GRADE: 2 %
TREADMILL SPEED: 10 MPH
TREADMILL TIME: 5 MIN

## 2022-02-03 NOTE — PROGRESS NOTES
Subjective:       Patient ID: Yon Loyd is a 56 y.o. male.    Chief Complaint: Diabetes and Hypertension    Yon is a 56 y.o. male who presents today for f/u    A1c: elevated. Seeing endocrine. Maybe due to stress, wasn't as consistent with meds with CAD issues. On 26U and 10 U TID with meals, trulicituy, starlix. Having insurance issues with trulicity. Will send to [facility].   HTN: increased metoprolol to 50 mg 1/8/2022. On synjarday twice daily.   DLD: on atorvastatin 80 mg.   CAD: seeing dr De Souza. S/p Successful PTCA to mLAD with 3.5 x 15 mm JOE post dilated with 3.5 NC balloon.  S/p Successful PTCA to mid/distal LCX  2.5 x 18 JOE post dilated with 3.0 NC balloon. On brilinta and aspirin. Antiplatelet management per cardiology.     macroalbuminuria noted, will check protein/creatine ration.     Review of Systems   Constitutional: Negative for chills and fever.   Respiratory: Negative for shortness of breath.    Cardiovascular: Negative for chest pain.   Gastrointestinal: Negative for nausea and vomiting.   Genitourinary: Negative for difficulty urinating.   Neurological: Negative for dizziness, light-headedness and headaches.             Lab Results   Component Value Date    HGBA1C 7.4 (H) 01/28/2022    HGBA1C 7.0 (H) 11/05/2021    HGBA1C 6.8 (H) 06/24/2021         Objective:     Vitals:    02/04/22 0954   BP: 118/72   Pulse: 82   Temp: 98.7 °F (37.1 °C)        Physical Exam  Vitals and nursing note reviewed.   Constitutional:       General: He is not in acute distress.     Appearance: He is not ill-appearing, toxic-appearing or diaphoretic.   Cardiovascular:      Rate and Rhythm: Normal rate and regular rhythm.   Pulmonary:      Effort: Pulmonary effort is normal.      Breath sounds: Normal breath sounds.   Abdominal:      Palpations: Abdomen is soft.      Tenderness: There is no abdominal tenderness.   Musculoskeletal:         General: No swelling.   Feet:      Right foot:       Protective Sensation: 10 sites tested. 9 sites sensed.      Skin integrity: Dry skin present.      Left foot:      Protective Sensation: 10 sites tested. 9 sites sensed.      Skin integrity: Dry skin present.   Skin:     Findings: No rash.   Neurological:      General: No focal deficit present.      Mental Status: He is alert.   Psychiatric:         Mood and Affect: Mood normal.         Behavior: Behavior normal.         Thought Content: Thought content normal.         Judgment: Judgment normal.         Assessment:       1. Proteinuria due to type 2 diabetes mellitus    2. Type 2 diabetes mellitus with diabetic nephropathy, with long-term current use of insulin    3. Type 2 diabetes mellitus with diabetic polyneuropathy, with long-term current use of insulin    4. Hypertension associated with diabetes    5. Hyperlipidemia associated with type 2 diabetes mellitus    6. Other hyperlipidemia        Plan:         Proteinuria: will get protein/creatine ratio, US, consider renal referral, I will let patient know  Continue metoprolol at 50 mg XR  Continue losartan, consider changing to valsartan?    Continue synjardy  jardiance component can help the heart  Insulin management per endocrine  Continue trulicity, sent to [facility].      Can continue starlix? Contraindicated with heart failure? Will defer to endocrine and cardiology. I think can continue, EF normal.     Continue brilinta and aspirin. Antiplatelet management per cardiology.     F/u 4 months, further f/u TBD. Labs TBD       Proteinuria due to type 2 diabetes mellitus  -     Protein / creatinine ratio, urine; Future; Expected date: 02/04/2022  -     US Retroperitoneal Complete; Future; Expected date: 02/04/2022    Type 2 diabetes mellitus with diabetic nephropathy, with long-term current use of insulin  -     TRULICITY 4.5 mg/0.5 mL pen injector; Inject 4.5 mg into the skin every 7 days.  Dispense: 12 pen; Refill: 0  -     Ambulatory referral/consult to  Optometry; Future; Expected date: 02/11/2022    Type 2 diabetes mellitus with diabetic polyneuropathy, with long-term current use of insulin  -     Ambulatory referral/consult to Optometry; Future; Expected date: 02/11/2022    Hypertension associated with diabetes    Hyperlipidemia associated with type 2 diabetes mellitus    Other hyperlipidemia        Warning signs discussed, patient to call with any further issues or worsening of symptoms.

## 2022-02-04 ENCOUNTER — OFFICE VISIT (OUTPATIENT)
Dept: FAMILY MEDICINE | Facility: CLINIC | Age: 57
End: 2022-02-04
Payer: COMMERCIAL

## 2022-02-04 ENCOUNTER — LAB VISIT (OUTPATIENT)
Dept: LAB | Facility: HOSPITAL | Age: 57
End: 2022-02-04
Attending: FAMILY MEDICINE
Payer: COMMERCIAL

## 2022-02-04 VITALS
HEART RATE: 82 BPM | DIASTOLIC BLOOD PRESSURE: 72 MMHG | HEIGHT: 72 IN | OXYGEN SATURATION: 95 % | BODY MASS INDEX: 28.66 KG/M2 | SYSTOLIC BLOOD PRESSURE: 118 MMHG | TEMPERATURE: 99 F | WEIGHT: 211.63 LBS

## 2022-02-04 DIAGNOSIS — Z79.4 TYPE 2 DIABETES MELLITUS WITH DIABETIC NEPHROPATHY, WITH LONG-TERM CURRENT USE OF INSULIN: Chronic | ICD-10-CM

## 2022-02-04 DIAGNOSIS — I15.2 HYPERTENSION ASSOCIATED WITH DIABETES: Chronic | ICD-10-CM

## 2022-02-04 DIAGNOSIS — E11.21 TYPE 2 DIABETES MELLITUS WITH DIABETIC NEPHROPATHY, WITH LONG-TERM CURRENT USE OF INSULIN: Chronic | ICD-10-CM

## 2022-02-04 DIAGNOSIS — R80.9 PROTEINURIA DUE TO TYPE 2 DIABETES MELLITUS: Chronic | ICD-10-CM

## 2022-02-04 DIAGNOSIS — E78.49 OTHER HYPERLIPIDEMIA: ICD-10-CM

## 2022-02-04 DIAGNOSIS — E11.29 PROTEINURIA DUE TO TYPE 2 DIABETES MELLITUS: Chronic | ICD-10-CM

## 2022-02-04 DIAGNOSIS — E11.29 PROTEINURIA DUE TO TYPE 2 DIABETES MELLITUS: Primary | Chronic | ICD-10-CM

## 2022-02-04 DIAGNOSIS — Z79.4 TYPE 2 DIABETES MELLITUS WITH DIABETIC POLYNEUROPATHY, WITH LONG-TERM CURRENT USE OF INSULIN: Chronic | ICD-10-CM

## 2022-02-04 DIAGNOSIS — R80.9 PROTEINURIA DUE TO TYPE 2 DIABETES MELLITUS: Primary | Chronic | ICD-10-CM

## 2022-02-04 DIAGNOSIS — E78.5 HYPERLIPIDEMIA ASSOCIATED WITH TYPE 2 DIABETES MELLITUS: Chronic | ICD-10-CM

## 2022-02-04 DIAGNOSIS — E11.42 TYPE 2 DIABETES MELLITUS WITH DIABETIC POLYNEUROPATHY, WITH LONG-TERM CURRENT USE OF INSULIN: Chronic | ICD-10-CM

## 2022-02-04 DIAGNOSIS — E11.69 HYPERLIPIDEMIA ASSOCIATED WITH TYPE 2 DIABETES MELLITUS: Chronic | ICD-10-CM

## 2022-02-04 DIAGNOSIS — E11.59 HYPERTENSION ASSOCIATED WITH DIABETES: Chronic | ICD-10-CM

## 2022-02-04 PROCEDURE — 3008F PR BODY MASS INDEX (BMI) DOCUMENTED: ICD-10-PCS | Mod: CPTII,S$GLB,, | Performed by: FAMILY MEDICINE

## 2022-02-04 PROCEDURE — 1160F PR REVIEW ALL MEDS BY PRESCRIBER/CLIN PHARMACIST DOCUMENTED: ICD-10-PCS | Mod: CPTII,S$GLB,, | Performed by: FAMILY MEDICINE

## 2022-02-04 PROCEDURE — 3051F PR MOST RECENT HEMOGLOBIN A1C LEVEL 7.0 - < 8.0%: ICD-10-PCS | Mod: CPTII,S$GLB,, | Performed by: FAMILY MEDICINE

## 2022-02-04 PROCEDURE — 99214 OFFICE O/P EST MOD 30 MIN: CPT | Mod: S$GLB,,, | Performed by: FAMILY MEDICINE

## 2022-02-04 PROCEDURE — 3008F BODY MASS INDEX DOCD: CPT | Mod: CPTII,S$GLB,, | Performed by: FAMILY MEDICINE

## 2022-02-04 PROCEDURE — 4010F PR ACE/ARB THEARPY RXD/TAKEN: ICD-10-PCS | Mod: CPTII,S$GLB,, | Performed by: FAMILY MEDICINE

## 2022-02-04 PROCEDURE — 3078F PR MOST RECENT DIASTOLIC BLOOD PRESSURE < 80 MM HG: ICD-10-PCS | Mod: CPTII,S$GLB,, | Performed by: FAMILY MEDICINE

## 2022-02-04 PROCEDURE — 3074F PR MOST RECENT SYSTOLIC BLOOD PRESSURE < 130 MM HG: ICD-10-PCS | Mod: CPTII,S$GLB,, | Performed by: FAMILY MEDICINE

## 2022-02-04 PROCEDURE — 1159F PR MEDICATION LIST DOCUMENTED IN MEDICAL RECORD: ICD-10-PCS | Mod: CPTII,S$GLB,, | Performed by: FAMILY MEDICINE

## 2022-02-04 PROCEDURE — 3078F DIAST BP <80 MM HG: CPT | Mod: CPTII,S$GLB,, | Performed by: FAMILY MEDICINE

## 2022-02-04 PROCEDURE — 99999 PR PBB SHADOW E&M-EST. PATIENT-LVL V: CPT | Mod: PBBFAC,,, | Performed by: FAMILY MEDICINE

## 2022-02-04 PROCEDURE — 1160F RVW MEDS BY RX/DR IN RCRD: CPT | Mod: CPTII,S$GLB,, | Performed by: FAMILY MEDICINE

## 2022-02-04 PROCEDURE — 3062F POS MACROALBUMINURIA REV: CPT | Mod: CPTII,S$GLB,, | Performed by: FAMILY MEDICINE

## 2022-02-04 PROCEDURE — 3074F SYST BP LT 130 MM HG: CPT | Mod: CPTII,S$GLB,, | Performed by: FAMILY MEDICINE

## 2022-02-04 PROCEDURE — 3051F HG A1C>EQUAL 7.0%<8.0%: CPT | Mod: CPTII,S$GLB,, | Performed by: FAMILY MEDICINE

## 2022-02-04 PROCEDURE — 82570 ASSAY OF URINE CREATININE: CPT | Performed by: FAMILY MEDICINE

## 2022-02-04 PROCEDURE — 3066F NEPHROPATHY DOC TX: CPT | Mod: CPTII,S$GLB,, | Performed by: FAMILY MEDICINE

## 2022-02-04 PROCEDURE — 4010F ACE/ARB THERAPY RXD/TAKEN: CPT | Mod: CPTII,S$GLB,, | Performed by: FAMILY MEDICINE

## 2022-02-04 PROCEDURE — 1159F MED LIST DOCD IN RCRD: CPT | Mod: CPTII,S$GLB,, | Performed by: FAMILY MEDICINE

## 2022-02-04 PROCEDURE — 99214 PR OFFICE/OUTPT VISIT, EST, LEVL IV, 30-39 MIN: ICD-10-PCS | Mod: S$GLB,,, | Performed by: FAMILY MEDICINE

## 2022-02-04 PROCEDURE — 3066F PR DOCUMENTATION OF TREATMENT FOR NEPHROPATHY: ICD-10-PCS | Mod: CPTII,S$GLB,, | Performed by: FAMILY MEDICINE

## 2022-02-04 PROCEDURE — 99999 PR PBB SHADOW E&M-EST. PATIENT-LVL V: ICD-10-PCS | Mod: PBBFAC,,, | Performed by: FAMILY MEDICINE

## 2022-02-04 PROCEDURE — 3062F PR POS MACROALBUMINURIA RESULT DOCUMENTED/REVIEW: ICD-10-PCS | Mod: CPTII,S$GLB,, | Performed by: FAMILY MEDICINE

## 2022-02-04 RX ORDER — DULAGLUTIDE 4.5 MG/.5ML
4.5 INJECTION, SOLUTION SUBCUTANEOUS
Qty: 12 PEN | Refills: 0 | Status: SHIPPED | OUTPATIENT
Start: 2022-02-04 | End: 2022-08-17 | Stop reason: SDUPTHER

## 2022-02-04 NOTE — PATIENT INSTRUCTIONS
Proteinuria: will get protein/creatine ratio, US, consider renal referral, I will let patient know  Continue metoprolol at 50 mg XR  Continue losartan, consider changing to valsartan?    Continue synjardy  jardiance component can help the heart  Insulin management per endocrine  Continue trulicity     Can continue starlix? Contraindicated with heart failure? Will defer to endocrine and cardiology. I think can continue.     Continue brilinta and aspirin. Antiplatelet management per cardiology.     F/u 4 months, further f/u TBD. Labs TBD

## 2022-02-05 LAB
CREAT UR-MCNC: 42 MG/DL (ref 23–375)
PROT UR-MCNC: 16 MG/DL (ref 0–15)
PROT/CREAT UR: 0.38 MG/G{CREAT} (ref 0–0.2)

## 2022-02-07 ENCOUNTER — TELEPHONE (OUTPATIENT)
Dept: PHARMACY | Facility: CLINIC | Age: 57
End: 2022-02-07
Payer: COMMERCIAL

## 2022-02-11 ENCOUNTER — PATIENT MESSAGE (OUTPATIENT)
Dept: CARDIOLOGY | Facility: CLINIC | Age: 57
End: 2022-02-11
Payer: COMMERCIAL

## 2022-03-09 ENCOUNTER — TELEPHONE (OUTPATIENT)
Dept: OPTOMETRY | Facility: CLINIC | Age: 57
End: 2022-03-09
Payer: COMMERCIAL

## 2022-03-15 NOTE — PROGRESS NOTES
Subjective:      Patient ID: Yon Loyd is a 56 y.o. male.    Chief Complaint:  No chief complaint on file.    History of Present Illness  Yon Loyd with Type 2 DM complicated by CAD with 2 stents in Dec 2021, retinopathy, CKD and proteinuria, DLP and HTN presents today for follow up of T2DM. Previous patient of mine. Last visit in Nov 2021.     CGM done in June 2020 -- flat line    Since his last visit, he had MI s/p successful PTCA and stent to mLAD and LCX      With regards to the diabetes:    Diagnosed with Type 2 diabetes in ~1993.  Been on insulin since ~2013. Started Trulicity 12/2016.   Family history of type 2 diabetes in mother and father.     DIABETES COMPLICATIONS:   Nephropathy +; sees nephrology -- follow up in one year per note  Retinopathy +; up to date with eye exam  Peripheral neuropathy +; tolerable; does not wish to see podiatry  CAD: +     Current regimen:  Synjardy 12.5/1000 mg twice daily  Trulicity 4.5 mg weekly (Tuesday's)  Toujeo 26 units daily in the morning  Humalog 10 units before breakfast and dinner  Starlix 60 mg before lunch but he has been taking 3 times a day for the last 1.5 months    Missed doses: missing starlix sometimes and has been missing doses of novolog at dinner. He was out of Trulicity and Synjardy while waiting for PA's    He is trying to give novolog 10 minutes prior to a meal. He is rotating injection sites. He is now changing needle every time.     Other medications tried:  Invokana but stopped due to fear of side effects    He reports he is not really checking blood sugar but finally agrees to wear dexcom starter kit.   Hypoglycemic event-Denies and s/s of BG less than 70  Knows how to correct with 15 grams of carbs- juice, coke, or a peppermint.      Eats 3 meals a day. He feels that he is not always following diabetic diet.  B: sausage biscuit from [employer] (throws away 75% of biscuit) or at home  L: sandwich  D: varies -burgers or chicken with fries    He is eating out often.   Snacks: peanut butter crackers, pickles, olives        Drinks: diet soda and coffee with equal    Exercise: walks often for work. No formal exercise.    Education - last visit: Politely declines   Glucagon: does not want     Diabetes Management Status  Statin: Taking  ACE/ARB: Taking    Lab Results   Component Value Date    HGBA1C 7.4 (H) 01/28/2022    HGBA1C 7.0 (H) 11/05/2021    HGBA1C 6.8 (H) 06/24/2021     Screening or Prevention Patient's value Goal Complete/Controlled?   HgA1C Testing and Control   Lab Results   Component Value Date    HGBA1C 7.4 (H) 01/28/2022      Annually/Less than 8% Yes   Lipid profile : 01/28/2022 Annually Yes   LDL control Lab Results   Component Value Date    LDLCALC 63.6 01/28/2022    Annually/Less than 100 mg/dl  Yes   Nephropathy screening Lab Results   Component Value Date    LABMICR 199.0 01/28/2022     Lab Results   Component Value Date    PROTEINUA Negative 07/30/2021    Annually No   Blood pressure BP Readings from Last 1 Encounters:   03/18/22 124/78    Less than 140/90 No   Dilated retinal exam : 12/04/2020 Annually Yes   Foot exam   : 02/04/2022 Annually Yes     With regards to dyslipidemia,      He is on atorvastatin 80 mg daily     Latest Reference Range & Units 11/05/21 08:38 01/28/22 07:43   Cholesterol 120 - 199 mg/dL 192 [1] 126 [2]   HDL 40 - 75 mg/dL 48 [3] 41 [4]   HDL/Cholesterol Ratio 20.0 - 50.0 % 25.0 32.5   LDL Cholesterol External 63.0 - 159.0 mg/dL 90.0 [5] 63.6 [6]   Non-HDL Cholesterol mg/dL 144 [7] 85 [8]   Total Cholesterol/HDL Ratio 2.0 - 5.0  4.0 3.1   Triglycerides 30 - 150 mg/dL 270 (H) [9] 107 [10]     Of note, he reports fatigue and snoring. He does not want to have sleep study at this time.     Review of Systems   Constitutional: Positive for fatigue. Negative for unexpected weight change.   Eyes: Negative for visual disturbance.   Endocrine: Positive for polydipsia (improved since last visit ). Negative for polyphagia  and polyuria.   Genitourinary: Positive for frequency (at night).   Psychiatric/Behavioral: Negative for sleep disturbance.     Objective:   Physical Exam  Vitals reviewed.   Neck:      Thyroid: No thyromegaly.   Cardiovascular:      Comments: No edema present  Pulmonary:      Effort: Pulmonary effort is normal.   Abdominal:      Palpations: Abdomen is soft.   Skin:     Comments: Left upper arm 2 pruritic rashes but shoulder now open lesion without edema but some erythema-- will consult dermatology -states has been there for 2 years     injection sites are without edema or erythema. No lipo hypertropthy or atrophy  Diabetes Foot Exam:  Feet no cuts or scratches  Shoes appropriate  DM foot exam deferred; done in Feb 2022    Body mass index is 28.66 kg/m².    Vitals:    03/18/22 0909   BP: 124/78   Pulse: 70     Wt Readings from Last 3 Encounters:   03/18/22 0909 95.8 kg (211 lb 5 oz)   02/04/22 0954 96 kg (211 lb 10.3 oz)   01/18/22 1556 97.7 kg (215 lb 6.2 oz)     Lab Review:   Lab Results   Component Value Date    HGBA1C 7.4 (H) 01/28/2022     Lab Results   Component Value Date    CHOL 126 01/28/2022    HDL 41 01/28/2022    LDLCALC 63.6 01/28/2022    TRIG 107 01/28/2022    CHOLHDL 32.5 01/28/2022     Lab Results   Component Value Date     01/28/2022    K 4.4 01/28/2022     01/28/2022    CO2 28 01/28/2022     (H) 01/28/2022    BUN 19 01/28/2022    CREATININE 1.04 01/28/2022    CALCIUM 9.3 01/28/2022    PROT 7.6 01/28/2022    ALBUMIN 4.2 01/28/2022    BILITOT 0.6 01/28/2022    ALKPHOS 123 01/28/2022    AST 31 01/28/2022    ALT 35 01/28/2022    ANIONGAP 10 01/28/2022    ESTGFRAFRICA >60.0 01/28/2022    EGFRNONAA >60.0 01/28/2022    TSH 0.918 11/05/2021       Assessment and Plan     1. Type 2 diabetes mellitus with both eyes affected by mild nonproliferative retinopathy without macular edema, with long-term current use of insulin  Hemoglobin A1C    Comprehensive Metabolic Panel    Hemoglobin A1C   2.  Hyperlipidemia associated with type 2 diabetes mellitus     3. Hypertension associated with diabetes     4. Arm skin lesion, left  Ambulatory referral/consult to Dermatology     Type 2 diabetes mellitus with both eyes affected by mild nonproliferative retinopathy without macular edema, with long-term current use of insulin  Complicated by CAD, RN, nephropathy and neuropathy  -- Reviewed goals of therapy are to get the best control we can without hypoglycemia  -- Labs prior to next appt.   -- Discussed A1c at goal!! <7%  Encouraged to remember to take medications. Set alarm on phone    Medication changes:   Discussed his A1c is from Jan 2022 but he has forgotten doses Synjardy and Trulicity and has been taking more Starlix than prescribed.     Discussed we need labs and logs to determine treatment plan. We will check labs next month and again on RTC. He finally agrees to take dexcom starter kit. He will let me know if he likes it and we will order. Will look at data in 10 days.     Continue   Synjardy 12.5/1000 mg twice daily  Trulicity 4.5 mg weekly (Tuesday's)  Toujeo 26 units daily in the morning (24 hour insulin)  Lyumjev/Humalog 10 units before breakfast and dinner (meals === fast acting)  Start taking the Starlix 60 mg before lunch -don't miss doses and set alarm to take     [email or web address]     You will receive a dexcom starter kit.    -- Discussed importance of checking BG prior to giving insulin to prevent hypoglycemia   -- Reviewed patient's current insulin regimen. Clarified proper insulin dose and timing in relation to meals, etc. Insulin injection sites and proper rotation instructed.  -- Advised frequent self blood glucose monitoring.  Patient encouraged to document glucose results and bring them to every clinic visit    -- Hypoglycemia precautions discussed. Instructed on precautions before driving.    -- Call for Bg repeatedly < 90 or > 180.   -- Close adherence  to lifestyle changes recommended.   -- Periodic follow ups for eye evaluations, foot care and dental care suggested.    Hyperlipidemia associated with type 2 diabetes mellitus  -- LDL goal <70; now at goal  -- On statin per ADA recommendations      Hypertension associated with diabetes  -- on ARB  Continue Losartan 100 mg daily   Goal blood pressure is is less than 130/80    Arm skin lesion, left  Consult derm    Follow up in about 4 months (around 7/18/2022).      Betzaida Rodriguez NP  A1c next month and in July 2022

## 2022-03-17 ENCOUNTER — PATIENT OUTREACH (OUTPATIENT)
Dept: ADMINISTRATIVE | Facility: OTHER | Age: 57
End: 2022-03-17
Payer: COMMERCIAL

## 2022-03-17 NOTE — ASSESSMENT & PLAN NOTE
Complicated by CAD, RN, nephropathy and neuropathy  -- Reviewed goals of therapy are to get the best control we can without hypoglycemia  -- Labs prior to next appt.   -- Discussed A1c at goal!! <7%  Encouraged to remember to take medications. Set alarm on phone    Medication changes:   Discussed his A1c is from Jan 2022 but he has forgotten doses Synjardy and Trulicity and has been taking more Starlix than prescribed.     Discussed we need labs and logs to determine treatment plan. We will check labs next month and again on RTC. He finally agrees to take dexcom starter kit. He will let me know if he likes it and we will order. Will look at data in 10 days.     Continue   Synjardy 12.5/1000 mg twice daily  Trulicity 4.5 mg weekly (Tuesday's)  Toujeo 26 units daily in the morning (24 hour insulin)  Lyumjev/Humalog 10 units before breakfast and dinner (meals === fast acting)  Start taking the Starlix 60 mg before lunch -don't miss doses and set alarm to take     [email or web address]     You will receive a dexcom starter kit.    -- Discussed importance of checking BG prior to giving insulin to prevent hypoglycemia   -- Reviewed patient's current insulin regimen. Clarified proper insulin dose and timing in relation to meals, etc. Insulin injection sites and proper rotation instructed.  -- Advised frequent self blood glucose monitoring.  Patient encouraged to document glucose results and bring them to every clinic visit    -- Hypoglycemia precautions discussed. Instructed on precautions before driving.    -- Call for Bg repeatedly < 90 or > 180.   -- Close adherence to lifestyle changes recommended.   -- Periodic follow ups for eye evaluations, foot care and dental care suggested.

## 2022-03-17 NOTE — PROGRESS NOTES
Health Maintenance Due   Topic Date Due    HIV Screening  Never done    TETANUS VACCINE  Never done    Shingles Vaccine (1 of 2) Never done     Updates were requested from care everywhere.  Chart was reviewed for overdue Proactive [facility] Encounters ([facility]) topics (CRS, Breast Cancer Screening, Eye exam)  Health Maintenance has been updated.  LINKS immunization registry triggered.  Immunizations were reconciled.

## 2022-03-18 ENCOUNTER — PATIENT MESSAGE (OUTPATIENT)
Dept: ENDOCRINOLOGY | Facility: CLINIC | Age: 57
End: 2022-03-18

## 2022-03-18 ENCOUNTER — OFFICE VISIT (OUTPATIENT)
Dept: ENDOCRINOLOGY | Facility: CLINIC | Age: 57
End: 2022-03-18
Payer: COMMERCIAL

## 2022-03-18 VITALS
DIASTOLIC BLOOD PRESSURE: 78 MMHG | SYSTOLIC BLOOD PRESSURE: 124 MMHG | HEART RATE: 70 BPM | HEIGHT: 72 IN | BODY MASS INDEX: 28.62 KG/M2 | WEIGHT: 211.31 LBS | OXYGEN SATURATION: 97 %

## 2022-03-18 DIAGNOSIS — Z79.4 TYPE 2 DIABETES MELLITUS WITH BOTH EYES AFFECTED BY MILD NONPROLIFERATIVE RETINOPATHY WITHOUT MACULAR EDEMA, WITH LONG-TERM CURRENT USE OF INSULIN: Primary | ICD-10-CM

## 2022-03-18 DIAGNOSIS — E11.59 HYPERTENSION ASSOCIATED WITH DIABETES: Chronic | ICD-10-CM

## 2022-03-18 DIAGNOSIS — I15.2 HYPERTENSION ASSOCIATED WITH DIABETES: Chronic | ICD-10-CM

## 2022-03-18 DIAGNOSIS — E78.5 HYPERLIPIDEMIA ASSOCIATED WITH TYPE 2 DIABETES MELLITUS: Chronic | ICD-10-CM

## 2022-03-18 DIAGNOSIS — L98.9 ARM SKIN LESION, LEFT: ICD-10-CM

## 2022-03-18 DIAGNOSIS — E11.3293 TYPE 2 DIABETES MELLITUS WITH BOTH EYES AFFECTED BY MILD NONPROLIFERATIVE RETINOPATHY WITHOUT MACULAR EDEMA, WITH LONG-TERM CURRENT USE OF INSULIN: Primary | ICD-10-CM

## 2022-03-18 DIAGNOSIS — E11.69 HYPERLIPIDEMIA ASSOCIATED WITH TYPE 2 DIABETES MELLITUS: Chronic | ICD-10-CM

## 2022-03-18 PROCEDURE — 4010F PR ACE/ARB THEARPY RXD/TAKEN: ICD-10-PCS | Mod: CPTII,S$GLB,, | Performed by: NURSE PRACTITIONER

## 2022-03-18 PROCEDURE — 3078F DIAST BP <80 MM HG: CPT | Mod: CPTII,S$GLB,, | Performed by: NURSE PRACTITIONER

## 2022-03-18 PROCEDURE — 1159F PR MEDICATION LIST DOCUMENTED IN MEDICAL RECORD: ICD-10-PCS | Mod: CPTII,S$GLB,, | Performed by: NURSE PRACTITIONER

## 2022-03-18 PROCEDURE — 1160F PR REVIEW ALL MEDS BY PRESCRIBER/CLIN PHARMACIST DOCUMENTED: ICD-10-PCS | Mod: CPTII,S$GLB,, | Performed by: NURSE PRACTITIONER

## 2022-03-18 PROCEDURE — 99999 PR PBB SHADOW E&M-EST. PATIENT-LVL IV: CPT | Mod: PBBFAC,,, | Performed by: NURSE PRACTITIONER

## 2022-03-18 PROCEDURE — 3062F POS MACROALBUMINURIA REV: CPT | Mod: CPTII,S$GLB,, | Performed by: NURSE PRACTITIONER

## 2022-03-18 PROCEDURE — 3008F BODY MASS INDEX DOCD: CPT | Mod: CPTII,S$GLB,, | Performed by: NURSE PRACTITIONER

## 2022-03-18 PROCEDURE — 3074F PR MOST RECENT SYSTOLIC BLOOD PRESSURE < 130 MM HG: ICD-10-PCS | Mod: CPTII,S$GLB,, | Performed by: NURSE PRACTITIONER

## 2022-03-18 PROCEDURE — 3051F HG A1C>EQUAL 7.0%<8.0%: CPT | Mod: CPTII,S$GLB,, | Performed by: NURSE PRACTITIONER

## 2022-03-18 PROCEDURE — 1159F MED LIST DOCD IN RCRD: CPT | Mod: CPTII,S$GLB,, | Performed by: NURSE PRACTITIONER

## 2022-03-18 PROCEDURE — 3066F NEPHROPATHY DOC TX: CPT | Mod: CPTII,S$GLB,, | Performed by: NURSE PRACTITIONER

## 2022-03-18 PROCEDURE — 99214 OFFICE O/P EST MOD 30 MIN: CPT | Mod: S$GLB,,, | Performed by: NURSE PRACTITIONER

## 2022-03-18 PROCEDURE — 1160F RVW MEDS BY RX/DR IN RCRD: CPT | Mod: CPTII,S$GLB,, | Performed by: NURSE PRACTITIONER

## 2022-03-18 PROCEDURE — 3066F PR DOCUMENTATION OF TREATMENT FOR NEPHROPATHY: ICD-10-PCS | Mod: CPTII,S$GLB,, | Performed by: NURSE PRACTITIONER

## 2022-03-18 PROCEDURE — 99999 PR PBB SHADOW E&M-EST. PATIENT-LVL IV: ICD-10-PCS | Mod: PBBFAC,,, | Performed by: NURSE PRACTITIONER

## 2022-03-18 PROCEDURE — 3008F PR BODY MASS INDEX (BMI) DOCUMENTED: ICD-10-PCS | Mod: CPTII,S$GLB,, | Performed by: NURSE PRACTITIONER

## 2022-03-18 PROCEDURE — 4010F ACE/ARB THERAPY RXD/TAKEN: CPT | Mod: CPTII,S$GLB,, | Performed by: NURSE PRACTITIONER

## 2022-03-18 PROCEDURE — 3062F PR POS MACROALBUMINURIA RESULT DOCUMENTED/REVIEW: ICD-10-PCS | Mod: CPTII,S$GLB,, | Performed by: NURSE PRACTITIONER

## 2022-03-18 PROCEDURE — 3051F PR MOST RECENT HEMOGLOBIN A1C LEVEL 7.0 - < 8.0%: ICD-10-PCS | Mod: CPTII,S$GLB,, | Performed by: NURSE PRACTITIONER

## 2022-03-18 PROCEDURE — 3078F PR MOST RECENT DIASTOLIC BLOOD PRESSURE < 80 MM HG: ICD-10-PCS | Mod: CPTII,S$GLB,, | Performed by: NURSE PRACTITIONER

## 2022-03-18 PROCEDURE — 3074F SYST BP LT 130 MM HG: CPT | Mod: CPTII,S$GLB,, | Performed by: NURSE PRACTITIONER

## 2022-03-18 PROCEDURE — 99214 PR OFFICE/OUTPT VISIT, EST, LEVL IV, 30-39 MIN: ICD-10-PCS | Mod: S$GLB,,, | Performed by: NURSE PRACTITIONER

## 2022-03-18 NOTE — PATIENT INSTRUCTIONS
Diabetes    Discussed his A1c is from Jan 2022 but he has forgotten doses Synjardy and Trulicity and has been taking more Starlix than prescribed.     Discussed we need labs and logs to determine treatment plan. We will check labs next month and again on RTC. He finally agrees to take dexcom starter kit. He will let me know if he likes it and we will order. Will look at data in 10 days.     Continue   Synjardy 12.5/1000 mg twice daily  Trulicity 4.5 mg weekly (Tuesday's)  Toujeo 26 units daily in the morning (24 hour insulin)  Lyumjev/Humalog 10 units before breakfast and dinner (meals === fast acting)  Start taking the Starlix 60 mg before lunch -don't miss doses and set alarm to take     [email or web address]     You will receive a dexcom starter kit. You will use your cell phone to monitor your blood sugars. Please download the dexcom G6 nae to monitor your blood sugars. You will create a profile with a username and password.  Please also download the dexcom clarity nae. You will use the profile information to log in to the clarity nae. The dexcom clarity nae will offer a share code so I can download your data as necessary. Send me the clarity code by going to profile, authorize sharing, generate code. It is a 12 digit code. Your loved ones may also download the dexcom follow nae to monitor your blood sugars from afar. Please let us know if you have any questions.     Cholesterol               Continue atorvastatin 80 mg daily                  BP               Continue Losartan 100 mg daily               Start checking blood pressure at home! And make log for PCP     Goal blood pressure is is less than 130/80     Vitamin D               OTC Vitamin D 2000 IU daily

## 2022-03-25 ENCOUNTER — HOSPITAL ENCOUNTER (OUTPATIENT)
Dept: RADIOLOGY | Facility: HOSPITAL | Age: 57
Discharge: HOME | End: 2022-03-25
Attending: FAMILY MEDICINE
Payer: COMMERCIAL

## 2022-03-25 DIAGNOSIS — R80.9 PROTEINURIA DUE TO TYPE 2 DIABETES MELLITUS: ICD-10-CM

## 2022-03-25 DIAGNOSIS — E11.29 PROTEINURIA DUE TO TYPE 2 DIABETES MELLITUS: ICD-10-CM

## 2022-03-25 PROCEDURE — 76770 US EXAM ABDO BACK WALL COMP: CPT | Mod: TC,PO

## 2022-03-28 ENCOUNTER — PATIENT MESSAGE (OUTPATIENT)
Dept: ENDOCRINOLOGY | Facility: CLINIC | Age: 57
End: 2022-03-28
Payer: COMMERCIAL

## 2022-04-01 ENCOUNTER — OFFICE VISIT (OUTPATIENT)
Dept: OPTOMETRY | Facility: CLINIC | Age: 57
End: 2022-04-01
Payer: COMMERCIAL

## 2022-04-01 DIAGNOSIS — Z79.4 TYPE 2 DIABETES MELLITUS WITH DIABETIC POLYNEUROPATHY, WITH LONG-TERM CURRENT USE OF INSULIN: Chronic | ICD-10-CM

## 2022-04-01 DIAGNOSIS — E11.42 TYPE 2 DIABETES MELLITUS WITH DIABETIC POLYNEUROPATHY, WITH LONG-TERM CURRENT USE OF INSULIN: Chronic | ICD-10-CM

## 2022-04-01 DIAGNOSIS — E11.21 TYPE 2 DIABETES MELLITUS WITH DIABETIC NEPHROPATHY, WITH LONG-TERM CURRENT USE OF INSULIN: Chronic | ICD-10-CM

## 2022-04-01 DIAGNOSIS — H52.4 PRESBYOPIA: ICD-10-CM

## 2022-04-01 DIAGNOSIS — H25.13 NUCLEAR SCLEROSIS, BILATERAL: ICD-10-CM

## 2022-04-01 DIAGNOSIS — E11.9 TYPE 2 DIABETES MELLITUS WITHOUT RETINOPATHY: Primary | ICD-10-CM

## 2022-04-01 DIAGNOSIS — Z79.4 TYPE 2 DIABETES MELLITUS WITH DIABETIC NEPHROPATHY, WITH LONG-TERM CURRENT USE OF INSULIN: Chronic | ICD-10-CM

## 2022-04-01 DIAGNOSIS — Z13.5 GLAUCOMA SCREENING: ICD-10-CM

## 2022-04-01 PROCEDURE — 92014 PR EYE EXAM, EST PATIENT,COMPREHESV: ICD-10-PCS | Mod: S$GLB,,, | Performed by: OPTOMETRIST

## 2022-04-01 PROCEDURE — 2023F DILAT RTA XM W/O RTNOPTHY: CPT | Mod: CPTII,S$GLB,, | Performed by: OPTOMETRIST

## 2022-04-01 PROCEDURE — 1159F MED LIST DOCD IN RCRD: CPT | Mod: CPTII,S$GLB,, | Performed by: OPTOMETRIST

## 2022-04-01 PROCEDURE — 2023F PR DILATED RETINAL EXAM W/O EVID OF RETINOPATHY: ICD-10-PCS | Mod: CPTII,S$GLB,, | Performed by: OPTOMETRIST

## 2022-04-01 PROCEDURE — 3066F NEPHROPATHY DOC TX: CPT | Mod: CPTII,S$GLB,, | Performed by: OPTOMETRIST

## 2022-04-01 PROCEDURE — 3066F PR DOCUMENTATION OF TREATMENT FOR NEPHROPATHY: ICD-10-PCS | Mod: CPTII,S$GLB,, | Performed by: OPTOMETRIST

## 2022-04-01 PROCEDURE — 1160F RVW MEDS BY RX/DR IN RCRD: CPT | Mod: CPTII,S$GLB,, | Performed by: OPTOMETRIST

## 2022-04-01 PROCEDURE — 4010F PR ACE/ARB THEARPY RXD/TAKEN: ICD-10-PCS | Mod: CPTII,S$GLB,, | Performed by: OPTOMETRIST

## 2022-04-01 PROCEDURE — 3051F PR MOST RECENT HEMOGLOBIN A1C LEVEL 7.0 - < 8.0%: ICD-10-PCS | Mod: CPTII,S$GLB,, | Performed by: OPTOMETRIST

## 2022-04-01 PROCEDURE — 3062F POS MACROALBUMINURIA REV: CPT | Mod: CPTII,S$GLB,, | Performed by: OPTOMETRIST

## 2022-04-01 PROCEDURE — 92015 DETERMINE REFRACTIVE STATE: CPT | Mod: S$GLB,,, | Performed by: OPTOMETRIST

## 2022-04-01 PROCEDURE — 92014 COMPRE OPH EXAM EST PT 1/>: CPT | Mod: S$GLB,,, | Performed by: OPTOMETRIST

## 2022-04-01 PROCEDURE — 3062F PR POS MACROALBUMINURIA RESULT DOCUMENTED/REVIEW: ICD-10-PCS | Mod: CPTII,S$GLB,, | Performed by: OPTOMETRIST

## 2022-04-01 PROCEDURE — 92015 PR REFRACTION: ICD-10-PCS | Mod: S$GLB,,, | Performed by: OPTOMETRIST

## 2022-04-01 PROCEDURE — 1159F PR MEDICATION LIST DOCUMENTED IN MEDICAL RECORD: ICD-10-PCS | Mod: CPTII,S$GLB,, | Performed by: OPTOMETRIST

## 2022-04-01 PROCEDURE — 1160F PR REVIEW ALL MEDS BY PRESCRIBER/CLIN PHARMACIST DOCUMENTED: ICD-10-PCS | Mod: CPTII,S$GLB,, | Performed by: OPTOMETRIST

## 2022-04-01 PROCEDURE — 4010F ACE/ARB THERAPY RXD/TAKEN: CPT | Mod: CPTII,S$GLB,, | Performed by: OPTOMETRIST

## 2022-04-01 PROCEDURE — 3051F HG A1C>EQUAL 7.0%<8.0%: CPT | Mod: CPTII,S$GLB,, | Performed by: OPTOMETRIST

## 2022-04-01 PROCEDURE — 99999 PR PBB SHADOW E&M-EST. PATIENT-LVL III: CPT | Mod: PBBFAC,,, | Performed by: OPTOMETRIST

## 2022-04-01 PROCEDURE — 99999 PR PBB SHADOW E&M-EST. PATIENT-LVL III: ICD-10-PCS | Mod: PBBFAC,,, | Performed by: OPTOMETRIST

## 2022-04-01 NOTE — PROGRESS NOTES
HPI     MITZY: 12/4/2020 - Dr. Vasquez    Presents today for Diabetic Eye Exam.  No vision complaints--need updated rx to purchase new spex.  Occ floaters, no flashes  No gtts    LBS: did not check today  Hemoglobin A1C       Date                     Value               Ref Range             Status                01/28/2022               7.4 (H)             4.0 - 5.6 %           Final                  11/05/2021               7.0 (H)             4.0 - 5.6 %           Final                   06/24/2021               6.8 (H)             4.0 - 5.6 %           Final                  Last edited by Zully Osei MA on 4/1/2022  9:11 AM. (History)        ROS     Positive for: Endocrine (DM)    Negative for: Constitutional, Gastrointestinal, Neurological, Skin,   Genitourinary, Musculoskeletal, HENT, Cardiovascular, Eyes, Respiratory,   Psychiatric, Allergic/Imm, Heme/Lymph    Last edited by Nikos Brown, JANINE on 4/1/2022  9:13 AM. (History)        Assessment /Plan     For exam results, see Encounter Report.    Type 2 diabetes mellitus without retinopathy    Type 2 diabetes mellitus with diabetic nephropathy, with long-term current use of insulin  -     Ambulatory referral/consult to Optometry    Type 2 diabetes mellitus with diabetic polyneuropathy, with long-term current use of insulin  -     Ambulatory referral/consult to Optometry    Nuclear sclerosis, bilateral    Glaucoma screening    Presbyopia      1. Cat OU--pt wishes new Rx  2. DM- WITHOUT RETINOPATHY.  (noted NPDR in the past, but today wnl) Advised yearly DFE    PLAN:    rtc 1 yr

## 2022-04-11 NOTE — TELEPHONE ENCOUNTER
No new care gaps identified.  Powered by [employer]. Reference number: [id number].   4/11/2022 12:10:59 PM CDT

## 2022-04-12 RX ORDER — METOPROLOL SUCCINATE 50 MG/1
TABLET, EXTENDED RELEASE ORAL
Qty: 90 TABLET | Refills: 3 | Status: SHIPPED | OUTPATIENT
Start: 2022-04-12 | End: 2023-03-31 | Stop reason: SDUPTHER

## 2022-04-12 NOTE — TELEPHONE ENCOUNTER
Refill Authorization Note   Yon Loyd  is requesting a refill authorization.  Brief Assessment and Rationale for Refill:  Approve     Medication Therapy Plan:       Medication Reconciliation Completed: No   Comments:     No Care Gaps recommended.     Note composed:12:37 PM 04/12/2022

## 2022-04-25 ENCOUNTER — OFFICE VISIT (OUTPATIENT)
Dept: CARDIOLOGY | Facility: CLINIC | Age: 57
End: 2022-04-25
Payer: COMMERCIAL

## 2022-04-25 VITALS
DIASTOLIC BLOOD PRESSURE: 75 MMHG | WEIGHT: 213.38 LBS | OXYGEN SATURATION: 94 % | HEART RATE: 77 BPM | HEIGHT: 72 IN | BODY MASS INDEX: 28.9 KG/M2 | SYSTOLIC BLOOD PRESSURE: 137 MMHG

## 2022-04-25 DIAGNOSIS — E78.49 OTHER HYPERLIPIDEMIA: ICD-10-CM

## 2022-04-25 DIAGNOSIS — I15.2 HYPERTENSION ASSOCIATED WITH DIABETES: Primary | Chronic | ICD-10-CM

## 2022-04-25 DIAGNOSIS — E11.69 HYPERLIPIDEMIA ASSOCIATED WITH TYPE 2 DIABETES MELLITUS: Chronic | ICD-10-CM

## 2022-04-25 DIAGNOSIS — I25.2 HISTORY OF NON-ST ELEVATION MYOCARDIAL INFARCTION (NSTEMI): Chronic | ICD-10-CM

## 2022-04-25 DIAGNOSIS — E78.5 HYPERLIPIDEMIA ASSOCIATED WITH TYPE 2 DIABETES MELLITUS: Chronic | ICD-10-CM

## 2022-04-25 DIAGNOSIS — Z95.5 STENTED CORONARY ARTERY: Chronic | ICD-10-CM

## 2022-04-25 DIAGNOSIS — E11.59 HYPERTENSION ASSOCIATED WITH DIABETES: Primary | Chronic | ICD-10-CM

## 2022-04-25 PROCEDURE — 99214 OFFICE O/P EST MOD 30 MIN: CPT | Mod: S$GLB,,, | Performed by: INTERNAL MEDICINE

## 2022-04-25 PROCEDURE — 1159F MED LIST DOCD IN RCRD: CPT | Mod: CPTII,S$GLB,, | Performed by: INTERNAL MEDICINE

## 2022-04-25 PROCEDURE — 3062F PR POS MACROALBUMINURIA RESULT DOCUMENTED/REVIEW: ICD-10-PCS | Mod: CPTII,S$GLB,, | Performed by: INTERNAL MEDICINE

## 2022-04-25 PROCEDURE — 3062F POS MACROALBUMINURIA REV: CPT | Mod: CPTII,S$GLB,, | Performed by: INTERNAL MEDICINE

## 2022-04-25 PROCEDURE — 99214 PR OFFICE/OUTPT VISIT, EST, LEVL IV, 30-39 MIN: ICD-10-PCS | Mod: S$GLB,,, | Performed by: INTERNAL MEDICINE

## 2022-04-25 PROCEDURE — 3051F HG A1C>EQUAL 7.0%<8.0%: CPT | Mod: CPTII,S$GLB,, | Performed by: INTERNAL MEDICINE

## 2022-04-25 PROCEDURE — 3008F PR BODY MASS INDEX (BMI) DOCUMENTED: ICD-10-PCS | Mod: CPTII,S$GLB,, | Performed by: INTERNAL MEDICINE

## 2022-04-25 PROCEDURE — 3008F BODY MASS INDEX DOCD: CPT | Mod: CPTII,S$GLB,, | Performed by: INTERNAL MEDICINE

## 2022-04-25 PROCEDURE — 1159F PR MEDICATION LIST DOCUMENTED IN MEDICAL RECORD: ICD-10-PCS | Mod: CPTII,S$GLB,, | Performed by: INTERNAL MEDICINE

## 2022-04-25 PROCEDURE — 4010F PR ACE/ARB THEARPY RXD/TAKEN: ICD-10-PCS | Mod: CPTII,S$GLB,, | Performed by: INTERNAL MEDICINE

## 2022-04-25 PROCEDURE — 3075F PR MOST RECENT SYSTOLIC BLOOD PRESS GE 130-139MM HG: ICD-10-PCS | Mod: CPTII,S$GLB,, | Performed by: INTERNAL MEDICINE

## 2022-04-25 PROCEDURE — 3066F PR DOCUMENTATION OF TREATMENT FOR NEPHROPATHY: ICD-10-PCS | Mod: CPTII,S$GLB,, | Performed by: INTERNAL MEDICINE

## 2022-04-25 PROCEDURE — 3075F SYST BP GE 130 - 139MM HG: CPT | Mod: CPTII,S$GLB,, | Performed by: INTERNAL MEDICINE

## 2022-04-25 PROCEDURE — 3078F PR MOST RECENT DIASTOLIC BLOOD PRESSURE < 80 MM HG: ICD-10-PCS | Mod: CPTII,S$GLB,, | Performed by: INTERNAL MEDICINE

## 2022-04-25 PROCEDURE — 3078F DIAST BP <80 MM HG: CPT | Mod: CPTII,S$GLB,, | Performed by: INTERNAL MEDICINE

## 2022-04-25 PROCEDURE — 4010F ACE/ARB THERAPY RXD/TAKEN: CPT | Mod: CPTII,S$GLB,, | Performed by: INTERNAL MEDICINE

## 2022-04-25 PROCEDURE — 3066F NEPHROPATHY DOC TX: CPT | Mod: CPTII,S$GLB,, | Performed by: INTERNAL MEDICINE

## 2022-04-25 PROCEDURE — 3051F PR MOST RECENT HEMOGLOBIN A1C LEVEL 7.0 - < 8.0%: ICD-10-PCS | Mod: CPTII,S$GLB,, | Performed by: INTERNAL MEDICINE

## 2022-04-25 NOTE — PROGRESS NOTES
Subjective:   @Patient ID:  Yon Loyd is a 56 y.o. male who presents for evaluation of CAD    HPI:   Here for f/u   He is doing well  He didn't start the cardiac rehab yet   Stopped tobacco  Compliant with DAPT       Prior cardiovascular  Hx  --------------------------------    - Heart Catheterization    12/27/2021  · The ejection fraction was greater than 55% by visual estimate.  · The pre-procedure left ventricular end diastolic pressure was 8.  · The Mid LAD lesion was 99% stenosed with 0% stenosis post-intervention.  · A STENT RESOLUTE DONI [id number] stent was successfully placed. Post dilated with 3.5 NC balloon. IVUS guided  · The Mid Cx to Dist Cx lesion was 95% stenosed with 0% stenosis post-intervention.  · A STENT RESOLUTE DONI [id number] stent was successfully placed to mid/distal LCX post dilated with 3.0 NC balloon. IVUS guided  · The RPDA lesion was distal 99% stenosed. Small to moderate caliber vs. Very distal lesion. Will be treated medically.  · The estimated blood loss was none.     - ASA/Brilinta  - Statin   - Add Coreg   - Risk factors modifications  - Medical therapy for residual distal small Rt PDA lesion.     - NORIS 1/2022  · Normal resting and exercise NORIS           - ECHO 12/27/2021  · The left ventricle is normal in size with normal systolic function.  · The estimated ejection fraction is 65%.  · Normal left ventricular diastolic function.  · Normal right ventricular size with normal right ventricular systolic function.  · Mild mitral regurgitation.  · Intermediate central venous pressure (8 mmHg).  · The estimated PA systolic pressure is 22 mmHg.          Patient Active Problem List    Diagnosis Date Noted    Arm skin lesion, left 03/18/2022    Other hyperlipidemia 02/04/2022    Stented coronary artery 01/18/2022    History of non-ST elevation myocardial infarction (NSTEMI) 12/27/2021    Umbilical hernia without obstruction and without gangrene 07/30/2021    Diastasis recti  07/30/2021    Type 2 diabetes mellitus with both eyes affected by mild nonproliferative retinopathy without macular edema, with long-term current use of insulin 08/11/2017    BMI 29.0-29.9,adult 12/23/2016    Proteinuria due to type 2 diabetes mellitus 12/23/2016    Type 2 diabetes mellitus with diabetic polyneuropathy, with long-term current use of insulin 10/04/2016    Type 2 diabetes mellitus with diabetic nephropathy, with long-term current use of insulin 12/03/2014    Hypertension associated with diabetes 10/24/2012    Hyperlipidemia associated with type 2 diabetes mellitus 10/24/2012                  LAST HbA1c  Lab Results   Component Value Date    HGBA1C 7.4 (H) 01/28/2022       Lipid panel  Lab Results   Component Value Date    CHOL 126 01/28/2022    CHOL 192 11/05/2021    CHOL 185 02/25/2021     Lab Results   Component Value Date    HDL 41 01/28/2022    HDL 48 11/05/2021    HDL 54 02/25/2021     Lab Results   Component Value Date    LDLCALC 63.6 01/28/2022    LDLCALC 90.0 11/05/2021    LDLCALC 98.0 02/25/2021     Lab Results   Component Value Date    TRIG 107 01/28/2022    TRIG 270 (H) 11/05/2021    TRIG 165 (H) 02/25/2021     Lab Results   Component Value Date    CHOLHDL 32.5 01/28/2022    CHOLHDL 25.0 11/05/2021    CHOLHDL 29.2 02/25/2021            Review of Systems   Constitutional: Negative for chills and fever.   HENT: Negative for hearing loss and nosebleeds.    Eyes: Negative for blurred vision.   Cardiovascular: Negative for chest pain, leg swelling and palpitations.   Respiratory: Negative for hemoptysis and shortness of breath.    Hematologic/Lymphatic: Negative for bleeding problem.   Skin: Negative for itching.   Musculoskeletal: Negative for falls.   Gastrointestinal: Negative for abdominal pain and hematochezia.   Genitourinary: Negative for hematuria.   Neurological: Negative for dizziness and loss of balance.   Psychiatric/Behavioral: Negative for altered mental status and  depression.       Objective:   Physical Exam  Constitutional:       Appearance: He is well-developed.   HENT:      Head: Normocephalic and atraumatic.   Eyes:      Conjunctiva/sclera: Conjunctivae normal.   Neck:      Vascular: No carotid bruit or JVD.   Cardiovascular:      Rate and Rhythm: Normal rate and regular rhythm.      Pulses:           Carotid pulses are 2+ on the right side and 2+ on the left side.       Radial pulses are 2+ on the right side and 2+ on the left side.      Heart sounds: Normal heart sounds. No murmur heard.    No friction rub. No gallop.   Pulmonary:      Effort: Pulmonary effort is normal. No respiratory distress.      Breath sounds: Normal breath sounds. No stridor. No wheezing.   Musculoskeletal:      Cervical back: Neck supple.   Skin:     General: Skin is warm and dry.   Neurological:      Mental Status: He is alert and oriented to person, place, and time.   Psychiatric:         Behavior: Behavior normal.         Assessment:     1. Hypertension associated with diabetes    2. Stented coronary artery    3. Hyperlipidemia associated with type 2 diabetes mellitus    4. Other hyperlipidemia    5. History of non-ST elevation myocardial infarction (NSTEMI)        Plan:   - s/p PCI as above  - Residual small vs disease in PDA. Medical tx  - Continue ASA/Brilinta  - BB  - High intense statin. LDL at goal < 70  - Cardiac rehab phase II          Pertinent cardiac images and EKG reviewed independently.    Continue with current medical plan and lifestyle changes.  Return sooner for concerns or questions. If symptoms persist go to the ED  I have reviewed all pertinent data including patient's medical history in detail and updated the computerized patient record.     No orders of the defined types were placed in this encounter.      Follow up as scheduled.     He expressed verbal understanding and agreed with the plan    Patient's Medications   New Prescriptions    No medications on file   Previous  "Medications    ASPIRIN (ECOTRIN) 81 MG EC TABLET    Take 81 mg by mouth once daily.    ATORVASTATIN (LIPITOR) 80 MG TABLET    Take 1 tablet (80 mg total) by mouth once daily.    BLOOD SUGAR DIAGNOSTIC STRP    To check BG 4 times daily, to use with insurance preferred meter    BLOOD-GLUCOSE METER KIT    To check BG 4 times daily, to use with insurance preferred meter    INSULIN GLARGINE U-300 CONC (TOUJEO MAX U-300 SOLOSTAR) 300 UNIT/ML (3 ML) INSULIN PEN    Inject 26 Units into the skin once daily.    INSULIN LISPRO PROTAMIN-LISPRO 100 UNIT/ML (50-50) INPN    Inject 10 Units into the skin before meals as needed.    LANCETS MISC    To check BG 4 times daily, to use with insurance preferred meter    LOSARTAN (COZAAR) 100 MG TABLET    TAKE 1 TABLET BY MOUTH EVERY DAY    METOPROLOL SUCCINATE (TOPROL-XL) 50 MG 24 HR TABLET    TAKE 1 TABLET BY MOUTH EVERY DAY    MULTIVITAMIN-MINERALS-LUTEIN TAB    Take 1 tablet by mouth once daily.    NATEGLINIDE (STARLIX) 60 MG TABLET    TAKE 1 TABLET BY MOUTH THREE TIMES DAILY BEFORE MEALS    PEN NEEDLE, DIABETIC (BD ULTRA-FINE BLACK PEN NEEDLE) 32 GAUGE X 5/32" NDLE    Uses 2  times daily, on injections. Dispense 4 mm needles only    SYNJARDY 12.5-1,000 MG TAB    TAKE 1 TABLET BY MOUTH TWICE DAILY    TICAGRELOR (BRILINTA) 90 MG TABLET    Take 1 tablet (90 mg total) by mouth 2 (two) times a day.    TRULICITY 4.5 MG/0.5 ML PEN INJECTOR    Inject 4.5 mg into the skin every 7 days.   Modified Medications    No medications on file   Discontinued Medications    No medications on file          "

## 2022-05-02 ENCOUNTER — LAB VISIT (OUTPATIENT)
Dept: LAB | Facility: HOSPITAL | Age: 57
End: 2022-05-02
Attending: NURSE PRACTITIONER
Payer: COMMERCIAL

## 2022-05-02 DIAGNOSIS — Z79.4 TYPE 2 DIABETES MELLITUS WITH BOTH EYES AFFECTED BY MILD NONPROLIFERATIVE RETINOPATHY WITHOUT MACULAR EDEMA, WITH LONG-TERM CURRENT USE OF INSULIN: ICD-10-CM

## 2022-05-02 DIAGNOSIS — E11.3293 TYPE 2 DIABETES MELLITUS WITH BOTH EYES AFFECTED BY MILD NONPROLIFERATIVE RETINOPATHY WITHOUT MACULAR EDEMA, WITH LONG-TERM CURRENT USE OF INSULIN: ICD-10-CM

## 2022-05-02 LAB
ESTIMATED AVG GLUCOSE: 148 MG/DL (ref 68–131)
HBA1C MFR BLD: 6.8 % (ref 4–5.6)

## 2022-05-02 PROCEDURE — 36415 COLL VENOUS BLD VENIPUNCTURE: CPT | Mod: PO | Performed by: NURSE PRACTITIONER

## 2022-05-02 PROCEDURE — 83036 HEMOGLOBIN GLYCOSYLATED A1C: CPT | Performed by: NURSE PRACTITIONER

## 2022-05-03 ENCOUNTER — PATIENT MESSAGE (OUTPATIENT)
Dept: ENDOCRINOLOGY | Facility: CLINIC | Age: 57
End: 2022-05-03
Payer: COMMERCIAL

## 2022-05-03 NOTE — TELEPHONE ENCOUNTER
Latest Reference Range & Units 05/02/22 12:28   Hemoglobin A1C External 4.0 - 5.6 % 6.8 (H) [1]

## 2022-06-03 ENCOUNTER — OFFICE VISIT (OUTPATIENT)
Dept: FAMILY MEDICINE | Facility: CLINIC | Age: 57
End: 2022-06-03
Payer: COMMERCIAL

## 2022-06-03 VITALS
OXYGEN SATURATION: 95 % | BODY MASS INDEX: 28.9 KG/M2 | TEMPERATURE: 99 F | HEIGHT: 72 IN | WEIGHT: 213.38 LBS | HEART RATE: 87 BPM | DIASTOLIC BLOOD PRESSURE: 78 MMHG | SYSTOLIC BLOOD PRESSURE: 136 MMHG

## 2022-06-03 DIAGNOSIS — E11.29 PROTEINURIA DUE TO TYPE 2 DIABETES MELLITUS: ICD-10-CM

## 2022-06-03 DIAGNOSIS — R80.9 PROTEINURIA DUE TO TYPE 2 DIABETES MELLITUS: ICD-10-CM

## 2022-06-03 DIAGNOSIS — I10 ESSENTIAL HYPERTENSION: ICD-10-CM

## 2022-06-03 DIAGNOSIS — E11.69 HYPERLIPIDEMIA ASSOCIATED WITH TYPE 2 DIABETES MELLITUS: ICD-10-CM

## 2022-06-03 DIAGNOSIS — E78.5 HYPERLIPIDEMIA ASSOCIATED WITH TYPE 2 DIABETES MELLITUS: ICD-10-CM

## 2022-06-03 DIAGNOSIS — R53.83 FATIGUE, UNSPECIFIED TYPE: Primary | ICD-10-CM

## 2022-06-03 DIAGNOSIS — Z95.5 STENTED CORONARY ARTERY: ICD-10-CM

## 2022-06-03 DIAGNOSIS — Z79.899 ENCOUNTER FOR LONG-TERM CURRENT USE OF MEDICATION: ICD-10-CM

## 2022-06-03 DIAGNOSIS — Z00.00 VISIT FOR WELL MAN HEALTH CHECK: ICD-10-CM

## 2022-06-03 DIAGNOSIS — Z12.5 SCREENING PSA (PROSTATE SPECIFIC ANTIGEN): ICD-10-CM

## 2022-06-03 DIAGNOSIS — E78.49 OTHER HYPERLIPIDEMIA: ICD-10-CM

## 2022-06-03 PROCEDURE — 4010F PR ACE/ARB THEARPY RXD/TAKEN: ICD-10-PCS | Mod: CPTII,S$GLB,, | Performed by: FAMILY MEDICINE

## 2022-06-03 PROCEDURE — 3066F PR DOCUMENTATION OF TREATMENT FOR NEPHROPATHY: ICD-10-PCS | Mod: CPTII,S$GLB,, | Performed by: FAMILY MEDICINE

## 2022-06-03 PROCEDURE — 3066F NEPHROPATHY DOC TX: CPT | Mod: CPTII,S$GLB,, | Performed by: FAMILY MEDICINE

## 2022-06-03 PROCEDURE — 3044F HG A1C LEVEL LT 7.0%: CPT | Mod: CPTII,S$GLB,, | Performed by: FAMILY MEDICINE

## 2022-06-03 PROCEDURE — 99999 PR PBB SHADOW E&M-EST. PATIENT-LVL IV: ICD-10-PCS | Mod: PBBFAC,,, | Performed by: FAMILY MEDICINE

## 2022-06-03 PROCEDURE — 3075F PR MOST RECENT SYSTOLIC BLOOD PRESS GE 130-139MM HG: ICD-10-PCS | Mod: CPTII,S$GLB,, | Performed by: FAMILY MEDICINE

## 2022-06-03 PROCEDURE — 3062F POS MACROALBUMINURIA REV: CPT | Mod: CPTII,S$GLB,, | Performed by: FAMILY MEDICINE

## 2022-06-03 PROCEDURE — 1159F MED LIST DOCD IN RCRD: CPT | Mod: CPTII,S$GLB,, | Performed by: FAMILY MEDICINE

## 2022-06-03 PROCEDURE — 1160F RVW MEDS BY RX/DR IN RCRD: CPT | Mod: CPTII,S$GLB,, | Performed by: FAMILY MEDICINE

## 2022-06-03 PROCEDURE — 3078F DIAST BP <80 MM HG: CPT | Mod: CPTII,S$GLB,, | Performed by: FAMILY MEDICINE

## 2022-06-03 PROCEDURE — 3062F PR POS MACROALBUMINURIA RESULT DOCUMENTED/REVIEW: ICD-10-PCS | Mod: CPTII,S$GLB,, | Performed by: FAMILY MEDICINE

## 2022-06-03 PROCEDURE — 3008F BODY MASS INDEX DOCD: CPT | Mod: CPTII,S$GLB,, | Performed by: FAMILY MEDICINE

## 2022-06-03 PROCEDURE — 4010F ACE/ARB THERAPY RXD/TAKEN: CPT | Mod: CPTII,S$GLB,, | Performed by: FAMILY MEDICINE

## 2022-06-03 PROCEDURE — 99214 OFFICE O/P EST MOD 30 MIN: CPT | Mod: S$GLB,,, | Performed by: FAMILY MEDICINE

## 2022-06-03 PROCEDURE — 99999 PR PBB SHADOW E&M-EST. PATIENT-LVL IV: CPT | Mod: PBBFAC,,, | Performed by: FAMILY MEDICINE

## 2022-06-03 PROCEDURE — 3075F SYST BP GE 130 - 139MM HG: CPT | Mod: CPTII,S$GLB,, | Performed by: FAMILY MEDICINE

## 2022-06-03 PROCEDURE — 1159F PR MEDICATION LIST DOCUMENTED IN MEDICAL RECORD: ICD-10-PCS | Mod: CPTII,S$GLB,, | Performed by: FAMILY MEDICINE

## 2022-06-03 PROCEDURE — 99214 PR OFFICE/OUTPT VISIT, EST, LEVL IV, 30-39 MIN: ICD-10-PCS | Mod: S$GLB,,, | Performed by: FAMILY MEDICINE

## 2022-06-03 PROCEDURE — 1160F PR REVIEW ALL MEDS BY PRESCRIBER/CLIN PHARMACIST DOCUMENTED: ICD-10-PCS | Mod: CPTII,S$GLB,, | Performed by: FAMILY MEDICINE

## 2022-06-03 PROCEDURE — 3078F PR MOST RECENT DIASTOLIC BLOOD PRESSURE < 80 MM HG: ICD-10-PCS | Mod: CPTII,S$GLB,, | Performed by: FAMILY MEDICINE

## 2022-06-03 PROCEDURE — 3044F PR MOST RECENT HEMOGLOBIN A1C LEVEL <7.0%: ICD-10-PCS | Mod: CPTII,S$GLB,, | Performed by: FAMILY MEDICINE

## 2022-06-03 PROCEDURE — 3008F PR BODY MASS INDEX (BMI) DOCUMENTED: ICD-10-PCS | Mod: CPTII,S$GLB,, | Performed by: FAMILY MEDICINE

## 2022-06-03 RX ORDER — VALSARTAN 320 MG/1
320 TABLET ORAL DAILY
Qty: 90 TABLET | Refills: 3 | Status: SHIPPED | OUTPATIENT
Start: 2022-06-03 | End: 2022-08-17 | Stop reason: SDUPTHER

## 2022-06-03 NOTE — PROGRESS NOTES
Subjective:       Patient ID: Yon Loyd is a 57 y.o. male.    Chief Complaint: Diabetes    Yon is a 57 y.o. male who presents today for f/u    DM: A1c, at goal last month. Seeing endocrine. On starlix, toujeo 26 U daily, lispro breakfast and dinner, and synjardy and trulicity.   HTN: increased metoprolol to 50 mg 1/8/2022. On losartan 100 mg. Systolic has been in the 130s.   DLD: on atorvastatin 80 mg.   CAD: seeing dr De Souza. S/p Successful PTCA to mLAD with 3.5 x 15 mm JOE post dilated with 3.5 NC balloon. S/p Successful PTCA to mid/distal LCX  2.5 x 18 JOE post dilated with 3.0 NC balloon. On brilinta and aspirin. Antiplatelet management per cardiology.     Proteinuria: noted.     Obesity: weight is stable.     Review of Systems   Constitutional: Positive for fatigue.   Respiratory: Negative for chest tightness and shortness of breath.    Cardiovascular: Negative for chest pain.   Gastrointestinal: Negative for diarrhea, nausea and vomiting.   Genitourinary: Negative for difficulty urinating and dysuria.   Neurological: Negative for dizziness, light-headedness and headaches.             Results for orders placed or performed in visit on 05/02/22   Hemoglobin A1C   Result Value Ref Range    Hemoglobin A1C 6.8 (H) 4.0 - 5.6 %    Estimated Avg Glucose 148 (H) 68 - 131 mg/dL       Objective:     Vitals:    06/03/22 0938   BP: 136/78   Pulse: 87   Temp: 98.6 °F (37 °C)        Physical Exam  Vitals and nursing note reviewed.   Constitutional:       General: He is not in acute distress.     Appearance: He is not ill-appearing, toxic-appearing or diaphoretic.   Cardiovascular:      Rate and Rhythm: Normal rate and regular rhythm.   Pulmonary:      Effort: Pulmonary effort is normal.      Breath sounds: Normal breath sounds.   Musculoskeletal:         General: No swelling.   Psychiatric:         Mood and Affect: Mood normal.         Behavior: Behavior normal.         Thought Content: Thought content normal.          Judgment: Judgment normal.         Assessment:       1. Fatigue, unspecified type    2. Encounter for long-term current use of medication    3. Essential hypertension    4. Proteinuria due to type 2 diabetes mellitus    5. Other hyperlipidemia    6. Hyperlipidemia associated with type 2 diabetes mellitus    7. Stented coronary artery    8. Visit for well man health check    9. Screening PSA (prostate specific antigen)        Plan:       Proteinuria: monitor at f/u  Weight loss  DM control    Continue metoprolol at 50 mg XR  Stop losartan. Start valsartan 320    Continue synjardy  jardiance component can help the heart  Insulin management per endocrine  Continue trulicity  Can continue starlix? defer to endocrine and cards. No symptoms currently on meds.     Continue brilinta and aspirin. Antiplatelet management per cardiology.     Shingrix (2 doses) and covid booster (2nd booster) at pharmacy when able.      F/u 6 months, labs prior.     Fatigue, unspecified type  -     Iron and TIBC; Future; Expected date: 06/03/2022  -     Ferritin; Future; Expected date: 06/03/2022  -     Vitamin B12; Future; Expected date: 06/03/2022    Encounter for long-term current use of medication  -     Vitamin B12; Future; Expected date: 06/03/2022    Essential hypertension  -     CBC Auto Differential; Future; Expected date: 06/03/2022  -     Comprehensive Metabolic Panel; Future; Expected date: 06/03/2022  -     valsartan (DIOVAN) 320 MG tablet; Take 1 tablet (320 mg total) by mouth once daily.  Dispense: 90 tablet; Refill: 3    Proteinuria due to type 2 diabetes mellitus  -     CBC Auto Differential; Future; Expected date: 06/03/2022  -     Comprehensive Metabolic Panel; Future; Expected date: 06/03/2022  -     Protein / creatinine ratio, urine; Future; Expected date: 06/03/2022    Other hyperlipidemia    Hyperlipidemia associated with type 2 diabetes mellitus    Stented coronary artery  -     Cardiac Rehab Phase II; Future    Visit  for well man health check  -     Iron and TIBC; Future; Expected date: 06/03/2022  -     Ferritin; Future; Expected date: 06/03/2022  -     Vitamin B12; Future; Expected date: 06/03/2022  -     PSA, Screening; Future; Expected date: 06/03/2022  -     CBC Auto Differential; Future; Expected date: 06/03/2022  -     Comprehensive Metabolic Panel; Future; Expected date: 06/03/2022  -     Hemoglobin A1C; Future; Expected date: 06/03/2022  -     Lipid Panel; Future; Expected date: 06/03/2022  -     TSH; Future; Expected date: 06/03/2022    Screening PSA (prostate specific antigen)  -     PSA, Screening; Future; Expected date: 06/03/2022        Warning signs discussed, patient to call with any further issues or worsening of symptoms.

## 2022-06-03 NOTE — PATIENT INSTRUCTIONS
Proteinuria: monitor at f/u  Weight loss  DM control    Continue metoprolol at 50 mg XR  Stop losartan. Start valsartan 320    Continue synjardy  jardiance component can help the heart  Insulin management per endocrine  Continue trulicity  Can continue starlix? Contraindicated with heart failure? Will defer to endocrine and cardiology. I think can continue, EF normal.     Continue brilinta and aspirin. Antiplatelet management per cardiology.      F/u 6 months, labs prior.     Shingrix (2 doses) and covid booster (2nd booster) at pharmacy when able.     Lab Results   Component Value Date    HGBA1C 6.8 (H) 05/02/2022    HGBA1C 7.4 (H) 01/28/2022    HGBA1C 7.0 (H) 11/05/2021       Diabetes Management Status    Statin: Taking  ACE/ARB: Taking    Screening or Prevention Patient's value Goal Complete/Controlled?   HgA1C Testing and Control   Lab Results   Component Value Date    HGBA1C 6.8 (H) 05/02/2022      Annually/Less than 8% Yes     Lipid profile : 01/28/2022 Annually Yes     LDL control Lab Results   Component Value Date    LDLCALC 63.6 01/28/2022    Annually/Less than 100 mg/dl  Yes     Nephropathy screening Lab Results   Component Value Date    LABMICR 199.0 01/28/2022     Lab Results   Component Value Date    PROTEINUA Negative 07/30/2021     Lab Results   Component Value Date    UTPCR 0.38 (H) 02/04/2022      Annually Yes     Blood pressure BP Readings from Last 1 Encounters:   06/03/22 136/78    Less than 140/90 Yes     Dilated retinal exam : 04/01/2022 Annually Yes     Foot exam   : 02/04/2022 Annually Yes

## 2022-06-14 ENCOUNTER — TELEPHONE (OUTPATIENT)
Dept: CARDIAC REHAB | Facility: CLINIC | Age: 57
End: 2022-06-14
Payer: COMMERCIAL

## 2022-07-08 ENCOUNTER — LAB VISIT (OUTPATIENT)
Dept: LAB | Facility: HOSPITAL | Age: 57
End: 2022-07-08
Attending: NURSE PRACTITIONER
Payer: COMMERCIAL

## 2022-07-08 DIAGNOSIS — Z79.4 TYPE 2 DIABETES MELLITUS WITH BOTH EYES AFFECTED BY MILD NONPROLIFERATIVE RETINOPATHY WITHOUT MACULAR EDEMA, WITH LONG-TERM CURRENT USE OF INSULIN: ICD-10-CM

## 2022-07-08 DIAGNOSIS — E11.3293 TYPE 2 DIABETES MELLITUS WITH BOTH EYES AFFECTED BY MILD NONPROLIFERATIVE RETINOPATHY WITHOUT MACULAR EDEMA, WITH LONG-TERM CURRENT USE OF INSULIN: ICD-10-CM

## 2022-07-08 LAB
ALBUMIN SERPL BCP-MCNC: 4.6 G/DL (ref 3.5–5.2)
ALP SERPL-CCNC: 104 U/L (ref 38–126)
ALT SERPL W/O P-5'-P-CCNC: 52 U/L (ref 10–44)
ANION GAP SERPL CALC-SCNC: 10 MMOL/L (ref 8–16)
AST SERPL-CCNC: 38 U/L (ref 15–46)
BILIRUB SERPL-MCNC: 0.6 MG/DL (ref 0.1–1)
CALCIUM SERPL-MCNC: 9.3 MG/DL (ref 8.7–10.5)
CHLORIDE SERPL-SCNC: 103 MMOL/L (ref 95–110)
CO2 SERPL-SCNC: 25 MMOL/L (ref 23–29)
CREAT SERPL-MCNC: 1.06 MG/DL (ref 0.5–1.4)
EST. GFR  (AFRICAN AMERICAN): >60 ML/MIN/1.73 M^2
EST. GFR  (NON AFRICAN AMERICAN): >60 ML/MIN/1.73 M^2
ESTIMATED AVG GLUCOSE: 174 MG/DL (ref 68–131)
GLUCOSE SERPL-MCNC: 122 MG/DL (ref 70–110)
HBA1C MFR BLD: 7.7 % (ref 4–5.6)
POTASSIUM SERPL-SCNC: 4.4 MMOL/L (ref 3.5–5.1)
PROT SERPL-MCNC: 7.5 G/DL (ref 6–8.4)
SODIUM SERPL-SCNC: 138 MMOL/L (ref 136–145)
UUN UR-MCNC: 15 MG/DL (ref 2–20)

## 2022-07-08 PROCEDURE — 36415 COLL VENOUS BLD VENIPUNCTURE: CPT | Mod: PO | Performed by: NURSE PRACTITIONER

## 2022-07-08 PROCEDURE — 83036 HEMOGLOBIN GLYCOSYLATED A1C: CPT | Performed by: NURSE PRACTITIONER

## 2022-07-08 PROCEDURE — 80053 COMPREHEN METABOLIC PANEL: CPT | Mod: PO | Performed by: NURSE PRACTITIONER

## 2022-07-14 ENCOUNTER — PATIENT MESSAGE (OUTPATIENT)
Dept: ENDOCRINOLOGY | Facility: CLINIC | Age: 57
End: 2022-07-14
Payer: COMMERCIAL

## 2022-08-17 ENCOUNTER — OFFICE VISIT (OUTPATIENT)
Dept: ENDOCRINOLOGY | Facility: CLINIC | Age: 57
End: 2022-08-17
Payer: COMMERCIAL

## 2022-08-17 VITALS
HEIGHT: 72 IN | WEIGHT: 209.75 LBS | DIASTOLIC BLOOD PRESSURE: 70 MMHG | BODY MASS INDEX: 28.41 KG/M2 | HEART RATE: 87 BPM | OXYGEN SATURATION: 98 % | SYSTOLIC BLOOD PRESSURE: 118 MMHG

## 2022-08-17 DIAGNOSIS — Z79.4 TYPE 2 DIABETES MELLITUS WITH BOTH EYES AFFECTED BY MILD NONPROLIFERATIVE RETINOPATHY WITHOUT MACULAR EDEMA, WITH LONG-TERM CURRENT USE OF INSULIN: ICD-10-CM

## 2022-08-17 DIAGNOSIS — I10 ESSENTIAL HYPERTENSION: ICD-10-CM

## 2022-08-17 DIAGNOSIS — E11.59 HYPERTENSION ASSOCIATED WITH DIABETES: Chronic | ICD-10-CM

## 2022-08-17 DIAGNOSIS — E11.69 HYPERLIPIDEMIA ASSOCIATED WITH TYPE 2 DIABETES MELLITUS: Primary | ICD-10-CM

## 2022-08-17 DIAGNOSIS — E11.3293 TYPE 2 DIABETES MELLITUS WITH BOTH EYES AFFECTED BY MILD NONPROLIFERATIVE RETINOPATHY WITHOUT MACULAR EDEMA, WITH LONG-TERM CURRENT USE OF INSULIN: ICD-10-CM

## 2022-08-17 DIAGNOSIS — I15.2 HYPERTENSION ASSOCIATED WITH DIABETES: Chronic | ICD-10-CM

## 2022-08-17 DIAGNOSIS — E11.21 TYPE 2 DIABETES MELLITUS WITH DIABETIC NEPHROPATHY, WITH LONG-TERM CURRENT USE OF INSULIN: Chronic | ICD-10-CM

## 2022-08-17 DIAGNOSIS — Z79.4 TYPE 2 DIABETES MELLITUS WITH DIABETIC NEPHROPATHY, WITH LONG-TERM CURRENT USE OF INSULIN: Chronic | ICD-10-CM

## 2022-08-17 DIAGNOSIS — E78.5 HYPERLIPIDEMIA ASSOCIATED WITH TYPE 2 DIABETES MELLITUS: Primary | ICD-10-CM

## 2022-08-17 PROCEDURE — 3062F PR POS MACROALBUMINURIA RESULT DOCUMENTED/REVIEW: ICD-10-PCS | Mod: CPTII,S$GLB,, | Performed by: INTERNAL MEDICINE

## 2022-08-17 PROCEDURE — 3051F PR MOST RECENT HEMOGLOBIN A1C LEVEL 7.0 - < 8.0%: ICD-10-PCS | Mod: CPTII,S$GLB,, | Performed by: INTERNAL MEDICINE

## 2022-08-17 PROCEDURE — 3078F DIAST BP <80 MM HG: CPT | Mod: CPTII,S$GLB,, | Performed by: INTERNAL MEDICINE

## 2022-08-17 PROCEDURE — 99999 PR PBB SHADOW E&M-EST. PATIENT-LVL V: ICD-10-PCS | Mod: PBBFAC,,, | Performed by: INTERNAL MEDICINE

## 2022-08-17 PROCEDURE — 3008F PR BODY MASS INDEX (BMI) DOCUMENTED: ICD-10-PCS | Mod: CPTII,S$GLB,, | Performed by: INTERNAL MEDICINE

## 2022-08-17 PROCEDURE — 99214 OFFICE O/P EST MOD 30 MIN: CPT | Mod: S$GLB,,, | Performed by: INTERNAL MEDICINE

## 2022-08-17 PROCEDURE — 3062F POS MACROALBUMINURIA REV: CPT | Mod: CPTII,S$GLB,, | Performed by: INTERNAL MEDICINE

## 2022-08-17 PROCEDURE — 4010F ACE/ARB THERAPY RXD/TAKEN: CPT | Mod: CPTII,S$GLB,, | Performed by: INTERNAL MEDICINE

## 2022-08-17 PROCEDURE — 3074F SYST BP LT 130 MM HG: CPT | Mod: CPTII,S$GLB,, | Performed by: INTERNAL MEDICINE

## 2022-08-17 PROCEDURE — 99999 PR PBB SHADOW E&M-EST. PATIENT-LVL V: CPT | Mod: PBBFAC,,, | Performed by: INTERNAL MEDICINE

## 2022-08-17 PROCEDURE — 99214 PR OFFICE/OUTPT VISIT, EST, LEVL IV, 30-39 MIN: ICD-10-PCS | Mod: S$GLB,,, | Performed by: INTERNAL MEDICINE

## 2022-08-17 PROCEDURE — 3066F NEPHROPATHY DOC TX: CPT | Mod: CPTII,S$GLB,, | Performed by: INTERNAL MEDICINE

## 2022-08-17 PROCEDURE — 1160F RVW MEDS BY RX/DR IN RCRD: CPT | Mod: CPTII,S$GLB,, | Performed by: INTERNAL MEDICINE

## 2022-08-17 PROCEDURE — 4010F PR ACE/ARB THEARPY RXD/TAKEN: ICD-10-PCS | Mod: CPTII,S$GLB,, | Performed by: INTERNAL MEDICINE

## 2022-08-17 PROCEDURE — 3008F BODY MASS INDEX DOCD: CPT | Mod: CPTII,S$GLB,, | Performed by: INTERNAL MEDICINE

## 2022-08-17 PROCEDURE — 3078F PR MOST RECENT DIASTOLIC BLOOD PRESSURE < 80 MM HG: ICD-10-PCS | Mod: CPTII,S$GLB,, | Performed by: INTERNAL MEDICINE

## 2022-08-17 PROCEDURE — 1159F MED LIST DOCD IN RCRD: CPT | Mod: CPTII,S$GLB,, | Performed by: INTERNAL MEDICINE

## 2022-08-17 PROCEDURE — 3066F PR DOCUMENTATION OF TREATMENT FOR NEPHROPATHY: ICD-10-PCS | Mod: CPTII,S$GLB,, | Performed by: INTERNAL MEDICINE

## 2022-08-17 PROCEDURE — 3074F PR MOST RECENT SYSTOLIC BLOOD PRESSURE < 130 MM HG: ICD-10-PCS | Mod: CPTII,S$GLB,, | Performed by: INTERNAL MEDICINE

## 2022-08-17 PROCEDURE — 1159F PR MEDICATION LIST DOCUMENTED IN MEDICAL RECORD: ICD-10-PCS | Mod: CPTII,S$GLB,, | Performed by: INTERNAL MEDICINE

## 2022-08-17 PROCEDURE — 1160F PR REVIEW ALL MEDS BY PRESCRIBER/CLIN PHARMACIST DOCUMENTED: ICD-10-PCS | Mod: CPTII,S$GLB,, | Performed by: INTERNAL MEDICINE

## 2022-08-17 PROCEDURE — 3051F HG A1C>EQUAL 7.0%<8.0%: CPT | Mod: CPTII,S$GLB,, | Performed by: INTERNAL MEDICINE

## 2022-08-17 RX ORDER — INSULIN ASPART 100 [IU]/ML
INJECTION, SOLUTION INTRAVENOUS; SUBCUTANEOUS
Qty: 15 ML | Refills: 3 | Status: SHIPPED | OUTPATIENT
Start: 2022-08-17 | End: 2023-11-29

## 2022-08-17 RX ORDER — INSULIN GLARGINE 300 U/ML
26 INJECTION, SOLUTION SUBCUTANEOUS DAILY
Qty: 5 PEN | Refills: 3 | Status: SHIPPED | OUTPATIENT
Start: 2022-08-17 | End: 2022-11-22

## 2022-08-17 RX ORDER — EMPAGLIFLOZIN AND METFORMIN HYDROCHLORIDE 12.5; 1 MG/1; MG/1
1 TABLET ORAL 2 TIMES DAILY
Qty: 180 TABLET | Refills: 2 | Status: SHIPPED | OUTPATIENT
Start: 2022-08-17 | End: 2023-09-18

## 2022-08-17 RX ORDER — VALSARTAN 320 MG/1
320 TABLET ORAL DAILY
Qty: 90 TABLET | Refills: 3 | Status: SHIPPED | OUTPATIENT
Start: 2022-08-17 | End: 2023-01-05 | Stop reason: SDUPTHER

## 2022-08-17 RX ORDER — NATEGLINIDE 60 MG/1
60 TABLET ORAL DAILY
Qty: 90 TABLET | Refills: 1 | Status: SHIPPED | OUTPATIENT
Start: 2022-08-17 | End: 2023-01-05

## 2022-08-17 RX ORDER — DULAGLUTIDE 4.5 MG/.5ML
4.5 INJECTION, SOLUTION SUBCUTANEOUS
Qty: 12 PEN | Refills: 1 | Status: SHIPPED | OUTPATIENT
Start: 2022-08-17 | End: 2022-12-23 | Stop reason: ALTCHOICE

## 2022-08-17 RX ORDER — ATORVASTATIN CALCIUM 80 MG/1
80 TABLET, FILM COATED ORAL DAILY
Qty: 90 TABLET | Refills: 3 | Status: SHIPPED | OUTPATIENT
Start: 2022-08-17 | End: 2022-12-23 | Stop reason: SDUPTHER

## 2022-08-17 NOTE — ASSESSMENT & PLAN NOTE
Uncontrolled with hemoglobin A1c above goal. Limited blood glucose data available for me to review today as patient has not been checking blood sugars regularly or keeping blood sugar log for my review.  Difficult to make significant changes to medication regimen at this time.  Patient encouraged to check blood sugars more regular and bring log to all visits.     Will get hemoglobin A1c before next visit.  Patient counseled extensively on risk of hypoglycemia given that he is on 4 injections of insulin a day and I encouraged him to check blood sugar more frequently.    Also has Dexcom sample which he will consider trying.  Discussed the importance of Diabetes Education, will arrange for him to see educator prior to his next visit (same day to min of many times he has to drive here).

## 2022-08-17 NOTE — PROGRESS NOTES
Subjective:    08/17/2022       Patient ID: Yon Loyd is a 57 y.o. male.    Chief Complaint:  No chief complaint on file.    History of Present Illness  Yon Loyd with Type 2 DM complicated by CAD with 2 stents in Dec 2021, retinopathy, CKD and proteinuria, DLP and HTN presents today for follow up of T2DM. Previous patient of [facility]. Last visit with Betzaida in 2/2022, first visit with me today.     Interval hx:  Since last visit was prescribed dexcom with  which he did not start using.   Has not been checking blood sugar recently so limited data but denies any symptoms of hypoglycemia.  Still occasionally missing some doses of Starlix and prandial insulin before dinner    With regards to the diabetes:    Diagnosed with Type 2 diabetes in ~1993.  Been on insulin since ~2013. Started Trulicity 12/2016.   Family history of type 2 diabetes in mother and father.     DIABETES COMPLICATIONS:   Nephropathy +; sees nephrology -- follow up in one year per note  Retinopathy +; up to date with eye exam  Peripheral neuropathy +; tolerable; does not wish to see podiatry  CAD: +     Current regimen:  Synjardy 12.5/1000 mg twice daily  Trulicity 4.5 mg weekly (Tuesday's)  Toujeo 26 units daily in the morning (24 hour insulin)  Lyumjev/Humalog 10 units before breakfast and dinner (occasionally missing dinner dose)  Start taking the Starlix 60 mg before lunch -occasionally missing    He is trying to give novolog 10 minutes prior to a meal. He is rotating injection sites. He is now changing needle every time.     Other medications tried:  Invokana but stopped due to fear of side effects    He reports he is not really checking blood sugar and is hesitant to start dexcom, has not seen CDE in many years.  Hypoglycemic event-Denies and s/s of BG less than 70  Knows how to correct with 15 grams of carbs- juice, coke, or a peppermint.      Eats 3 meals a day. Working on improving diet  Started using riced cauliflower.       Exercise: walks often for work. No formal exercise.    Education - last visit: none  Glucagon: does not want     Diabetes Management Status  Statin: Taking atorvastatin 80 mg daily   ACE/ARB: Taking valsartan 320 mg daily  ASA: yes    Lab Results   Component Value Date    HGBA1C 7.7 (H) 07/08/2022    HGBA1C 6.8 (H) 05/02/2022    HGBA1C 7.4 (H) 01/28/2022     Screening or Prevention Patient's value Goal Complete/Controlled?   HgA1C Testing and Control   Lab Results   Component Value Date    HGBA1C 7.7 (H) 07/08/2022      Annually/Less than 8% Yes   Lipid profile : 01/28/2022 Annually Yes   LDL control Lab Results   Component Value Date    LDLCALC 63.6 01/28/2022    Annually/Less than 100 mg/dl  Yes   Nephropathy screening Lab Results   Component Value Date    LABMICR 199.0 01/28/2022     Lab Results   Component Value Date    PROTEINUA Negative 07/30/2021    Annually No   Blood pressure BP Readings from Last 1 Encounters:   08/17/22 118/70    Less than 140/90 No   Dilated retinal exam : 04/01/2022 Annually Yes   Foot exam   : 02/04/2022 Annually Yes     With regards to dyslipidemia,      He is on atorvastatin 80 mg daily     Latest Reference Range & Units 11/05/21 08:38 01/28/22 07:43   Cholesterol 120 - 199 mg/dL 192 [1] 126 [2]   HDL 40 - 75 mg/dL 48 [3] 41 [4]   HDL/Cholesterol Ratio 20.0 - 50.0 % 25.0 32.5   LDL Cholesterol External 63.0 - 159.0 mg/dL 90.0 [5] 63.6 [6]   Non-HDL Cholesterol mg/dL 144 [7] 85 [8]   Total Cholesterol/HDL Ratio 2.0 - 5.0  4.0 3.1   Triglycerides 30 - 150 mg/dL 270 (H) [9] 107 [10]     Hx of fatigue and snoring- declined sleep study at last visit.    ROS: see HPI     Objective:   Constitutional:  Pleasant,  in no acute distress.   HENT:   Eyes:     No scleral icterus.   Respiratory:   Effort normal   Neurological:  normal speech  Psych:  Normal mood and affect.    Diabetes Foot Exam:  Shoes appropriate  DM foot exam deferred; done in Feb 2022    Body mass index is 28.45  kg/m².    Vitals:    08/17/22 1323   BP: 118/70   Pulse: 87     Wt Readings from Last 3 Encounters:   08/17/22 1323 95.1 kg (209 lb 12.3 oz)   06/03/22 0938 96.8 kg (213 lb 6.5 oz)   04/25/22 0810 96.8 kg (213 lb 6.4 oz)     Lab Review:   Lab Results   Component Value Date    HGBA1C 7.7 (H) 07/08/2022     Lab Results   Component Value Date    CHOL 126 01/28/2022    HDL 41 01/28/2022    LDLCALC 63.6 01/28/2022    TRIG 107 01/28/2022    CHOLHDL 32.5 01/28/2022     Lab Results   Component Value Date     07/08/2022    K 4.4 07/08/2022     07/08/2022    CO2 25 07/08/2022     (H) 07/08/2022    BUN 15 07/08/2022    CREATININE 1.06 07/08/2022    CALCIUM 9.3 07/08/2022    PROT 7.5 07/08/2022    ALBUMIN 4.6 07/08/2022    BILITOT 0.6 07/08/2022    ALKPHOS 104 07/08/2022    AST 38 07/08/2022    ALT 52 (H) 07/08/2022    ANIONGAP 10 07/08/2022    ESTGFRAFRICA >60.0 07/08/2022    EGFRNONAA >60.0 07/08/2022    TSH 0.918 11/05/2021       Assessment and Plan     Problem List Items Addressed This Visit        1 - High    Type 2 diabetes mellitus with both eyes affected by mild nonproliferative retinopathy without macular edema, with long-term current use of insulin     Uncontrolled with hemoglobin A1c above goal. Limited blood glucose data available for me to review today as patient has not been checking blood sugars regularly or keeping blood sugar log for my review.  Difficult to make significant changes to medication regimen at this time.  Patient encouraged to check blood sugars more regular and bring log to all visits.   Will get hemoglobin A1c before next visit.  Patient counseled extensively on risk of hypoglycemia given that he is on 4 injections of insulin a day and I encouraged him to check blood sugar more frequently.    Also has Dexcom sample which he will consider trying.  Discussed the importance of Diabetes Education, will arrange for him to see educator prior to his next visit (same day to min of  many times he has to drive here).               Relevant Medications    insulin glargine U-300 conc (TOUJEO MAX U-300 SOLOSTAR) 300 unit/mL (3 mL) insulin pen    insulin aspart U-100 (NOVOLOG FLEXPEN U-100 INSULIN) 100 unit/mL (3 mL) InPn pen    nateglinide (STARLIX) 60 MG tablet    empagliflozin-metformin (SYNJARDY) 12.5-1,000 mg Tab    TRULICITY 4.5 mg/0.5 mL pen injector    Other Relevant Orders    Ambulatory referral/consult to Diabetes Education    Hemoglobin A1C       2     Hypertension associated with diabetes (Chronic)     Blood pressure at goal in clinic today.  Continue current medications which include ACEI/ARB.            Relevant Medications    insulin glargine U-300 conc (TOUJEO MAX U-300 SOLOSTAR) 300 unit/mL (3 mL) insulin pen    insulin aspart U-100 (NOVOLOG FLEXPEN U-100 INSULIN) 100 unit/mL (3 mL) InPn pen    nateglinide (STARLIX) 60 MG tablet    TRULICITY 4.5 mg/0.5 mL pen injector    valsartan (DIOVAN) 320 MG tablet       3     Hyperlipidemia associated with type 2 diabetes mellitus - Primary (Chronic)     On statin per ADA recommendations with LDL at goal <70.              Relevant Medications    atorvastatin (LIPITOR) 80 MG tablet    insulin glargine U-300 conc (TOUJEO MAX U-300 SOLOSTAR) 300 unit/mL (3 mL) insulin pen    insulin aspart U-100 (NOVOLOG FLEXPEN U-100 INSULIN) 100 unit/mL (3 mL) InPn pen    nateglinide (STARLIX) 60 MG tablet    TRULICITY 4.5 mg/0.5 mL pen injector       Unprioritized    Type 2 diabetes mellitus with diabetic nephropathy, with long-term current use of insulin (Chronic)    Relevant Medications    insulin glargine U-300 conc (TOUJEO MAX U-300 SOLOSTAR) 300 unit/mL (3 mL) insulin pen    insulin aspart U-100 (NOVOLOG FLEXPEN U-100 INSULIN) 100 unit/mL (3 mL) InPn pen    nateglinide (STARLIX) 60 MG tablet    TRULICITY 4.5 mg/0.5 mL pen injector      Other Visit Diagnoses     Essential hypertension        Relevant Medications    valsartan (DIOVAN) 320 MG tablet           RTC in 3-4 month(s) with same day CDE prior (back to back visits) needs HbA1c prior      Jagdish Patel MD

## 2022-11-15 ENCOUNTER — OFFICE VISIT (OUTPATIENT)
Dept: FAMILY MEDICINE | Facility: CLINIC | Age: 57
End: 2022-11-15
Payer: COMMERCIAL

## 2022-11-15 VITALS
TEMPERATURE: 98 F | WEIGHT: 213.88 LBS | DIASTOLIC BLOOD PRESSURE: 84 MMHG | OXYGEN SATURATION: 98 % | HEIGHT: 72 IN | HEART RATE: 84 BPM | SYSTOLIC BLOOD PRESSURE: 134 MMHG | BODY MASS INDEX: 28.97 KG/M2

## 2022-11-15 DIAGNOSIS — L30.9 DERMATITIS: Primary | ICD-10-CM

## 2022-11-15 PROCEDURE — 96372 PR INJECTION,THERAP/PROPH/DIAG2ST, IM OR SUBCUT: ICD-10-PCS | Mod: S$GLB,,, | Performed by: PEDIATRICS

## 2022-11-15 PROCEDURE — 1159F MED LIST DOCD IN RCRD: CPT | Mod: CPTII,S$GLB,, | Performed by: PEDIATRICS

## 2022-11-15 PROCEDURE — 3008F BODY MASS INDEX DOCD: CPT | Mod: CPTII,S$GLB,, | Performed by: PEDIATRICS

## 2022-11-15 PROCEDURE — 3066F PR DOCUMENTATION OF TREATMENT FOR NEPHROPATHY: ICD-10-PCS | Mod: CPTII,S$GLB,, | Performed by: PEDIATRICS

## 2022-11-15 PROCEDURE — 1159F PR MEDICATION LIST DOCUMENTED IN MEDICAL RECORD: ICD-10-PCS | Mod: CPTII,S$GLB,, | Performed by: PEDIATRICS

## 2022-11-15 PROCEDURE — 99999 PR PBB SHADOW E&M-EST. PATIENT-LVL V: ICD-10-PCS | Mod: PBBFAC,,, | Performed by: PEDIATRICS

## 2022-11-15 PROCEDURE — 1160F PR REVIEW ALL MEDS BY PRESCRIBER/CLIN PHARMACIST DOCUMENTED: ICD-10-PCS | Mod: CPTII,S$GLB,, | Performed by: PEDIATRICS

## 2022-11-15 PROCEDURE — 3066F NEPHROPATHY DOC TX: CPT | Mod: CPTII,S$GLB,, | Performed by: PEDIATRICS

## 2022-11-15 PROCEDURE — 3075F SYST BP GE 130 - 139MM HG: CPT | Mod: CPTII,S$GLB,, | Performed by: PEDIATRICS

## 2022-11-15 PROCEDURE — 99999 PR PBB SHADOW E&M-EST. PATIENT-LVL V: CPT | Mod: PBBFAC,,, | Performed by: PEDIATRICS

## 2022-11-15 PROCEDURE — 99214 OFFICE O/P EST MOD 30 MIN: CPT | Mod: 25,S$GLB,, | Performed by: PEDIATRICS

## 2022-11-15 PROCEDURE — 3008F PR BODY MASS INDEX (BMI) DOCUMENTED: ICD-10-PCS | Mod: CPTII,S$GLB,, | Performed by: PEDIATRICS

## 2022-11-15 PROCEDURE — 1160F RVW MEDS BY RX/DR IN RCRD: CPT | Mod: CPTII,S$GLB,, | Performed by: PEDIATRICS

## 2022-11-15 PROCEDURE — 3062F PR POS MACROALBUMINURIA RESULT DOCUMENTED/REVIEW: ICD-10-PCS | Mod: CPTII,S$GLB,, | Performed by: PEDIATRICS

## 2022-11-15 PROCEDURE — 3051F PR MOST RECENT HEMOGLOBIN A1C LEVEL 7.0 - < 8.0%: ICD-10-PCS | Mod: CPTII,S$GLB,, | Performed by: PEDIATRICS

## 2022-11-15 PROCEDURE — 96372 THER/PROPH/DIAG INJ SC/IM: CPT | Mod: S$GLB,,, | Performed by: PEDIATRICS

## 2022-11-15 PROCEDURE — 99214 PR OFFICE/OUTPT VISIT, EST, LEVL IV, 30-39 MIN: ICD-10-PCS | Mod: 25,S$GLB,, | Performed by: PEDIATRICS

## 2022-11-15 PROCEDURE — 3079F DIAST BP 80-89 MM HG: CPT | Mod: CPTII,S$GLB,, | Performed by: PEDIATRICS

## 2022-11-15 PROCEDURE — 3075F PR MOST RECENT SYSTOLIC BLOOD PRESS GE 130-139MM HG: ICD-10-PCS | Mod: CPTII,S$GLB,, | Performed by: PEDIATRICS

## 2022-11-15 PROCEDURE — 3062F POS MACROALBUMINURIA REV: CPT | Mod: CPTII,S$GLB,, | Performed by: PEDIATRICS

## 2022-11-15 PROCEDURE — 3051F HG A1C>EQUAL 7.0%<8.0%: CPT | Mod: CPTII,S$GLB,, | Performed by: PEDIATRICS

## 2022-11-15 PROCEDURE — 4010F PR ACE/ARB THEARPY RXD/TAKEN: ICD-10-PCS | Mod: CPTII,S$GLB,, | Performed by: PEDIATRICS

## 2022-11-15 PROCEDURE — 4010F ACE/ARB THERAPY RXD/TAKEN: CPT | Mod: CPTII,S$GLB,, | Performed by: PEDIATRICS

## 2022-11-15 PROCEDURE — 3079F PR MOST RECENT DIASTOLIC BLOOD PRESSURE 80-89 MM HG: ICD-10-PCS | Mod: CPTII,S$GLB,, | Performed by: PEDIATRICS

## 2022-11-15 RX ORDER — BETAMETHASONE SODIUM PHOSPHATE AND BETAMETHASONE ACETATE 3; 3 MG/ML; MG/ML
6 INJECTION, SUSPENSION INTRA-ARTICULAR; INTRALESIONAL; INTRAMUSCULAR; SOFT TISSUE
Status: COMPLETED | OUTPATIENT
Start: 2022-11-15 | End: 2022-11-15

## 2022-11-15 RX ORDER — METHYLPREDNISOLONE 4 MG/1
TABLET ORAL
Qty: 21 EACH | Refills: 0 | Status: SHIPPED | OUTPATIENT
Start: 2022-11-15 | End: 2022-12-06

## 2022-11-15 RX ADMIN — BETAMETHASONE SODIUM PHOSPHATE AND BETAMETHASONE ACETATE 6 MG: 3; 3 INJECTION, SUSPENSION INTRA-ARTICULAR; INTRALESIONAL; INTRAMUSCULAR; SOFT TISSUE at 10:11

## 2022-11-15 NOTE — PROGRESS NOTES
Subjective:      Patient ID: Yon Loyd is a 57 y.o. male.    Chief Complaint: Rash (Rash on left side of face/nose//Wednesday night// tingly// itchy// warm to touch// right eye was swollen )    Face swelling and itching beginning on Wednesday night. Reports it is slightly getting better, def no worse. States it is warm to the touch.     Rash  Pertinent negatives include no congestion, cough, diarrhea, fatigue, fever, shortness of breath, sore throat or vomiting.   Review of Systems   Constitutional:  Negative for chills, fatigue and fever.   HENT:  Negative for congestion, sinus pain and sore throat.    Respiratory:  Negative for cough and shortness of breath.    Gastrointestinal:  Negative for diarrhea, nausea and vomiting.   Genitourinary:  Negative for difficulty urinating.   Musculoskeletal:  Negative for arthralgias.   Skin:  Positive for rash.   Neurological:  Negative for dizziness.   Psychiatric/Behavioral:  Negative for confusion.       Current Outpatient Medications on File Prior to Visit   Medication Sig    aspirin (ECOTRIN) 81 MG EC tablet Take 81 mg by mouth once daily.    atorvastatin (LIPITOR) 80 MG tablet Take 1 tablet (80 mg total) by mouth once daily.    blood sugar diagnostic Strp To check BG 4 times daily, to use with insurance preferred meter    blood-glucose meter kit To check BG 4 times daily, to use with insurance preferred meter    empagliflozin-metformin (SYNJARDY) 12.5-1,000 mg Tab Take 1 tablet by mouth 2 (two) times daily.    insulin aspart U-100 (NOVOLOG FLEXPEN U-100 INSULIN) 100 unit/mL (3 mL) InPn pen Inject 10 units under the skin twice a day before breakfast and dinner    insulin glargine U-300 conc (TOUJEO MAX U-300 SOLOSTAR) 300 unit/mL (3 mL) insulin pen Inject 26 Units into the skin once daily.    lancets Misc To check BG 4 times daily, to use with insurance preferred meter    metoprolol succinate (TOPROL-XL) 50 MG 24 hr tablet TAKE 1 TABLET BY MOUTH EVERY DAY     "multivitamin-minerals-lutein Tab Take 1 tablet by mouth once daily.    nateglinide (STARLIX) 60 MG tablet Take 1 tablet (60 mg total) by mouth once daily. 1 tablet before lunch    pen needle, diabetic (BD ULTRA-FINE BLACK PEN NEEDLE) 32 gauge x 5/32" Ndle Uses 2  times daily, on injections. Dispense 4 mm needles only    ticagrelor (BRILINTA) 90 mg tablet Take 1 tablet (90 mg total) by mouth 2 (two) times a day.    TRULICITY 4.5 mg/0.5 mL pen injector Inject 4.5 mg into the skin every 7 days.    valsartan (DIOVAN) 320 MG tablet Take 1 tablet (320 mg total) by mouth once daily.     No current facility-administered medications on file prior to visit.        Objective:     Vitals:    11/15/22 0941   BP: 134/84   Pulse: 84   Temp: 98.1 °F (36.7 °C)      Physical Exam  Constitutional:       General: He is not in acute distress.     Appearance: Normal appearance.   HENT:      Head: Normocephalic and atraumatic.      Right Ear: Tympanic membrane, ear canal and external ear normal.      Left Ear: Tympanic membrane, ear canal and external ear normal.      Nose: Nose normal.      Mouth/Throat:      Mouth: Mucous membranes are moist.      Pharynx: Oropharynx is clear.   Eyes:      Extraocular Movements: Extraocular movements intact.      Conjunctiva/sclera: Conjunctivae normal.      Pupils: Pupils are equal, round, and reactive to light.   Cardiovascular:      Rate and Rhythm: Normal rate and regular rhythm.      Pulses: Normal pulses.      Heart sounds: Normal heart sounds.   Pulmonary:      Effort: Pulmonary effort is normal. No respiratory distress.      Breath sounds: Normal breath sounds. No wheezing.   Abdominal:      General: Abdomen is flat. Bowel sounds are normal.   Musculoskeletal:         General: No swelling. Normal range of motion.      Cervical back: Normal range of motion and neck supple.   Skin:     General: Skin is warm and dry.      Findings: No rash.   Neurological:      Mental Status: He is alert and " oriented to person, place, and time.      Gait: Gait normal.   Psychiatric:         Mood and Affect: Mood normal.         Behavior: Behavior normal.      Assessment:         1. Dermatitis          Plan:   1. Dermatitis  - betamethasone acetate-betamethasone sodium phosphate injection 6 mg  - methylPREDNISolone (MEDROL DOSEPACK) 4 mg tablet; use as directed  Dispense: 21 each; Refill: 0       -keep area clean and dry  -avoid scented soaps or lotions until rash is resolved  -use warm soaks as directed    Follow up if symptoms worsen or fail to improve.       Waldo Eden NP   [facility]  11/15/22

## 2022-12-02 ENCOUNTER — LAB VISIT (OUTPATIENT)
Dept: LAB | Facility: HOSPITAL | Age: 57
End: 2022-12-02
Attending: FAMILY MEDICINE
Payer: COMMERCIAL

## 2022-12-02 DIAGNOSIS — E11.29 PROTEINURIA DUE TO TYPE 2 DIABETES MELLITUS: ICD-10-CM

## 2022-12-02 DIAGNOSIS — R80.9 PROTEINURIA DUE TO TYPE 2 DIABETES MELLITUS: ICD-10-CM

## 2022-12-02 LAB
CREAT UR-MCNC: 60.8 MG/DL (ref 23–375)
PROT UR-MCNC: 17 MG/DL (ref 0–15)
PROT/CREAT UR: 0.28 MG/G{CREAT} (ref 0–0.2)

## 2022-12-02 PROCEDURE — 84156 ASSAY OF PROTEIN URINE: CPT | Performed by: FAMILY MEDICINE

## 2022-12-23 ENCOUNTER — TELEPHONE (OUTPATIENT)
Dept: INTERNAL MEDICINE | Facility: CLINIC | Age: 57
End: 2022-12-23
Payer: COMMERCIAL

## 2022-12-23 ENCOUNTER — OFFICE VISIT (OUTPATIENT)
Dept: FAMILY MEDICINE | Facility: CLINIC | Age: 57
End: 2022-12-23
Payer: COMMERCIAL

## 2022-12-23 VITALS
HEART RATE: 70 BPM | SYSTOLIC BLOOD PRESSURE: 130 MMHG | OXYGEN SATURATION: 97 % | DIASTOLIC BLOOD PRESSURE: 78 MMHG | HEIGHT: 72 IN | BODY MASS INDEX: 28.54 KG/M2 | WEIGHT: 210.75 LBS

## 2022-12-23 DIAGNOSIS — Z00.00 VISIT FOR WELL MAN HEALTH CHECK: Primary | ICD-10-CM

## 2022-12-23 DIAGNOSIS — Z95.5 STENTED CORONARY ARTERY: Chronic | ICD-10-CM

## 2022-12-23 DIAGNOSIS — E11.69 HYPERLIPIDEMIA ASSOCIATED WITH TYPE 2 DIABETES MELLITUS: Chronic | ICD-10-CM

## 2022-12-23 DIAGNOSIS — Z79.4 TYPE 2 DIABETES MELLITUS WITH DIABETIC NEPHROPATHY, WITH LONG-TERM CURRENT USE OF INSULIN: Chronic | ICD-10-CM

## 2022-12-23 DIAGNOSIS — Z79.4 TYPE 2 DIABETES MELLITUS WITH DIABETIC POLYNEUROPATHY, WITH LONG-TERM CURRENT USE OF INSULIN: Chronic | ICD-10-CM

## 2022-12-23 DIAGNOSIS — E78.49 OTHER HYPERLIPIDEMIA: ICD-10-CM

## 2022-12-23 DIAGNOSIS — Z79.4 TYPE 2 DIABETES MELLITUS WITH BOTH EYES AFFECTED BY MILD NONPROLIFERATIVE RETINOPATHY WITHOUT MACULAR EDEMA, WITH LONG-TERM CURRENT USE OF INSULIN: ICD-10-CM

## 2022-12-23 DIAGNOSIS — E78.5 HYPERLIPIDEMIA ASSOCIATED WITH TYPE 2 DIABETES MELLITUS: Chronic | ICD-10-CM

## 2022-12-23 DIAGNOSIS — L98.9 SKIN LESION: ICD-10-CM

## 2022-12-23 DIAGNOSIS — E11.42 TYPE 2 DIABETES MELLITUS WITH DIABETIC POLYNEUROPATHY, WITH LONG-TERM CURRENT USE OF INSULIN: Chronic | ICD-10-CM

## 2022-12-23 DIAGNOSIS — H01.001 BLEPHARITIS OF RIGHT UPPER EYELID, UNSPECIFIED TYPE: ICD-10-CM

## 2022-12-23 DIAGNOSIS — E11.21 TYPE 2 DIABETES MELLITUS WITH DIABETIC NEPHROPATHY, WITH LONG-TERM CURRENT USE OF INSULIN: Chronic | ICD-10-CM

## 2022-12-23 DIAGNOSIS — E11.3293 TYPE 2 DIABETES MELLITUS WITH BOTH EYES AFFECTED BY MILD NONPROLIFERATIVE RETINOPATHY WITHOUT MACULAR EDEMA, WITH LONG-TERM CURRENT USE OF INSULIN: ICD-10-CM

## 2022-12-23 PROCEDURE — 3075F PR MOST RECENT SYSTOLIC BLOOD PRESS GE 130-139MM HG: ICD-10-PCS | Mod: CPTII,S$GLB,, | Performed by: FAMILY MEDICINE

## 2022-12-23 PROCEDURE — 4010F ACE/ARB THERAPY RXD/TAKEN: CPT | Mod: CPTII,S$GLB,, | Performed by: FAMILY MEDICINE

## 2022-12-23 PROCEDURE — 3066F NEPHROPATHY DOC TX: CPT | Mod: CPTII,S$GLB,, | Performed by: FAMILY MEDICINE

## 2022-12-23 PROCEDURE — 3066F PR DOCUMENTATION OF TREATMENT FOR NEPHROPATHY: ICD-10-PCS | Mod: CPTII,S$GLB,, | Performed by: FAMILY MEDICINE

## 2022-12-23 PROCEDURE — 3078F PR MOST RECENT DIASTOLIC BLOOD PRESSURE < 80 MM HG: ICD-10-PCS | Mod: CPTII,S$GLB,, | Performed by: FAMILY MEDICINE

## 2022-12-23 PROCEDURE — 3062F POS MACROALBUMINURIA REV: CPT | Mod: CPTII,S$GLB,, | Performed by: FAMILY MEDICINE

## 2022-12-23 PROCEDURE — 3062F PR POS MACROALBUMINURIA RESULT DOCUMENTED/REVIEW: ICD-10-PCS | Mod: CPTII,S$GLB,, | Performed by: FAMILY MEDICINE

## 2022-12-23 PROCEDURE — 1159F MED LIST DOCD IN RCRD: CPT | Mod: CPTII,S$GLB,, | Performed by: FAMILY MEDICINE

## 2022-12-23 PROCEDURE — 99999 PR PBB SHADOW E&M-EST. PATIENT-LVL V: ICD-10-PCS | Mod: PBBFAC,,, | Performed by: FAMILY MEDICINE

## 2022-12-23 PROCEDURE — 3078F DIAST BP <80 MM HG: CPT | Mod: CPTII,S$GLB,, | Performed by: FAMILY MEDICINE

## 2022-12-23 PROCEDURE — 1160F PR REVIEW ALL MEDS BY PRESCRIBER/CLIN PHARMACIST DOCUMENTED: ICD-10-PCS | Mod: CPTII,S$GLB,, | Performed by: FAMILY MEDICINE

## 2022-12-23 PROCEDURE — 99999 PR PBB SHADOW E&M-EST. PATIENT-LVL V: CPT | Mod: PBBFAC,,, | Performed by: FAMILY MEDICINE

## 2022-12-23 PROCEDURE — 3051F PR MOST RECENT HEMOGLOBIN A1C LEVEL 7.0 - < 8.0%: ICD-10-PCS | Mod: CPTII,S$GLB,, | Performed by: FAMILY MEDICINE

## 2022-12-23 PROCEDURE — 1159F PR MEDICATION LIST DOCUMENTED IN MEDICAL RECORD: ICD-10-PCS | Mod: CPTII,S$GLB,, | Performed by: FAMILY MEDICINE

## 2022-12-23 PROCEDURE — 3008F PR BODY MASS INDEX (BMI) DOCUMENTED: ICD-10-PCS | Mod: CPTII,S$GLB,, | Performed by: FAMILY MEDICINE

## 2022-12-23 PROCEDURE — 99396 PREV VISIT EST AGE 40-64: CPT | Mod: S$GLB,,, | Performed by: FAMILY MEDICINE

## 2022-12-23 PROCEDURE — 4010F PR ACE/ARB THEARPY RXD/TAKEN: ICD-10-PCS | Mod: CPTII,S$GLB,, | Performed by: FAMILY MEDICINE

## 2022-12-23 PROCEDURE — 99396 PR PREVENTIVE VISIT,EST,40-64: ICD-10-PCS | Mod: S$GLB,,, | Performed by: FAMILY MEDICINE

## 2022-12-23 PROCEDURE — 3051F HG A1C>EQUAL 7.0%<8.0%: CPT | Mod: CPTII,S$GLB,, | Performed by: FAMILY MEDICINE

## 2022-12-23 PROCEDURE — 3008F BODY MASS INDEX DOCD: CPT | Mod: CPTII,S$GLB,, | Performed by: FAMILY MEDICINE

## 2022-12-23 PROCEDURE — 3075F SYST BP GE 130 - 139MM HG: CPT | Mod: CPTII,S$GLB,, | Performed by: FAMILY MEDICINE

## 2022-12-23 PROCEDURE — 1160F RVW MEDS BY RX/DR IN RCRD: CPT | Mod: CPTII,S$GLB,, | Performed by: FAMILY MEDICINE

## 2022-12-23 RX ORDER — ERYTHROMYCIN 5 MG/G
OINTMENT OPHTHALMIC 3 TIMES DAILY
Qty: 3.5 G | Refills: 0 | Status: SHIPPED | OUTPATIENT
Start: 2022-12-23

## 2022-12-23 RX ORDER — TIRZEPATIDE 12.5 MG/.5ML
12.5 INJECTION, SOLUTION SUBCUTANEOUS
Qty: 12 PEN | Refills: 0 | Status: SHIPPED | OUTPATIENT
Start: 2022-12-23 | End: 2023-04-14

## 2022-12-23 RX ORDER — ATORVASTATIN CALCIUM 80 MG/1
80 TABLET, FILM COATED ORAL DAILY
Qty: 90 TABLET | Refills: 3 | Status: SHIPPED | OUTPATIENT
Start: 2022-12-23 | End: 2023-01-05 | Stop reason: SDUPTHER

## 2022-12-23 NOTE — PATIENT INSTRUCTIONS
Finish trulicity  A1c not at goal  Try mounjaro at 12.5 mg  Continue synjardy  jardiance component can help the heart  Insulin management per endocrine  Can continue starlix  Further changes per endocrinology, will send this note to them    Continue metoprolol at 50 mg XR  Continue valsartan 320    Refilled brilinta x 1. Can stop? Defer to cardiology recs. Due for f/u  Continue aspirin.     Restart statin. CAN NOT MISS. Repeat lipids prior to next apt.     Skin lesion, >3 years. Needs biopsy??    Lab Results   Component Value Date    HGBA1C 7.6 (H) 12/02/2022    HGBA1C 7.7 (H) 07/08/2022    HGBA1C 6.8 (H) 05/02/2022

## 2022-12-23 NOTE — PROGRESS NOTES
Subjective:       Patient ID: Yon Loyd is a 57 y.o. male.    Chief Complaint: Annual Exam      Yon Loyd is a 56 y.o. male who presents today for an annual. Was previously seeing Dr. Norman     Seeing Betzaida Rodriguez in endocrinology.     DM: A1c not at goal. Was off trulicity x 1 week. Seeing endocrine. On starlix, toujeo 26 U daily, lispro breakfast and dinner, and synjardy and trulicity. Has been not at goal for the last two tests.   HTN: increased metoprolol to 50 mg 1/8/2022. On valsartan 320 mg. BP stable today.   DLD: was accidentally off of statin for likely 3 months based on refill history. LDL was >180. Now back on statin.   CAD: seeing dr De Souza. S/p Successful PTCA to mLAD with 3.5 x 15 mm JOE post dilated with 3.5 NC balloon. S/p Successful PTCA to mid/distal LCX  2.5 x 18 JOE post dilated with 3.0 NC balloon. On brilinta and aspirin. Antiplatelet management per cardiology. Had MI 12/26/2021. Needs f/u with cardiology? Message sent.      HTN/Proteinuria: on valsartan. Seeing renal. Proteinuria due to DM.     Having eye pain. Woke up with facial swelling and redness a few weeks ago. Was given steroids, and symptoms improved. He also has a stye on his right eye. Has seen eye doctor, yesterday.      Having a skin lesion on the left arm. Has been ongoing for about 3 years. It does bleed. Has not shown this to be in the past.      Labs: reviewed and ordered     C-scope: Last Colonoscopy completed on 10/29/2021     PMHx: reviewed in EMR and updated  Meds: reviewed in EMR and updated  Shx: reviewed in EMR and updated  FMHx: declined to talk about complete family history.   Social: declined to talk about social history.     Review of Systems   Constitutional:  Negative for chills and fever.   Eyes:  Positive for pain. Negative for photophobia and redness.   Skin:  Positive for rash.         Immunization History   Administered Date(s) Administered    COVID-19, MRNA, LN-S, PF (Pfizer) (Purple Cap)  03/01/2021, 03/22/2021, 08/14/2021    Influenza 10/24/2012    Influenza - Intradermal - Quadrivalent - PF 09/16/2013, 12/03/2014    Influenza - Intradermal - Trivalent - PF 09/16/2013, 12/03/2014    Influenza - Quadrivalent - PF *Preferred* (6 months and older) 10/24/2012, 01/08/2022    Influenza Split 10/24/2012    Pneumococcal Conjugate - 13 Valent 03/19/2015    Pneumococcal Polysaccharide - 23 Valent 06/09/2016         Results for orders placed or performed in visit on 12/02/22   Iron and TIBC   Result Value Ref Range    Iron 74 45 - 160 ug/dL    Transferrin 250 200 - 375 mg/dL    TIBC 370 250 - 450 ug/dL    Saturated Iron 20 20 - 50 %   Ferritin   Result Value Ref Range    Ferritin 72 20.0 - 300.0 ng/mL   Vitamin B12   Result Value Ref Range    Vitamin B-12 1105 (H) 210 - 950 pg/mL   PSA, Screening   Result Value Ref Range    PSA, Screen 0.95 0.00 - 4.00 ng/mL   CBC Auto Differential   Result Value Ref Range    WBC 6.01 3.90 - 12.70 K/uL    RBC 5.59 4.60 - 6.20 M/uL    Hemoglobin 17.4 14.0 - 18.0 g/dL    Hematocrit 52.9 40.0 - 54.0 %    MCV 95 82 - 98 fL    MCH 31.1 (H) 27.0 - 31.0 pg    MCHC 32.9 32.0 - 36.0 g/dL    RDW 12.9 11.5 - 14.5 %    Platelets 197 150 - 450 K/uL    MPV 9.9 9.2 - 12.9 fL    Immature Granulocytes 0.5 0.0 - 0.5 %    Gran # (ANC) 3.4 1.8 - 7.7 K/uL    Immature Grans (Abs) 0.03 0.00 - 0.04 K/uL    Lymph # 1.7 1.0 - 4.8 K/uL    Mono # 0.8 0.3 - 1.0 K/uL    Eos # 0.1 0.0 - 0.5 K/uL    Baso # 0.04 0.00 - 0.20 K/uL    nRBC 0 0 /100 WBC    Gran % 56.7 38.0 - 73.0 %    Lymph % 27.8 18.0 - 48.0 %    Mono % 12.6 4.0 - 15.0 %    Eosinophil % 1.7 0.0 - 8.0 %    Basophil % 0.7 0.0 - 1.9 %    Differential Method Automated    Comprehensive Metabolic Panel   Result Value Ref Range    Sodium 141 136 - 145 mmol/L    Potassium 4.6 3.5 - 5.1 mmol/L    Chloride 105 95 - 110 mmol/L    CO2 29 23 - 29 mmol/L    Glucose 158 (H) 70 - 110 mg/dL    BUN 18 2 - 20 mg/dL    Creatinine 1.07 0.50 - 1.40 mg/dL    Calcium  9.6 8.7 - 10.5 mg/dL    Total Protein 7.5 6.0 - 8.4 g/dL    Albumin 4.4 3.5 - 5.2 g/dL    Total Bilirubin 0.5 0.1 - 1.0 mg/dL    Alkaline Phosphatase 93 38 - 126 U/L    AST 25 15 - 46 U/L    ALT 27 10 - 44 U/L    Anion Gap 7 (L) 8 - 16 mmol/L    eGFR >60.0 >60 mL/min/1.73 m^2   Hemoglobin A1C   Result Value Ref Range    Hemoglobin A1C 7.6 (H) 4.0 - 5.6 %    Estimated Avg Glucose 171 (H) 68 - 131 mg/dL   Lipid Panel   Result Value Ref Range    Cholesterol 299 (H) 120 - 199 mg/dL    Triglycerides 322 (H) 30 - 150 mg/dL    HDL 52 40 - 75 mg/dL    LDL Cholesterol 182.6 (H) 63.0 - 159.0 mg/dL    HDL/Cholesterol Ratio 17.4 (L) 20.0 - 50.0 %    Total Cholesterol/HDL Ratio 5.8 (H) 2.0 - 5.0    Non-HDL Cholesterol 247 mg/dL   TSH   Result Value Ref Range    TSH 1.140 0.400 - 4.000 uIU/mL       Objective:     Vitals:    12/23/22 1032   BP: 130/78   Pulse: 70        Physical Exam  Constitutional:       General: He is not in acute distress.     Appearance: He is not ill-appearing, toxic-appearing or diaphoretic.   Eyes:      Comments: What appears to be a stye on the right upper lid   Cardiovascular:      Rate and Rhythm: Normal rate and regular rhythm.   Pulmonary:      Effort: Pulmonary effort is normal.      Breath sounds: Normal breath sounds.   Abdominal:      Palpations: Abdomen is soft.      Tenderness: There is no abdominal tenderness.   Musculoskeletal:         General: No swelling.   Neurological:      General: No focal deficit present.      Mental Status: He is alert.   Psychiatric:         Mood and Affect: Mood normal.         Behavior: Behavior normal.         Thought Content: Thought content normal.         Judgment: Judgment normal.             Assessment:       1. Visit for well man health check    2. Type 2 diabetes mellitus with both eyes affected by mild nonproliferative retinopathy without macular edema, with long-term current use of insulin    3. Type 2 diabetes mellitus with diabetic nephropathy, with  long-term current use of insulin    4. Type 2 diabetes mellitus with diabetic polyneuropathy, with long-term current use of insulin    5. BMI 28.0-28.9,adult    6. Stented coronary artery    7. Hyperlipidemia associated with type 2 diabetes mellitus    8. Other hyperlipidemia    9. Blepharitis of right upper eyelid, unspecified type    10. Skin lesion        Plan:         ?Avoid tobacco  ?Be physically active  ?Maintain a healthy weight  ?Eat a diet rich in fruits, vegetables, and whole grains, and low in saturated/trans fat  ?Limit alcohol consumption  ?Protect against sexually transmitted infections  ?Avoid excess sun    Finish trulicity  A1c not at goal  Try mounjaro at 12.5 mg  Continue synjardy  jardiance component can help the heart  Insulin management per endocrine  Can continue starlix  Further changes per endocrinology, will send this note to them    Continue metoprolol at 50 mg XR  Continue valsartan 320    Refilled brilinta x 1. Can stop? Defer to cardiology recs. Due for f/u  Continue aspirin.     Restart statin. CAN NOT MISS. Repeat lipids prior to next apt.     Skin lesion, >3 years. Needs biopsy??    Visit for [facility] health check    Type 2 diabetes mellitus with both eyes affected by mild nonproliferative retinopathy without macular edema, with long-term current use of insulin  -     Ambulatory referral/consult to Endocrinology; Future; Expected date: 12/30/2022  -     tirzepatide (MOUNJARO) 12.5 mg/0.5 mL PnIj; Inject 12.5 mg into the skin every 7 days.  Dispense: 12 pen; Refill: 0  -     CBC Auto Differential; Future; Expected date: 12/23/2022  -     Comprehensive Metabolic Panel; Future; Expected date: 12/23/2022  -     Hemoglobin A1C; Future; Expected date: 12/23/2022  -     Microalbumin/Creatinine Ratio, Urine; Future; Expected date: 12/23/2022    Type 2 diabetes mellitus with diabetic nephropathy, with long-term current use of insulin  -     Ambulatory referral/consult to Endocrinology;  Future; Expected date: 12/30/2022  -     tirzepatide (MOUNJARO) 12.5 mg/0.5 mL PnIj; Inject 12.5 mg into the skin every 7 days.  Dispense: 12 pen; Refill: 0  -     CBC Auto Differential; Future; Expected date: 12/23/2022  -     Comprehensive Metabolic Panel; Future; Expected date: 12/23/2022  -     Hemoglobin A1C; Future; Expected date: 12/23/2022  -     Microalbumin/Creatinine Ratio, Urine; Future; Expected date: 12/23/2022    Type 2 diabetes mellitus with diabetic polyneuropathy, with long-term current use of insulin  -     Ambulatory referral/consult to Endocrinology; Future; Expected date: 12/30/2022  -     tirzepatide (MOUNJARO) 12.5 mg/0.5 mL PnIj; Inject 12.5 mg into the skin every 7 days.  Dispense: 12 pen; Refill: 0  -     CBC Auto Differential; Future; Expected date: 12/23/2022  -     Comprehensive Metabolic Panel; Future; Expected date: 12/23/2022  -     Hemoglobin A1C; Future; Expected date: 12/23/2022  -     Microalbumin/Creatinine Ratio, Urine; Future; Expected date: 12/23/2022    BMI 28.0-28.9,adult    Stented coronary artery  -     ticagrelor (BRILINTA) 90 mg tablet; Take 1 tablet (90 mg total) by mouth 2 (two) times a day.  Dispense: 180 tablet; Refill: 1  -     Ambulatory referral/consult to Cardiology; Future; Expected date: 12/30/2022  -     Lipid Panel; Future; Expected date: 12/23/2022    Hyperlipidemia associated with type 2 diabetes mellitus  -     Lipid Panel; Future; Expected date: 12/23/2022  -     atorvastatin (LIPITOR) 80 MG tablet; Take 1 tablet (80 mg total) by mouth once daily.  Dispense: 90 tablet; Refill: 3    Other hyperlipidemia  -     Lipid Panel; Future; Expected date: 12/23/2022    Blepharitis of right upper eyelid, unspecified type  -     erythromycin (ROMYCIN) ophthalmic ointment; Place into the right eye 3 (three) times daily.  Dispense: 3.5 g; Refill: 0    Skin lesion  -     Ambulatory referral/consult to Dermatology; Future; Expected date: 12/30/2022

## 2022-12-23 NOTE — TELEPHONE ENCOUNTER
----- Message from Lynette Lou sent at 12/23/2022 11:53 AM CST -----  Good morning  Patient has a referral to Endocrinology.  Patient ep with Ms Betzaida Rodriguez, but apparently she is leaving [facility].   I have heard great things about Dr Eden, and I'm hoping she can take in this patient and help  him with his Diabetes.  Can you please assist scheduling patient.  If not much trouble, could you please keep me posted.  Thanks    Lynette

## 2023-01-04 NOTE — PROGRESS NOTES
Subjective:    01/05/2023     Patient ID: Yon Loyd is a 57 y.o. male.    Chief Complaint:  No chief complaint on file.    History of Present Illness  Yon Loyd with Type 2 DM complicated by CAD with 2 stents in Dec 2021, retinopathy, CKD and proteinuria, DLP and HTN presents today for follow up of T2DM. Previous patient of [facility]. Last visit with Betzaida in 2/2022. Last visit with Dr Patel in Aug 2022    Interval hx:  Since his last visit his PCP changed Trulicity to Mounjaro. Was given dexcom starter kit but does not know how to apply. Still occasionally missing some doses of Starlix and prandial insulin before dinner    With regards to the diabetes:    Diagnosed with Type 2 diabetes in ~1993.  Been on insulin since ~2013. Started Trulicity 12/2016.   Family history of type 2 diabetes in mother and father.     DIABETES COMPLICATIONS:   Nephropathy +; sees nephrology -- follow up in one year per note  Retinopathy +; up to date with eye exam  Peripheral neuropathy +; tolerable; does not wish to see podiatry  CAD: +     Current regimen:  Synjardy 12.5/1000 mg twice daily  Mounjaro 12.5 mg weekly   Toujeo 26 units daily in the morning (24 hour insulin)  Lyumjev/Humalog/Novolog 10 units before breakfast and dinner (occasionally missing dinner dose)  Starlix 60 mg before lunch -occasionally missing    He is trying to give novolog 10 minutes prior to a meal. He is rotating injection sites. He is now changing needle every time.     He is also missing doses of Novolog at dinner time.    Other medications tried:  Invokana but stopped due to fear of side effects    He reports he is not really checking blood sugar and is hesitant to start dexcom, has not seen CDE in many years.  Hypoglycemic event-Denies and s/s of BG less than 70  Knows how to correct with 15 grams of carbs- juice, coke, or a peppermint.      Eats 3 meals a day. Working on improving diet  Started using riced cauliflower.  He is doing better with diet  since his last visit.    Exercise: walks often for work. No formal exercise.    Education - last visit: none; needs   Glucagon: does not want     Diabetes Management Status  Statin: Taking atorvastatin 80 mg daily   ACE/ARB: Taking valsartan 320 mg daily  ASA: yes    Lab Results   Component Value Date    HGBA1C 7.6 (H) 12/02/2022    HGBA1C 7.7 (H) 07/08/2022    HGBA1C 6.8 (H) 05/02/2022     Screening or Prevention Patient's value Goal Complete/Controlled?   HgA1C Testing and Control   Lab Results   Component Value Date    HGBA1C 7.6 (H) 12/02/2022      Annually/Less than 8% Yes   Lipid profile : 12/02/2022 Annually Yes   LDL control Lab Results   Component Value Date    LDLCALC 182.6 (H) 12/02/2022    Annually/Less than 100 mg/dl  Yes   Nephropathy screening Lab Results   Component Value Date    LABMICR 199.0 01/28/2022     Lab Results   Component Value Date    PROTEINUA Negative 07/30/2021    Annually No   Blood pressure BP Readings from Last 1 Encounters:   01/05/23 136/76    Less than 140/90 No   Dilated retinal exam : 04/01/2022 Annually Yes   Foot exam   : 02/04/2022 Annually Yes     With regards to dyslipidemia,      He is on atorvastatin 80 mg daily but was off for the last 2-3 months, did not obtain prescription refill as intended last visit     Latest Reference Range & Units 01/28/22 07:43 12/02/22 08:19   Cholesterol 120 - 199 mg/dL 126 299 (H)   HDL 40 - 75 mg/dL 41 52   HDL/Cholesterol Ratio 20.0 - 50.0 % 32.5 17.4 (L)   LDL Cholesterol External 63.0 - 159.0 mg/dL 63.6 182.6 (H)   Non-HDL Cholesterol mg/dL 85 247   Total Cholesterol/HDL Ratio 2.0 - 5.0  3.1 5.8 (H)   Triglycerides 30 - 150 mg/dL 107 322 (H)   (H): Data is abnormally high  (L): Data is abnormally low    Hx of fatigue and snoring- declined sleep study at last visit.    Review of Systems   Constitutional:  Positive for fatigue. Negative for unexpected weight change.   Eyes:  Negative for visual disturbance.   Endocrine: Positive for  polyuria. Negative for polydipsia and polyphagia.        Nocturia     Objective:   Constitutional:  Pleasant,  in no acute distress.   HENT:   Eyes:     No scleral icterus.   Respiratory:   Effort normal   Neurological:  normal speech  Psych:  Normal mood and affect.    Diabetes Foot Exam:  Shoes appropriate  DM foot exam deferred; done in Feb 2022    Body mass index is 28.63 kg/m².    Vitals:    01/05/23 1404   BP: 136/76   Pulse: 79     Wt Readings from Last 3 Encounters:   01/05/23 1404 95.8 kg (211 lb 1.5 oz)   12/23/22 1032 95.6 kg (210 lb 12.2 oz)   11/15/22 0941 97 kg (213 lb 13.5 oz)     Lab Review:   Lab Results   Component Value Date    HGBA1C 7.6 (H) 12/02/2022     Lab Results   Component Value Date    CHOL 299 (H) 12/02/2022    HDL 52 12/02/2022    LDLCALC 182.6 (H) 12/02/2022    TRIG 322 (H) 12/02/2022    CHOLHDL 17.4 (L) 12/02/2022     Lab Results   Component Value Date     12/02/2022    K 4.6 12/02/2022     12/02/2022    CO2 29 12/02/2022     (H) 12/02/2022    BUN 18 12/02/2022    CREATININE 1.07 12/02/2022    CALCIUM 9.6 12/02/2022    PROT 7.5 12/02/2022    ALBUMIN 4.4 12/02/2022    BILITOT 0.5 12/02/2022    ALKPHOS 93 12/02/2022    AST 25 12/02/2022    ALT 27 12/02/2022    ANIONGAP 7 (L) 12/02/2022    ESTGFRAFRICA >60.0 07/08/2022    EGFRNONAA >60.0 07/08/2022    TSH 1.140 12/02/2022     Assessment and Plan     1. Vitamin D deficiency  Vitamin D      2. Type 2 diabetes mellitus with diabetic polyneuropathy, with long-term current use of insulin  Ambulatory referral/consult to Endocrinology    Ambulatory referral/consult to Diabetes Education    Ambulatory referral/consult to Nephrology    Comprehensive Metabolic Panel    Hemoglobin A1C    nateglinide (STARLIX) 120 MG tablet    C-Peptide    Glutamic Acid Decarboxylase      3. Hypertension associated with diabetes        4. Hyperlipidemia associated with type 2 diabetes mellitus  atorvastatin (LIPITOR) 80 MG tablet      5. Type 2  diabetes mellitus with both eyes affected by mild nonproliferative retinopathy without macular edema, with long-term current use of insulin  Ambulatory referral/consult to Endocrinology      6. Type 2 diabetes mellitus with diabetic nephropathy, with long-term current use of insulin  Ambulatory referral/consult to Endocrinology      7. Essential hypertension  valsartan (DIOVAN) 320 MG tablet      8. Proteinuria due to type 2 diabetes mellitus            Type 2 diabetes mellitus with diabetic polyneuropathy, with long-term current use of insulin  -- Reviewed goals of therapy are to get the best control we can without hypoglycemia  Check labs on RTC  Medication changes:     A1c above goal   Discussed we need labs and logs to determine treatment plan. He agrees to see diabetes education to set up dexcom and will let me know if he likes it so we can order it.      Continue   Synjardy 12.5/1000 mg twice daily  Trulicity 4.5 mg weekly (Tuesday's)  Toujeo 26 units daily in the morning (24 hour insulin)  Lyumjev/Humalog 10 units before breakfast and dinner (meals === fast acting)  Increase Starlix to 120 mg before lunch -don't miss doses and set alarm to take    -- Reviewed patient's current insulin regimen. Clarified proper insulin dose and timing in relation to meals, etc. Insulin injection sites and proper rotation instructed.    -- Advised frequent self blood glucose monitoring.  Patient encouraged to document glucose results and bring them to every clinic visit    -- Hypoglycemia precautions discussed. Instructed on precautions before driving.    -- Call for Bg repeatedly < 90 or > 180.   -- Close adherence to lifestyle changes recommended.   -- Periodic follow ups for eye evaluations, foot care and dental care suggested.    Hypertension associated with diabetes  Blood pressure at goal in clinic today.  Continue current medications which include ACEI/ARB.     Hyperlipidemia associated with type 2 diabetes mellitus  On  statin per ADA recommendations   LDL goal <70  Not at goal   Missed medication but is back on       Proteinuria due to type 2 diabetes mellitus  Consult nephrology     Betzaida Rodriguez NP     Follow up in about 3 months (around 4/5/2023).  Labs on RTC

## 2023-01-05 ENCOUNTER — OFFICE VISIT (OUTPATIENT)
Dept: ENDOCRINOLOGY | Facility: CLINIC | Age: 58
End: 2023-01-05
Payer: COMMERCIAL

## 2023-01-05 VITALS
SYSTOLIC BLOOD PRESSURE: 136 MMHG | WEIGHT: 211.06 LBS | DIASTOLIC BLOOD PRESSURE: 76 MMHG | HEIGHT: 72 IN | HEART RATE: 79 BPM | OXYGEN SATURATION: 97 % | BODY MASS INDEX: 28.59 KG/M2

## 2023-01-05 DIAGNOSIS — R80.9 PROTEINURIA DUE TO TYPE 2 DIABETES MELLITUS: Chronic | ICD-10-CM

## 2023-01-05 DIAGNOSIS — E11.3293 TYPE 2 DIABETES MELLITUS WITH BOTH EYES AFFECTED BY MILD NONPROLIFERATIVE RETINOPATHY WITHOUT MACULAR EDEMA, WITH LONG-TERM CURRENT USE OF INSULIN: ICD-10-CM

## 2023-01-05 DIAGNOSIS — Z79.4 TYPE 2 DIABETES MELLITUS WITH BOTH EYES AFFECTED BY MILD NONPROLIFERATIVE RETINOPATHY WITHOUT MACULAR EDEMA, WITH LONG-TERM CURRENT USE OF INSULIN: ICD-10-CM

## 2023-01-05 DIAGNOSIS — Z79.4 TYPE 2 DIABETES MELLITUS WITH DIABETIC NEPHROPATHY, WITH LONG-TERM CURRENT USE OF INSULIN: Chronic | ICD-10-CM

## 2023-01-05 DIAGNOSIS — I10 ESSENTIAL HYPERTENSION: ICD-10-CM

## 2023-01-05 DIAGNOSIS — E11.59 HYPERTENSION ASSOCIATED WITH DIABETES: Chronic | ICD-10-CM

## 2023-01-05 DIAGNOSIS — E11.69 HYPERLIPIDEMIA ASSOCIATED WITH TYPE 2 DIABETES MELLITUS: Chronic | ICD-10-CM

## 2023-01-05 DIAGNOSIS — E11.29 PROTEINURIA DUE TO TYPE 2 DIABETES MELLITUS: Chronic | ICD-10-CM

## 2023-01-05 DIAGNOSIS — Z79.4 TYPE 2 DIABETES MELLITUS WITH DIABETIC POLYNEUROPATHY, WITH LONG-TERM CURRENT USE OF INSULIN: Chronic | ICD-10-CM

## 2023-01-05 DIAGNOSIS — E55.9 VITAMIN D DEFICIENCY: Primary | ICD-10-CM

## 2023-01-05 DIAGNOSIS — E78.5 HYPERLIPIDEMIA ASSOCIATED WITH TYPE 2 DIABETES MELLITUS: Chronic | ICD-10-CM

## 2023-01-05 DIAGNOSIS — E11.42 TYPE 2 DIABETES MELLITUS WITH DIABETIC POLYNEUROPATHY, WITH LONG-TERM CURRENT USE OF INSULIN: Chronic | ICD-10-CM

## 2023-01-05 DIAGNOSIS — E11.21 TYPE 2 DIABETES MELLITUS WITH DIABETIC NEPHROPATHY, WITH LONG-TERM CURRENT USE OF INSULIN: Chronic | ICD-10-CM

## 2023-01-05 DIAGNOSIS — I15.2 HYPERTENSION ASSOCIATED WITH DIABETES: Chronic | ICD-10-CM

## 2023-01-05 PROCEDURE — 4010F PR ACE/ARB THEARPY RXD/TAKEN: ICD-10-PCS | Mod: CPTII,S$GLB,, | Performed by: NURSE PRACTITIONER

## 2023-01-05 PROCEDURE — 3078F PR MOST RECENT DIASTOLIC BLOOD PRESSURE < 80 MM HG: ICD-10-PCS | Mod: CPTII,S$GLB,, | Performed by: NURSE PRACTITIONER

## 2023-01-05 PROCEDURE — 99214 PR OFFICE/OUTPT VISIT, EST, LEVL IV, 30-39 MIN: ICD-10-PCS | Mod: S$GLB,,, | Performed by: NURSE PRACTITIONER

## 2023-01-05 PROCEDURE — 99214 OFFICE O/P EST MOD 30 MIN: CPT | Mod: S$GLB,,, | Performed by: NURSE PRACTITIONER

## 2023-01-05 PROCEDURE — 4010F ACE/ARB THERAPY RXD/TAKEN: CPT | Mod: CPTII,S$GLB,, | Performed by: NURSE PRACTITIONER

## 2023-01-05 PROCEDURE — 3072F LOW RISK FOR RETINOPATHY: CPT | Mod: CPTII,S$GLB,, | Performed by: NURSE PRACTITIONER

## 2023-01-05 PROCEDURE — 3072F PR LOW RISK FOR RETINOPATHY: ICD-10-PCS | Mod: CPTII,S$GLB,, | Performed by: NURSE PRACTITIONER

## 2023-01-05 PROCEDURE — 1159F PR MEDICATION LIST DOCUMENTED IN MEDICAL RECORD: ICD-10-PCS | Mod: CPTII,S$GLB,, | Performed by: NURSE PRACTITIONER

## 2023-01-05 PROCEDURE — 3075F PR MOST RECENT SYSTOLIC BLOOD PRESS GE 130-139MM HG: ICD-10-PCS | Mod: CPTII,S$GLB,, | Performed by: NURSE PRACTITIONER

## 2023-01-05 PROCEDURE — 3075F SYST BP GE 130 - 139MM HG: CPT | Mod: CPTII,S$GLB,, | Performed by: NURSE PRACTITIONER

## 2023-01-05 PROCEDURE — 99999 PR PBB SHADOW E&M-EST. PATIENT-LVL V: ICD-10-PCS | Mod: PBBFAC,,, | Performed by: NURSE PRACTITIONER

## 2023-01-05 PROCEDURE — 1159F MED LIST DOCD IN RCRD: CPT | Mod: CPTII,S$GLB,, | Performed by: NURSE PRACTITIONER

## 2023-01-05 PROCEDURE — 1160F RVW MEDS BY RX/DR IN RCRD: CPT | Mod: CPTII,S$GLB,, | Performed by: NURSE PRACTITIONER

## 2023-01-05 PROCEDURE — 99999 PR PBB SHADOW E&M-EST. PATIENT-LVL V: CPT | Mod: PBBFAC,,, | Performed by: NURSE PRACTITIONER

## 2023-01-05 PROCEDURE — 1160F PR REVIEW ALL MEDS BY PRESCRIBER/CLIN PHARMACIST DOCUMENTED: ICD-10-PCS | Mod: CPTII,S$GLB,, | Performed by: NURSE PRACTITIONER

## 2023-01-05 PROCEDURE — 3078F DIAST BP <80 MM HG: CPT | Mod: CPTII,S$GLB,, | Performed by: NURSE PRACTITIONER

## 2023-01-05 PROCEDURE — 3008F PR BODY MASS INDEX (BMI) DOCUMENTED: ICD-10-PCS | Mod: CPTII,S$GLB,, | Performed by: NURSE PRACTITIONER

## 2023-01-05 PROCEDURE — 3008F BODY MASS INDEX DOCD: CPT | Mod: CPTII,S$GLB,, | Performed by: NURSE PRACTITIONER

## 2023-01-05 RX ORDER — NATEGLINIDE 120 MG/1
120 TABLET ORAL
Qty: 90 TABLET | Refills: 3 | Status: SHIPPED | OUTPATIENT
Start: 2023-01-05 | End: 2024-01-05

## 2023-01-05 RX ORDER — DOXYCYCLINE HYCLATE 100 MG
100 TABLET ORAL 2 TIMES DAILY
COMMUNITY
Start: 2023-01-04 | End: 2023-03-31

## 2023-01-05 RX ORDER — ATORVASTATIN CALCIUM 80 MG/1
80 TABLET, FILM COATED ORAL DAILY
Qty: 90 TABLET | Refills: 3 | Status: SHIPPED | OUTPATIENT
Start: 2023-01-05 | End: 2023-03-31 | Stop reason: SDUPTHER

## 2023-01-05 RX ORDER — VALSARTAN 320 MG/1
320 TABLET ORAL DAILY
Qty: 90 TABLET | Refills: 3 | Status: SHIPPED | OUTPATIENT
Start: 2023-01-05 | End: 2023-09-22 | Stop reason: SDUPTHER

## 2023-01-05 NOTE — PATIENT INSTRUCTIONS
Diabetes     A1c above goal   Discussed we need labs and logs to determine treatment plan. He agrees to see diabetes education to set up dexcom and will let me know if he likes it so we can order it.      Continue   Synjardy 12.5/1000 mg twice daily  Trulicity 4.5 mg weekly (Tuesday's)  Toujeo 26 units daily in the morning (24 hour insulin)  Lyumjev/Humalog 10 units before breakfast and dinner (meals === fast acting)  Increase Starlix to 120 mg before lunch -don't miss doses and set alarm to take     [email or web address]     You will receive a dexcom starter kit. You will use your cell phone to monitor your blood sugars. Please download the dexcom G6 nae to monitor your blood sugars. You will create a profile with a username and password.  Please also download the dexcom clarity nae. You will use the profile information to log in to the clarity nae. The dexcom clarity nae will offer a share code so I can download your data as necessary. Send me the clarity code by going to profile, authorize sharing, generate code. It is a 12 digit code. Your loved ones may also download the dexcom follow nae to monitor your blood sugars from afar. Please let us know if you have any questions.      Cholesterol               Continue atorvastatin 80 mg daily                  BP               Continue Losartan 100 mg daily               Start checking blood pressure at home! And make log for PCP     Goal blood pressure is is less than 130/80     Vitamin D               OTC Vitamin D 2000 IU daily

## 2023-01-05 NOTE — ASSESSMENT & PLAN NOTE
On statin per ADA recommendations   LDL goal <70  Not at goal   Missed medication but is back on

## 2023-01-05 NOTE — ASSESSMENT & PLAN NOTE
-- Reviewed goals of therapy are to get the best control we can without hypoglycemia  Check labs on RTC  Medication changes:     A1c above goal   Discussed we need labs and logs to determine treatment plan. He agrees to see diabetes education to set up dexcom and will let me know if he likes it so we can order it.      Continue   Synjardy 12.5/1000 mg twice daily  Trulicity 4.5 mg weekly (Tuesday's)  Toujeo 26 units daily in the morning (24 hour insulin)  Lyumjev/Humalog 10 units before breakfast and dinner (meals === fast acting)  Increase Starlix to 120 mg before lunch -don't miss doses and set alarm to take    -- Reviewed patient's current insulin regimen. Clarified proper insulin dose and timing in relation to meals, etc. Insulin injection sites and proper rotation instructed.    -- Advised frequent self blood glucose monitoring.  Patient encouraged to document glucose results and bring them to every clinic visit    -- Hypoglycemia precautions discussed. Instructed on precautions before driving.    -- Call for Bg repeatedly < 90 or > 180.   -- Close adherence to lifestyle changes recommended.   -- Periodic follow ups for eye evaluations, foot care and dental care suggested.

## 2023-01-06 ENCOUNTER — OFFICE VISIT (OUTPATIENT)
Dept: DERMATOLOGY | Facility: CLINIC | Age: 58
End: 2023-01-06
Payer: COMMERCIAL

## 2023-01-06 ENCOUNTER — CLINICAL SUPPORT (OUTPATIENT)
Dept: DIABETES | Facility: CLINIC | Age: 58
End: 2023-01-06
Payer: COMMERCIAL

## 2023-01-06 DIAGNOSIS — D23.9 INTRADERMAL NEVUS: ICD-10-CM

## 2023-01-06 DIAGNOSIS — Z79.4 TYPE 2 DIABETES MELLITUS WITH DIABETIC POLYNEUROPATHY, WITH LONG-TERM CURRENT USE OF INSULIN: Chronic | ICD-10-CM

## 2023-01-06 DIAGNOSIS — D48.5 NEOPLASM OF UNCERTAIN BEHAVIOR OF SKIN: Primary | ICD-10-CM

## 2023-01-06 DIAGNOSIS — E11.42 TYPE 2 DIABETES MELLITUS WITH DIABETIC POLYNEUROPATHY, WITH LONG-TERM CURRENT USE OF INSULIN: Chronic | ICD-10-CM

## 2023-01-06 PROCEDURE — 88305 TISSUE EXAM BY PATHOLOGIST: CPT | Mod: 26,,, | Performed by: PATHOLOGY

## 2023-01-06 PROCEDURE — 88305 TISSUE EXAM BY PATHOLOGIST: ICD-10-PCS | Mod: 26,,, | Performed by: PATHOLOGY

## 2023-01-06 PROCEDURE — 1159F MED LIST DOCD IN RCRD: CPT | Mod: CPTII,S$GLB,, | Performed by: STUDENT IN AN ORGANIZED HEALTH CARE EDUCATION/TRAINING PROGRAM

## 2023-01-06 PROCEDURE — 1159F PR MEDICATION LIST DOCUMENTED IN MEDICAL RECORD: ICD-10-PCS | Mod: CPTII,S$GLB,, | Performed by: STUDENT IN AN ORGANIZED HEALTH CARE EDUCATION/TRAINING PROGRAM

## 2023-01-06 PROCEDURE — 3072F LOW RISK FOR RETINOPATHY: CPT | Mod: CPTII,S$GLB,, | Performed by: STUDENT IN AN ORGANIZED HEALTH CARE EDUCATION/TRAINING PROGRAM

## 2023-01-06 PROCEDURE — 99202 OFFICE O/P NEW SF 15 MIN: CPT | Mod: 25,S$GLB,, | Performed by: STUDENT IN AN ORGANIZED HEALTH CARE EDUCATION/TRAINING PROGRAM

## 2023-01-06 PROCEDURE — 88305 TISSUE EXAM BY PATHOLOGIST: CPT | Performed by: PATHOLOGY

## 2023-01-06 PROCEDURE — 4010F PR ACE/ARB THEARPY RXD/TAKEN: ICD-10-PCS | Mod: CPTII,S$GLB,, | Performed by: STUDENT IN AN ORGANIZED HEALTH CARE EDUCATION/TRAINING PROGRAM

## 2023-01-06 PROCEDURE — 11102 PR TANGENTIAL BIOPSY, SKIN, SINGLE LESION: ICD-10-PCS | Mod: S$GLB,,, | Performed by: STUDENT IN AN ORGANIZED HEALTH CARE EDUCATION/TRAINING PROGRAM

## 2023-01-06 PROCEDURE — 99999 PR PBB SHADOW E&M-EST. PATIENT-LVL II: CPT | Mod: PBBFAC,,,

## 2023-01-06 PROCEDURE — 99999 PR PBB SHADOW E&M-EST. PATIENT-LVL IV: CPT | Mod: PBBFAC,,, | Performed by: STUDENT IN AN ORGANIZED HEALTH CARE EDUCATION/TRAINING PROGRAM

## 2023-01-06 PROCEDURE — G0108 DIAB MANAGE TRN  PER INDIV: HCPCS | Mod: S$GLB,,, | Performed by: NURSE PRACTITIONER

## 2023-01-06 PROCEDURE — G0108 PR DIAB MANAGE TRN  PER INDIV: ICD-10-PCS | Mod: S$GLB,,, | Performed by: NURSE PRACTITIONER

## 2023-01-06 PROCEDURE — 1160F RVW MEDS BY RX/DR IN RCRD: CPT | Mod: CPTII,S$GLB,, | Performed by: STUDENT IN AN ORGANIZED HEALTH CARE EDUCATION/TRAINING PROGRAM

## 2023-01-06 PROCEDURE — 1160F PR REVIEW ALL MEDS BY PRESCRIBER/CLIN PHARMACIST DOCUMENTED: ICD-10-PCS | Mod: CPTII,S$GLB,, | Performed by: STUDENT IN AN ORGANIZED HEALTH CARE EDUCATION/TRAINING PROGRAM

## 2023-01-06 PROCEDURE — 3072F PR LOW RISK FOR RETINOPATHY: ICD-10-PCS | Mod: CPTII,S$GLB,, | Performed by: STUDENT IN AN ORGANIZED HEALTH CARE EDUCATION/TRAINING PROGRAM

## 2023-01-06 PROCEDURE — 99202 PR OFFICE/OUTPT VISIT, NEW, LEVL II, 15-29 MIN: ICD-10-PCS | Mod: 25,S$GLB,, | Performed by: STUDENT IN AN ORGANIZED HEALTH CARE EDUCATION/TRAINING PROGRAM

## 2023-01-06 PROCEDURE — 11102 TANGNTL BX SKIN SINGLE LES: CPT | Mod: S$GLB,,, | Performed by: STUDENT IN AN ORGANIZED HEALTH CARE EDUCATION/TRAINING PROGRAM

## 2023-01-06 PROCEDURE — 4010F ACE/ARB THERAPY RXD/TAKEN: CPT | Mod: CPTII,S$GLB,, | Performed by: STUDENT IN AN ORGANIZED HEALTH CARE EDUCATION/TRAINING PROGRAM

## 2023-01-06 PROCEDURE — 99999 PR PBB SHADOW E&M-EST. PATIENT-LVL IV: ICD-10-PCS | Mod: PBBFAC,,, | Performed by: STUDENT IN AN ORGANIZED HEALTH CARE EDUCATION/TRAINING PROGRAM

## 2023-01-06 PROCEDURE — 99999 PR PBB SHADOW E&M-EST. PATIENT-LVL II: ICD-10-PCS | Mod: PBBFAC,,,

## 2023-01-06 NOTE — PROGRESS NOTES
Diabetes Care Specialist Progress Note  Author: Edita Cha RN, CDE  Date: 1/6/2023    Program Intake  Reason for Diabetes Program Visit:: Intervention  Type of Intervention:: Individual  Individual: Device Training  Device Training: Personal CGM  Current diabetes risk level:: moderate  In the last 12 months, have you:: none  Permission to speak with others about care:: no    Lab Results   Component Value Date    HGBA1C 7.6 (H) 12/02/2022       Additional Social History    Access to Mass Media & Technology  Does the patient have access to any of the following devices or technologies?: Smart phone  Media or technology needs impacting ability to self-manage diabetes?: No    Cognitive/Behavioral Health  Alert and Oriented: Yes  Difficulty Thinking: No  Requires Prompting: No  Requires assistance for routine expression?: No  Cognitive or behavioral barriers impacting ability to self-manage diabetes?: No    Communication  Language preference: English  Hearing Problems: No  Vision Problems: No  Communication needs impacting ability to self-manage diabetes?: No    Health Literacy  Preferred Learning Method: Face to Face, Demonstration, Reading Materials  How often do you need to have someone help you read instructions, pamphlets, or written material from your doctor or pharmacy?: Never  Health literacy needs impacting ability to self-manage diabetes?: No      Diabetes Self-Management Skills Assessment    Diabetes Disease Process/Treatment Options  Diabetes Disease Process/Treatment Options: Skills Assessment Completed: No  Deferred due to:: Time    Nutrition/Healthy Eating  Nutrition/Healthy Eating Skills Assessment Completed:: No  Deffered due to:: Time    Physical Activity/Exercise  Physical Activity/Exercise Skills Assessment Completed: : No  Deffered due to:: Time    Medications  Medication Skills Assessment Completed:: No  Deffered due to:: Time    Home Blood Glucose Monitoring  Patient states that blood sugar is  checked at home daily.: no  Reasons for not monitoring:: needs training  Home Blood Glucose Monitoring Skills Assessment Completed: : Yes  Assessment indicates:: Knowledge deficit, Instruction Needed  Area of need?: Yes    Acute Complications  Acute Complications Skills Assessment Completed: : No  Deffered due to:: Time    Chronic Complications  Chronic Complications Skills Assessment Completed: : No  Deferred due to:: Time    Psychosocial/Coping  Psychosocial/Coping Skills Assessment Completed: : No  Deffered due to:: Time      Assessment Summary and Plan    Based on today's diabetes care assessment, the following areas of need were identified:      Social 1/6/2023   Access to Mass Media/Tech No   Cognitive/Behavioral Health No   Communication No   Health Literacy No              Diabetes Self-Management Skills 1/6/2023   Home Blood Glucose Monitoring Yes, see care planning          Today's interventions were provided through individual discussion, instruction, and written materials were provided.      Patient verbalized understanding of instruction and written materials.  Pt was able to return back demonstration of instructions today. Patient understood key points, needs reinforcement and further instruction.     Diabetes Self-Management Care Plan:    Today's Diabetes Self-Management Care Plan was developed with Yon's input. Yon has agreed to work toward the following goal(s) to improve his/her overall diabetes control.      Care Plan: Diabetes Management   Updates made since 12/7/2022 12:00 AM        Problem: Blood Glucose Self-Monitoring         Goal: Patient agrees to check and record blood sugars using Dexcom G6 system    Start Date: 1/6/2023   Barriers: Knowledge deficit   Note:    DEXCOM G6 CGM TRAINING     Patient referred to clinic today for Dexcom G6 continuous glucose sensor system training. He was provided with a sample kit from his Endo.   Patient arrived with Dexcom G6 mobile nae downloaded to  "phone. Education was provided using "Quick Start Guide" per Dexcom protocol.     Pt will be using his phone as the primary .  Overview:  5min glucose reading updates, trending arrows, BG graph screens, battery life indicator, Blue Tooth Symbol.  Menus: trend Graph, start sensor, enter BG, events, Alerts, Settings, Shutdown, Stop Sensor   Settings:                          * Urgent Low: 55 mg/dL                          * Urgent Low Soon: on                          * Low alert: 80 mg/dl                          * High alert: 250 mg/dl                           * Rise rate: off                          * Fall Rate: off                          * Signal Loss: on repeat 20 min                          * No Reading: on repeat 20 min                          * Always sound: on                          * Alert Schedule:  (instructed on how to set up alert schedule if desired)     Reviewed additional setting options with patient, including Graph Height and Transmitter ID. Transmitter was paired with .    Reviewed where to find sensor insertion time and sensor expiration date.   Discussed no need to calibrate sensor during the entirety of the 10 day wear. Discussed that pt can calibrate sensor if desired, but at that time she will need to continue calibrating every 12 hours for sensor to remain accurate. Reviewed appropriate calibration techniques.  Reviewed sensor site selection. Site selected and prepped using aseptic technique Inserted to abdomin. Transmitter placed in pod and secured.  Practiced sensor pod/transmitter removal from site, and removal of transmitter from sensor pod.  Patient able to demonstrate without difficulty.  Encouraged to review manual prior to starting another sensor.   Reviewed problem solving aspects of sensor transmission/ variables that can disrupt RF transmission.  range 20 feet, but the first 3 hrs keep within 3 feet of transmitter.  Pt instructed on lag time " of interstitial fluid from CBG and was advised to tx hypoglycemia and dose insulin based on SMBG values.  Dexcom technical support contact number given and examples of when to contact them discussed. Pt will download software at home for Dexcom download.   Patient advised to always check glucose with glucometer should readings do not match symptoms felt (ex. Hypo or hyperglycemia).  Will reach out to provider should he decide to move forward with Dexcom  Clarity sharing code:  [email or web address]  Expires 1/6/24               Follow Up Plan     No follow-ups on file.    Today's care plan and follow up schedule was discussed with patient.  Yon verbalized understanding of the care plan, goals, and agrees to follow up plan.        The patient was encouraged to communicate with his/her health care provider/physician and care team regarding his/her condition(s) and treatment.  I provided the patient with my contact information today and encouraged to contact me via phone or [facility] Patient Portal as needed.     Length of Visit   Total Time: 60 Minutes

## 2023-01-06 NOTE — PATIENT INSTRUCTIONS
Shave Biopsy Wound Care    Your doctor has performed a shave biopsy today.  A band aid and vaseline ointment has been placed over the site.  This should remain in place for NO LONGER THAN 48 hours.  It is fine to remove the bandaid after 24 hours, if the area is no longer bleeding. It is recommended that you keep the area dry (do not wet)) for the first 24 hours.  After 24 hours, wash the area with warm soap and water and apply Vaseline jelly.  Many patients prefer to use Neosporin or Bacitracin ointment.  This is acceptable; however, know that you can develop an allergy to this medication even if you have used it safely for years.  It is important to keep the area moist.  Letting it dry out and get air slows healing time, and will worsen the scar.        If you notice increasing redness, tenderness, pain, or yellow drainage at the biopsy site, please notify your doctor.  These are signs of an infection.    If your biopsy site is bleeding, apply firm pressure for 15 minutes straight.  Repeat for another 15 minutes, if it is still bleeding.   If the surgical site continues to bleed, then please contact your doctor.      For [facility] users:   You will receive your biopsy results in [facility] as soon as they are available. Please be assured that your physician/provider will review your results and will then determine what further treatment, evaluation, or planning is required. You should be contacted by your physician's/provider's office within 5 business days of receiving your results; If not, please reach out to directly. This is one more way [facility] is putting you first.     [facility] [place] [address]/ [phone] FAX/ [email or web address]

## 2023-01-11 LAB
FINAL PATHOLOGIC DIAGNOSIS: NORMAL
GROSS: NORMAL
Lab: NORMAL
MICROSCOPIC EXAM: NORMAL

## 2023-01-19 ENCOUNTER — PATIENT MESSAGE (OUTPATIENT)
Dept: ADMINISTRATIVE | Facility: HOSPITAL | Age: 58
End: 2023-01-19
Payer: COMMERCIAL

## 2023-01-19 ENCOUNTER — PROCEDURE VISIT (OUTPATIENT)
Dept: DERMATOLOGY | Facility: CLINIC | Age: 58
End: 2023-01-19
Payer: COMMERCIAL

## 2023-01-19 DIAGNOSIS — D48.5 NEOPLASM OF UNCERTAIN BEHAVIOR OF SKIN: ICD-10-CM

## 2023-01-19 DIAGNOSIS — C44.91 BASAL CELL CARCINOMA (BCC), UNSPECIFIED SITE: Primary | ICD-10-CM

## 2023-01-19 PROCEDURE — 99499 NO LOS: ICD-10-PCS | Mod: S$GLB,,, | Performed by: STUDENT IN AN ORGANIZED HEALTH CARE EDUCATION/TRAINING PROGRAM

## 2023-01-19 PROCEDURE — 11102 PR TANGENTIAL BIOPSY, SKIN, SINGLE LESION: ICD-10-PCS | Mod: 59,S$GLB,, | Performed by: STUDENT IN AN ORGANIZED HEALTH CARE EDUCATION/TRAINING PROGRAM

## 2023-01-19 PROCEDURE — 88305 TISSUE EXAM BY PATHOLOGIST: ICD-10-PCS | Mod: 26,,, | Performed by: PATHOLOGY

## 2023-01-19 PROCEDURE — 11603 PR EXC SKIN MALIG 2.1-3 CM TRUNK,ARM,LEG: ICD-10-PCS | Mod: S$GLB,,, | Performed by: STUDENT IN AN ORGANIZED HEALTH CARE EDUCATION/TRAINING PROGRAM

## 2023-01-19 PROCEDURE — 99499 UNLISTED E&M SERVICE: CPT | Mod: S$GLB,,, | Performed by: STUDENT IN AN ORGANIZED HEALTH CARE EDUCATION/TRAINING PROGRAM

## 2023-01-19 PROCEDURE — 11603 EXC TR-EXT MAL+MARG 2.1-3 CM: CPT | Mod: S$GLB,,, | Performed by: STUDENT IN AN ORGANIZED HEALTH CARE EDUCATION/TRAINING PROGRAM

## 2023-01-19 PROCEDURE — 88305 TISSUE EXAM BY PATHOLOGIST: CPT | Mod: 26,,, | Performed by: PATHOLOGY

## 2023-01-19 PROCEDURE — 12034 PR LAYR CLOS WND TRUNK,ARM,LEG 7.6-12.5 CM: ICD-10-PCS | Mod: S$GLB,,, | Performed by: STUDENT IN AN ORGANIZED HEALTH CARE EDUCATION/TRAINING PROGRAM

## 2023-01-19 PROCEDURE — 11102 TANGNTL BX SKIN SINGLE LES: CPT | Mod: 59,S$GLB,, | Performed by: STUDENT IN AN ORGANIZED HEALTH CARE EDUCATION/TRAINING PROGRAM

## 2023-01-19 PROCEDURE — 12034 INTMD RPR S/TR/EXT 7.6-12.5: CPT | Mod: S$GLB,,, | Performed by: STUDENT IN AN ORGANIZED HEALTH CARE EDUCATION/TRAINING PROGRAM

## 2023-01-19 PROCEDURE — 88305 TISSUE EXAM BY PATHOLOGIST: CPT | Performed by: PATHOLOGY

## 2023-01-19 NOTE — PATIENT INSTRUCTIONS
Wound Care    A band aid and vaseline ointment has been placed over the site.  This should remain in place for 24 hours.  It is recommended that you keep the area dry for the first 24 hours.  After 24 hours, you may remove the band aid and wash the area with warm soap and water and apply Vaseline jelly.  Many patients prefer to use Neosporin or Bacitracin ointment.  This is acceptable; however, know that you can develop an allergy to this medication even if you have used it safely for years.  It is important to keep the area moist.  Letting it dry out and get air slows healing time, and will worsen the scar.  Band aid is optional after first 24 hours.      If you notice increasing redness, tenderness, pain, or yellow drainage at the site, please notify your doctor.  These are signs of an infection.    If your site is bleeding, apply firm pressure for 15 minutes straight.  Repeat for another 15 minutes, if it is still bleeding.   If the surgical site continues to bleed, then please contact your doctor.      [facility] [place] [address]/ [phone] FAX/ [email or web address]       Shave Biopsy Wound Care    Your doctor has performed a shave biopsy today.  A band aid and vaseline ointment has been placed over the site.  This should remain in place for NO LONGER THAN 48 hours.  It is fine to remove the bandaid after 24 hours, if the area is no longer bleeding. It is recommended that you keep the area dry (do not wet)) for the first 24 hours.  After 24 hours, wash the area with warm soap and water and apply Vaseline jelly.  Many patients prefer to use Neosporin or Bacitracin ointment.  This is acceptable; however, know that you can develop an allergy to this medication even if you have used it safely for years.  It is important to keep the area moist.  Letting it dry out and get air slows healing time, and will worsen the scar.        If you notice increasing redness, tenderness, pain, or  yellow drainage at the biopsy site, please notify your doctor.  These are signs of an infection.    If your biopsy site is bleeding, apply firm pressure for 15 minutes straight.  Repeat for another 15 minutes, if it is still bleeding.   If the surgical site continues to bleed, then please contact your doctor.      For [facility] users:   You will receive your biopsy results in [facility] as soon as they are available. Please be assured that your physician/provider will review your results and will then determine what further treatment, evaluation, or planning is required. You should be contacted by your physician's/provider's office within 5 business days of receiving your results; If not, please reach out to directly. This is one more way [facility] is putting you first.     [facility] [place] [address]/ [phone] FAX/ [email or web address]

## 2023-01-19 NOTE — PROGRESS NOTES
PROCEDURE: Elliptical excision with intermediate layered repair in order to decrease dead space, decrease tension, and close large gap.    ANESTHETIC: 20 cc 1% Xylocaine with Epinephrine 1:100,000, buffered    SURGEON: Naif Olmedo M.D.    ASSISTANTS: Mario Emanuel RN    PREOPERATIVE DIAGNOSIS:  Biopsy-proven Basal Cell Carcinoma    POSTOPERATIVE DIAGNOSIS:  Same as preoperative diagnosis    PATHOLOGIC DIAGNOSIS: Pending    LOCATION: left shoulder    INITIAL LESION SIZE: 2.1 cm    EXCISED DIAMETER: 2.9 cm    PREPARATION: The diagnosis, procedure, alternatives, benefits and risks, including but not limited to: infection, bleeding/bruising, drug reactions, pain, scar or cosmetic defect, local sensation disturbances, wound dehiscence (separation of wound edges after sutures removed) and/or recurrence of present condition were explained to the patient. The patient elected to proceed.  Patient's identity was verified using 2 patient identifiers and the side and site was verified.  Time out period with surgeon, assistant and patient in surgical suite was taken.    PROCEDURE: The location noted above was prepped, draped, and anesthetized in the usual sterile fashion per Mario Emanuel RN. Lesional tissue was carefully marked with at least 4 mm margins of clinically normal skin in all directions. A fusiform elliptical excision was done with #15 blade carried down completely through the dermis into the deep subcutaneous tissues to the level of the non-muscle fascia, and dissection was carried out in that plane.  Electrocoagulation was used to obtain hemostasis. Blood loss was minimal. The wound was then approximated in a layered fashion with subcutaneous and intradermal sutures of 3.0 Monocryl, approximately 9 in number, and the wound was then superficially closed with simple interrupted sutures of 4.0 Prolene.    The patient tolerated the procedure well.    The area was cleaned and dressed appropriately and the patient was  given wound care instructions, as well as an appointment for follow-up evaluation.    LENGTH OF REPAIR: 8 cm                  ___________________________________  Neoplasm of uncertain behavior of skin--anterior waistline    History: lesion is bothersome and rubs on clothes    Exam with 1.5 cm pedunculated skin colored and brown papule    Shave biopsy procedure note:  Shave biopsy performed after verbal consent including risk of infection, scar, recurrence, need for additional treatment of site. Area prepped with alcohol, anesthetized with approximately 1.0cc of 1% lidocaine with epinephrine. Lesional tissue shaved with razor blade. Hemostasis achieved with application of aluminum chloride followed by hyfrecation. No complications. Dressing applied. Wound care explained.

## 2023-01-25 ENCOUNTER — OFFICE VISIT (OUTPATIENT)
Dept: CARDIOLOGY | Facility: CLINIC | Age: 58
End: 2023-01-25
Payer: COMMERCIAL

## 2023-01-25 VITALS
HEIGHT: 72 IN | DIASTOLIC BLOOD PRESSURE: 80 MMHG | OXYGEN SATURATION: 96 % | WEIGHT: 213.63 LBS | SYSTOLIC BLOOD PRESSURE: 123 MMHG | BODY MASS INDEX: 28.93 KG/M2 | HEART RATE: 78 BPM

## 2023-01-25 DIAGNOSIS — E11.21 TYPE 2 DIABETES MELLITUS WITH DIABETIC NEPHROPATHY, WITH LONG-TERM CURRENT USE OF INSULIN: Chronic | ICD-10-CM

## 2023-01-25 DIAGNOSIS — Z95.5 STENTED CORONARY ARTERY: Chronic | ICD-10-CM

## 2023-01-25 DIAGNOSIS — I15.2 HYPERTENSION ASSOCIATED WITH DIABETES: Chronic | ICD-10-CM

## 2023-01-25 DIAGNOSIS — Z79.4 TYPE 2 DIABETES MELLITUS WITH DIABETIC NEPHROPATHY, WITH LONG-TERM CURRENT USE OF INSULIN: Chronic | ICD-10-CM

## 2023-01-25 DIAGNOSIS — E78.5 HYPERLIPIDEMIA ASSOCIATED WITH TYPE 2 DIABETES MELLITUS: Chronic | ICD-10-CM

## 2023-01-25 DIAGNOSIS — I25.2 HISTORY OF NON-ST ELEVATION MYOCARDIAL INFARCTION (NSTEMI): Primary | Chronic | ICD-10-CM

## 2023-01-25 DIAGNOSIS — E11.59 HYPERTENSION ASSOCIATED WITH DIABETES: Chronic | ICD-10-CM

## 2023-01-25 DIAGNOSIS — E11.69 HYPERLIPIDEMIA ASSOCIATED WITH TYPE 2 DIABETES MELLITUS: Chronic | ICD-10-CM

## 2023-01-25 LAB
FINAL PATHOLOGIC DIAGNOSIS: NORMAL
GROSS: NORMAL
Lab: NORMAL
MICROSCOPIC EXAM: NORMAL

## 2023-01-25 PROCEDURE — 3074F PR MOST RECENT SYSTOLIC BLOOD PRESSURE < 130 MM HG: ICD-10-PCS | Mod: CPTII,S$GLB,, | Performed by: INTERNAL MEDICINE

## 2023-01-25 PROCEDURE — 3008F PR BODY MASS INDEX (BMI) DOCUMENTED: ICD-10-PCS | Mod: CPTII,S$GLB,, | Performed by: INTERNAL MEDICINE

## 2023-01-25 PROCEDURE — 4010F PR ACE/ARB THEARPY RXD/TAKEN: ICD-10-PCS | Mod: CPTII,S$GLB,, | Performed by: INTERNAL MEDICINE

## 2023-01-25 PROCEDURE — 1159F MED LIST DOCD IN RCRD: CPT | Mod: CPTII,S$GLB,, | Performed by: INTERNAL MEDICINE

## 2023-01-25 PROCEDURE — 99214 PR OFFICE/OUTPT VISIT, EST, LEVL IV, 30-39 MIN: ICD-10-PCS | Mod: S$GLB,,, | Performed by: INTERNAL MEDICINE

## 2023-01-25 PROCEDURE — 3079F PR MOST RECENT DIASTOLIC BLOOD PRESSURE 80-89 MM HG: ICD-10-PCS | Mod: CPTII,S$GLB,, | Performed by: INTERNAL MEDICINE

## 2023-01-25 PROCEDURE — 4010F ACE/ARB THERAPY RXD/TAKEN: CPT | Mod: CPTII,S$GLB,, | Performed by: INTERNAL MEDICINE

## 2023-01-25 PROCEDURE — 3072F PR LOW RISK FOR RETINOPATHY: ICD-10-PCS | Mod: CPTII,S$GLB,, | Performed by: INTERNAL MEDICINE

## 2023-01-25 PROCEDURE — 1159F PR MEDICATION LIST DOCUMENTED IN MEDICAL RECORD: ICD-10-PCS | Mod: CPTII,S$GLB,, | Performed by: INTERNAL MEDICINE

## 2023-01-25 PROCEDURE — 99999 PR PBB SHADOW E&M-EST. PATIENT-LVL IV: ICD-10-PCS | Mod: PBBFAC,,, | Performed by: INTERNAL MEDICINE

## 2023-01-25 PROCEDURE — 3072F LOW RISK FOR RETINOPATHY: CPT | Mod: CPTII,S$GLB,, | Performed by: INTERNAL MEDICINE

## 2023-01-25 PROCEDURE — 99999 PR PBB SHADOW E&M-EST. PATIENT-LVL IV: CPT | Mod: PBBFAC,,, | Performed by: INTERNAL MEDICINE

## 2023-01-25 PROCEDURE — 3008F BODY MASS INDEX DOCD: CPT | Mod: CPTII,S$GLB,, | Performed by: INTERNAL MEDICINE

## 2023-01-25 PROCEDURE — 3074F SYST BP LT 130 MM HG: CPT | Mod: CPTII,S$GLB,, | Performed by: INTERNAL MEDICINE

## 2023-01-25 PROCEDURE — 99214 OFFICE O/P EST MOD 30 MIN: CPT | Mod: S$GLB,,, | Performed by: INTERNAL MEDICINE

## 2023-01-25 PROCEDURE — 3079F DIAST BP 80-89 MM HG: CPT | Mod: CPTII,S$GLB,, | Performed by: INTERNAL MEDICINE

## 2023-01-25 RX ORDER — NITROGLYCERIN 0.4 MG/1
0.4 TABLET SUBLINGUAL EVERY 5 MIN PRN
Qty: 20 TABLET | Refills: 12 | Status: SHIPPED | OUTPATIENT
Start: 2023-01-25 | End: 2023-12-04

## 2023-01-25 NOTE — PROGRESS NOTES
Subjective:   @Patient ID:  Yon Loyd is a 57 y.o. male who presents for evaluation of CAD    HPI:   Here for f/u   He is doing well  He missed statin for 3 months, that's why LDL significantly elevated. He is back on statin now.   He didn't do the cardiac rehab   Occasional minor chest pain. Last was 2 months ago. Lasts for seconds/mins. Just 2 times over the last 1.5 years.        Stopped tobacco  Compliant with DAPT. No bleeding issues        Prior cardiovascular  Hx  --------------------------------    - Heart Catheterization    12/27/2021  The ejection fraction was greater than 55% by visual estimate.  The pre-procedure left ventricular end diastolic pressure was 8.  The Mid LAD lesion was 99% stenosed with 0% stenosis post-intervention.  A STENT RESOLUTE DONI [id number] stent was successfully placed. Post dilated with 3.5 NC balloon. IVUS guided  The Mid Cx to Dist Cx lesion was 95% stenosed with 0% stenosis post-intervention.  A STENT RESOLUTE DONI [id number] stent was successfully placed to mid/distal LCX post dilated with 3.0 NC balloon. IVUS guided  The RPDA lesion was distal 99% stenosed. Small to moderate caliber vs. Very distal lesion. Will be treated medically.  The estimated blood loss was none.     - ASA/Brilinta  - Statin   - Add Coreg   - Risk factors modifications  - Medical therapy for residual distal small Rt PDA lesion.     - NORIS 1/2022  Normal resting and exercise NORIS           - ECHO 12/27/2021  The left ventricle is normal in size with normal systolic function.  The estimated ejection fraction is 65%.  Normal left ventricular diastolic function.  Normal right ventricular size with normal right ventricular systolic function.  Mild mitral regurgitation.  Intermediate central venous pressure (8 mmHg).  The estimated PA systolic pressure is 22 mmHg.          Patient Active Problem List    Diagnosis Date Noted    Arm skin lesion, left 03/18/2022    Other hyperlipidemia 02/04/2022    Stented  coronary artery 2022    History of non-ST elevation myocardial infarction (NSTEMI) 2021    Umbilical hernia without obstruction and without gangrene 2021    Diastasis recti 2021    Type 2 diabetes mellitus with both eyes affected by mild nonproliferative retinopathy without macular edema, with long-term current use of insulin 2017    BMI 29.0-29.9,adult 2016    Proteinuria due to type 2 diabetes mellitus 2016    Type 2 diabetes mellitus with diabetic polyneuropathy, with long-term current use of insulin 10/04/2016    Type 2 diabetes mellitus with diabetic nephropathy, with long-term current use of insulin 2014    Hypertension associated with diabetes 10/24/2012    Hyperlipidemia associated with type 2 diabetes mellitus 10/24/2012     Lab Results   Component Value Date    LDLCALC 63.6 2022                 Left Arm BP - Sittin/80      LAST HbA1c  Lab Results   Component Value Date    HGBA1C 7.6 (H) 2022       Lipid panel  Lab Results   Component Value Date    CHOL 299 (H) 2022    CHOL 126 2022    CHOL 192 2021     Lab Results   Component Value Date    HDL 52 2022    HDL 41 2022    HDL 48 2021     Lab Results   Component Value Date    LDLCALC 182.6 (H) 2022    LDLCALC 63.6 2022    LDLCALC 90.0 2021     Lab Results   Component Value Date    TRIG 322 (H) 2022    TRIG 107 2022    TRIG 270 (H) 2021     Lab Results   Component Value Date    CHOLHDL 17.4 (L) 2022    CHOLHDL 32.5 2022    CHOLHDL 25.0 2021            Review of Systems   Constitutional: Negative for chills and fever.   HENT:  Negative for hearing loss and nosebleeds.    Eyes:  Negative for blurred vision.   Cardiovascular:  Negative for chest pain, leg swelling and palpitations.   Respiratory:  Negative for hemoptysis and shortness of breath.    Hematologic/Lymphatic: Negative for bleeding problem.   Skin:   Negative for itching.   Musculoskeletal:  Negative for falls.   Gastrointestinal:  Negative for abdominal pain and hematochezia.   Genitourinary:  Negative for hematuria.   Neurological:  Negative for dizziness and loss of balance.   Psychiatric/Behavioral:  Negative for altered mental status and depression.      Objective:   Physical Exam  Constitutional:       Appearance: He is well-developed.   HENT:      Head: Normocephalic and atraumatic.   Eyes:      Conjunctiva/sclera: Conjunctivae normal.   Neck:      Vascular: No carotid bruit or JVD.   Cardiovascular:      Rate and Rhythm: Normal rate and regular rhythm.      Pulses:           Carotid pulses are 2+ on the right side and 2+ on the left side.       Radial pulses are 2+ on the right side and 2+ on the left side.      Heart sounds: Normal heart sounds. No murmur heard.    No friction rub. No gallop.   Pulmonary:      Effort: Pulmonary effort is normal. No respiratory distress.      Breath sounds: Normal breath sounds. No stridor. No wheezing.   Musculoskeletal:      Cervical back: Neck supple.   Skin:     General: Skin is warm and dry.   Neurological:      Mental Status: He is alert and oriented to person, place, and time.   Psychiatric:         Behavior: Behavior normal.       Assessment:     1. History of non-ST elevation myocardial infarction (NSTEMI)    2. Stented coronary artery    3. Hyperlipidemia associated with type 2 diabetes mellitus    4. Hypertension associated with diabetes    5. Type 2 diabetes mellitus with diabetic nephropathy, with long-term current use of insulin    6. BMI 29.0-29.9,adult          Plan:   - s/p PCI as above  - With any recurren worsening ches pain may consider stress test  - Residual small vs disease in PDA. Medical tx  - Continue ASA/Brilinta.  We discussed about switching to single agent.  And possible benefits from DAPT for longer period of time.  At this time since he does not have any issues and after shared decision we  will continue that and will readdress next visit  - BB  - High intense statin. LDL > 70. He is back on statin now.  Repeat lipid panel next visit.      I spent 5-10 minutes asking, assessing, assisting, arranging and advising heart healthy diet improvements. This included low-salt meals, portion control and health food alternatives. I also encourage 30 minutes of moderate exercise 3-4x a week.              Pertinent cardiac images and EKG reviewed independently.    Continue with current medical plan and lifestyle changes.  Return sooner for concerns or questions. If symptoms persist go to the ED  I have reviewed all pertinent data including patient's medical history in detail and updated the computerized patient record.     No orders of the defined types were placed in this encounter.      Follow up as scheduled.     He expressed verbal understanding and agreed with the plan    Patient's Medications   New Prescriptions    No medications on file   Previous Medications    ASPIRIN (ECOTRIN) 81 MG EC TABLET    Take 81 mg by mouth once daily.    ATORVASTATIN (LIPITOR) 80 MG TABLET    Take 1 tablet (80 mg total) by mouth once daily.    BLOOD SUGAR DIAGNOSTIC STRP    To check BG 4 times daily, to use with insurance preferred meter    BLOOD-GLUCOSE METER KIT    To check BG 4 times daily, to use with insurance preferred meter    DOXYCYCLINE (VIBRA-TABS) 100 MG TABLET    Take 100 mg by mouth 2 (two) times daily.    EMPAGLIFLOZIN-METFORMIN (SYNJARDY) 12.5-1,000 MG TAB    Take 1 tablet by mouth 2 (two) times daily.    ERYTHROMYCIN (ROMYCIN) OPHTHALMIC OINTMENT    Place into the right eye 3 (three) times daily.    INSULIN ASPART U-100 (NOVOLOG FLEXPEN U-100 INSULIN) 100 UNIT/ML (3 ML) INPN PEN    Inject 10 units under the skin twice a day before breakfast and dinner    INSULIN GLARGINE U-300 CONC (TOUJEO MAX U-300 SOLOSTAR) 300 UNIT/ML (3 ML) INSULIN PEN    INJECT 26 UNITS SUBCUTANEOUSLY EVERY DAY - SHOULD LAST 34 DAYS     "LANCETS MISC    To check BG 4 times daily, to use with insurance preferred meter    METOPROLOL SUCCINATE (TOPROL-XL) 50 MG 24 HR TABLET    TAKE 1 TABLET BY MOUTH EVERY DAY    MULTIVITAMIN-MINERALS-LUTEIN TAB    Take 1 tablet by mouth once daily.    NATEGLINIDE (STARLIX) 120 MG TABLET    Take 1 tablet (120 mg total) by mouth with lunch.    PEN NEEDLE, DIABETIC (BD ULTRA-FINE BLACK PEN NEEDLE) 32 GAUGE X 5/32" NDLE    Uses 2  times daily, on injections. Dispense 4 mm needles only    TICAGRELOR (BRILINTA) 90 MG TABLET    Take 1 tablet (90 mg total) by mouth 2 (two) times a day.    TIRZEPATIDE (MOUNJARO) 12.5 MG/0.5 ML PNIJ    Inject 12.5 mg into the skin every 7 days.    VALSARTAN (DIOVAN) 320 MG TABLET    Take 1 tablet (320 mg total) by mouth once daily.   Modified Medications    No medications on file   Discontinued Medications    No medications on file            "

## 2023-02-02 NOTE — PROGRESS NOTES
Subjective:       Patient ID:  Yon Loyd is a 57 y.o. male who presents for   Chief Complaint   Patient presents with    Lesion     L shoulder     Patient here for Skin Exam    Last seen by dermatologist: 2018    No - personal history of atypical moles removed  No - personal history of MM   No - family history of MM  Yes - childhood blistering sunburns  No - tanning bed use  No - personal history of NMSC    Patient with specific complaint of lesion(s)  Location: left shoulder  Duration: 3 years  Symptoms: itchy, red, bleeds, does not heal  Relieving factors/Previous treatments: none          Review of Systems   Skin:  Positive for activity-related sunscreen use. Negative for itching, rash, dry skin, daily sunscreen use, recent sunburn and wears hat.   Hematologic/Lymphatic: Bruises/bleeds easily (aspirin and bloodthinner).      Objective:    Physical Exam   Constitutional: He appears well-developed and well-nourished. No distress.   Neurological: He is alert and oriented to person, place, and time. He is not disoriented.   Psychiatric: He has a normal mood and affect.   Skin:   Areas Examined (abnormalities noted in diagram):   LUE Inspection Performed            Diagram Legend     Erythematous scaling macule/papule c/w actinic keratosis       Vascular papule c/w angioma      Pigmented verrucoid papule/plaque c/w seborrheic keratosis      Yellow umbilicated papule c/w sebaceous hyperplasia      Irregularly shaped tan macule c/w lentigo     1-2 mm smooth white papules consistent with Milia      Movable subcutaneous cyst with punctum c/w epidermal inclusion cyst      Subcutaneous movable cyst c/w pilar cyst      Firm pink to brown papule c/w dermatofibroma      Pedunculated fleshy papule(s) c/w skin tag(s)      Evenly pigmented macule c/w junctional nevus     Mildly variegated pigmented, slightly irregular-bordered macule c/w mildly atypical nevus      Flesh colored to evenly pigmented papule c/w intradermal  24hr urine metanephrines/ catecolamines WNL.     Plasma metanephrines < 1 times ULN     Pheochromocytoma and functional paraganglioma has been ruled out as to make a biochemical diagnosis of functional paraganglioma or pheochromocytoma metanephrines/ catecholamines should be > 3 times ULN.      Patient noted to have normal adrenal glands on prior scans.     Anterior mediastinal unlikely to be a functional paraganglioma.     If concerns for hormonal over production pre op, surgical team to consider low dose anthony 1 blocker like doxazosin 1mg daily        nevus       Pink pearly papule/plaque c/w basal cell carcinoma      Erythematous hyperkeratotic cursted plaque c/w SCC      Surgical scar with no sign of skin cancer recurrence      Open and closed comedones      Inflammatory papules and pustules      Verrucoid papule consistent consistent with wart     Erythematous eczematous patches and plaques     Dystrophic onycholytic nail with subungual debris c/w onychomycosis     Umbilicated papule    Erythematous-base heme-crusted tan verrucoid plaque consistent with inflamed seborrheic keratosis     Erythematous Silvery Scaling Plaque c/w Psoriasis     See annotation            Assessment / Plan:      Pathology Orders:       Normal Orders This Visit    Specimen to Pathology, Dermatology     Comments:    Number of Specimens:->1  ------------------------->-------------------------  Spec 1 Procedure:->Biopsy  Spec 1 Clinical Impression:->BCC  Spec 1 Source:->left shoulder  Release to patient->Immediate    Questions:    Procedure Type: Dermatology and skin neoplasms    Number of Specimens: 1    ------------------------: -------------------------    Spec 1 Procedure: Biopsy    Spec 1 Clinical Impression: BCC    Spec 1 Source: left shoulder    Release to patient: Immediate          Neoplasm of uncertain behavior of skin  - likely BCC. Given size, would be appropriate for mohs or excision    Shave biopsy procedure note:    Shave biopsy performed after verbal consent including risk of infection, scar, recurrence, need for additional treatment of site. Area prepped with alcohol, anesthetized with approximately 1.0cc of 1% lidocaine with epinephrine. Lesional tissue shaved with razor blade. Hemostasis achieved with application of aluminum chloride followed by hyfrecation. No complications. Dressing applied. Wound care explained.      Intradermal nevus--Pedunculated grown on suprapubic area. There for many years and not growing or changing for favor benign growth. Possibly IDN vs  Skin tag. Can consider shave biopsy in the future to confirm diagnosis and given that it is irritated by waistband    Patient should follow up for FBSE in 6 months             Follow up in about 6 months (around 7/6/2023).     [Disease: _____________________] : Disease: [unfilled] [T: ___] : T[unfilled] [N: ___] : N[unfilled] [M: ___] : M[unfilled] [AJCC Stage: ____] : AJCC Stage: [unfilled] [de-identified] : Mr Mclaughlin is a 47 year old male with past medical history of stage II testicular CA (1994) treated with surgery, chemotherapy (BEP) and an autologous BMT and Non-Hodgkins lymphoma right neck neck (1998) for which he underwent radiation therapy presenting for transfer of care for melanoma.  \par \par He noticed a raised, black, pruritic lesion on his right parietal scalp.  Biopsy was performed on 8/14/2018 which was consistent with a 1.1mm melanoma with no significant mitotic figures, no ulceration and no regression.\par He was seen by Dr Sarwat Chandler on 8/13/2018 who recommended wide excision of melanoma with SLNB and reconstruction.\par On 8/29/2018 patient underwent radical resection of scalp melanoma, right posterior modified neck dissection with closure by Dr Bradley Toussaint (Plastic)\par Final Path: metastatic melanoma present in 2/2 LN, residual melanoma, negative margins, no lymphovascular invasion.  Tumor stage pT2a pN2a\par PET/CT 9/15/2018: Minimally FDG avid soft tissue thickening right scalp probably postsurgical.FDG avid focal area of soft tissue thickening right neck, favor postsurgical changes rather than residual disease. Suggest correlation with clinical exam and surgical margins. No FDG avid distant metastatic disease. \par On 10/19/2018 underwent right functional neck dissection with Dr Sarwat Chandler\par Path: no metastatic melanoma seen in [address] LN (0/18) and benign skin/tissue findings.\par He was referred to Dr Kendall Sands (Medical Oncology) at [facility]. \par He was originally seen by Dr Kendall Sands who referred the patient to Dr Gopi Arredondo at [facility].  Patient has stage IIIa (T2aN2) BRAF testing was performed and mutation was detected.\par The patient was offered adjuvant Nivolumab 480mg every four weeks by Dr Arredondo for 13 cycles.\par  [de-identified] : melanoma [de-identified] : Surgical Oncology: Dr Sarwat Chandler [phone]\par Plastics: Dr Kendall Toussaint [phone]\par Medical Oncology ([facility]): Dr Kendall Sands  [phone]\par Medical Oncology: Melanoma ([facility]): Dr Gopi Arredondo [phone]\par

## 2023-02-03 ENCOUNTER — CLINICAL SUPPORT (OUTPATIENT)
Dept: DERMATOLOGY | Facility: CLINIC | Age: 58
End: 2023-02-03
Payer: COMMERCIAL

## 2023-02-03 DIAGNOSIS — Z48.02 ENCOUNTER FOR REMOVAL OF SUTURES: Primary | ICD-10-CM

## 2023-02-03 PROCEDURE — 99024 PR POST-OP FOLLOW-UP VISIT: ICD-10-PCS | Mod: S$GLB,,, | Performed by: STUDENT IN AN ORGANIZED HEALTH CARE EDUCATION/TRAINING PROGRAM

## 2023-02-03 PROCEDURE — 99024 POSTOP FOLLOW-UP VISIT: CPT | Mod: S$GLB,,, | Performed by: STUDENT IN AN ORGANIZED HEALTH CARE EDUCATION/TRAINING PROGRAM

## 2023-02-03 NOTE — PROGRESS NOTES
Suture Removal note:  CC: 57 y.o. male patient is here for suture removal.         HPI: Patient is s/p excision of BCC from the left shoulder on 1/19/23.  Patient reports no problems.    WOUND PE:  Sutures intact.  Wound healing well.  Good approximation of skin edges.  No signs or symptoms of infection.    IMPRESSION:  -RESIDUAL BASAL CELL CARCINOMA, COMPLETELY EXCISED   -SURGICAL MARGINS ARE NEGATIVE FOR BASAL CELL CARCINOMA   -SCAR (POST-SURGICAL) - margins clear.    PLAN:  Sutures removed today.  Continue wound care.    RTC: In 6 months.

## 2023-03-24 ENCOUNTER — LAB VISIT (OUTPATIENT)
Dept: LAB | Facility: HOSPITAL | Age: 58
End: 2023-03-24
Attending: FAMILY MEDICINE
Payer: COMMERCIAL

## 2023-03-24 DIAGNOSIS — Z79.4 TYPE 2 DIABETES MELLITUS WITH DIABETIC POLYNEUROPATHY, WITH LONG-TERM CURRENT USE OF INSULIN: Chronic | ICD-10-CM

## 2023-03-24 DIAGNOSIS — E11.21 TYPE 2 DIABETES MELLITUS WITH DIABETIC NEPHROPATHY, WITH LONG-TERM CURRENT USE OF INSULIN: Chronic | ICD-10-CM

## 2023-03-24 DIAGNOSIS — E11.42 TYPE 2 DIABETES MELLITUS WITH DIABETIC POLYNEUROPATHY, WITH LONG-TERM CURRENT USE OF INSULIN: Chronic | ICD-10-CM

## 2023-03-24 DIAGNOSIS — Z79.4 TYPE 2 DIABETES MELLITUS WITH BOTH EYES AFFECTED BY MILD NONPROLIFERATIVE RETINOPATHY WITHOUT MACULAR EDEMA, WITH LONG-TERM CURRENT USE OF INSULIN: ICD-10-CM

## 2023-03-24 DIAGNOSIS — Z79.4 TYPE 2 DIABETES MELLITUS WITH DIABETIC NEPHROPATHY, WITH LONG-TERM CURRENT USE OF INSULIN: Chronic | ICD-10-CM

## 2023-03-24 DIAGNOSIS — E11.3293 TYPE 2 DIABETES MELLITUS WITH BOTH EYES AFFECTED BY MILD NONPROLIFERATIVE RETINOPATHY WITHOUT MACULAR EDEMA, WITH LONG-TERM CURRENT USE OF INSULIN: ICD-10-CM

## 2023-03-24 LAB
ALBUMIN/CREAT UR: 72.9 UG/MG (ref 0–30)
CREAT UR-MCNC: 59 MG/DL (ref 23–375)
MICROALBUMIN UR DL<=1MG/L-MCNC: 43 UG/ML

## 2023-03-24 PROCEDURE — 82570 ASSAY OF URINE CREATININE: CPT | Mod: PO | Performed by: FAMILY MEDICINE

## 2023-03-31 ENCOUNTER — OFFICE VISIT (OUTPATIENT)
Dept: FAMILY MEDICINE | Facility: CLINIC | Age: 58
End: 2023-03-31
Payer: COMMERCIAL

## 2023-03-31 VITALS
TEMPERATURE: 98 F | HEART RATE: 71 BPM | DIASTOLIC BLOOD PRESSURE: 72 MMHG | WEIGHT: 206.38 LBS | HEIGHT: 72 IN | SYSTOLIC BLOOD PRESSURE: 116 MMHG | OXYGEN SATURATION: 97 % | BODY MASS INDEX: 27.95 KG/M2

## 2023-03-31 DIAGNOSIS — Z79.4 TYPE 2 DIABETES MELLITUS WITH BOTH EYES AFFECTED BY MILD NONPROLIFERATIVE RETINOPATHY WITHOUT MACULAR EDEMA, WITH LONG-TERM CURRENT USE OF INSULIN: Primary | ICD-10-CM

## 2023-03-31 DIAGNOSIS — E11.29 PROTEINURIA DUE TO TYPE 2 DIABETES MELLITUS: Chronic | ICD-10-CM

## 2023-03-31 DIAGNOSIS — E78.5 HYPERLIPIDEMIA ASSOCIATED WITH TYPE 2 DIABETES MELLITUS: Chronic | ICD-10-CM

## 2023-03-31 DIAGNOSIS — E11.42 TYPE 2 DIABETES MELLITUS WITH DIABETIC POLYNEUROPATHY, WITH LONG-TERM CURRENT USE OF INSULIN: Chronic | ICD-10-CM

## 2023-03-31 DIAGNOSIS — R80.9 PROTEINURIA DUE TO TYPE 2 DIABETES MELLITUS: Chronic | ICD-10-CM

## 2023-03-31 DIAGNOSIS — I15.2 HYPERTENSION ASSOCIATED WITH DIABETES: Chronic | ICD-10-CM

## 2023-03-31 DIAGNOSIS — Z79.4 TYPE 2 DIABETES MELLITUS WITH DIABETIC NEPHROPATHY, WITH LONG-TERM CURRENT USE OF INSULIN: Chronic | ICD-10-CM

## 2023-03-31 DIAGNOSIS — B35.3 TINEA PEDIS OF BOTH FEET: ICD-10-CM

## 2023-03-31 DIAGNOSIS — C44.619 BASAL CELL CARCINOMA (BCC) OF SKIN OF LEFT UPPER EXTREMITY INCLUDING SHOULDER: ICD-10-CM

## 2023-03-31 DIAGNOSIS — E78.49 OTHER HYPERLIPIDEMIA: ICD-10-CM

## 2023-03-31 DIAGNOSIS — E11.3293 TYPE 2 DIABETES MELLITUS WITH BOTH EYES AFFECTED BY MILD NONPROLIFERATIVE RETINOPATHY WITHOUT MACULAR EDEMA, WITH LONG-TERM CURRENT USE OF INSULIN: Primary | ICD-10-CM

## 2023-03-31 DIAGNOSIS — Z23 NEED FOR DIPHTHERIA-TETANUS-PERTUSSIS (TDAP) VACCINE: ICD-10-CM

## 2023-03-31 DIAGNOSIS — Z95.5 STENTED CORONARY ARTERY: Chronic | ICD-10-CM

## 2023-03-31 DIAGNOSIS — E11.59 HYPERTENSION ASSOCIATED WITH DIABETES: Chronic | ICD-10-CM

## 2023-03-31 DIAGNOSIS — E11.69 HYPERLIPIDEMIA ASSOCIATED WITH TYPE 2 DIABETES MELLITUS: Chronic | ICD-10-CM

## 2023-03-31 DIAGNOSIS — Z23 NEED FOR SHINGLES VACCINE: ICD-10-CM

## 2023-03-31 DIAGNOSIS — Z79.4 TYPE 2 DIABETES MELLITUS WITH DIABETIC POLYNEUROPATHY, WITH LONG-TERM CURRENT USE OF INSULIN: Chronic | ICD-10-CM

## 2023-03-31 DIAGNOSIS — E11.21 TYPE 2 DIABETES MELLITUS WITH DIABETIC NEPHROPATHY, WITH LONG-TERM CURRENT USE OF INSULIN: Chronic | ICD-10-CM

## 2023-03-31 PROCEDURE — 90750 ZOSTER RECOMBINANT VACCINE: ICD-10-PCS | Mod: S$GLB,,, | Performed by: FAMILY MEDICINE

## 2023-03-31 PROCEDURE — 1160F PR REVIEW ALL MEDS BY PRESCRIBER/CLIN PHARMACIST DOCUMENTED: ICD-10-PCS | Mod: CPTII,S$GLB,, | Performed by: FAMILY MEDICINE

## 2023-03-31 PROCEDURE — 3044F HG A1C LEVEL LT 7.0%: CPT | Mod: CPTII,S$GLB,, | Performed by: FAMILY MEDICINE

## 2023-03-31 PROCEDURE — 4010F PR ACE/ARB THEARPY RXD/TAKEN: ICD-10-PCS | Mod: CPTII,S$GLB,, | Performed by: FAMILY MEDICINE

## 2023-03-31 PROCEDURE — 3078F DIAST BP <80 MM HG: CPT | Mod: CPTII,S$GLB,, | Performed by: FAMILY MEDICINE

## 2023-03-31 PROCEDURE — 90750 HZV VACC RECOMBINANT IM: CPT | Mod: S$GLB,,, | Performed by: FAMILY MEDICINE

## 2023-03-31 PROCEDURE — 90472 IMMUNIZATION ADMIN EACH ADD: CPT | Mod: S$GLB,,, | Performed by: FAMILY MEDICINE

## 2023-03-31 PROCEDURE — 99214 PR OFFICE/OUTPT VISIT, EST, LEVL IV, 30-39 MIN: ICD-10-PCS | Mod: 25,S$GLB,, | Performed by: FAMILY MEDICINE

## 2023-03-31 PROCEDURE — 3074F PR MOST RECENT SYSTOLIC BLOOD PRESSURE < 130 MM HG: ICD-10-PCS | Mod: CPTII,S$GLB,, | Performed by: FAMILY MEDICINE

## 2023-03-31 PROCEDURE — 3060F POS MICROALBUMINURIA REV: CPT | Mod: CPTII,S$GLB,, | Performed by: FAMILY MEDICINE

## 2023-03-31 PROCEDURE — 3066F PR DOCUMENTATION OF TREATMENT FOR NEPHROPATHY: ICD-10-PCS | Mod: CPTII,S$GLB,, | Performed by: FAMILY MEDICINE

## 2023-03-31 PROCEDURE — 1159F PR MEDICATION LIST DOCUMENTED IN MEDICAL RECORD: ICD-10-PCS | Mod: CPTII,S$GLB,, | Performed by: FAMILY MEDICINE

## 2023-03-31 PROCEDURE — 3078F PR MOST RECENT DIASTOLIC BLOOD PRESSURE < 80 MM HG: ICD-10-PCS | Mod: CPTII,S$GLB,, | Performed by: FAMILY MEDICINE

## 2023-03-31 PROCEDURE — 99999 PR PBB SHADOW E&M-EST. PATIENT-LVL V: ICD-10-PCS | Mod: PBBFAC,,, | Performed by: FAMILY MEDICINE

## 2023-03-31 PROCEDURE — 3008F PR BODY MASS INDEX (BMI) DOCUMENTED: ICD-10-PCS | Mod: CPTII,S$GLB,, | Performed by: FAMILY MEDICINE

## 2023-03-31 PROCEDURE — 3074F SYST BP LT 130 MM HG: CPT | Mod: CPTII,S$GLB,, | Performed by: FAMILY MEDICINE

## 2023-03-31 PROCEDURE — 3072F PR LOW RISK FOR RETINOPATHY: ICD-10-PCS | Mod: CPTII,S$GLB,, | Performed by: FAMILY MEDICINE

## 2023-03-31 PROCEDURE — 1160F RVW MEDS BY RX/DR IN RCRD: CPT | Mod: CPTII,S$GLB,, | Performed by: FAMILY MEDICINE

## 2023-03-31 PROCEDURE — 3008F BODY MASS INDEX DOCD: CPT | Mod: CPTII,S$GLB,, | Performed by: FAMILY MEDICINE

## 2023-03-31 PROCEDURE — 90471 IMMUNIZATION ADMIN: CPT | Mod: S$GLB,,, | Performed by: FAMILY MEDICINE

## 2023-03-31 PROCEDURE — 3072F LOW RISK FOR RETINOPATHY: CPT | Mod: CPTII,S$GLB,, | Performed by: FAMILY MEDICINE

## 2023-03-31 PROCEDURE — 3060F PR POS MICROALBUMINURIA RESULT DOCUMENTED/REVIEW: ICD-10-PCS | Mod: CPTII,S$GLB,, | Performed by: FAMILY MEDICINE

## 2023-03-31 PROCEDURE — 3044F PR MOST RECENT HEMOGLOBIN A1C LEVEL <7.0%: ICD-10-PCS | Mod: CPTII,S$GLB,, | Performed by: FAMILY MEDICINE

## 2023-03-31 PROCEDURE — 90472 ZOSTER RECOMBINANT VACCINE: ICD-10-PCS | Mod: S$GLB,,, | Performed by: FAMILY MEDICINE

## 2023-03-31 PROCEDURE — 99214 OFFICE O/P EST MOD 30 MIN: CPT | Mod: 25,S$GLB,, | Performed by: FAMILY MEDICINE

## 2023-03-31 PROCEDURE — 90715 TDAP VACCINE GREATER THAN OR EQUAL TO 7YO IM: ICD-10-PCS | Mod: S$GLB,,, | Performed by: FAMILY MEDICINE

## 2023-03-31 PROCEDURE — 4010F ACE/ARB THERAPY RXD/TAKEN: CPT | Mod: CPTII,S$GLB,, | Performed by: FAMILY MEDICINE

## 2023-03-31 PROCEDURE — 99999 PR PBB SHADOW E&M-EST. PATIENT-LVL V: CPT | Mod: PBBFAC,,, | Performed by: FAMILY MEDICINE

## 2023-03-31 PROCEDURE — 90715 TDAP VACCINE 7 YRS/> IM: CPT | Mod: S$GLB,,, | Performed by: FAMILY MEDICINE

## 2023-03-31 PROCEDURE — 3066F NEPHROPATHY DOC TX: CPT | Mod: CPTII,S$GLB,, | Performed by: FAMILY MEDICINE

## 2023-03-31 PROCEDURE — 1159F MED LIST DOCD IN RCRD: CPT | Mod: CPTII,S$GLB,, | Performed by: FAMILY MEDICINE

## 2023-03-31 PROCEDURE — 90471 TDAP VACCINE GREATER THAN OR EQUAL TO 7YO IM: ICD-10-PCS | Mod: S$GLB,,, | Performed by: FAMILY MEDICINE

## 2023-03-31 RX ORDER — EZETIMIBE 10 MG/1
10 TABLET ORAL DAILY
Qty: 90 TABLET | Refills: 3 | Status: SHIPPED | OUTPATIENT
Start: 2023-03-31 | End: 2023-09-22 | Stop reason: SDUPTHER

## 2023-03-31 RX ORDER — CLOTRIMAZOLE AND BETAMETHASONE DIPROPIONATE 10; .64 MG/G; MG/G
CREAM TOPICAL 2 TIMES DAILY
Qty: 45 G | Refills: 0 | Status: SHIPPED | OUTPATIENT
Start: 2023-03-31

## 2023-03-31 RX ORDER — METOPROLOL SUCCINATE 50 MG/1
50 TABLET, EXTENDED RELEASE ORAL DAILY
Qty: 90 TABLET | Refills: 3 | Status: SHIPPED | OUTPATIENT
Start: 2023-03-31 | End: 2023-09-22 | Stop reason: SDUPTHER

## 2023-03-31 RX ORDER — ATORVASTATIN CALCIUM 80 MG/1
80 TABLET, FILM COATED ORAL DAILY
Qty: 90 TABLET | Refills: 3 | Status: SHIPPED | OUTPATIENT
Start: 2023-03-31 | End: 2023-09-22 | Stop reason: SDUPTHER

## 2023-03-31 NOTE — PATIENT INSTRUCTIONS
Try mounjaro at 12.5 mg  Consider increasing to 15 mg/week or to 10 mg/week if there is a shortage.   Continue synjardy  jardiance component can help the heart  Insulin management per endocrine  Can continue starlix  Consider increasing mounjaro and decreasing/stopping mealtime insulin??  Discuss this with endocrinology.      Continue metoprolol at 50 mg XR  Continue valsartan 320     Continue brilinta and aspirin for one more year, per cardiology.      Continue atorvastatin 80 mg  Add zetia  LDL goal <70.     In the future, consider decreased metoprolol to 25 mg?    Continue weight loss  Increase exercise!!!    Will need annual f/u with dermatology, forever.     Lab Results   Component Value Date    HGBA1C 6.2 (H) 03/24/2023    HGBA1C 7.6 (H) 12/02/2022    HGBA1C 7.7 (H) 07/08/2022

## 2023-03-31 NOTE — PROGRESS NOTES
Subjective:       Patient ID: Yon Loyd is a 57 y.o. male.    Chief Complaint: Diabetes    Yon is a 57 y.o. male who presents today for f/u    DM: changed to mounjaro. Missed a few doses due to shortages. Seeing endocrinology. On jardiance, insulin, starlix.   LA 26 U, SA 10 U BIDWM. No lows. A1c much better!!! Seeing Betzaida Rodriguez in endocrinology.   HTN: increased metoprolol to 50 mg 1/8/2022. On valsartan 320 mg.  DLD: was accidentally off of statin for likely 3 months based on refill history. LDL was >180. Now back on statin. LDL better. Not at goal <70.   CAD: seeing Dr. De Souza. Continue DAPT for now per cardiology. Maybe for another year?  HTN/Proteinuria: on valsartan. Seeing renal. Proteinuria due to DM. Improving!    BCC: removed by dermatology 1/19/2023. See dermatology notes.     Obesity: down about 6 pounds.     Walks at work. No issues.     Review of Systems   Constitutional:  Negative for chills and fever.   HENT:  Negative for trouble swallowing.    Respiratory:  Negative for chest tightness and shortness of breath.    Cardiovascular:  Negative for chest pain.   Gastrointestinal:  Negative for vomiting.   Neurological:  Negative for dizziness, light-headedness and headaches.           Results for orders placed or performed in visit on 03/24/23   CBC Auto Differential   Result Value Ref Range    WBC 5.80 3.90 - 12.70 K/uL    RBC 5.38 4.60 - 6.20 M/uL    Hemoglobin 16.9 14.0 - 18.0 g/dL    Hematocrit 50.6 40.0 - 54.0 %    MCV 94 82 - 98 fL    MCH 31.4 (H) 27.0 - 31.0 pg    MCHC 33.4 32.0 - 36.0 g/dL    RDW 12.7 11.5 - 14.5 %    Platelets 173 150 - 450 K/uL    MPV 9.8 9.2 - 12.9 fL    Immature Granulocytes 0.7 (H) 0.0 - 0.5 %    Gran # (ANC) 3.0 1.8 - 7.7 K/uL    Immature Grans (Abs) 0.04 0.00 - 0.04 K/uL    Lymph # 1.9 1.0 - 4.8 K/uL    Mono # 0.7 0.3 - 1.0 K/uL    Eos # 0.2 0.0 - 0.5 K/uL    Baso # 0.04 0.00 - 0.20 K/uL    nRBC 0 0 /100 WBC    Gran % 52.0 38.0 - 73.0 %    Lymph % 32.8 18.0 -  48.0 %    Mono % 11.2 4.0 - 15.0 %    Eosinophil % 2.6 0.0 - 8.0 %    Basophil % 0.7 0.0 - 1.9 %    Differential Method Automated    Comprehensive Metabolic Panel   Result Value Ref Range    Sodium 139 136 - 145 mmol/L    Potassium 4.2 3.5 - 5.1 mmol/L    Chloride 107 95 - 110 mmol/L    CO2 25 23 - 29 mmol/L    Glucose 164 (H) 70 - 110 mg/dL    BUN 17 2 - 20 mg/dL    Creatinine 1.08 0.50 - 1.40 mg/dL    Calcium 8.9 8.7 - 10.5 mg/dL    Total Protein 7.6 6.0 - 8.4 g/dL    Albumin 4.7 3.5 - 5.2 g/dL    Total Bilirubin 0.6 0.1 - 1.0 mg/dL    Alkaline Phosphatase 100 38 - 126 U/L    AST 28 15 - 46 U/L    ALT 29 10 - 44 U/L    Anion Gap 7 (L) 8 - 16 mmol/L    eGFR >60.0 >60 mL/min/1.73 m^2   Hemoglobin A1C   Result Value Ref Range    Hemoglobin A1C 6.2 (H) 4.0 - 5.6 %    Estimated Avg Glucose 131 68 - 131 mg/dL   Lipid Panel   Result Value Ref Range    Cholesterol 179 120 - 199 mg/dL    Triglycerides 226 (H) 30 - 150 mg/dL    HDL 46 40 - 75 mg/dL    LDL Cholesterol 87.8 63.0 - 159.0 mg/dL    HDL/Cholesterol Ratio 25.7 20.0 - 50.0 %    Total Cholesterol/HDL Ratio 3.9 2.0 - 5.0    Non-HDL Cholesterol 133 mg/dL   C-Peptide   Result Value Ref Range    C-Peptide 1.19 0.78 - 5.19 ng/mL   Glutamic Acid Decarboxylase   Result Value Ref Range    Glutamic Acid Decarb Ab 0.04 (H) <=0.02 nmol/L       Objective:     Vitals:    03/31/23 1057   BP: 116/72   BP Location: Left arm   Patient Position: Sitting   BP Method: Small (Manual)   Pulse: 71   Temp: 97.5 °F (36.4 °C)   TempSrc: Oral   SpO2: 97%   Weight: 93.6 kg (206 lb 5.6 oz)   Height: 6' (1.829 m)        Physical Exam  Vitals and nursing note reviewed.   Constitutional:       General: He is not in acute distress.     Appearance: He is obese. He is not ill-appearing, toxic-appearing or diaphoretic.   Cardiovascular:      Rate and Rhythm: Normal rate and regular rhythm.   Pulmonary:      Effort: Pulmonary effort is normal.      Breath sounds: Normal breath sounds.   Abdominal:       Palpations: Abdomen is soft.      Tenderness: There is no abdominal tenderness.   Feet:      Right foot:      Protective Sensation: 10 sites tested.  10 sites sensed.      Skin integrity: Dry skin present.      Toenail Condition: Right toenails are abnormally thick. Fungal disease present.     Left foot:      Protective Sensation: 10 sites tested.  9 sites sensed.      Skin integrity: Dry skin present.      Toenail Condition: Left toenails are abnormally thick. Fungal disease present.  Neurological:      General: No focal deficit present.      Mental Status: He is alert.   Psychiatric:         Mood and Affect: Mood normal.         Behavior: Behavior normal.         Thought Content: Thought content normal.         Judgment: Judgment normal.       Assessment:       1. Type 2 diabetes mellitus with both eyes affected by mild nonproliferative retinopathy without macular edema, with long-term current use of insulin    2. Basal cell carcinoma (BCC) of skin of left upper extremity including shoulder    3. Hyperlipidemia associated with type 2 diabetes mellitus    4. Hypertension associated with diabetes    5. Other hyperlipidemia    6. Proteinuria due to type 2 diabetes mellitus    7. Type 2 diabetes mellitus with diabetic nephropathy, with long-term current use of insulin    8. Type 2 diabetes mellitus with diabetic polyneuropathy, with long-term current use of insulin    9. Need for diphtheria-tetanus-pertussis (Tdap) vaccine    10. Need for shingles vaccine    11. Tinea pedis of both feet    12. Stented coronary artery        Plan:         Try mounjaro at 12.5 mg  Consider increasing to 15 mg/week or to 10 mg/week if there is a shortage.   Continue synjardy  jardiance component can help the heart  Insulin management per endocrine  Can continue starlix  Consider increasing mounjaro and decreasing/stopping mealtime insulin??  Discuss this with endocrinology.      Continue metoprolol at 50 mg XR  Continue valsartan 320      Continue brilinta and aspirin for one more year, per cardiology.      Continue atorvastatin 80 mg  Add zetia  LDL goal <70.     In the future, consider decreased metoprolol to 25 mg?    Continue weight loss  Increase exercise!!!    Will need annual f/u with dermatology, forever.        Type 2 diabetes mellitus with both eyes affected by mild nonproliferative retinopathy without macular edema, with long-term current use of insulin  -     Comprehensive Metabolic Panel; Future; Expected date: 03/31/2023  -     Hemoglobin A1C; Future; Expected date: 03/31/2023    Basal cell carcinoma (BCC) of skin of left upper extremity including shoulder    Hyperlipidemia associated with type 2 diabetes mellitus  -     atorvastatin (LIPITOR) 80 MG tablet; Take 1 tablet (80 mg total) by mouth once daily.  Dispense: 90 tablet; Refill: 3  -     ezetimibe (ZETIA) 10 mg tablet; Take 1 tablet (10 mg total) by mouth once daily.  Dispense: 90 tablet; Refill: 3    Hypertension associated with diabetes  -     metoprolol succinate (TOPROL-XL) 50 MG 24 hr tablet; Take 1 tablet (50 mg total) by mouth once daily.  Dispense: 90 tablet; Refill: 3    Other hyperlipidemia    Proteinuria due to type 2 diabetes mellitus    Type 2 diabetes mellitus with diabetic nephropathy, with long-term current use of insulin    Type 2 diabetes mellitus with diabetic polyneuropathy, with long-term current use of insulin    Need for diphtheria-tetanus-pertussis (Tdap) vaccine  -     Tdap Vaccine    Need for shingles vaccine  -     (In Office Administered) Zoster Recombinant Vaccine    Tinea pedis of both feet  -     clotrimazole-betamethasone 1-0.05% (LOTRISONE) cream; Apply topically 2 (two) times daily.  Dispense: 45 g; Refill: 0    Stented coronary artery  -     metoprolol succinate (TOPROL-XL) 50 MG 24 hr tablet; Take 1 tablet (50 mg total) by mouth once daily.  Dispense: 90 tablet; Refill: 3            Warning signs discussed, patient to call with any further  issues or worsening of symptoms. Above note may have utilized dictation, please excuse any transcription errors.

## 2023-04-12 NOTE — PROGRESS NOTES
Subjective:    04/14/2023     Patient ID: Yon Loyd is a 57 y.o. male.    Chief Complaint:  Follow-up    History of Present Illness  Yon Loyd with Type 2 DM complicated by CAD with 2 stents in Dec 2021, retinopathy, CKD and proteinuria, DLP and HTN presents today for follow up of T2DM. Previous patient of [email or web address]. Last visit with Betzaida in 2/2022. Last visit with Dr Patel in Aug 2022    Since his last visit his PCP changed Trulicity to Mounjaro. Was given dexcom starter kit but does not know how to apply. Still occasionally missing some doses of Starlix and prandial insulin before dinner    Has something on left eyelid and will have further eval in May    With regards to the diabetes:    Diagnosed with Type 2 diabetes in ~1993.  Been on insulin since ~2013. Started Trulicity 12/2016.   Family history of type 2 diabetes in mother and father.     DIABETES COMPLICATIONS:   Nephropathy +; sees nephrology -- follow up in one year per note  Retinopathy +; up to date with eye exam  Peripheral neuropathy +; tolerable; does not wish to see podiatry  Had significant burning on top of foot  CAD: +     Current regimen:  Synjardy 12.5/1000 mg twice daily  Mounjaro 12.5 mg weekly   Toujeo 26 units daily in the morning (24 hour insulin)  Lyumjev/Humalog/Novolog 10 units before breakfast and dinner (occasionally missing dinner dose)  Starlix 120 mg before lunch -occasionally missing    He is trying to give novolog 10 minutes prior to a meal. He is rotating injection sites. He is now changing needle every time.     He is also missing doses of Novolog at dinner time.    Other medications tried:  Invokana but stopped due to fear of side effects    He is not taking blood sugars   Hypoglycemic event-Denies and s/s of BG less than 70  Knows how to correct with 15 grams of carbs- juice, coke, or a peppermint.      Eats 3 meals a day. Working on improving diet  Started using riced cauliflower.  He is doing better with diet since  his last visit.    Exercise: walks often for work. 1-1.8 miles daily    Education - last visit: none; 1/2023  Glucagon: does not want     Diabetes Management Status  Statin: Taking atorvastatin 80 mg daily   ACE/ARB: Taking valsartan 320 mg daily  ASA: yes    Lab Results   Component Value Date    HGBA1C 6.2 (H) 03/24/2023    HGBA1C 7.6 (H) 12/02/2022    HGBA1C 7.7 (H) 07/08/2022     Screening or Prevention Patient's value Goal Complete/Controlled?   HgA1C Testing and Control   Lab Results   Component Value Date    HGBA1C 6.2 (H) 03/24/2023      Annually/Less than 8% Yes   Lipid profile : 03/24/2023 Annually Yes   LDL control Lab Results   Component Value Date    LDLCALC 87.8 03/24/2023    Annually/Less than 100 mg/dl  Yes   Nephropathy screening Lab Results   Component Value Date    LABMICR 43.0 03/24/2023     Lab Results   Component Value Date    PROTEINUA Negative 07/30/2021    Annually No   Blood pressure BP Readings from Last 1 Encounters:   04/14/23 128/82    Less than 140/90 No   Dilated retinal exam : 12/21/2022 Annually Yes   Foot exam   : 03/31/2023 Annually Yes     With regards to dyslipidemia,      He is on atorvastatin 80 mg daily but was off for the last 2-3 months, did not obtain prescription refill as intended last visit    He is now on zetia 10 mg daily     Latest Reference Range & Units 12/02/22 08:19 03/24/23 08:28   Cholesterol 120 - 199 mg/dL 299 (H) 179   HDL 40 - 75 mg/dL 52 46   HDL/Cholesterol Ratio 20.0 - 50.0 % 17.4 (L) 25.7   LDL Cholesterol External 63.0 - 159.0 mg/dL 182.6 (H) 87.8   Non-HDL Cholesterol mg/dL 247 133   Total Cholesterol/HDL Ratio 2.0 - 5.0  5.8 (H) 3.9   Triglycerides 30 - 150 mg/dL 322 (H) 226 (H)   (H): Data is abnormally high  (L): Data is abnormally low    Hx of fatigue and snoring- declined sleep study at last visit.    Review of Systems   Constitutional:  Positive for fatigue. Negative for unexpected weight change.   Eyes:  Negative for visual disturbance.    Endocrine: Positive for polyuria. Negative for polydipsia and polyphagia.        Nocturia     Objective:   Constitutional:  Pleasant,  in no acute distress.   HENT:   Eyes:     No scleral icterus.   Respiratory:   Effort normal   Neurological:  normal speech  Psych:  Normal mood and affect.    Diabetes Foot Exam:  Shoes appropriate  DM foot exam deferred; done in Feb 2022    Body mass index is 28.21 kg/m².  Wt Readings from Last 3 Encounters:   04/14/23 1055 94.3 kg (208 lb)   03/31/23 1057 93.6 kg (206 lb 5.6 oz)   01/25/23 1051 96.9 kg (213 lb 10 oz)     Vitals:    04/14/23 1055   BP: 128/82   Pulse: 74   Resp: 16     Wt Readings from Last 3 Encounters:   04/14/23 1055 94.3 kg (208 lb)   03/31/23 1057 93.6 kg (206 lb 5.6 oz)   01/25/23 1051 96.9 kg (213 lb 10 oz)     Lab Review:   Lab Results   Component Value Date    HGBA1C 6.2 (H) 03/24/2023     Lab Results   Component Value Date    CHOL 179 03/24/2023    HDL 46 03/24/2023    LDLCALC 87.8 03/24/2023    TRIG 226 (H) 03/24/2023    CHOLHDL 25.7 03/24/2023     Lab Results   Component Value Date     03/24/2023    K 4.2 03/24/2023     03/24/2023    CO2 25 03/24/2023     (H) 03/24/2023    BUN 17 03/24/2023    CREATININE 1.08 03/24/2023    CALCIUM 8.9 03/24/2023    PROT 7.6 03/24/2023    ALBUMIN 4.7 03/24/2023    BILITOT 0.6 03/24/2023    ALKPHOS 100 03/24/2023    AST 28 03/24/2023    ALT 29 03/24/2023    ANIONGAP 7 (L) 03/24/2023    ESTGFRAFRICA >60.0 07/08/2022    EGFRNONAA >60.0 07/08/2022    TSH 1.140 12/02/2022     Assessment and Plan     1. Type 2 diabetes mellitus with diabetic polyneuropathy, with long-term current use of insulin  Comprehensive Metabolic Panel    Hemoglobin A1C    tirzepatide 15 mg/0.5 mL PnIj    GLUCOSE MONITORING CONTINUOUS MIN 72 HOURS      2. Proteinuria due to type 2 diabetes mellitus        3. Hypertension associated with diabetes        4. Hyperlipidemia associated with type 2 diabetes mellitus              Type 2  diabetes mellitus with diabetic polyneuropathy, with long-term current use of insulin  -- Reviewed goals of therapy are to get the best control we can without hypoglycemia  Check labs on RTC  Medication changes:   A1c improving  Discussed he may need less insulin     Continue   Synjardy 12.5/1000 mg twice daily  Mounjaro 12.5 mg weekly and increase to 15 mg weekly when you run out of 12.5 mg weekly  Toujeo 26 units daily in the morning (24 hour insulin)  Lyumjev/Humalog 10 units before breakfast and dinner (meals === fast acting)  Continue Starlix to 120 mg before lunch -don't miss doses and set alarm to take     [email or web address]  He wore dexcom sample for 10 days but he did not like it. Discussed desirae or dexcom and he declines. We are unable to log into dexcom nae for information from sample. Discussed I believe he likely having hypoglycemia. Will check CGMS and consider decreasing insulin.    SATNAM slightly +    -- Reviewed patient's current insulin regimen. Clarified proper insulin dose and timing in relation to meals, etc. Insulin injection sites and proper rotation instructed.    -- Advised frequent self blood glucose monitoring.  Patient encouraged to document glucose results and bring them to every clinic visit    -- Hypoglycemia precautions discussed. Instructed on precautions before driving.    -- Call for Bg repeatedly < 90 or > 180.   -- Close adherence to lifestyle changes recommended.   -- Periodic follow ups for eye evaluations, foot care and dental care suggested.    Proteinuria due to type 2 diabetes mellitus  Improving     Hypertension associated with diabetes  Blood pressure at goal in clinic today.  Continue current medications which include ACEI/ARB.     Hyperlipidemia associated with type 2 diabetes mellitus  On statin per ADA recommendations   LDL goal <70  Not at goal   On statin         Betzaida Rodriguez NP     Follow up in about 4 months (around  8/14/2023).  Labs on RTC

## 2023-04-14 ENCOUNTER — OFFICE VISIT (OUTPATIENT)
Dept: ENDOCRINOLOGY | Facility: CLINIC | Age: 58
End: 2023-04-14
Payer: COMMERCIAL

## 2023-04-14 VITALS
OXYGEN SATURATION: 99 % | SYSTOLIC BLOOD PRESSURE: 128 MMHG | HEART RATE: 74 BPM | BODY MASS INDEX: 28.21 KG/M2 | WEIGHT: 208 LBS | DIASTOLIC BLOOD PRESSURE: 82 MMHG | RESPIRATION RATE: 16 BRPM

## 2023-04-14 DIAGNOSIS — E11.29 PROTEINURIA DUE TO TYPE 2 DIABETES MELLITUS: Chronic | ICD-10-CM

## 2023-04-14 DIAGNOSIS — E78.5 HYPERLIPIDEMIA ASSOCIATED WITH TYPE 2 DIABETES MELLITUS: Chronic | ICD-10-CM

## 2023-04-14 DIAGNOSIS — R80.9 PROTEINURIA DUE TO TYPE 2 DIABETES MELLITUS: Chronic | ICD-10-CM

## 2023-04-14 DIAGNOSIS — E11.59 HYPERTENSION ASSOCIATED WITH DIABETES: Chronic | ICD-10-CM

## 2023-04-14 DIAGNOSIS — Z79.4 TYPE 2 DIABETES MELLITUS WITH DIABETIC POLYNEUROPATHY, WITH LONG-TERM CURRENT USE OF INSULIN: Chronic | ICD-10-CM

## 2023-04-14 DIAGNOSIS — E11.69 HYPERLIPIDEMIA ASSOCIATED WITH TYPE 2 DIABETES MELLITUS: Chronic | ICD-10-CM

## 2023-04-14 DIAGNOSIS — E11.42 TYPE 2 DIABETES MELLITUS WITH DIABETIC POLYNEUROPATHY, WITH LONG-TERM CURRENT USE OF INSULIN: Chronic | ICD-10-CM

## 2023-04-14 DIAGNOSIS — I15.2 HYPERTENSION ASSOCIATED WITH DIABETES: Chronic | ICD-10-CM

## 2023-04-14 PROCEDURE — 3060F POS MICROALBUMINURIA REV: CPT | Mod: CPTII,S$GLB,, | Performed by: NURSE PRACTITIONER

## 2023-04-14 PROCEDURE — 3072F PR LOW RISK FOR RETINOPATHY: ICD-10-PCS | Mod: CPTII,S$GLB,, | Performed by: NURSE PRACTITIONER

## 2023-04-14 PROCEDURE — 3079F DIAST BP 80-89 MM HG: CPT | Mod: CPTII,S$GLB,, | Performed by: NURSE PRACTITIONER

## 2023-04-14 PROCEDURE — 99214 PR OFFICE/OUTPT VISIT, EST, LEVL IV, 30-39 MIN: ICD-10-PCS | Mod: S$GLB,,, | Performed by: NURSE PRACTITIONER

## 2023-04-14 PROCEDURE — 3074F SYST BP LT 130 MM HG: CPT | Mod: CPTII,S$GLB,, | Performed by: NURSE PRACTITIONER

## 2023-04-14 PROCEDURE — 3066F NEPHROPATHY DOC TX: CPT | Mod: CPTII,S$GLB,, | Performed by: NURSE PRACTITIONER

## 2023-04-14 PROCEDURE — 3008F BODY MASS INDEX DOCD: CPT | Mod: CPTII,S$GLB,, | Performed by: NURSE PRACTITIONER

## 2023-04-14 PROCEDURE — 3072F LOW RISK FOR RETINOPATHY: CPT | Mod: CPTII,S$GLB,, | Performed by: NURSE PRACTITIONER

## 2023-04-14 PROCEDURE — 99214 OFFICE O/P EST MOD 30 MIN: CPT | Mod: S$GLB,,, | Performed by: NURSE PRACTITIONER

## 2023-04-14 PROCEDURE — 1160F RVW MEDS BY RX/DR IN RCRD: CPT | Mod: CPTII,S$GLB,, | Performed by: NURSE PRACTITIONER

## 2023-04-14 PROCEDURE — 3044F HG A1C LEVEL LT 7.0%: CPT | Mod: CPTII,S$GLB,, | Performed by: NURSE PRACTITIONER

## 2023-04-14 PROCEDURE — 1159F MED LIST DOCD IN RCRD: CPT | Mod: CPTII,S$GLB,, | Performed by: NURSE PRACTITIONER

## 2023-04-14 PROCEDURE — 99999 PR PBB SHADOW E&M-EST. PATIENT-LVL V: ICD-10-PCS | Mod: PBBFAC,,, | Performed by: NURSE PRACTITIONER

## 2023-04-14 PROCEDURE — 1160F PR REVIEW ALL MEDS BY PRESCRIBER/CLIN PHARMACIST DOCUMENTED: ICD-10-PCS | Mod: CPTII,S$GLB,, | Performed by: NURSE PRACTITIONER

## 2023-04-14 PROCEDURE — 3008F PR BODY MASS INDEX (BMI) DOCUMENTED: ICD-10-PCS | Mod: CPTII,S$GLB,, | Performed by: NURSE PRACTITIONER

## 2023-04-14 PROCEDURE — 4010F ACE/ARB THERAPY RXD/TAKEN: CPT | Mod: CPTII,S$GLB,, | Performed by: NURSE PRACTITIONER

## 2023-04-14 PROCEDURE — 3060F PR POS MICROALBUMINURIA RESULT DOCUMENTED/REVIEW: ICD-10-PCS | Mod: CPTII,S$GLB,, | Performed by: NURSE PRACTITIONER

## 2023-04-14 PROCEDURE — 3079F PR MOST RECENT DIASTOLIC BLOOD PRESSURE 80-89 MM HG: ICD-10-PCS | Mod: CPTII,S$GLB,, | Performed by: NURSE PRACTITIONER

## 2023-04-14 PROCEDURE — 3074F PR MOST RECENT SYSTOLIC BLOOD PRESSURE < 130 MM HG: ICD-10-PCS | Mod: CPTII,S$GLB,, | Performed by: NURSE PRACTITIONER

## 2023-04-14 PROCEDURE — 1159F PR MEDICATION LIST DOCUMENTED IN MEDICAL RECORD: ICD-10-PCS | Mod: CPTII,S$GLB,, | Performed by: NURSE PRACTITIONER

## 2023-04-14 PROCEDURE — 3066F PR DOCUMENTATION OF TREATMENT FOR NEPHROPATHY: ICD-10-PCS | Mod: CPTII,S$GLB,, | Performed by: NURSE PRACTITIONER

## 2023-04-14 PROCEDURE — 3044F PR MOST RECENT HEMOGLOBIN A1C LEVEL <7.0%: ICD-10-PCS | Mod: CPTII,S$GLB,, | Performed by: NURSE PRACTITIONER

## 2023-04-14 PROCEDURE — 99999 PR PBB SHADOW E&M-EST. PATIENT-LVL V: CPT | Mod: PBBFAC,,, | Performed by: NURSE PRACTITIONER

## 2023-04-14 PROCEDURE — 4010F PR ACE/ARB THEARPY RXD/TAKEN: ICD-10-PCS | Mod: CPTII,S$GLB,, | Performed by: NURSE PRACTITIONER

## 2023-04-14 NOTE — ASSESSMENT & PLAN NOTE
-- Reviewed goals of therapy are to get the best control we can without hypoglycemia  Check labs on RTC  Medication changes:   A1c improving  Discussed he may need less insulin     Continue   Synjardy 12.5/1000 mg twice daily  Mounjaro 12.5 mg weekly and increase to 15 mg weekly when you run out of 12.5 mg weekly  Toujeo 26 units daily in the morning (24 hour insulin)  Lyumjev/Humalog 10 units before breakfast and dinner (meals === fast acting)  Continue Starlix to 120 mg before lunch -don't miss doses and set alarm to take     [email or web address]  He wore dexcom sample for 10 days but he did not like it. Discussed desirae or dexcom and he declines. We are unable to log into dexcom nae for information from sample. Discussed I believe he likely having hypoglycemia. Will check CGMS and consider decreasing insulin.    SATNAM slightly +    -- Reviewed patient's current insulin regimen. Clarified proper insulin dose and timing in relation to meals, etc. Insulin injection sites and proper rotation instructed.    -- Advised frequent self blood glucose monitoring.  Patient encouraged to document glucose results and bring them to every clinic visit    -- Hypoglycemia precautions discussed. Instructed on precautions before driving.    -- Call for Bg repeatedly < 90 or > 180.   -- Close adherence to lifestyle changes recommended.   -- Periodic follow ups for eye evaluations, foot care and dental care suggested.

## 2023-04-14 NOTE — PATIENT INSTRUCTIONS
Diabetes     A1c improving  Discussed he may need less insulin     Continue   Synjardy 12.5/1000 mg twice daily  Mounjaro 12.5 mg weekly and increase to 15 mg weekly when you run out of 12.5 mg weekly  Toujeo 26 units daily in the morning (24 hour insulin)  Lyumjev/Humalog 10 units before breakfast and dinner (meals === fast acting)  Continue Starlix to 120 mg before lunch -don't miss doses and set alarm to take     [email or web address]  He wore dexcom sample for 10 days but he did not like it. Discussed desirae or dexcom and he declines. We are unable to log into dexcom nae for information from sample. Discussed I believe he likely having hypoglycemia. Will check CGMS and consider decreasing insulin.    SATNAM slightly +     Cholesterol               Continue atorvastatin 80 mg daily                  BP               Continue Losartan 100 mg daily               Start checking blood pressure at home! And make log for PCP     Goal blood pressure is is less than 130/80     Vitamin D               OTC Vitamin D 2000 IU daily

## 2023-04-18 ENCOUNTER — CLINICAL SUPPORT (OUTPATIENT)
Dept: ENDOCRINOLOGY | Facility: CLINIC | Age: 58
End: 2023-04-18
Payer: COMMERCIAL

## 2023-04-18 NOTE — PROGRESS NOTES
"PLACEMENT OF DEXCOM G6 PRO SENSOR  CONTINOUS GLUCOSE MONITORING SYSTEM (CGMS)     Patient is here in clinic today for placement of continuous glucose monitoring sensor.                Each patient verified that they were here for CGMS procedure ordered by their provider and that they have a working glucose meter and supplies at home.   Patient will be provided with a Dexcom G6 Pro sensor, transmitter, and a copy of the Continuous Glucose Monitoring Patient Log to fill out during the study.              A detailed  explanation of Continuous Glucose Monitoring was  provided. Patient informed that this is a blind procedure and that they will not actually see the blood sugar tracing in real time.    Instructed patient to check blood sugar using home glucometer and to record the following on provided patient log sheets:Blood sugar taken at home, Meals and snacks, Activity, and Diabetes medications taken and dosage               Patient was brought to a private location.  Site selected and prepared and allowed to dry. Glucose Transmitter Serial Number [id number]  was inserted to patient's abdomen.               The following forms  were given and reviewed in detail with patient and all questions answered.   Continuous Glucose Monitoring Patient Log   Dexcom G6 PRO Patient Handout "Blinded CGM Patient Handout"                 Instructions: Time: 15 min   Insertion of sensor:  Time: 5 minutes      "

## 2023-05-08 ENCOUNTER — LAB VISIT (OUTPATIENT)
Dept: LAB | Facility: HOSPITAL | Age: 58
End: 2023-05-08
Payer: COMMERCIAL

## 2023-05-08 ENCOUNTER — OFFICE VISIT (OUTPATIENT)
Dept: NEPHROLOGY | Facility: CLINIC | Age: 58
End: 2023-05-08
Payer: COMMERCIAL

## 2023-05-08 ENCOUNTER — CLINICAL SUPPORT (OUTPATIENT)
Dept: ENDOCRINOLOGY | Facility: CLINIC | Age: 58
End: 2023-05-08
Payer: COMMERCIAL

## 2023-05-08 VITALS
OXYGEN SATURATION: 98 % | HEART RATE: 76 BPM | BODY MASS INDEX: 28.07 KG/M2 | HEIGHT: 72 IN | SYSTOLIC BLOOD PRESSURE: 108 MMHG | DIASTOLIC BLOOD PRESSURE: 60 MMHG | WEIGHT: 207.25 LBS

## 2023-05-08 DIAGNOSIS — N18.2 CHRONIC KIDNEY DISEASE (CKD) STAGE G2/A2, MILDLY DECREASED GLOMERULAR FILTRATION RATE (GFR) BETWEEN 60-89 ML/MIN/1.73 SQUARE METER AND ALBUMINURIA CREATININE RATIO BETWEEN 30-299 MG/G: Primary | ICD-10-CM

## 2023-05-08 DIAGNOSIS — Z79.4 TYPE 2 DIABETES MELLITUS WITH DIABETIC POLYNEUROPATHY, WITH LONG-TERM CURRENT USE OF INSULIN: Chronic | ICD-10-CM

## 2023-05-08 DIAGNOSIS — N18.2 TYPE 2 DM WITH CKD STAGE 2 AND HYPERTENSION: ICD-10-CM

## 2023-05-08 DIAGNOSIS — E11.42 TYPE 2 DIABETES MELLITUS WITH DIABETIC POLYNEUROPATHY, WITH LONG-TERM CURRENT USE OF INSULIN: Chronic | ICD-10-CM

## 2023-05-08 DIAGNOSIS — E11.65 TYPE 2 DIABETES MELLITUS WITH HYPERGLYCEMIA, WITH LONG-TERM CURRENT USE OF INSULIN: Primary | ICD-10-CM

## 2023-05-08 DIAGNOSIS — Z79.4 TYPE 2 DIABETES MELLITUS WITH HYPERGLYCEMIA, WITH LONG-TERM CURRENT USE OF INSULIN: Primary | ICD-10-CM

## 2023-05-08 DIAGNOSIS — I10 HYPERTENSION, UNSPECIFIED TYPE: ICD-10-CM

## 2023-05-08 DIAGNOSIS — I12.9 TYPE 2 DM WITH CKD STAGE 2 AND HYPERTENSION: ICD-10-CM

## 2023-05-08 DIAGNOSIS — E11.22 TYPE 2 DM WITH CKD STAGE 2 AND HYPERTENSION: ICD-10-CM

## 2023-05-08 LAB
ALBUMIN SERPL BCP-MCNC: 3.9 G/DL (ref 3.5–5.2)
ANION GAP SERPL CALC-SCNC: 10 MMOL/L (ref 8–16)
BUN SERPL-MCNC: 17 MG/DL (ref 6–20)
CALCIUM SERPL-MCNC: 9.3 MG/DL (ref 8.7–10.5)
CHLORIDE SERPL-SCNC: 104 MMOL/L (ref 95–110)
CO2 SERPL-SCNC: 25 MMOL/L (ref 23–29)
CREAT SERPL-MCNC: 1.1 MG/DL (ref 0.5–1.4)
EST. GFR  (NO RACE VARIABLE): >60 ML/MIN/1.73 M^2
GLUCOSE SERPL-MCNC: 112 MG/DL (ref 70–110)
PHOSPHATE SERPL-MCNC: 2.9 MG/DL (ref 2.7–4.5)
POTASSIUM SERPL-SCNC: 4.6 MMOL/L (ref 3.5–5.1)
PTH-INTACT SERPL-MCNC: 38.4 PG/ML (ref 9–77)
SODIUM SERPL-SCNC: 139 MMOL/L (ref 136–145)

## 2023-05-08 PROCEDURE — 3044F PR MOST RECENT HEMOGLOBIN A1C LEVEL <7.0%: ICD-10-PCS | Mod: CPTII,S$GLB,, | Performed by: NURSE PRACTITIONER

## 2023-05-08 PROCEDURE — 3074F SYST BP LT 130 MM HG: CPT | Mod: CPTII,S$GLB,, | Performed by: NURSE PRACTITIONER

## 2023-05-08 PROCEDURE — 95251 PR GLUCOSE MONITOR, 72 HOUR, PHYS INTERP: ICD-10-PCS | Mod: S$GLB,,, | Performed by: NURSE PRACTITIONER

## 2023-05-08 PROCEDURE — 4010F PR ACE/ARB THEARPY RXD/TAKEN: ICD-10-PCS | Mod: CPTII,S$GLB,, | Performed by: NURSE PRACTITIONER

## 2023-05-08 PROCEDURE — 95250 CONT GLUC MNTR PHYS/QHP EQP: CPT | Mod: S$GLB,,, | Performed by: NURSE PRACTITIONER

## 2023-05-08 PROCEDURE — 99214 OFFICE O/P EST MOD 30 MIN: CPT | Mod: S$GLB,,, | Performed by: NURSE PRACTITIONER

## 2023-05-08 PROCEDURE — 99999 PR PBB SHADOW E&M-EST. PATIENT-LVL V: ICD-10-PCS | Mod: PBBFAC,,, | Performed by: NURSE PRACTITIONER

## 2023-05-08 PROCEDURE — 1160F RVW MEDS BY RX/DR IN RCRD: CPT | Mod: CPTII,S$GLB,, | Performed by: NURSE PRACTITIONER

## 2023-05-08 PROCEDURE — 99999 PR PBB SHADOW E&M-EST. PATIENT-LVL I: ICD-10-PCS | Mod: PBBFAC,,,

## 2023-05-08 PROCEDURE — 83970 ASSAY OF PARATHORMONE: CPT | Performed by: NURSE PRACTITIONER

## 2023-05-08 PROCEDURE — 36415 COLL VENOUS BLD VENIPUNCTURE: CPT | Performed by: NURSE PRACTITIONER

## 2023-05-08 PROCEDURE — 4010F ACE/ARB THERAPY RXD/TAKEN: CPT | Mod: CPTII,S$GLB,, | Performed by: NURSE PRACTITIONER

## 2023-05-08 PROCEDURE — 3044F HG A1C LEVEL LT 7.0%: CPT | Mod: CPTII,S$GLB,, | Performed by: NURSE PRACTITIONER

## 2023-05-08 PROCEDURE — 3066F PR DOCUMENTATION OF TREATMENT FOR NEPHROPATHY: ICD-10-PCS | Mod: CPTII,S$GLB,, | Performed by: NURSE PRACTITIONER

## 2023-05-08 PROCEDURE — 95250 PR GLUCOSE MONITORING,72 HRS,SUB-Q SENSOR: ICD-10-PCS | Mod: S$GLB,,, | Performed by: NURSE PRACTITIONER

## 2023-05-08 PROCEDURE — 3060F PR POS MICROALBUMINURIA RESULT DOCUMENTED/REVIEW: ICD-10-PCS | Mod: CPTII,S$GLB,, | Performed by: NURSE PRACTITIONER

## 2023-05-08 PROCEDURE — 3060F POS MICROALBUMINURIA REV: CPT | Mod: CPTII,S$GLB,, | Performed by: NURSE PRACTITIONER

## 2023-05-08 PROCEDURE — 95251 CONT GLUC MNTR ANALYSIS I&R: CPT | Mod: S$GLB,,, | Performed by: NURSE PRACTITIONER

## 2023-05-08 PROCEDURE — 3008F BODY MASS INDEX DOCD: CPT | Mod: CPTII,S$GLB,, | Performed by: NURSE PRACTITIONER

## 2023-05-08 PROCEDURE — 3074F PR MOST RECENT SYSTOLIC BLOOD PRESSURE < 130 MM HG: ICD-10-PCS | Mod: CPTII,S$GLB,, | Performed by: NURSE PRACTITIONER

## 2023-05-08 PROCEDURE — 3066F NEPHROPATHY DOC TX: CPT | Mod: CPTII,S$GLB,, | Performed by: NURSE PRACTITIONER

## 2023-05-08 PROCEDURE — 99999 PR PBB SHADOW E&M-EST. PATIENT-LVL I: CPT | Mod: PBBFAC,,,

## 2023-05-08 PROCEDURE — 1160F PR REVIEW ALL MEDS BY PRESCRIBER/CLIN PHARMACIST DOCUMENTED: ICD-10-PCS | Mod: CPTII,S$GLB,, | Performed by: NURSE PRACTITIONER

## 2023-05-08 PROCEDURE — 99214 PR OFFICE/OUTPT VISIT, EST, LEVL IV, 30-39 MIN: ICD-10-PCS | Mod: S$GLB,,, | Performed by: NURSE PRACTITIONER

## 2023-05-08 PROCEDURE — 1159F MED LIST DOCD IN RCRD: CPT | Mod: CPTII,S$GLB,, | Performed by: NURSE PRACTITIONER

## 2023-05-08 PROCEDURE — 3078F DIAST BP <80 MM HG: CPT | Mod: CPTII,S$GLB,, | Performed by: NURSE PRACTITIONER

## 2023-05-08 PROCEDURE — 3072F LOW RISK FOR RETINOPATHY: CPT | Mod: CPTII,S$GLB,, | Performed by: NURSE PRACTITIONER

## 2023-05-08 PROCEDURE — 99999 PR PBB SHADOW E&M-EST. PATIENT-LVL V: CPT | Mod: PBBFAC,,, | Performed by: NURSE PRACTITIONER

## 2023-05-08 PROCEDURE — 80069 RENAL FUNCTION PANEL: CPT | Performed by: NURSE PRACTITIONER

## 2023-05-08 PROCEDURE — 1159F PR MEDICATION LIST DOCUMENTED IN MEDICAL RECORD: ICD-10-PCS | Mod: CPTII,S$GLB,, | Performed by: NURSE PRACTITIONER

## 2023-05-08 PROCEDURE — 3078F PR MOST RECENT DIASTOLIC BLOOD PRESSURE < 80 MM HG: ICD-10-PCS | Mod: CPTII,S$GLB,, | Performed by: NURSE PRACTITIONER

## 2023-05-08 PROCEDURE — 3008F PR BODY MASS INDEX (BMI) DOCUMENTED: ICD-10-PCS | Mod: CPTII,S$GLB,, | Performed by: NURSE PRACTITIONER

## 2023-05-08 PROCEDURE — 3072F PR LOW RISK FOR RETINOPATHY: ICD-10-PCS | Mod: CPTII,S$GLB,, | Performed by: NURSE PRACTITIONER

## 2023-05-08 NOTE — PROGRESS NOTES
Subjective:       Patient ID: Yon Loyd is a 57 y.o. White male who presents for follow up of No chief complaint on file.    HPI  Mr. Loyd is a 55 year old man with diabetes, hypertension presenting for follow up of microalbuminuria.  Patient with microalbuminuria up to ~530mg 12.16, now ~234mg.  Patient reports blood sugars well controlled since last visit in 2017.  He denies any NSAID use.  He otherwise denies any fever, chest pain, shortness of breath, abdominal pain, diarrhea, dysuria/hematuria.     Update 5/8/2023:  Last seen by Dr. Vasquez in Sept. 2020. New to me.  Presents for f/u of CKD, proteinuria.  Diagnosed with DM at age 28; HTN for about 10 years.  Baseline sCr: 1.0 mg/dL.  Home BPs: does not take regularly  Renal US: March 2022: kidneys on the large side. Right kidney has mildly increased echogenicity.   Denies NSAID use.    Review of Systems   Respiratory:  Negative for shortness of breath.    Cardiovascular:  Negative for leg swelling.   Gastrointestinal:  Positive for nausea (relates to medication side effects). Negative for diarrhea and vomiting.   Genitourinary:  Positive for frequency (at baseline). Negative for difficulty urinating, dysuria, hematuria and urgency.   Neurological:  Negative for dizziness and weakness.   All other systems reviewed and are negative.    Objective:    /60   Pulse 76   Ht 6' (1.829 m)   Wt 94 kg (207 lb 3.7 oz)   SpO2 98%   BMI 28.11 kg/m²     Physical Exam  Vitals reviewed.   Constitutional:       General: He is not in acute distress.     Appearance: He is well-developed.   Pulmonary:      Effort: Pulmonary effort is normal. No respiratory distress.   Abdominal:      Tenderness: There is no right CVA tenderness or left CVA tenderness.   Musculoskeletal:      Right lower leg: No edema.      Left lower leg: No edema.   Neurological:      Mental Status: He is alert.   Psychiatric:         Mood and Affect: Mood normal.         Behavior: Behavior  normal.         Thought Content: Thought content normal.         Judgment: Judgment normal.       Lab Results   Component Value Date    CREATININE 1.08 03/24/2023     Prot/Creat Ratio, Urine   Date Value Ref Range Status   12/02/2022 0.28 (H) 0.00 - 0.20 Final   02/04/2022 0.38 (H) 0.00 - 0.20 Final     Lab Results   Component Value Date     03/24/2023    K 4.2 03/24/2023    CO2 25 03/24/2023     03/24/2023     Lab Results   Component Value Date    CALCIUM 8.9 03/24/2023    PHOS 2.7 12/28/2021     Lab Results   Component Value Date    HGB 16.9 03/24/2023    WBC 5.80 03/24/2023    HCT 50.6 03/24/2023      Lab Results   Component Value Date    HGBA1C 6.2 (H) 03/24/2023     03/24/2023    BUN 17 03/24/2023     Lab Results   Component Value Date    LDLCALC 87.8 03/24/2023       Assessment:       1. Chronic kidney disease (CKD) stage G2/A2, mildly decreased glomerular filtration rate (GFR) between 60-89 mL/min/1.73 square meter and albuminuria creatinine ratio between  mg/g    2. Type 2 diabetes mellitus with diabetic polyneuropathy, with long-term current use of insulin    3. Type 2 DM with CKD stage 2 and hypertension    4. Hypertension, unspecified type        Plan:     CKD stage G2A2 -  Mr. Loyd is a 55 year old man with diabetes, hypertension presenting for follow of microalbuminuria/ CKD.   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  Educated patient to control BP, BG, remain well-hydrated, and avoid NSAIDs to prevent progression of CKD.    UPCR Albuminuric. Needs repeat UPCR. On empagliflozin-metformin.   Acid-base WNL   Renal osteodystrophy Ca okay.   Anemia Hgb at goal.   DM Well-controlled now. Present since at least 2009. Has had A1c over 12 previously. Retinopathy documented.   Lipid Management On statin.   ESRD planning Reassurance provided.     HTN - okay today on metoprolol succinate 50 mg, valsartan 320  mg  - Goal is -130    All questions patient had were answered.  Asked if further questions. None. F/u in clinic in 12 mos with labs and urine prior to next visit or sooner if needed.  ER for emergency concerns.    Summary of Plan:  Continue ARB and SGLT2i  RFP, PTH, UA, UPCR, ACR  avoid NSAID/ bactrim/ IV contrast/ gadolinium/ aminoglycoside where possible  RTC in 12 mos

## 2023-05-11 NOTE — PROGRESS NOTES
Continuous Glucose Sensor Report     Ordering Provider:  ADRIAN Rodriguez NP     Interpreting Provider: ADRIAN Rodriguez NP     Date of this outpatient clinic study: This sensor was put in place on 4/14/2023 and taken off on 4/23/2023       Reason for CGMS: Concern for hypoglycemia on current anti-glycemic therapy     March 2023 A1c 6.2%     Type of diabetes: 2     Current diabetes medications and doses:   Synjardy 12.5/1000 mg twice daily   Mounjaro 15 mg weekly   Toujeo 26 units daily in the morning    Lyumjev/Humalog 10 units before breakfast and dinner   Starlix 120 mg before lunch     Blood glucose, food and exercise log filled out consistently:  none     ______________________________________________________________     Medication/Diet/Exercise-related patterns/events:     Medication adherence: Not documented but blood sugar responds appropriately     Diet: N/A     Exercise patterns: N/A     ______________________________________________________________     Patterns/ Events:     Hypoglycemia: 0%   Possible precipitating events: N/A     Hyperglycemia:  11%   Possible precipitating events: missed doses of medication; dietary indiscretions     ________________________________________________________________     SUMMARY of KEY FINDINGS:       Average glucose: 138   Above 180 mg/dL: 11%  (Above 250 mg/dL: 0%)   Within  mg/dL: 89%   Below 70 mg/dL: 0%  (Below 54 mg/dL: 0%)     Yon Loyd is a 57- year -old man with diagnosis of Type 2 diabetes with hyperglycemia. Study reveals no hypoglycemia. Some prolonged postprandial hyperglycemia after lunch likely due to missed doses of medication. Overall, his blood sugar is at goal.     RECOMMENDATIONS:     Diabetes education referral for comprehensive education visit.       Confirm medication compliance - administration technique, timing of administration, administration site.     Patient would benefit from personal CGM given glucose variability.     Medication Changes    Synjardy 12.5/1000 mg twice daily   Mounjaro 15 mg weekly   Toujeo 26 units daily in the morning    Lyumjev/Humalog 10 units before breakfast and dinner   Starlix 120 mg before lunch

## 2023-07-11 RX ORDER — TIRZEPATIDE 12.5 MG/.5ML
INJECTION, SOLUTION SUBCUTANEOUS
Refills: 0 | OUTPATIENT
Start: 2023-07-11

## 2023-07-11 NOTE — TELEPHONE ENCOUNTER
No care due was identified.  [facility] Embedded Care Due Messages. Reference number: [id number].   7/11/2023 12:00:58 PM CDT

## 2023-07-12 RX ORDER — TIRZEPATIDE 12.5 MG/.5ML
INJECTION, SOLUTION SUBCUTANEOUS
Refills: 0 | OUTPATIENT
Start: 2023-07-12

## 2023-07-12 NOTE — TELEPHONE ENCOUNTER
No care due was identified.  [facility] Embedded Care Due Messages. Reference number: [id number].   7/12/2023 8:46:28 AM CDT

## 2023-07-12 NOTE — TELEPHONE ENCOUNTER
Refill Decision Note   Yon Rach  is requesting a refill authorization.  Brief Assessment and Rationale for Refill:  Quick Discontinue     Medication Therapy Plan:         Comments:     Note composed:10:22 PM 07/11/2023

## 2023-07-12 NOTE — TELEPHONE ENCOUNTER
Refill Decision Note   Yon Loyd  is requesting a refill authorization.  Brief Assessment and Rationale for Refill:  Quick Discontinue     Medication Therapy Plan:       Medication Reconciliation Completed: No   Comments:   MOUNJARO 12.5 mg/0.5 mL PnIj [Pharmacy Med Name: MOUNJARO INJ 12.5/0.5]  0 7/11/2023  No   Request refused: Refill not appropriate (Dose increased to 15 mg 4/14/23)   Sig: INJECT 1 PEN SUBCUTANEOUSLY EVERY WEEK   Class: Normal   Order: [id number]   Date/Time Signed: 7/11/2023 22:23       No prior authorization was found for this prescription.   Found prior authorization for another prescription for the same medication: Canceled - Other   Renewals    Renewal provider: Lebron Pond DO        Ordering Encounter Report    Associated Reports   View Encounter       No Care Gaps recommended.     Note composed:12:19 PM 07/12/2023

## 2023-07-18 ENCOUNTER — OFFICE VISIT (OUTPATIENT)
Dept: DERMATOLOGY | Facility: CLINIC | Age: 58
End: 2023-07-18
Payer: COMMERCIAL

## 2023-07-18 DIAGNOSIS — L72.0 INFECTED EPIDERMOID CYST: Primary | ICD-10-CM

## 2023-07-18 DIAGNOSIS — Z79.4 TYPE 2 DIABETES MELLITUS WITH DIABETIC POLYNEUROPATHY, WITH LONG-TERM CURRENT USE OF INSULIN: Primary | ICD-10-CM

## 2023-07-18 DIAGNOSIS — E11.42 TYPE 2 DIABETES MELLITUS WITH DIABETIC POLYNEUROPATHY, WITH LONG-TERM CURRENT USE OF INSULIN: Primary | ICD-10-CM

## 2023-07-18 DIAGNOSIS — L08.9 INFECTED EPIDERMOID CYST: Primary | ICD-10-CM

## 2023-07-18 PROCEDURE — 3044F PR MOST RECENT HEMOGLOBIN A1C LEVEL <7.0%: ICD-10-PCS | Mod: CPTII,S$GLB,, | Performed by: STUDENT IN AN ORGANIZED HEALTH CARE EDUCATION/TRAINING PROGRAM

## 2023-07-18 PROCEDURE — 4010F PR ACE/ARB THEARPY RXD/TAKEN: ICD-10-PCS | Mod: CPTII,S$GLB,, | Performed by: STUDENT IN AN ORGANIZED HEALTH CARE EDUCATION/TRAINING PROGRAM

## 2023-07-18 PROCEDURE — 3072F PR LOW RISK FOR RETINOPATHY: ICD-10-PCS | Mod: CPTII,S$GLB,, | Performed by: STUDENT IN AN ORGANIZED HEALTH CARE EDUCATION/TRAINING PROGRAM

## 2023-07-18 PROCEDURE — 4010F ACE/ARB THERAPY RXD/TAKEN: CPT | Mod: CPTII,S$GLB,, | Performed by: STUDENT IN AN ORGANIZED HEALTH CARE EDUCATION/TRAINING PROGRAM

## 2023-07-18 PROCEDURE — 87186 SC STD MICRODIL/AGAR DIL: CPT | Performed by: STUDENT IN AN ORGANIZED HEALTH CARE EDUCATION/TRAINING PROGRAM

## 2023-07-18 PROCEDURE — 11900 INJECT SKIN LESIONS </W 7: CPT | Mod: S$GLB,,, | Performed by: STUDENT IN AN ORGANIZED HEALTH CARE EDUCATION/TRAINING PROGRAM

## 2023-07-18 PROCEDURE — 3066F NEPHROPATHY DOC TX: CPT | Mod: CPTII,S$GLB,, | Performed by: STUDENT IN AN ORGANIZED HEALTH CARE EDUCATION/TRAINING PROGRAM

## 2023-07-18 PROCEDURE — 87070 CULTURE OTHR SPECIMN AEROBIC: CPT | Performed by: STUDENT IN AN ORGANIZED HEALTH CARE EDUCATION/TRAINING PROGRAM

## 2023-07-18 PROCEDURE — 3060F POS MICROALBUMINURIA REV: CPT | Mod: CPTII,S$GLB,, | Performed by: STUDENT IN AN ORGANIZED HEALTH CARE EDUCATION/TRAINING PROGRAM

## 2023-07-18 PROCEDURE — 1159F MED LIST DOCD IN RCRD: CPT | Mod: CPTII,S$GLB,, | Performed by: STUDENT IN AN ORGANIZED HEALTH CARE EDUCATION/TRAINING PROGRAM

## 2023-07-18 PROCEDURE — 99999 PR PBB SHADOW E&M-EST. PATIENT-LVL III: CPT | Mod: PBBFAC,,, | Performed by: STUDENT IN AN ORGANIZED HEALTH CARE EDUCATION/TRAINING PROGRAM

## 2023-07-18 PROCEDURE — 99999 PR PBB SHADOW E&M-EST. PATIENT-LVL III: ICD-10-PCS | Mod: PBBFAC,,, | Performed by: STUDENT IN AN ORGANIZED HEALTH CARE EDUCATION/TRAINING PROGRAM

## 2023-07-18 PROCEDURE — 3066F PR DOCUMENTATION OF TREATMENT FOR NEPHROPATHY: ICD-10-PCS | Mod: CPTII,S$GLB,, | Performed by: STUDENT IN AN ORGANIZED HEALTH CARE EDUCATION/TRAINING PROGRAM

## 2023-07-18 PROCEDURE — 1160F PR REVIEW ALL MEDS BY PRESCRIBER/CLIN PHARMACIST DOCUMENTED: ICD-10-PCS | Mod: CPTII,S$GLB,, | Performed by: STUDENT IN AN ORGANIZED HEALTH CARE EDUCATION/TRAINING PROGRAM

## 2023-07-18 PROCEDURE — 99213 PR OFFICE/OUTPT VISIT, EST, LEVL III, 20-29 MIN: ICD-10-PCS | Mod: 25,S$GLB,, | Performed by: STUDENT IN AN ORGANIZED HEALTH CARE EDUCATION/TRAINING PROGRAM

## 2023-07-18 PROCEDURE — 87077 CULTURE AEROBIC IDENTIFY: CPT | Performed by: STUDENT IN AN ORGANIZED HEALTH CARE EDUCATION/TRAINING PROGRAM

## 2023-07-18 PROCEDURE — 3060F PR POS MICROALBUMINURIA RESULT DOCUMENTED/REVIEW: ICD-10-PCS | Mod: CPTII,S$GLB,, | Performed by: STUDENT IN AN ORGANIZED HEALTH CARE EDUCATION/TRAINING PROGRAM

## 2023-07-18 PROCEDURE — 3072F LOW RISK FOR RETINOPATHY: CPT | Mod: CPTII,S$GLB,, | Performed by: STUDENT IN AN ORGANIZED HEALTH CARE EDUCATION/TRAINING PROGRAM

## 2023-07-18 PROCEDURE — 1160F RVW MEDS BY RX/DR IN RCRD: CPT | Mod: CPTII,S$GLB,, | Performed by: STUDENT IN AN ORGANIZED HEALTH CARE EDUCATION/TRAINING PROGRAM

## 2023-07-18 PROCEDURE — 11900 PR INJECTION INTO SKIN LESIONS, UP TO 7: ICD-10-PCS | Mod: S$GLB,,, | Performed by: STUDENT IN AN ORGANIZED HEALTH CARE EDUCATION/TRAINING PROGRAM

## 2023-07-18 PROCEDURE — 99213 OFFICE O/P EST LOW 20 MIN: CPT | Mod: 25,S$GLB,, | Performed by: STUDENT IN AN ORGANIZED HEALTH CARE EDUCATION/TRAINING PROGRAM

## 2023-07-18 PROCEDURE — 3044F HG A1C LEVEL LT 7.0%: CPT | Mod: CPTII,S$GLB,, | Performed by: STUDENT IN AN ORGANIZED HEALTH CARE EDUCATION/TRAINING PROGRAM

## 2023-07-18 PROCEDURE — 1159F PR MEDICATION LIST DOCUMENTED IN MEDICAL RECORD: ICD-10-PCS | Mod: CPTII,S$GLB,, | Performed by: STUDENT IN AN ORGANIZED HEALTH CARE EDUCATION/TRAINING PROGRAM

## 2023-07-18 RX ORDER — DOXYCYCLINE 100 MG/1
100 CAPSULE ORAL 2 TIMES DAILY
Qty: 20 CAPSULE | Refills: 0 | Status: SHIPPED | OUTPATIENT
Start: 2023-07-18 | End: 2023-07-28

## 2023-07-18 NOTE — PROGRESS NOTES
Subjective:      Patient ID:  Yon Loyd is a 58 y.o. male who presents for   Chief Complaint   Patient presents with    Lesion     Abscess/boil - R armpit     History of Present Illness: The patient presents for follow up of skin check.    The patient was last seen on: 1/19/2023 for BCC excision (L shoulder) and shave biopsy (ant. waist line)  H/o nmsc - BCC    Other skin complaints:      Patient with new complaint of abscess  Location: R armpit  Duration: 1.5 weeks  Symptoms: very sore  Relieving factors/Previous treatments: none         Review of Systems   Skin:  Positive for activity-related sunscreen use. Negative for itching, rash, dry skin, daily sunscreen use, recent sunburn and wears hat.   Hematologic/Lymphatic: Bruises/bleeds easily (aspirin and bloodthinner).     Objective:   Physical Exam   Constitutional: He appears well-developed and well-nourished. No distress.   Neurological: He is alert and oriented to person, place, and time. He is not disoriented.   Psychiatric: He has a normal mood and affect.   Skin:   Areas Examined (abnormalities noted in diagram):   Chest / Axilla Inspection Performed         Diagram Legend     Erythematous scaling macule/papule c/w actinic keratosis       Vascular papule c/w angioma      Pigmented verrucoid papule/plaque c/w seborrheic keratosis      Yellow umbilicated papule c/w sebaceous hyperplasia      Irregularly shaped tan macule c/w lentigo     1-2 mm smooth white papules consistent with Milia      Movable subcutaneous cyst with punctum c/w epidermal inclusion cyst      Subcutaneous movable cyst c/w pilar cyst      Firm pink to brown papule c/w dermatofibroma      Pedunculated fleshy papule(s) c/w skin tag(s)      Evenly pigmented macule c/w junctional nevus     Mildly variegated pigmented, slightly irregular-bordered macule c/w mildly atypical nevus      Flesh colored to evenly pigmented papule c/w intradermal nevus       Pink pearly papule/plaque c/w basal  cell carcinoma      Erythematous hyperkeratotic cursted plaque c/w SCC      Surgical scar with no sign of skin cancer recurrence      Open and closed comedones      Inflammatory papules and pustules      Verrucoid papule consistent consistent with wart     Erythematous eczematous patches and plaques     Dystrophic onycholytic nail with subungual debris c/w onychomycosis     Umbilicated papule    Erythematous-base heme-crusted tan verrucoid plaque consistent with inflamed seborrheic keratosis     Erythematous Silvery Scaling Plaque c/w Psoriasis     See annotation      Assessment / Plan:        Infected epidermoid cyst--right axilla  -     Aerobic culture  -     doxycycline (VIBRAMYCIN) 100 MG Cap; Take 1 capsule (100 mg total) by mouth 2 (two) times daily. for 10 days  Dispense: 20 capsule; Refill: 0    verbal consent obtained. Area prepped with alcohol, anesthetized with approximately 1.0cc of 1% lidocaine with epinephrine. Incised with #11 blade, serosangious material expressed. ILK as below    Intralesional Kenalog 5mg/cc (0.4 cc total) injected into 1 lesions on the right axilla today after obtaining verbal consent including risk of surrounding hypopigmentation. Patient tolerated procedure well.  Units: 1  NDC for Kenalog 10mg/cc:  5496-7860-93

## 2023-07-21 LAB — BACTERIA SPEC AEROBE CULT: ABNORMAL

## 2023-08-02 ENCOUNTER — PATIENT MESSAGE (OUTPATIENT)
Dept: ENDOCRINOLOGY | Facility: CLINIC | Age: 58
End: 2023-08-02
Payer: COMMERCIAL

## 2023-08-02 DIAGNOSIS — Z79.4 TYPE 2 DIABETES MELLITUS WITH DIABETIC POLYNEUROPATHY, WITH LONG-TERM CURRENT USE OF INSULIN: Primary | ICD-10-CM

## 2023-08-02 DIAGNOSIS — E11.42 TYPE 2 DIABETES MELLITUS WITH DIABETIC POLYNEUROPATHY, WITH LONG-TERM CURRENT USE OF INSULIN: Primary | ICD-10-CM

## 2023-08-02 RX ORDER — TIRZEPATIDE 12.5 MG/.5ML
INJECTION, SOLUTION SUBCUTANEOUS
Refills: 0 | OUTPATIENT
Start: 2023-08-02

## 2023-08-02 NOTE — TELEPHONE ENCOUNTER
No care due was identified.  [facility] Embedded Care Due Messages. Reference number: [id number].   8/02/2023 10:35:53 AM CDT

## 2023-08-02 NOTE — TELEPHONE ENCOUNTER
Refill Decision Note   Yon Loyd  is requesting a refill authorization.  Brief Assessment and Rationale for Refill:  Quick Discontinue     Medication Therapy Plan:  PATIENT WAS INCREASED TO 15MG/0.5ML ON 04/14/23    Medication Reconciliation Completed: No   Comments:     No Care Gaps recommended.     Note composed:12:32 PM 08/02/2023

## 2023-08-18 ENCOUNTER — LAB VISIT (OUTPATIENT)
Dept: LAB | Facility: HOSPITAL | Age: 58
End: 2023-08-18
Attending: NURSE PRACTITIONER
Payer: COMMERCIAL

## 2023-08-18 DIAGNOSIS — Z79.4 TYPE 2 DIABETES MELLITUS WITH DIABETIC POLYNEUROPATHY, WITH LONG-TERM CURRENT USE OF INSULIN: Chronic | ICD-10-CM

## 2023-08-18 DIAGNOSIS — E11.42 TYPE 2 DIABETES MELLITUS WITH DIABETIC POLYNEUROPATHY, WITH LONG-TERM CURRENT USE OF INSULIN: Chronic | ICD-10-CM

## 2023-08-18 LAB
ALBUMIN SERPL BCP-MCNC: 4.7 G/DL (ref 3.5–5.2)
ALP SERPL-CCNC: 94 U/L (ref 38–126)
ALT SERPL W/O P-5'-P-CCNC: 41 U/L (ref 10–44)
ANION GAP SERPL CALC-SCNC: 13 MMOL/L (ref 8–16)
AST SERPL-CCNC: 37 U/L (ref 15–46)
BILIRUB SERPL-MCNC: 0.6 MG/DL (ref 0.1–1)
CALCIUM SERPL-MCNC: 9.8 MG/DL (ref 8.7–10.5)
CHLORIDE SERPL-SCNC: 103 MMOL/L (ref 95–110)
CO2 SERPL-SCNC: 25 MMOL/L (ref 23–29)
CREAT SERPL-MCNC: 1.05 MG/DL (ref 0.5–1.4)
EST. GFR  (NO RACE VARIABLE): >60 ML/MIN/1.73 M^2
ESTIMATED AVG GLUCOSE: 157 MG/DL (ref 68–131)
GLUCOSE SERPL-MCNC: 88 MG/DL (ref 70–110)
HBA1C MFR BLD: 7.1 % (ref 4–5.6)
POTASSIUM SERPL-SCNC: 4.4 MMOL/L (ref 3.5–5.1)
PROT SERPL-MCNC: 8.2 G/DL (ref 6–8.4)
SODIUM SERPL-SCNC: 141 MMOL/L (ref 136–145)
UUN UR-MCNC: 20 MG/DL (ref 2–20)

## 2023-08-18 PROCEDURE — 83036 HEMOGLOBIN GLYCOSYLATED A1C: CPT | Performed by: NURSE PRACTITIONER

## 2023-08-18 PROCEDURE — 80053 COMPREHEN METABOLIC PANEL: CPT | Mod: PO | Performed by: NURSE PRACTITIONER

## 2023-08-18 PROCEDURE — 36415 COLL VENOUS BLD VENIPUNCTURE: CPT | Mod: PO | Performed by: NURSE PRACTITIONER

## 2023-08-23 NOTE — PROGRESS NOTES
Subjective:    08/25/2023     Patient ID: Yon Loyd is a 58 y.o. male.    Chief Complaint:  No chief complaint on file.    History of Present Illness  Yon Loyd with Type 2 DM complicated by CAD with 2 stents in Dec 2021, retinopathy, CKD and proteinuria, DLP and HTN presents today for follow up of T2DM. Previous patient of [employer]. Last visit with me in April 2023.    Since his last visit, he has been having problems finding Mounajro 15 mg weekly and has needed to decrease dose in order to control blood sugars.    With regards to the diabetes:    Diagnosed with Type 2 diabetes in ~1993.  Been on insulin since ~2013. Started Trulicity 12/2016.   Family history of type 2 diabetes in mother and father.     DIABETES COMPLICATIONS:   Nephropathy +; sees nephrology -- follow up in one year per note  Retinopathy +; needs eye exam  Peripheral neuropathy +; tolerable; does not wish to see podiatry  CAD: +     Current regimen:  Synjardy 12.5/1000 mg twice daily  Mounjaro 10 or 12.5 mg weekly   Toujeo 26 units daily in the morning (24 hour insulin)  Lyumjev/Humalog/Novolog 10 units before breakfast and dinner (occasionally missing dinner dose)  Starlix 120 mg before lunch -occasionally missing    He is trying to give novolog 10 minutes prior to a meal. He is rotating injection sites. He is now changing needle every time.     He is also missing doses of Novolog at dinner time.    Other medications tried:  Invokana but stopped due to fear of side effects    He is not taking blood sugars   DECLINES CGM  Hypoglycemic event-Denies and s/s of BG less than 70  Knows how to correct with 15 grams of carbs- juice, coke, or a peppermint.      Eats 3 meals a day.   Working on improving diet  Started using riced cauliflower.    He is doing better with diet since his last visit.    Exercise: walks often for work. 1-1.8 miles daily    Education - last visit: none; 1/2023-politely declines   Glucagon: does not want     Diabetes  Management Status  Statin: Taking atorvastatin 80 mg daily   ACE/ARB: Taking valsartan 320 mg daily  ASA: yes    Lab Results   Component Value Date    HGBA1C 7.1 (H) 08/18/2023    HGBA1C 6.2 (H) 03/24/2023    HGBA1C 7.6 (H) 12/02/2022     Screening or Prevention Patient's value Goal Complete/Controlled?   HgA1C Testing and Control   Lab Results   Component Value Date    HGBA1C 7.1 (H) 08/18/2023      Annually/Less than 8% Yes   Lipid profile : 03/24/2023 Annually Yes   LDL control Lab Results   Component Value Date    LDLCALC 87.8 03/24/2023    Annually/Less than 100 mg/dl  Yes   Nephropathy screening Lab Results   Component Value Date    LABMICR 46.0 05/08/2023     Lab Results   Component Value Date    PROTEINUA Negative 05/08/2023    Annually No   Blood pressure BP Readings from Last 1 Encounters:   08/25/23 110/68    Less than 140/90 No   Dilated retinal exam : 05/19/2023 Annually Yes   Foot exam   : 03/31/2023 Annually Yes     With regards to dyslipidemia,      He is on atorvastatin 80 mg daily and zetia 10 mg daily     Latest Reference Range & Units 12/02/22 08:19 03/24/23 08:28   Cholesterol 120 - 199 mg/dL 299 (H) 179   HDL 40 - 75 mg/dL 52 46   HDL/Cholesterol Ratio 20.0 - 50.0 % 17.4 (L) 25.7   LDL Cholesterol External 63.0 - 159.0 mg/dL 182.6 (H) 87.8   Non-HDL Cholesterol mg/dL 247 133   Total Cholesterol/HDL Ratio 2.0 - 5.0  5.8 (H) 3.9   Triglycerides 30 - 150 mg/dL 322 (H) 226 (H)   (H): Data is abnormally high  (L): Data is abnormally low    Hx of fatigue and snoring- declined sleep study at last visit.    Review of Systems   Constitutional:  Positive for fatigue. Negative for unexpected weight change.   Eyes:  Negative for visual disturbance.   Endocrine: Positive for polydipsia (occ), polyphagia (occ) and polyuria (occ).        Nocturia     Objective:   Constitutional:  Pleasant,  in no acute distress.   HENT:   Eyes:     No scleral icterus.   Respiratory:   Effort normal   Neurological:  normal  speech  Psych:  Normal mood and affect.    Diabetes Foot Exam:  Shoes appropriate  DM foot exam deferred; done March 2023    Body mass index is 27.58 kg/m².  Wt Readings from Last 3 Encounters:   08/25/23 1030 92.3 kg (203 lb 6 oz)   05/08/23 0810 94 kg (207 lb 3.7 oz)   04/14/23 1055 94.3 kg (208 lb)     Vitals:    08/25/23 1030   BP: 110/68   Pulse: 77       Wt Readings from Last 3 Encounters:   08/25/23 1030 92.3 kg (203 lb 6 oz)   05/08/23 0810 94 kg (207 lb 3.7 oz)   04/14/23 1055 94.3 kg (208 lb)     Lab Review:   Lab Results   Component Value Date    HGBA1C 7.1 (H) 08/18/2023     Lab Results   Component Value Date    CHOL 179 03/24/2023    HDL 46 03/24/2023    LDLCALC 87.8 03/24/2023    TRIG 226 (H) 03/24/2023    CHOLHDL 25.7 03/24/2023     Lab Results   Component Value Date     08/18/2023    K 4.4 08/18/2023     08/18/2023    CO2 25 08/18/2023    GLU 88 08/18/2023    BUN 20 08/18/2023    CREATININE 1.05 08/18/2023    CALCIUM 9.8 08/18/2023    PROT 8.2 08/18/2023    ALBUMIN 4.7 08/18/2023    BILITOT 0.6 08/18/2023    ALKPHOS 94 08/18/2023    AST 37 08/18/2023    ALT 41 08/18/2023    ANIONGAP 13 08/18/2023    ESTGFRAFRICA >60.0 07/08/2022    EGFRNONAA >60.0 07/08/2022    TSH 1.140 12/02/2022     Assessment and Plan     1. Type 2 diabetes mellitus with both eyes affected by mild nonproliferative retinopathy without macular edema, with long-term current use of insulin  Hemoglobin A1C    Comprehensive Metabolic Panel    Lipid Panel      2. Hyperlipidemia associated with type 2 diabetes mellitus        3. Hypertension associated with diabetes        4. Proteinuria due to type 2 diabetes mellitus        5. 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 Would prefer Mounjaro 15 mg weekly     Continue   Synjardy 12.5/1000 mg twice daily  Mounjaro 12.5/15 mg weekly   Lyumjev/Humalog 10 units before breakfast and dinner (meals === fast acting)  Continue Starlix 120 mg before lunch -don't miss doses and set alarm to take     Discussed desirae or dexcom and he declines.    SATNAM slightly +     -- Reviewed patient's current insulin regimen. Clarified proper insulin dose and timing in relation to meals, etc. Insulin injection sites and proper rotation instructed.    -- Advised frequent self blood glucose monitoring.  Patient encouraged to document glucose results and bring them to every clinic visit    -- Hypoglycemia precautions discussed. Instructed on precautions before driving.    -- Call for Bg repeatedly < 90 or > 180.   -- Close adherence to lifestyle changes recommended.   -- Periodic follow ups for eye evaluations, foot care and dental care suggested.    Hyperlipidemia associated with type 2 diabetes mellitus  On statin per ADA recommendations   LDL goal <70  Close to goal   On statin and zetia  May need to add PCSK9    Hypertension associated with diabetes  Blood pressure at goal in clinic today.  Continue current medications which include ACEI/ARB.     Proteinuria due to type 2 diabetes mellitus  Improving     Betzaida Rodriguez NP     Follow up in about 4 months (around 12/25/2023).  Labs on RTC

## 2023-08-24 NOTE — ASSESSMENT & PLAN NOTE
On statin per ADA recommendations   LDL goal <70  Close to goal   On statin and zetia  May need to add PCSK9

## 2023-08-25 ENCOUNTER — PATIENT MESSAGE (OUTPATIENT)
Dept: OPTOMETRY | Facility: CLINIC | Age: 58
End: 2023-08-25
Payer: COMMERCIAL

## 2023-08-25 ENCOUNTER — OFFICE VISIT (OUTPATIENT)
Dept: ENDOCRINOLOGY | Facility: CLINIC | Age: 58
End: 2023-08-25
Payer: COMMERCIAL

## 2023-08-25 VITALS
HEIGHT: 72 IN | DIASTOLIC BLOOD PRESSURE: 68 MMHG | OXYGEN SATURATION: 97 % | HEART RATE: 77 BPM | SYSTOLIC BLOOD PRESSURE: 110 MMHG | WEIGHT: 203.38 LBS | BODY MASS INDEX: 27.55 KG/M2

## 2023-08-25 DIAGNOSIS — R80.9 PROTEINURIA DUE TO TYPE 2 DIABETES MELLITUS: Chronic | ICD-10-CM

## 2023-08-25 DIAGNOSIS — E11.3293 TYPE 2 DIABETES MELLITUS WITH BOTH EYES AFFECTED BY MILD NONPROLIFERATIVE RETINOPATHY WITHOUT MACULAR EDEMA, WITH LONG-TERM CURRENT USE OF INSULIN: ICD-10-CM

## 2023-08-25 DIAGNOSIS — Z79.4 TYPE 2 DIABETES MELLITUS WITH DIABETIC POLYNEUROPATHY, WITH LONG-TERM CURRENT USE OF INSULIN: ICD-10-CM

## 2023-08-25 DIAGNOSIS — E11.59 HYPERTENSION ASSOCIATED WITH DIABETES: Chronic | ICD-10-CM

## 2023-08-25 DIAGNOSIS — E11.42 TYPE 2 DIABETES MELLITUS WITH DIABETIC POLYNEUROPATHY, WITH LONG-TERM CURRENT USE OF INSULIN: ICD-10-CM

## 2023-08-25 DIAGNOSIS — I15.2 HYPERTENSION ASSOCIATED WITH DIABETES: Chronic | ICD-10-CM

## 2023-08-25 DIAGNOSIS — E11.29 PROTEINURIA DUE TO TYPE 2 DIABETES MELLITUS: Chronic | ICD-10-CM

## 2023-08-25 DIAGNOSIS — Z79.4 TYPE 2 DIABETES MELLITUS WITH BOTH EYES AFFECTED BY MILD NONPROLIFERATIVE RETINOPATHY WITHOUT MACULAR EDEMA, WITH LONG-TERM CURRENT USE OF INSULIN: ICD-10-CM

## 2023-08-25 DIAGNOSIS — E11.69 HYPERLIPIDEMIA ASSOCIATED WITH TYPE 2 DIABETES MELLITUS: Chronic | ICD-10-CM

## 2023-08-25 DIAGNOSIS — E78.5 HYPERLIPIDEMIA ASSOCIATED WITH TYPE 2 DIABETES MELLITUS: Chronic | ICD-10-CM

## 2023-08-25 PROCEDURE — 3060F PR POS MICROALBUMINURIA RESULT DOCUMENTED/REVIEW: ICD-10-PCS | Mod: CPTII,S$GLB,, | Performed by: NURSE PRACTITIONER

## 2023-08-25 PROCEDURE — 4010F ACE/ARB THERAPY RXD/TAKEN: CPT | Mod: CPTII,S$GLB,, | Performed by: NURSE PRACTITIONER

## 2023-08-25 PROCEDURE — 99999 PR PBB SHADOW E&M-EST. PATIENT-LVL IV: CPT | Mod: PBBFAC,,, | Performed by: NURSE PRACTITIONER

## 2023-08-25 PROCEDURE — 3051F PR MOST RECENT HEMOGLOBIN A1C LEVEL 7.0 - < 8.0%: ICD-10-PCS | Mod: CPTII,S$GLB,, | Performed by: NURSE PRACTITIONER

## 2023-08-25 PROCEDURE — 1160F PR REVIEW ALL MEDS BY PRESCRIBER/CLIN PHARMACIST DOCUMENTED: ICD-10-PCS | Mod: CPTII,S$GLB,, | Performed by: NURSE PRACTITIONER

## 2023-08-25 PROCEDURE — 1159F MED LIST DOCD IN RCRD: CPT | Mod: CPTII,S$GLB,, | Performed by: NURSE PRACTITIONER

## 2023-08-25 PROCEDURE — 3078F PR MOST RECENT DIASTOLIC BLOOD PRESSURE < 80 MM HG: ICD-10-PCS | Mod: CPTII,S$GLB,, | Performed by: NURSE PRACTITIONER

## 2023-08-25 PROCEDURE — 99214 OFFICE O/P EST MOD 30 MIN: CPT | Mod: S$GLB,,, | Performed by: NURSE PRACTITIONER

## 2023-08-25 PROCEDURE — 3060F POS MICROALBUMINURIA REV: CPT | Mod: CPTII,S$GLB,, | Performed by: NURSE PRACTITIONER

## 2023-08-25 PROCEDURE — 99214 PR OFFICE/OUTPT VISIT, EST, LEVL IV, 30-39 MIN: ICD-10-PCS | Mod: S$GLB,,, | Performed by: NURSE PRACTITIONER

## 2023-08-25 PROCEDURE — 3051F HG A1C>EQUAL 7.0%<8.0%: CPT | Mod: CPTII,S$GLB,, | Performed by: NURSE PRACTITIONER

## 2023-08-25 PROCEDURE — 3078F DIAST BP <80 MM HG: CPT | Mod: CPTII,S$GLB,, | Performed by: NURSE PRACTITIONER

## 2023-08-25 PROCEDURE — 3074F SYST BP LT 130 MM HG: CPT | Mod: CPTII,S$GLB,, | Performed by: NURSE PRACTITIONER

## 2023-08-25 PROCEDURE — 4010F PR ACE/ARB THEARPY RXD/TAKEN: ICD-10-PCS | Mod: CPTII,S$GLB,, | Performed by: NURSE PRACTITIONER

## 2023-08-25 PROCEDURE — 3074F PR MOST RECENT SYSTOLIC BLOOD PRESSURE < 130 MM HG: ICD-10-PCS | Mod: CPTII,S$GLB,, | Performed by: NURSE PRACTITIONER

## 2023-08-25 PROCEDURE — 1160F RVW MEDS BY RX/DR IN RCRD: CPT | Mod: CPTII,S$GLB,, | Performed by: NURSE PRACTITIONER

## 2023-08-25 PROCEDURE — 3008F PR BODY MASS INDEX (BMI) DOCUMENTED: ICD-10-PCS | Mod: CPTII,S$GLB,, | Performed by: NURSE PRACTITIONER

## 2023-08-25 PROCEDURE — 3008F BODY MASS INDEX DOCD: CPT | Mod: CPTII,S$GLB,, | Performed by: NURSE PRACTITIONER

## 2023-08-25 PROCEDURE — 1159F PR MEDICATION LIST DOCUMENTED IN MEDICAL RECORD: ICD-10-PCS | Mod: CPTII,S$GLB,, | Performed by: NURSE PRACTITIONER

## 2023-08-25 PROCEDURE — 99999 PR PBB SHADOW E&M-EST. PATIENT-LVL IV: ICD-10-PCS | Mod: PBBFAC,,, | Performed by: NURSE PRACTITIONER

## 2023-08-25 PROCEDURE — 3066F PR DOCUMENTATION OF TREATMENT FOR NEPHROPATHY: ICD-10-PCS | Mod: CPTII,S$GLB,, | Performed by: NURSE PRACTITIONER

## 2023-08-25 PROCEDURE — 3066F NEPHROPATHY DOC TX: CPT | Mod: CPTII,S$GLB,, | Performed by: NURSE PRACTITIONER

## 2023-08-25 RX ORDER — TIRZEPATIDE 15 MG/.5ML
15 INJECTION, SOLUTION SUBCUTANEOUS
Qty: 4 PEN | Refills: 3 | Status: SHIPPED | OUTPATIENT
Start: 2023-08-25 | End: 2023-10-29

## 2023-08-25 NOTE — PATIENT INSTRUCTIONS
Diabetes         A1c above goal due to late/decreased doses of Mounjaro. Would prefer Mounjaro 15 mg weekly     Continue   Synjardy 12.5/1000 mg twice daily  Mounjaro 12.5/15 mg weekly   Lyumjev/Humalog 10 units before breakfast and dinner (meals === fast acting)  Continue Starlix 120 mg before lunch -don't miss doses and set alarm to take     Discussed desirae or dexcom and he declines.    SATNAM slightly +     Cholesterol               Continue atorvastatin 80 mg daily                  BP               Continue Losartan 100 mg daily               Start checking blood pressure at home! And make log for PCP     Goal blood pressure is is less than 130/80     Vitamin D               OTC Vitamin D 2000 IU daily

## 2023-09-01 ENCOUNTER — PATIENT MESSAGE (OUTPATIENT)
Dept: OPTOMETRY | Facility: CLINIC | Age: 58
End: 2023-09-01
Payer: COMMERCIAL

## 2023-09-22 ENCOUNTER — OFFICE VISIT (OUTPATIENT)
Dept: FAMILY MEDICINE | Facility: CLINIC | Age: 58
End: 2023-09-22
Payer: COMMERCIAL

## 2023-09-22 VITALS
HEART RATE: 72 BPM | SYSTOLIC BLOOD PRESSURE: 102 MMHG | WEIGHT: 205.06 LBS | HEIGHT: 72 IN | OXYGEN SATURATION: 97 % | BODY MASS INDEX: 27.77 KG/M2 | DIASTOLIC BLOOD PRESSURE: 66 MMHG

## 2023-09-22 DIAGNOSIS — Z12.5 SCREENING PSA (PROSTATE SPECIFIC ANTIGEN): ICD-10-CM

## 2023-09-22 DIAGNOSIS — E11.59 HYPERTENSION ASSOCIATED WITH DIABETES: Chronic | ICD-10-CM

## 2023-09-22 DIAGNOSIS — I10 ESSENTIAL HYPERTENSION: ICD-10-CM

## 2023-09-22 DIAGNOSIS — E11.42 TYPE 2 DIABETES MELLITUS WITH DIABETIC POLYNEUROPATHY, WITH LONG-TERM CURRENT USE OF INSULIN: Chronic | ICD-10-CM

## 2023-09-22 DIAGNOSIS — E11.3293 TYPE 2 DIABETES MELLITUS WITH BOTH EYES AFFECTED BY MILD NONPROLIFERATIVE RETINOPATHY WITHOUT MACULAR EDEMA, WITH LONG-TERM CURRENT USE OF INSULIN: Primary | ICD-10-CM

## 2023-09-22 DIAGNOSIS — Z79.4 TYPE 2 DIABETES MELLITUS WITH DIABETIC POLYNEUROPATHY, WITH LONG-TERM CURRENT USE OF INSULIN: Chronic | ICD-10-CM

## 2023-09-22 DIAGNOSIS — Z79.4 TYPE 2 DIABETES MELLITUS WITH BOTH EYES AFFECTED BY MILD NONPROLIFERATIVE RETINOPATHY WITHOUT MACULAR EDEMA, WITH LONG-TERM CURRENT USE OF INSULIN: Primary | ICD-10-CM

## 2023-09-22 DIAGNOSIS — E11.69 HYPERLIPIDEMIA ASSOCIATED WITH TYPE 2 DIABETES MELLITUS: Chronic | ICD-10-CM

## 2023-09-22 DIAGNOSIS — C44.619 BASAL CELL CARCINOMA (BCC) OF SKIN OF LEFT UPPER EXTREMITY INCLUDING SHOULDER: ICD-10-CM

## 2023-09-22 DIAGNOSIS — E78.5 HYPERLIPIDEMIA ASSOCIATED WITH TYPE 2 DIABETES MELLITUS: Chronic | ICD-10-CM

## 2023-09-22 DIAGNOSIS — Z95.5 STENTED CORONARY ARTERY: Chronic | ICD-10-CM

## 2023-09-22 DIAGNOSIS — I15.2 HYPERTENSION ASSOCIATED WITH DIABETES: Chronic | ICD-10-CM

## 2023-09-22 PROCEDURE — 3051F PR MOST RECENT HEMOGLOBIN A1C LEVEL 7.0 - < 8.0%: ICD-10-PCS | Mod: CPTII,S$GLB,, | Performed by: FAMILY MEDICINE

## 2023-09-22 PROCEDURE — 90472 PNEUMOCOCCAL CONJUGATE VACCINE 20-VALENT: ICD-10-PCS | Mod: S$GLB,,, | Performed by: FAMILY MEDICINE

## 2023-09-22 PROCEDURE — 3060F POS MICROALBUMINURIA REV: CPT | Mod: CPTII,S$GLB,, | Performed by: FAMILY MEDICINE

## 2023-09-22 PROCEDURE — 99999 PR PBB SHADOW E&M-EST. PATIENT-LVL V: ICD-10-PCS | Mod: PBBFAC,,, | Performed by: FAMILY MEDICINE

## 2023-09-22 PROCEDURE — 3060F PR POS MICROALBUMINURIA RESULT DOCUMENTED/REVIEW: ICD-10-PCS | Mod: CPTII,S$GLB,, | Performed by: FAMILY MEDICINE

## 2023-09-22 PROCEDURE — 3008F BODY MASS INDEX DOCD: CPT | Mod: CPTII,S$GLB,, | Performed by: FAMILY MEDICINE

## 2023-09-22 PROCEDURE — 90471 IMMUNIZATION ADMIN: CPT | Mod: S$GLB,,, | Performed by: FAMILY MEDICINE

## 2023-09-22 PROCEDURE — 3074F SYST BP LT 130 MM HG: CPT | Mod: CPTII,S$GLB,, | Performed by: FAMILY MEDICINE

## 2023-09-22 PROCEDURE — 3078F PR MOST RECENT DIASTOLIC BLOOD PRESSURE < 80 MM HG: ICD-10-PCS | Mod: CPTII,S$GLB,, | Performed by: FAMILY MEDICINE

## 2023-09-22 PROCEDURE — 1159F PR MEDICATION LIST DOCUMENTED IN MEDICAL RECORD: ICD-10-PCS | Mod: CPTII,S$GLB,, | Performed by: FAMILY MEDICINE

## 2023-09-22 PROCEDURE — 3074F PR MOST RECENT SYSTOLIC BLOOD PRESSURE < 130 MM HG: ICD-10-PCS | Mod: CPTII,S$GLB,, | Performed by: FAMILY MEDICINE

## 2023-09-22 PROCEDURE — 1159F MED LIST DOCD IN RCRD: CPT | Mod: CPTII,S$GLB,, | Performed by: FAMILY MEDICINE

## 2023-09-22 PROCEDURE — 4010F PR ACE/ARB THEARPY RXD/TAKEN: ICD-10-PCS | Mod: CPTII,S$GLB,, | Performed by: FAMILY MEDICINE

## 2023-09-22 PROCEDURE — 90750 ZOSTER RECOMBINANT VACCINE: ICD-10-PCS | Mod: S$GLB,,, | Performed by: FAMILY MEDICINE

## 2023-09-22 PROCEDURE — 3008F PR BODY MASS INDEX (BMI) DOCUMENTED: ICD-10-PCS | Mod: CPTII,S$GLB,, | Performed by: FAMILY MEDICINE

## 2023-09-22 PROCEDURE — 3066F PR DOCUMENTATION OF TREATMENT FOR NEPHROPATHY: ICD-10-PCS | Mod: CPTII,S$GLB,, | Performed by: FAMILY MEDICINE

## 2023-09-22 PROCEDURE — 3051F HG A1C>EQUAL 7.0%<8.0%: CPT | Mod: CPTII,S$GLB,, | Performed by: FAMILY MEDICINE

## 2023-09-22 PROCEDURE — 3066F NEPHROPATHY DOC TX: CPT | Mod: CPTII,S$GLB,, | Performed by: FAMILY MEDICINE

## 2023-09-22 PROCEDURE — 99999 PR PBB SHADOW E&M-EST. PATIENT-LVL V: CPT | Mod: PBBFAC,,, | Performed by: FAMILY MEDICINE

## 2023-09-22 PROCEDURE — 90472 IMMUNIZATION ADMIN EACH ADD: CPT | Mod: S$GLB,,, | Performed by: FAMILY MEDICINE

## 2023-09-22 PROCEDURE — 4010F ACE/ARB THERAPY RXD/TAKEN: CPT | Mod: CPTII,S$GLB,, | Performed by: FAMILY MEDICINE

## 2023-09-22 PROCEDURE — 90677 PCV20 VACCINE IM: CPT | Mod: S$GLB,,, | Performed by: FAMILY MEDICINE

## 2023-09-22 PROCEDURE — 90677 PNEUMOCOCCAL CONJUGATE VACCINE 20-VALENT: ICD-10-PCS | Mod: S$GLB,,, | Performed by: FAMILY MEDICINE

## 2023-09-22 PROCEDURE — 3078F DIAST BP <80 MM HG: CPT | Mod: CPTII,S$GLB,, | Performed by: FAMILY MEDICINE

## 2023-09-22 PROCEDURE — 99214 PR OFFICE/OUTPT VISIT, EST, LEVL IV, 30-39 MIN: ICD-10-PCS | Mod: 25,S$GLB,, | Performed by: FAMILY MEDICINE

## 2023-09-22 PROCEDURE — 99214 OFFICE O/P EST MOD 30 MIN: CPT | Mod: 25,S$GLB,, | Performed by: FAMILY MEDICINE

## 2023-09-22 PROCEDURE — 90750 HZV VACC RECOMBINANT IM: CPT | Mod: S$GLB,,, | Performed by: FAMILY MEDICINE

## 2023-09-22 PROCEDURE — 90471 ZOSTER RECOMBINANT VACCINE: ICD-10-PCS | Mod: S$GLB,,, | Performed by: FAMILY MEDICINE

## 2023-09-22 RX ORDER — EZETIMIBE 10 MG/1
10 TABLET ORAL DAILY
Qty: 90 TABLET | Refills: 3 | Status: SHIPPED | OUTPATIENT
Start: 2023-09-22 | End: 2024-01-03

## 2023-09-22 RX ORDER — METOPROLOL SUCCINATE 50 MG/1
50 TABLET, EXTENDED RELEASE ORAL DAILY
Qty: 90 TABLET | Refills: 3 | Status: SHIPPED | OUTPATIENT
Start: 2023-09-22 | End: 2023-09-22 | Stop reason: SDUPTHER

## 2023-09-22 RX ORDER — METOPROLOL SUCCINATE 25 MG/1
25 TABLET, EXTENDED RELEASE ORAL DAILY
Qty: 90 TABLET | Refills: 3 | Status: SHIPPED | OUTPATIENT
Start: 2023-09-22

## 2023-09-22 RX ORDER — NATEGLINIDE 120 MG/1
120 TABLET ORAL
Qty: 90 TABLET | Refills: 3 | Status: CANCELLED | OUTPATIENT
Start: 2023-09-22 | End: 2024-09-21

## 2023-09-22 RX ORDER — VALSARTAN 320 MG/1
320 TABLET ORAL DAILY
Qty: 90 TABLET | Refills: 3 | Status: SHIPPED | OUTPATIENT
Start: 2023-09-22 | End: 2024-09-21

## 2023-09-22 RX ORDER — EMPAGLIFLOZIN AND METFORMIN HYDROCHLORIDE 12.5; 1 MG/1; MG/1
1 TABLET ORAL 2 TIMES DAILY
Qty: 180 TABLET | Refills: 1 | Status: CANCELLED | OUTPATIENT
Start: 2023-09-22

## 2023-09-22 RX ORDER — ATORVASTATIN CALCIUM 80 MG/1
80 TABLET, FILM COATED ORAL DAILY
Qty: 90 TABLET | Refills: 3 | Status: SHIPPED | OUTPATIENT
Start: 2023-09-22 | End: 2024-01-03 | Stop reason: SDUPTHER

## 2023-09-22 RX ORDER — TIRZEPATIDE 15 MG/.5ML
15 INJECTION, SOLUTION SUBCUTANEOUS
Qty: 4 PEN | Refills: 3 | Status: CANCELLED | OUTPATIENT
Start: 2023-09-22

## 2023-09-22 NOTE — PATIENT INSTRUCTIONS
Continue mounjaro 15 mg  Continue synjardy  jardiance component can help the heart  Insulin management per endocrine  Can continue starlix  F/u with endocrine    Continue metoprolol but at 25 mg due to orthostatic symptoms.  Continue valsartan 320  Goal BP: 120/80 to 130/80     Continue brilinta and aspirin for one more year, per cardiology.      Continue atorvastatin 80 mg  Continue zetia  LDL goal <70.      Continue weight loss  Increase exercise!!!     Will need annual f/u with dermatology, forever    Goal weight, less then 200?    Lab Results   Component Value Date    HGBA1C 7.1 (H) 08/18/2023    HGBA1C 6.2 (H) 03/24/2023    HGBA1C 7.6 (H) 12/02/2022

## 2023-09-22 NOTE — PROGRESS NOTES
Subjective:       Patient ID: Yon Loyd is a 58 y.o. male.    Chief Complaint: Follow-up    Yon is a 58 y.o. male who presents today for f/u    DM: seeing endocrinology. On mounjaro. Missed a few doses due to shortages. On synjardy, insulin, starlix, LA 26 U, SA 10 U BIDWM. Seeing Betzaida Rodriguez in endocrinology.   HTN: increased metoprolol to 50 mg 1/8/2022. Decreased to 25 mg on 9/22/2023. On valsartan 320 mg. Does report orthostatic symptoms. Has been ongoing. Reports this occurs 1-2 times/month.   Proteinuria: on valsartan. Seeing renal. Proteinuria due to DM. Improving!  DLD: on statin and zetia.   CAD: seeing Dr. De Souza  Obesity: down about 15 pounds from peak.     Seeing dermatology.       Review of Systems   Constitutional:  Negative for chills and fever.   HENT:  Negative for trouble swallowing.    Respiratory:  Negative for shortness of breath.    Cardiovascular:  Negative for chest pain.   Gastrointestinal:  Negative for abdominal pain, nausea and vomiting.   Neurological:  Positive for dizziness. Negative for light-headedness and headaches.               Results for orders placed or performed in visit on 08/18/23   Comprehensive Metabolic Panel   Result Value Ref Range    Sodium 141 136 - 145 mmol/L    Potassium 4.4 3.5 - 5.1 mmol/L    Chloride 103 95 - 110 mmol/L    CO2 25 23 - 29 mmol/L    Glucose 88 70 - 110 mg/dL    BUN 20 2 - 20 mg/dL    Creatinine 1.05 0.50 - 1.40 mg/dL    Calcium 9.8 8.7 - 10.5 mg/dL    Total Protein 8.2 6.0 - 8.4 g/dL    Albumin 4.7 3.5 - 5.2 g/dL    Total Bilirubin 0.6 0.1 - 1.0 mg/dL    Alkaline Phosphatase 94 38 - 126 U/L    AST 37 15 - 46 U/L    ALT 41 10 - 44 U/L    Anion Gap 13 8 - 16 mmol/L    eGFR >60.0 >60 mL/min/1.73 m^2   Hemoglobin A1C   Result Value Ref Range    Hemoglobin A1C 7.1 (H) 4.0 - 5.6 %    Estimated Avg Glucose 157 (H) 68 - 131 mg/dL       Objective:     Vitals:    09/22/23 0843   BP: 102/66   BP Location: Right arm   Patient Position: Sitting    Pulse: 72   SpO2: 97%   Weight: 93 kg (205 lb 1.2 oz)   Height: 6' (1.829 m)        Physical Exam  Constitutional:       General: He is not in acute distress.     Appearance: He is not ill-appearing, toxic-appearing or diaphoretic.   Cardiovascular:      Rate and Rhythm: Normal rate and regular rhythm.   Pulmonary:      Effort: Pulmonary effort is normal.      Breath sounds: Normal breath sounds.   Abdominal:      Palpations: Abdomen is soft.      Tenderness: There is no abdominal tenderness.   Neurological:      General: No focal deficit present.      Mental Status: He is alert.   Psychiatric:         Mood and Affect: Mood normal.         Behavior: Behavior normal.         Thought Content: Thought content normal.         Judgment: Judgment normal.         Assessment:       1. Type 2 diabetes mellitus with both eyes affected by mild nonproliferative retinopathy without macular edema, with long-term current use of insulin    2. Hyperlipidemia associated with type 2 diabetes mellitus    3. Hypertension associated with diabetes    4. Stented coronary artery    5. Type 2 diabetes mellitus with diabetic polyneuropathy, with long-term current use of insulin    6. Essential hypertension    7. Screening PSA (prostate specific antigen)        Plan:       Continue mounjaro 15 mg  Continue synjardy  jardiance component can help the heart  Insulin management per endocrine  Can continue starlix  F/u with endocrine    Continue metoprolol but at 25 mg due to orthostatic symptoms.  Continue valsartan 320  Goal BP: 120/80 to 130/80     Continue brilinta and aspirin for one more year, per cardiology.      Continue atorvastatin 80 mg  Continue zetia  LDL goal <70.      Continue weight loss  Increase exercise!!!     Will need annual f/u with dermatology, forever    Goal weight, less then 200?    Type 2 diabetes mellitus with both eyes affected by mild nonproliferative retinopathy without macular edema, with long-term current use of  insulin  -     CBC Auto Differential; Future; Expected date: 09/22/2023  -     Ambulatory referral/consult to Cardiology; Future; Expected date: 09/29/2023    Hyperlipidemia associated with type 2 diabetes mellitus  -     atorvastatin (LIPITOR) 80 MG tablet; Take 1 tablet (80 mg total) by mouth once daily.  Dispense: 90 tablet; Refill: 3  -     ezetimibe (ZETIA) 10 mg tablet; Take 1 tablet (10 mg total) by mouth once daily.  Dispense: 90 tablet; Refill: 3    Hypertension associated with diabetes  -     Discontinue: metoprolol succinate (TOPROL-XL) 50 MG 24 hr tablet; Take 1 tablet (50 mg total) by mouth once daily.  Dispense: 90 tablet; Refill: 3  -     metoprolol succinate (TOPROL-XL) 25 MG 24 hr tablet; Take 1 tablet (25 mg total) by mouth once daily. Take in place of 50 mg.  Dispense: 90 tablet; Refill: 3    Stented coronary artery  -     Discontinue: metoprolol succinate (TOPROL-XL) 50 MG 24 hr tablet; Take 1 tablet (50 mg total) by mouth once daily.  Dispense: 90 tablet; Refill: 3  -     metoprolol succinate (TOPROL-XL) 25 MG 24 hr tablet; Take 1 tablet (25 mg total) by mouth once daily. Take in place of 50 mg.  Dispense: 90 tablet; Refill: 3    Type 2 diabetes mellitus with diabetic polyneuropathy, with long-term current use of insulin  -     CBC Auto Differential; Future; Expected date: 09/22/2023  -     Ambulatory referral/consult to Cardiology; Future; Expected date: 09/29/2023    Essential hypertension  -     valsartan (DIOVAN) 320 MG tablet; Take 1 tablet (320 mg total) by mouth once daily.  Dispense: 90 tablet; Refill: 3  -     CBC Auto Differential; Future; Expected date: 09/22/2023    Screening PSA (prostate specific antigen)  -     PSA, Screening; Future; Expected date: 09/22/2023    Other orders  -     (In Office Administered) Zoster Recombinant Vaccine  -     (In Office Administered) Pneumococcal Conjugate Vaccine (20 Valent) (IM)            Warning signs discussed, patient to call with any further  issues or worsening of symptoms. Above note may have utilized dictation, please excuse any transcription errors.

## 2023-09-25 ENCOUNTER — TELEPHONE (OUTPATIENT)
Dept: CARDIOLOGY | Facility: CLINIC | Age: 58
End: 2023-09-25
Payer: COMMERCIAL

## 2023-09-25 NOTE — TELEPHONE ENCOUNTER
Spoke with Mrs Ojeda , referral coordinator in [place], to get patient to set appointment online for faster access to Dr Meadows appointment.  Mrs Ojeda verbalized understanding.

## 2023-09-25 NOTE — TELEPHONE ENCOUNTER
----- Message from Lynette Lou sent at 9/25/2023 10:01 AM CDT -----  Good morning  Patient with a Referral to Cardiology.  Patient ep with Dr De Souza  Please assist scheduling patient. Please call patient before scheduling.  Patient needs something in December or January.  Thanks    Lynette

## 2023-10-02 DIAGNOSIS — E11.3293 TYPE 2 DIABETES MELLITUS WITH BOTH EYES AFFECTED BY MILD NONPROLIFERATIVE RETINOPATHY WITHOUT MACULAR EDEMA, WITH LONG-TERM CURRENT USE OF INSULIN: ICD-10-CM

## 2023-10-02 DIAGNOSIS — Z79.4 TYPE 2 DIABETES MELLITUS WITH BOTH EYES AFFECTED BY MILD NONPROLIFERATIVE RETINOPATHY WITHOUT MACULAR EDEMA, WITH LONG-TERM CURRENT USE OF INSULIN: ICD-10-CM

## 2023-10-03 RX ORDER — INSULIN GLARGINE 300 U/ML
26 INJECTION, SOLUTION SUBCUTANEOUS DAILY
Qty: 15 ML | Refills: 1 | Status: SHIPPED | OUTPATIENT
Start: 2023-10-03 | End: 2023-12-29 | Stop reason: SDUPTHER

## 2023-10-09 DIAGNOSIS — Z95.5 STENTED CORONARY ARTERY: Chronic | ICD-10-CM

## 2023-10-11 RX ORDER — TICAGRELOR 90 MG/1
90 TABLET ORAL 2 TIMES DAILY
Qty: 180 TABLET | Refills: 0 | Status: SHIPPED | OUTPATIENT
Start: 2023-10-11 | End: 2024-02-12

## 2023-10-28 DIAGNOSIS — E11.42 TYPE 2 DIABETES MELLITUS WITH DIABETIC POLYNEUROPATHY, WITH LONG-TERM CURRENT USE OF INSULIN: ICD-10-CM

## 2023-10-28 DIAGNOSIS — Z79.4 TYPE 2 DIABETES MELLITUS WITH DIABETIC POLYNEUROPATHY, WITH LONG-TERM CURRENT USE OF INSULIN: ICD-10-CM

## 2023-10-29 RX ORDER — TIRZEPATIDE 15 MG/.5ML
INJECTION, SOLUTION SUBCUTANEOUS
Qty: 4 PEN | Refills: 2 | Status: SHIPPED | OUTPATIENT
Start: 2023-10-29 | End: 2024-01-30

## 2023-11-29 DIAGNOSIS — Z79.4 TYPE 2 DIABETES MELLITUS WITH BOTH EYES AFFECTED BY MILD NONPROLIFERATIVE RETINOPATHY WITHOUT MACULAR EDEMA, WITH LONG-TERM CURRENT USE OF INSULIN: ICD-10-CM

## 2023-11-29 DIAGNOSIS — E11.3293 TYPE 2 DIABETES MELLITUS WITH BOTH EYES AFFECTED BY MILD NONPROLIFERATIVE RETINOPATHY WITHOUT MACULAR EDEMA, WITH LONG-TERM CURRENT USE OF INSULIN: ICD-10-CM

## 2023-11-29 RX ORDER — INSULIN ASPART 100 [IU]/ML
INJECTION, SOLUTION INTRAVENOUS; SUBCUTANEOUS
Qty: 15 ML | Refills: 2 | Status: SHIPPED | OUTPATIENT
Start: 2023-11-29

## 2023-12-04 ENCOUNTER — OFFICE VISIT (OUTPATIENT)
Dept: CARDIOLOGY | Facility: CLINIC | Age: 58
End: 2023-12-04
Payer: COMMERCIAL

## 2023-12-04 VITALS
DIASTOLIC BLOOD PRESSURE: 64 MMHG | SYSTOLIC BLOOD PRESSURE: 94 MMHG | HEART RATE: 81 BPM | HEIGHT: 72 IN | WEIGHT: 202.81 LBS | OXYGEN SATURATION: 96 % | BODY MASS INDEX: 27.47 KG/M2

## 2023-12-04 DIAGNOSIS — N18.2 CHRONIC KIDNEY DISEASE (CKD) STAGE G2/A2, MILDLY DECREASED GLOMERULAR FILTRATION RATE (GFR) BETWEEN 60-89 ML/MIN/1.73 SQUARE METER AND ALBUMINURIA CREATININE RATIO BETWEEN 30-299 MG/G: ICD-10-CM

## 2023-12-04 DIAGNOSIS — E11.3293 TYPE 2 DIABETES MELLITUS WITH BOTH EYES AFFECTED BY MILD NONPROLIFERATIVE RETINOPATHY WITHOUT MACULAR EDEMA, WITH LONG-TERM CURRENT USE OF INSULIN: ICD-10-CM

## 2023-12-04 DIAGNOSIS — E11.21 TYPE 2 DIABETES MELLITUS WITH DIABETIC NEPHROPATHY, WITH LONG-TERM CURRENT USE OF INSULIN: Chronic | ICD-10-CM

## 2023-12-04 DIAGNOSIS — E11.59 HYPERTENSION ASSOCIATED WITH DIABETES: Chronic | ICD-10-CM

## 2023-12-04 DIAGNOSIS — E78.49 OTHER HYPERLIPIDEMIA: ICD-10-CM

## 2023-12-04 DIAGNOSIS — Z79.4 TYPE 2 DIABETES MELLITUS WITH DIABETIC NEPHROPATHY, WITH LONG-TERM CURRENT USE OF INSULIN: Chronic | ICD-10-CM

## 2023-12-04 DIAGNOSIS — E11.42 TYPE 2 DIABETES MELLITUS WITH DIABETIC POLYNEUROPATHY, WITH LONG-TERM CURRENT USE OF INSULIN: Chronic | ICD-10-CM

## 2023-12-04 DIAGNOSIS — E11.69 HYPERLIPIDEMIA ASSOCIATED WITH TYPE 2 DIABETES MELLITUS: Chronic | ICD-10-CM

## 2023-12-04 DIAGNOSIS — Z79.4 TYPE 2 DIABETES MELLITUS WITH DIABETIC POLYNEUROPATHY, WITH LONG-TERM CURRENT USE OF INSULIN: Chronic | ICD-10-CM

## 2023-12-04 DIAGNOSIS — I25.2 HISTORY OF NON-ST ELEVATION MYOCARDIAL INFARCTION (NSTEMI): Primary | Chronic | ICD-10-CM

## 2023-12-04 DIAGNOSIS — Z79.4 TYPE 2 DIABETES MELLITUS WITH BOTH EYES AFFECTED BY MILD NONPROLIFERATIVE RETINOPATHY WITHOUT MACULAR EDEMA, WITH LONG-TERM CURRENT USE OF INSULIN: ICD-10-CM

## 2023-12-04 DIAGNOSIS — Z95.5 STENTED CORONARY ARTERY: Chronic | ICD-10-CM

## 2023-12-04 DIAGNOSIS — E78.5 HYPERLIPIDEMIA ASSOCIATED WITH TYPE 2 DIABETES MELLITUS: Chronic | ICD-10-CM

## 2023-12-04 DIAGNOSIS — I15.2 HYPERTENSION ASSOCIATED WITH DIABETES: Chronic | ICD-10-CM

## 2023-12-04 PROCEDURE — 3078F DIAST BP <80 MM HG: CPT | Mod: CPTII,S$GLB,, | Performed by: INTERNAL MEDICINE

## 2023-12-04 PROCEDURE — 3051F PR MOST RECENT HEMOGLOBIN A1C LEVEL 7.0 - < 8.0%: ICD-10-PCS | Mod: CPTII,S$GLB,, | Performed by: INTERNAL MEDICINE

## 2023-12-04 PROCEDURE — 3060F POS MICROALBUMINURIA REV: CPT | Mod: CPTII,S$GLB,, | Performed by: INTERNAL MEDICINE

## 2023-12-04 PROCEDURE — 1160F RVW MEDS BY RX/DR IN RCRD: CPT | Mod: CPTII,S$GLB,, | Performed by: INTERNAL MEDICINE

## 2023-12-04 PROCEDURE — 99999 PR PBB SHADOW E&M-EST. PATIENT-LVL V: CPT | Mod: PBBFAC,,, | Performed by: INTERNAL MEDICINE

## 2023-12-04 PROCEDURE — 3074F PR MOST RECENT SYSTOLIC BLOOD PRESSURE < 130 MM HG: ICD-10-PCS | Mod: CPTII,S$GLB,, | Performed by: INTERNAL MEDICINE

## 2023-12-04 PROCEDURE — 3078F PR MOST RECENT DIASTOLIC BLOOD PRESSURE < 80 MM HG: ICD-10-PCS | Mod: CPTII,S$GLB,, | Performed by: INTERNAL MEDICINE

## 2023-12-04 PROCEDURE — 99214 PR OFFICE/OUTPT VISIT, EST, LEVL IV, 30-39 MIN: ICD-10-PCS | Mod: S$GLB,,, | Performed by: INTERNAL MEDICINE

## 2023-12-04 PROCEDURE — 3060F PR POS MICROALBUMINURIA RESULT DOCUMENTED/REVIEW: ICD-10-PCS | Mod: CPTII,S$GLB,, | Performed by: INTERNAL MEDICINE

## 2023-12-04 PROCEDURE — 99214 OFFICE O/P EST MOD 30 MIN: CPT | Mod: S$GLB,,, | Performed by: INTERNAL MEDICINE

## 2023-12-04 PROCEDURE — 3066F PR DOCUMENTATION OF TREATMENT FOR NEPHROPATHY: ICD-10-PCS | Mod: CPTII,S$GLB,, | Performed by: INTERNAL MEDICINE

## 2023-12-04 PROCEDURE — 3051F HG A1C>EQUAL 7.0%<8.0%: CPT | Mod: CPTII,S$GLB,, | Performed by: INTERNAL MEDICINE

## 2023-12-04 PROCEDURE — 3008F BODY MASS INDEX DOCD: CPT | Mod: CPTII,S$GLB,, | Performed by: INTERNAL MEDICINE

## 2023-12-04 PROCEDURE — 99999 PR PBB SHADOW E&M-EST. PATIENT-LVL V: ICD-10-PCS | Mod: PBBFAC,,, | Performed by: INTERNAL MEDICINE

## 2023-12-04 PROCEDURE — 1159F PR MEDICATION LIST DOCUMENTED IN MEDICAL RECORD: ICD-10-PCS | Mod: CPTII,S$GLB,, | Performed by: INTERNAL MEDICINE

## 2023-12-04 PROCEDURE — 3008F PR BODY MASS INDEX (BMI) DOCUMENTED: ICD-10-PCS | Mod: CPTII,S$GLB,, | Performed by: INTERNAL MEDICINE

## 2023-12-04 PROCEDURE — 3074F SYST BP LT 130 MM HG: CPT | Mod: CPTII,S$GLB,, | Performed by: INTERNAL MEDICINE

## 2023-12-04 PROCEDURE — 1159F MED LIST DOCD IN RCRD: CPT | Mod: CPTII,S$GLB,, | Performed by: INTERNAL MEDICINE

## 2023-12-04 PROCEDURE — 1160F PR REVIEW ALL MEDS BY PRESCRIBER/CLIN PHARMACIST DOCUMENTED: ICD-10-PCS | Mod: CPTII,S$GLB,, | Performed by: INTERNAL MEDICINE

## 2023-12-04 PROCEDURE — 3066F NEPHROPATHY DOC TX: CPT | Mod: CPTII,S$GLB,, | Performed by: INTERNAL MEDICINE

## 2023-12-04 PROCEDURE — 4010F ACE/ARB THERAPY RXD/TAKEN: CPT | Mod: CPTII,S$GLB,, | Performed by: INTERNAL MEDICINE

## 2023-12-04 PROCEDURE — 4010F PR ACE/ARB THEARPY RXD/TAKEN: ICD-10-PCS | Mod: CPTII,S$GLB,, | Performed by: INTERNAL MEDICINE

## 2023-12-04 RX ORDER — ACETAMINOPHEN 160 MG/5ML
200 SUSPENSION, ORAL (FINAL DOSE FORM) ORAL DAILY
Start: 2023-12-04 | End: 2024-01-03

## 2023-12-04 NOTE — PROGRESS NOTES
Subjective:   @Patient ID:  Yon Loyd is a 58 y.o. male who presents for evaluation of CAD    HPI:   12/2023:  Here for follow-up.  He has been doing okay since last time.  Main complaint today is right thigh pain that started about 2 months ago.  Seems ezetimibe was started in September.  He is already on a high dose of atorvastatin.  Muscle aches are not that bad he would like to monitor for now.  We discussed about PCSK9 inhibitor.  Blood pressure is okay.  He is on aspirin Brilinta without any bleeding issues    1/2023: Here for f/u. He is doing well. He missed statin for 3 months, that's why LDL significantly elevated. He is back on statin now.  He didn't do the cardiac rehab. Occasional minor chest pain. Last was 2 months ago. Lasts for seconds/mins. Just 2 times over the last 1.5 years.        Stopped tobacco  Compliant with DAPT. No bleeding issues        Prior cardiovascular  Hx  --------------------------------    - Heart Catheterization    12/27/2021  The ejection fraction was greater than 55% by visual estimate.  The pre-procedure left ventricular end diastolic pressure was 8.  The Mid LAD lesion was 99% stenosed with 0% stenosis post-intervention.  A STENT RESOLUTE DONI [id number] stent was successfully placed. Post dilated with 3.5 NC balloon. IVUS guided  The Mid Cx to Dist Cx lesion was 95% stenosed with 0% stenosis post-intervention.  A STENT RESOLUTE DONI [id number] stent was successfully placed to mid/distal LCX post dilated with 3.0 NC balloon. IVUS guided  The RPDA lesion was distal 99% stenosed. Small to moderate caliber vs. Very distal lesion. Will be treated medically.  The estimated blood loss was none.     - ASA/Brilinta  - Statin   - Add Coreg   - Risk factors modifications  - Medical therapy for residual distal small Rt PDA lesion.     - NORIS 1/2022  Normal resting and exercise NORIS           - ECHO 12/27/2021  The left ventricle is normal in size with normal systolic function.  The  estimated ejection fraction is 65%.  Normal left ventricular diastolic function.  Normal right ventricular size with normal right ventricular systolic function.  Mild mitral regurgitation.  Intermediate central venous pressure (8 mmHg).  The estimated PA systolic pressure is 22 mmHg.          Patient Active Problem List    Diagnosis Date Noted    Chronic kidney disease (CKD) stage G2/A2, mildly decreased glomerular filtration rate (GFR) between 60-89 mL/min/1.73 square meter and albuminuria creatinine ratio between  mg/g 05/08/2023    Basal cell carcinoma (BCC) of skin of left upper extremity including shoulder 03/31/2023    Arm skin lesion, left 03/18/2022    Other hyperlipidemia 02/04/2022    Stented coronary artery 01/18/2022    History of non-ST elevation myocardial infarction (NSTEMI) 12/27/2021    Umbilical hernia without obstruction and without gangrene 07/30/2021    Diastasis recti 07/30/2021    Type 2 diabetes mellitus with both eyes affected by mild nonproliferative retinopathy without macular edema, with long-term current use of insulin 08/11/2017    BMI 29.0-29.9,adult 12/23/2016    Proteinuria due to type 2 diabetes mellitus 12/23/2016    Type 2 diabetes mellitus with diabetic polyneuropathy, with long-term current use of insulin 10/04/2016    Type 2 diabetes mellitus with diabetic nephropathy, with long-term current use of insulin 12/03/2014    Hypertension associated with diabetes 10/24/2012    Hyperlipidemia associated with type 2 diabetes mellitus 10/24/2012     Lab Results   Component Value Date    LDLCALC 63.6 01/28/2022                        LAST HbA1c  Lab Results   Component Value Date    HGBA1C 7.1 (H) 08/18/2023       Lipid panel  Lab Results   Component Value Date    CHOL 179 03/24/2023    CHOL 299 (H) 12/02/2022    CHOL 126 01/28/2022     Lab Results   Component Value Date    HDL 46 03/24/2023    HDL 52 12/02/2022    HDL 41 01/28/2022     Lab Results   Component Value Date    LDLCALC  87.8 03/24/2023    LDLCALC 182.6 (H) 12/02/2022    LDLCALC 63.6 01/28/2022     Lab Results   Component Value Date    TRIG 226 (H) 03/24/2023    TRIG 322 (H) 12/02/2022    TRIG 107 01/28/2022     Lab Results   Component Value Date    CHOLHDL 25.7 03/24/2023    CHOLHDL 17.4 (L) 12/02/2022    CHOLHDL 32.5 01/28/2022            Review of Systems   Constitutional: Negative for chills and fever.   HENT:  Negative for hearing loss and nosebleeds.    Eyes:  Negative for blurred vision.   Cardiovascular:  Negative for chest pain, leg swelling and palpitations.   Respiratory:  Negative for hemoptysis and shortness of breath.    Hematologic/Lymphatic: Negative for bleeding problem.   Skin:  Negative for itching.   Musculoskeletal:  Negative for falls.   Gastrointestinal:  Negative for abdominal pain and hematochezia.   Genitourinary:  Negative for hematuria.   Neurological:  Negative for dizziness and loss of balance.   Psychiatric/Behavioral:  Negative for altered mental status and depression.        Objective:   Physical Exam  Constitutional:       Appearance: He is well-developed.   HENT:      Head: Normocephalic and atraumatic.   Eyes:      Conjunctiva/sclera: Conjunctivae normal.   Neck:      Vascular: No carotid bruit or JVD.   Cardiovascular:      Rate and Rhythm: Normal rate and regular rhythm.      Pulses:           Carotid pulses are 2+ on the right side and 2+ on the left side.       Radial pulses are 2+ on the right side and 2+ on the left side.      Heart sounds: Normal heart sounds. No murmur heard.     No friction rub. No gallop.   Pulmonary:      Effort: Pulmonary effort is normal. No respiratory distress.      Breath sounds: Normal breath sounds. No stridor. No wheezing.   Musculoskeletal:      Cervical back: Neck supple.   Skin:     General: Skin is warm and dry.   Neurological:      Mental Status: He is alert and oriented to person, place, and time.   Psychiatric:         Behavior: Behavior normal.          Assessment:     1. History of non-ST elevation myocardial infarction (NSTEMI)    2. Hyperlipidemia associated with type 2 diabetes mellitus    3. Hypertension associated with diabetes    4. Other hyperlipidemia    5. Stented coronary artery    6. Chronic kidney disease (CKD) stage G2/A2, mildly decreased glomerular filtration rate (GFR) between 60-89 mL/min/1.73 square meter and albuminuria creatinine ratio between  mg/g    7. Type 2 diabetes mellitus with diabetic nephropathy, with long-term current use of insulin          Plan:   s/p PCI as above  Residual small vs disease in PDA. Medical tx  We will stop aspirin continue Brilinta  Lower extremity symptoms could be related to statin/ezetimibe.  Have discussed about stopping ezetimibe and starting PCSK9 inhibitors.  He would like to monitor for now.  He will start taking Co Q10  Start fish oil  LDL goal lower than 70  BB       I spent 5-10 minutes asking, assessing, assisting, arranging and advising heart healthy diet improvements. This included low-salt meals, portion control and health food alternatives. I also encourage 30 minutes of moderate exercise 3-4x a week.              Pertinent cardiac images and EKG reviewed independently.    Continue with current medical plan and lifestyle changes.  Return sooner for concerns or questions. If symptoms persist go to the ED  I have reviewed all pertinent data including patient's medical history in detail and updated the computerized patient record.     No orders of the defined types were placed in this encounter.      Follow up as scheduled.     He expressed verbal understanding and agreed with the plan    Patient's Medications   New Prescriptions    No medications on file   Previous Medications    ASPIRIN (ECOTRIN) 81 MG EC TABLET    Take 81 mg by mouth once daily.    ATORVASTATIN (LIPITOR) 80 MG TABLET    Take 1 tablet (80 mg total) by mouth once daily.    BLOOD SUGAR DIAGNOSTIC STRP    To check BG 4  "times daily, to use with insurance preferred meter    BLOOD-GLUCOSE METER KIT    To check BG 4 times daily, to use with insurance preferred meter    BRILINTA 90 MG TABLET    TAKE 1 TABLET BY MOUTH TWICE DAILY    CLOTRIMAZOLE-BETAMETHASONE 1-0.05% (LOTRISONE) CREAM    Apply topically 2 (two) times daily.    ERYTHROMYCIN (ROMYCIN) OPHTHALMIC OINTMENT    Place into the right eye 3 (three) times daily.    EZETIMIBE (ZETIA) 10 MG TABLET    Take 1 tablet (10 mg total) by mouth once daily.    INSULIN ASPART U-100 (NOVOLOG FLEXPEN U-100 INSULIN) 100 UNIT/ML (3 ML) INPN PEN    INJECT 10 UNITS UNDER THE SKIN TWICE DAILY BEFORE BREAKFAST AND DINNER (75 DAYS SUPPLY)    INSULIN GLARGINE U-300 CONC (TOUJEO MAX U-300 SOLOSTAR) 300 UNIT/ML (3 ML) INSULIN PEN    Inject 26 Units into the skin once daily.    LANCETS MISC    To check BG 4 times daily, to use with insurance preferred meter    METOPROLOL SUCCINATE (TOPROL-XL) 25 MG 24 HR TABLET    Take 1 tablet (25 mg total) by mouth once daily. Take in place of 50 mg.    MULTIVITAMIN-MINERALS-LUTEIN TAB    Take 1 tablet by mouth once daily.    NATEGLINIDE (STARLIX) 120 MG TABLET    Take 1 tablet (120 mg total) by mouth with lunch.    NITROGLYCERIN (NITROSTAT) 0.4 MG SL TABLET    Place 1 tablet (0.4 mg total) under the tongue every 5 (five) minutes as needed for Chest pain.    PEN NEEDLE, DIABETIC (BD ULTRA-FINE BLACK PEN NEEDLE) 32 GAUGE X 5/32" NDLE    Uses 2  times daily, on injections. Dispense 4 mm needles only    SYNJARDY 12.5-1,000 MG TAB    TAKE 1 TABLET BY MOUTH TWICE DAILY    TIRZEPATIDE (MOUNJARO) 15 MG/0.5 ML PNIJ    INJECT 1 PEN (15MG) INTO THE SKIN EVERY 7 DAYS    TIRZEPATIDE 12.5 MG/0.5 ML PNIJ    Inject 12.5 mg into the skin every 7 days.    VALSARTAN (DIOVAN) 320 MG TABLET    Take 1 tablet (320 mg total) by mouth once daily.   Modified Medications    No medications on file   Discontinued Medications    No medications on file            "

## 2023-12-22 ENCOUNTER — LAB VISIT (OUTPATIENT)
Dept: LAB | Facility: HOSPITAL | Age: 58
End: 2023-12-22
Attending: NURSE PRACTITIONER
Payer: COMMERCIAL

## 2023-12-22 DIAGNOSIS — E11.3293 TYPE 2 DIABETES MELLITUS WITH BOTH EYES AFFECTED BY MILD NONPROLIFERATIVE RETINOPATHY WITHOUT MACULAR EDEMA, WITH LONG-TERM CURRENT USE OF INSULIN: ICD-10-CM

## 2023-12-22 DIAGNOSIS — I10 ESSENTIAL HYPERTENSION: ICD-10-CM

## 2023-12-22 DIAGNOSIS — E11.42 TYPE 2 DIABETES MELLITUS WITH DIABETIC POLYNEUROPATHY, WITH LONG-TERM CURRENT USE OF INSULIN: Chronic | ICD-10-CM

## 2023-12-22 DIAGNOSIS — Z79.4 TYPE 2 DIABETES MELLITUS WITH BOTH EYES AFFECTED BY MILD NONPROLIFERATIVE RETINOPATHY WITHOUT MACULAR EDEMA, WITH LONG-TERM CURRENT USE OF INSULIN: ICD-10-CM

## 2023-12-22 DIAGNOSIS — Z12.5 SCREENING PSA (PROSTATE SPECIFIC ANTIGEN): ICD-10-CM

## 2023-12-22 DIAGNOSIS — Z79.4 TYPE 2 DIABETES MELLITUS WITH DIABETIC POLYNEUROPATHY, WITH LONG-TERM CURRENT USE OF INSULIN: Chronic | ICD-10-CM

## 2023-12-22 LAB
ALBUMIN SERPL BCP-MCNC: 4.3 G/DL (ref 3.5–5.2)
ALP SERPL-CCNC: 99 U/L (ref 38–126)
ALT SERPL W/O P-5'-P-CCNC: 37 U/L (ref 10–44)
ANION GAP SERPL CALC-SCNC: 12 MMOL/L (ref 8–16)
AST SERPL-CCNC: 32 U/L (ref 15–46)
BASOPHILS # BLD AUTO: 0.04 K/UL (ref 0–0.2)
BASOPHILS NFR BLD: 0.5 % (ref 0–1.9)
BILIRUB SERPL-MCNC: 0.6 MG/DL (ref 0.1–1)
CALCIUM SERPL-MCNC: 9.7 MG/DL (ref 8.7–10.5)
CHLORIDE SERPL-SCNC: 102 MMOL/L (ref 95–110)
CHOLEST SERPL-MCNC: 112 MG/DL (ref 120–199)
CHOLEST/HDLC SERPL: 2.6 {RATIO} (ref 2–5)
CO2 SERPL-SCNC: 27 MMOL/L (ref 23–29)
COMPLEXED PSA SERPL-MCNC: 0.99 NG/ML (ref 0–4)
CREAT SERPL-MCNC: 1.05 MG/DL (ref 0.5–1.4)
DIFFERENTIAL METHOD: ABNORMAL
EOSINOPHIL # BLD AUTO: 0.1 K/UL (ref 0–0.5)
EOSINOPHIL NFR BLD: 1.8 % (ref 0–8)
ERYTHROCYTE [DISTWIDTH] IN BLOOD BY AUTOMATED COUNT: 12.7 % (ref 11.5–14.5)
EST. GFR  (NO RACE VARIABLE): >60 ML/MIN/1.73 M^2
ESTIMATED AVG GLUCOSE: 120 MG/DL (ref 68–131)
GLUCOSE SERPL-MCNC: 105 MG/DL (ref 70–110)
HBA1C MFR BLD: 5.8 % (ref 4–5.6)
HCT VFR BLD AUTO: 55 % (ref 40–54)
HDLC SERPL-MCNC: 43 MG/DL (ref 40–75)
HDLC SERPL: 38.4 % (ref 20–50)
HGB BLD-MCNC: 18.1 G/DL (ref 14–18)
IMM GRANULOCYTES # BLD AUTO: 0.02 K/UL (ref 0–0.04)
IMM GRANULOCYTES NFR BLD AUTO: 0.3 % (ref 0–0.5)
LDLC SERPL CALC-MCNC: 37.4 MG/DL (ref 63–159)
LYMPHOCYTES # BLD AUTO: 1.5 K/UL (ref 1–4.8)
LYMPHOCYTES NFR BLD: 18.8 % (ref 18–48)
MCH RBC QN AUTO: 31.2 PG (ref 27–31)
MCHC RBC AUTO-ENTMCNC: 32.9 G/DL (ref 32–36)
MCV RBC AUTO: 95 FL (ref 82–98)
MONOCYTES # BLD AUTO: 0.9 K/UL (ref 0.3–1)
MONOCYTES NFR BLD: 12 % (ref 4–15)
NEUTROPHILS # BLD AUTO: 5.2 K/UL (ref 1.8–7.7)
NEUTROPHILS NFR BLD: 66.6 % (ref 38–73)
NONHDLC SERPL-MCNC: 69 MG/DL
NRBC BLD-RTO: 0 /100 WBC
PLATELET # BLD AUTO: 169 K/UL (ref 150–450)
PMV BLD AUTO: 9.7 FL (ref 9.2–12.9)
POTASSIUM SERPL-SCNC: 4.6 MMOL/L (ref 3.5–5.1)
PROT SERPL-MCNC: 7.6 G/DL (ref 6–8.4)
RBC # BLD AUTO: 5.8 M/UL (ref 4.6–6.2)
SODIUM SERPL-SCNC: 141 MMOL/L (ref 136–145)
TRIGL SERPL-MCNC: 158 MG/DL (ref 30–150)
UUN UR-MCNC: 18 MG/DL (ref 2–20)
WBC # BLD AUTO: 7.73 K/UL (ref 3.9–12.7)

## 2023-12-22 PROCEDURE — 80053 COMPREHEN METABOLIC PANEL: CPT | Mod: PN | Performed by: NURSE PRACTITIONER

## 2023-12-22 PROCEDURE — 36415 COLL VENOUS BLD VENIPUNCTURE: CPT | Mod: PN | Performed by: FAMILY MEDICINE

## 2023-12-22 PROCEDURE — 80061 LIPID PANEL: CPT | Performed by: NURSE PRACTITIONER

## 2023-12-22 PROCEDURE — 85025 COMPLETE CBC W/AUTO DIFF WBC: CPT | Mod: PN | Performed by: FAMILY MEDICINE

## 2023-12-22 PROCEDURE — 83036 HEMOGLOBIN GLYCOSYLATED A1C: CPT | Performed by: NURSE PRACTITIONER

## 2023-12-22 PROCEDURE — 84153 ASSAY OF PSA TOTAL: CPT | Performed by: FAMILY MEDICINE

## 2023-12-22 NOTE — PROGRESS NOTES
Subjective:    12/29/2023     Patient ID: Yon Loyd is a 58 y.o. male.    Chief Complaint:  No chief complaint on file.    History of Present Illness  Yon Loyd with Type 2 DM complicated by CAD with 2 stents in Dec 2021, retinopathy, CKD and proteinuria, DLP and HTN presents today for follow up of T2DM. Previous patient of [facility]. Last visit with me in Aug 2023.    Since his last visit, he has been having problems finding Mounajro 15 mg weekly and has needed to decrease dose in order to control blood sugars.    Since his last visit,     With regards to the diabetes:    Diagnosed with Type 2 diabetes in ~1993.  Been on insulin since ~2013. Started Trulicity 12/2016.   Family history of type 2 diabetes in mother and father.     DIABETES COMPLICATIONS:   Nephropathy +; sees nephrology -- follow up in one year per note  Retinopathy +; scheduled   Peripheral neuropathy +; tolerable; does not wish to see podiatry  CAD: +     Current regimen:  Synjardy 12.5/1000 mg twice daily  Mounjaro 15 mg weekly   Toujeo 13 units daily in the morning (24 hour insulin)  Lyumjev/Humalog/Novolog 10 units before breakfast and dinner (occasionally missing dinner dose)  Starlix 120 mg before lunch -occasionally missing    He is trying to give novolog 10 minutes prior to a meal. He is rotating injection sites. He is now changing needle every time.     He forgot to give novolog at dinner for about 3-4 weeks. Recently resumed.     Other medications tried:  Invokana but stopped due to fear of side effects    He is not taking blood sugars   DECLINES CGM  Hypoglycemic event-Denies and s/s of BG less than 70  Knows how to correct with 15 grams of carbs- juice, coke, or a peppermint.      Eats 3 meals a day.   Working on improving diet  Started using riced cauliflower.    He is doing better with diet since his last visit.    Exercise: walks often for work. 1-1.8 miles daily    Education - last visit: none; 1/2023-politely declines    Glucagon: does not want     Diabetes Management Status  Statin: Taking atorvastatin 80 mg daily   ACE/ARB: Taking valsartan 320 mg daily  ASA: yes    Lab Results   Component Value Date    HGBA1C 5.8 (H) 12/22/2023    HGBA1C 7.1 (H) 08/18/2023    HGBA1C 6.2 (H) 03/24/2023     Screening or Prevention Patient's value Goal Complete/Controlled?   HgA1C Testing and Control   Lab Results   Component Value Date    HGBA1C 5.8 (H) 12/22/2023      Annually/Less than 8% Yes   Lipid profile : 12/22/2023 Annually Yes   LDL control Lab Results   Component Value Date    LDLCALC 37.4 (L) 12/22/2023    Annually/Less than 100 mg/dl  Yes   Nephropathy screening Lab Results   Component Value Date    LABMICR 46.0 05/08/2023     Lab Results   Component Value Date    PROTEINUA Negative 05/08/2023    Annually No   Blood pressure BP Readings from Last 1 Encounters:   12/29/23 106/70    Less than 140/90 No   Dilated retinal exam : 04/07/2023 Annually Yes   Foot exam   : 03/31/2023 Annually Yes     With regards to dyslipidemia,      He is on atorvastatin 80 mg daily and zetia 10 mg daily   Latest Reference Range & Units 03/24/23 08:28 12/22/23 08:24   Cholesterol Total 120 - 199 mg/dL 179 112 (L)   HDL 40 - 75 mg/dL 46 43   HDL/Cholesterol Ratio 20.0 - 50.0 % 25.7 38.4   Non-HDL Cholesterol mg/dL 133 69   Total Cholesterol/HDL Ratio 2.0 - 5.0  3.9 2.6   Triglycerides 30 - 150 mg/dL 226 (H) 158 (H)   LDL Cholesterol 63.0 - 159.0 mg/dL 87.8 37.4 (L)   (L): Data is abnormally low  (H): Data is abnormally high    Hx of fatigue and snoring- declined sleep study at last visit.    Review of Systems   Constitutional:  Positive for fatigue. Negative for unexpected weight change.   Eyes:  Negative for visual disturbance.   Endocrine: Positive for polydipsia (occ), polyphagia (occ) and polyuria (occ).        Nocturia     Objective:   Constitutional:  Pleasant,  in no acute distress.   HENT:   Eyes:     No scleral icterus.   Respiratory:   Effort normal    Neurological:  normal speech  Psych:  Normal mood and affect.    Diabetes Foot Exam:  Shoes appropriate  DM foot exam deferred; done March 2023    Body mass index is 27.04 kg/m².  Wt Readings from Last 3 Encounters:   12/29/23 1125 90.5 kg (199 lb 6.5 oz)   12/04/23 1022 92 kg (202 lb 13.2 oz)   09/22/23 0843 93 kg (205 lb 1.2 oz)     Vitals:    12/29/23 1125   BP: 106/70   Pulse: 79         Wt Readings from Last 3 Encounters:   12/29/23 1125 90.5 kg (199 lb 6.5 oz)   12/04/23 1022 92 kg (202 lb 13.2 oz)   09/22/23 0843 93 kg (205 lb 1.2 oz)     Lab Review:   Lab Results   Component Value Date    HGBA1C 5.8 (H) 12/22/2023     Lab Results   Component Value Date    CHOL 112 (L) 12/22/2023    HDL 43 12/22/2023    LDLCALC 37.4 (L) 12/22/2023    TRIG 158 (H) 12/22/2023    CHOLHDL 38.4 12/22/2023     Lab Results   Component Value Date     12/22/2023    K 4.6 12/22/2023     12/22/2023    CO2 27 12/22/2023     12/22/2023    BUN 18 12/22/2023    CREATININE 1.05 12/22/2023    CALCIUM 9.7 12/22/2023    PROT 7.6 12/22/2023    ALBUMIN 4.3 12/22/2023    BILITOT 0.6 12/22/2023    ALKPHOS 99 12/22/2023    AST 32 12/22/2023    ALT 37 12/22/2023    ANIONGAP 12 12/22/2023    ESTGFRAFRICA >60.0 07/08/2022    EGFRNONAA >60.0 07/08/2022    TSH 1.140 12/02/2022     Assessment and Plan     1. Type 2 diabetes mellitus with diabetic polyneuropathy, with long-term current use of insulin  Hemoglobin A1C    Comprehensive Metabolic Panel      2. Proteinuria due to type 2 diabetes mellitus        3. Hyperlipidemia associated with type 2 diabetes mellitus        4. Hypertension associated with diabetes        5. Type 2 diabetes mellitus with both eyes affected by mild nonproliferative retinopathy without macular edema, with long-term current use of insulin  empagliflozin-metformin (SYNJARDY) 12.5-1,000 mg Tab    insulin glargine U-300 conc (TOUJEO MAX U-300 SOLOSTAR) 300 unit/mL (3 mL) insulin pen    GLUCOSE MONITORING  CONTINUOUS MIN 72 HOURS    DISCONTINUED: insulin glargine U-300 conc (TOUJEO MAX U-300 SOLOSTAR) 300 unit/mL (3 mL) insulin pen    DISCONTINUED: empagliflozin-metformin (SYNJARDY) 12.5-1,000 mg Tab      6. Snores  Ambulatory referral/consult to Sleep Disorders          Type 2 diabetes mellitus with diabetic polyneuropathy, with long-term current use of insulin  -- Reviewed goals of therapy are to get the best control we can without hypoglycemia    A1c at goal but he may be having hypoglycemia especially since he missed doses of insulin and A1c low.   We will perform a professional continuous glucose monitor in order to assess blood sugar trends and patterns, highs and lows, and determine treatment plan.      Continue   Synjardy 12.5/1000 mg twice daily  Mounjaro 15 mg weekly   Toujeo 13 units daily in the morning (24 hour insulin)  Lyumjev/Humalog/Novolog 10 units before breakfast and dinner (occasionally missing dinner dose)  Starlix 120 mg before lunch -occasionally missing     Discussed desirae or dexcom and he declines.    SATNAM slightly +    -- Reviewed patient's current insulin regimen. Clarified proper insulin dose and timing in relation to meals, etc. Insulin injection sites and proper rotation instructed.    -- Advised frequent self blood glucose monitoring.  Patient encouraged to document glucose results and bring them to every clinic visit    -- Hypoglycemia precautions discussed. Instructed on precautions before driving.    -- Call for Bg repeatedly < 90 or > 180.   -- Close adherence to lifestyle changes recommended.   -- Periodic follow ups for eye evaluations, foot care and dental care suggested.    Proteinuria due to type 2 diabetes mellitus  Improving     Hyperlipidemia associated with type 2 diabetes mellitus  On statin per ADA recommendations   LDL goal <70  Close to goal   On statin and zetia  May need to add PCSK9    Hypertension associated with diabetes  Blood pressure at goal in clinic today.   Continue current medications which include ACEI/ARB.     Snores  Consult sleep medicine      Betzaida Rodriguez NP     Follow up in about 4 months (around 4/29/2024).  Labs on RTC

## 2023-12-28 NOTE — ASSESSMENT & PLAN NOTE
-- Reviewed goals of therapy are to get the best control we can without hypoglycemia    A1c at goal but he may be having hypoglycemia especially since he missed doses of insulin and A1c low.   We will perform a professional continuous glucose monitor in order to assess blood sugar trends and patterns, highs and lows, and determine treatment plan.      Continue   Synjardy 12.5/1000 mg twice daily  Mounjaro 15 mg weekly   Toujeo 13 units daily in the morning (24 hour insulin)  Lyumjev/Humalog/Novolog 10 units before breakfast and dinner (occasionally missing dinner dose)  Starlix 120 mg before lunch -occasionally missing     Discussed desirae or dexcom and he declines.    SATNAM slightly +    -- Reviewed patient's current insulin regimen. Clarified proper insulin dose and timing in relation to meals, etc. Insulin injection sites and proper rotation instructed.    -- Advised frequent self blood glucose monitoring.  Patient encouraged to document glucose results and bring them to every clinic visit    -- Hypoglycemia precautions discussed. Instructed on precautions before driving.    -- Call for Bg repeatedly < 90 or > 180.   -- Close adherence to lifestyle changes recommended.   -- Periodic follow ups for eye evaluations, foot care and dental care suggested.

## 2023-12-29 ENCOUNTER — OFFICE VISIT (OUTPATIENT)
Dept: ENDOCRINOLOGY | Facility: CLINIC | Age: 58
End: 2023-12-29
Payer: COMMERCIAL

## 2023-12-29 VITALS
OXYGEN SATURATION: 97 % | BODY MASS INDEX: 27.01 KG/M2 | HEIGHT: 72 IN | WEIGHT: 199.44 LBS | HEART RATE: 79 BPM | DIASTOLIC BLOOD PRESSURE: 70 MMHG | SYSTOLIC BLOOD PRESSURE: 106 MMHG

## 2023-12-29 DIAGNOSIS — E11.3293 TYPE 2 DIABETES MELLITUS WITH BOTH EYES AFFECTED BY MILD NONPROLIFERATIVE RETINOPATHY WITHOUT MACULAR EDEMA, WITH LONG-TERM CURRENT USE OF INSULIN: ICD-10-CM

## 2023-12-29 DIAGNOSIS — E11.69 HYPERLIPIDEMIA ASSOCIATED WITH TYPE 2 DIABETES MELLITUS: Chronic | ICD-10-CM

## 2023-12-29 DIAGNOSIS — I15.2 HYPERTENSION ASSOCIATED WITH DIABETES: Chronic | ICD-10-CM

## 2023-12-29 DIAGNOSIS — E11.42 TYPE 2 DIABETES MELLITUS WITH DIABETIC POLYNEUROPATHY, WITH LONG-TERM CURRENT USE OF INSULIN: Primary | Chronic | ICD-10-CM

## 2023-12-29 DIAGNOSIS — E11.29 PROTEINURIA DUE TO TYPE 2 DIABETES MELLITUS: Chronic | ICD-10-CM

## 2023-12-29 DIAGNOSIS — R06.83 SNORES: ICD-10-CM

## 2023-12-29 DIAGNOSIS — R80.9 PROTEINURIA DUE TO TYPE 2 DIABETES MELLITUS: Chronic | ICD-10-CM

## 2023-12-29 DIAGNOSIS — Z79.4 TYPE 2 DIABETES MELLITUS WITH BOTH EYES AFFECTED BY MILD NONPROLIFERATIVE RETINOPATHY WITHOUT MACULAR EDEMA, WITH LONG-TERM CURRENT USE OF INSULIN: ICD-10-CM

## 2023-12-29 DIAGNOSIS — E11.59 HYPERTENSION ASSOCIATED WITH DIABETES: Chronic | ICD-10-CM

## 2023-12-29 DIAGNOSIS — Z79.4 TYPE 2 DIABETES MELLITUS WITH DIABETIC POLYNEUROPATHY, WITH LONG-TERM CURRENT USE OF INSULIN: Primary | Chronic | ICD-10-CM

## 2023-12-29 DIAGNOSIS — E78.5 HYPERLIPIDEMIA ASSOCIATED WITH TYPE 2 DIABETES MELLITUS: Chronic | ICD-10-CM

## 2023-12-29 PROCEDURE — 3008F BODY MASS INDEX DOCD: CPT | Mod: CPTII,S$GLB,, | Performed by: NURSE PRACTITIONER

## 2023-12-29 PROCEDURE — 99999 PR PBB SHADOW E&M-EST. PATIENT-LVL V: ICD-10-PCS | Mod: PBBFAC,,, | Performed by: NURSE PRACTITIONER

## 2023-12-29 PROCEDURE — 99999 PR PBB SHADOW E&M-EST. PATIENT-LVL V: CPT | Mod: PBBFAC,,, | Performed by: NURSE PRACTITIONER

## 2023-12-29 PROCEDURE — 3066F NEPHROPATHY DOC TX: CPT | Mod: CPTII,S$GLB,, | Performed by: NURSE PRACTITIONER

## 2023-12-29 PROCEDURE — 3044F PR MOST RECENT HEMOGLOBIN A1C LEVEL <7.0%: ICD-10-PCS | Mod: CPTII,S$GLB,, | Performed by: NURSE PRACTITIONER

## 2023-12-29 PROCEDURE — 3060F PR POS MICROALBUMINURIA RESULT DOCUMENTED/REVIEW: ICD-10-PCS | Mod: CPTII,S$GLB,, | Performed by: NURSE PRACTITIONER

## 2023-12-29 PROCEDURE — 1160F PR REVIEW ALL MEDS BY PRESCRIBER/CLIN PHARMACIST DOCUMENTED: ICD-10-PCS | Mod: CPTII,S$GLB,, | Performed by: NURSE PRACTITIONER

## 2023-12-29 PROCEDURE — 99214 PR OFFICE/OUTPT VISIT, EST, LEVL IV, 30-39 MIN: ICD-10-PCS | Mod: S$GLB,,, | Performed by: NURSE PRACTITIONER

## 2023-12-29 PROCEDURE — 3044F HG A1C LEVEL LT 7.0%: CPT | Mod: CPTII,S$GLB,, | Performed by: NURSE PRACTITIONER

## 2023-12-29 PROCEDURE — 3074F PR MOST RECENT SYSTOLIC BLOOD PRESSURE < 130 MM HG: ICD-10-PCS | Mod: CPTII,S$GLB,, | Performed by: NURSE PRACTITIONER

## 2023-12-29 PROCEDURE — 4010F ACE/ARB THERAPY RXD/TAKEN: CPT | Mod: CPTII,S$GLB,, | Performed by: NURSE PRACTITIONER

## 2023-12-29 PROCEDURE — 3066F PR DOCUMENTATION OF TREATMENT FOR NEPHROPATHY: ICD-10-PCS | Mod: CPTII,S$GLB,, | Performed by: NURSE PRACTITIONER

## 2023-12-29 PROCEDURE — 1160F RVW MEDS BY RX/DR IN RCRD: CPT | Mod: CPTII,S$GLB,, | Performed by: NURSE PRACTITIONER

## 2023-12-29 PROCEDURE — 3008F PR BODY MASS INDEX (BMI) DOCUMENTED: ICD-10-PCS | Mod: CPTII,S$GLB,, | Performed by: NURSE PRACTITIONER

## 2023-12-29 PROCEDURE — 1159F MED LIST DOCD IN RCRD: CPT | Mod: CPTII,S$GLB,, | Performed by: NURSE PRACTITIONER

## 2023-12-29 PROCEDURE — 3078F DIAST BP <80 MM HG: CPT | Mod: CPTII,S$GLB,, | Performed by: NURSE PRACTITIONER

## 2023-12-29 PROCEDURE — 3060F POS MICROALBUMINURIA REV: CPT | Mod: CPTII,S$GLB,, | Performed by: NURSE PRACTITIONER

## 2023-12-29 PROCEDURE — 3078F PR MOST RECENT DIASTOLIC BLOOD PRESSURE < 80 MM HG: ICD-10-PCS | Mod: CPTII,S$GLB,, | Performed by: NURSE PRACTITIONER

## 2023-12-29 PROCEDURE — 4010F PR ACE/ARB THEARPY RXD/TAKEN: ICD-10-PCS | Mod: CPTII,S$GLB,, | Performed by: NURSE PRACTITIONER

## 2023-12-29 PROCEDURE — 3074F SYST BP LT 130 MM HG: CPT | Mod: CPTII,S$GLB,, | Performed by: NURSE PRACTITIONER

## 2023-12-29 PROCEDURE — 99214 OFFICE O/P EST MOD 30 MIN: CPT | Mod: S$GLB,,, | Performed by: NURSE PRACTITIONER

## 2023-12-29 PROCEDURE — 1159F PR MEDICATION LIST DOCUMENTED IN MEDICAL RECORD: ICD-10-PCS | Mod: CPTII,S$GLB,, | Performed by: NURSE PRACTITIONER

## 2023-12-29 RX ORDER — INSULIN GLARGINE 300 U/ML
26 INJECTION, SOLUTION SUBCUTANEOUS DAILY
Qty: 8 ML | Refills: 3 | Status: SHIPPED | OUTPATIENT
Start: 2023-12-29 | End: 2024-12-28

## 2023-12-29 RX ORDER — EMPAGLIFLOZIN AND METFORMIN HYDROCHLORIDE 12.5; 1 MG/1; MG/1
1 TABLET ORAL 2 TIMES DAILY
Qty: 180 TABLET | Refills: 1 | Status: SHIPPED | OUTPATIENT
Start: 2023-12-29 | End: 2024-12-28

## 2023-12-29 RX ORDER — INSULIN GLARGINE 300 U/ML
26 INJECTION, SOLUTION SUBCUTANEOUS DAILY
Qty: 8 ML | Refills: 3 | Status: SHIPPED | OUTPATIENT
Start: 2023-12-29 | End: 2023-12-29

## 2023-12-29 RX ORDER — EMPAGLIFLOZIN AND METFORMIN HYDROCHLORIDE 12.5; 1 MG/1; MG/1
1 TABLET ORAL 2 TIMES DAILY
Qty: 180 TABLET | Refills: 1 | Status: SHIPPED | OUTPATIENT
Start: 2023-12-29 | End: 2023-12-29

## 2023-12-29 NOTE — PATIENT INSTRUCTIONS
Diabetes     A1c at goal but he may be having hypoglycemia especially since he missed doses of insulin and A1c low.   We will perform a professional continuous glucose monitor in order to assess blood sugar trends and patterns, highs and lows, and determine treatment plan.          Continue   Synjardy 12.5/1000 mg twice daily  Mounjaro 15 mg weekly   Toujeo 13 units daily in the morning (24 hour insulin)  Lyumjev/Humalog/Novolog 10 units before breakfast and dinner (occasionally missing dinner dose)  Starlix 120 mg before lunch -occasionally missing     Discussed desirae or dexcom and he declines.    SATNAM slightly +     Cholesterol               Continue atorvastatin 80 mg daily and zetia    OTC CQ 10 100 mg twice daily                  BP               Continue valsartan 100 mg daily               Start checking blood pressure at home! And make log for PCP     Goal blood pressure is is less than 130/80     Vitamin D               OTC Vitamin D 2000 IU daily

## 2024-01-02 ENCOUNTER — TELEPHONE (OUTPATIENT)
Dept: DERMATOLOGY | Facility: CLINIC | Age: 59
End: 2024-01-02
Payer: COMMERCIAL

## 2024-01-02 ENCOUNTER — CLINICAL SUPPORT (OUTPATIENT)
Dept: ENDOCRINOLOGY | Facility: CLINIC | Age: 59
End: 2024-01-02
Payer: COMMERCIAL

## 2024-01-02 ENCOUNTER — PATIENT MESSAGE (OUTPATIENT)
Dept: DERMATOLOGY | Facility: CLINIC | Age: 59
End: 2024-01-02
Payer: COMMERCIAL

## 2024-01-02 DIAGNOSIS — Z79.4 TYPE 2 DIABETES MELLITUS WITH DIABETIC POLYNEUROPATHY, WITH LONG-TERM CURRENT USE OF INSULIN: Primary | ICD-10-CM

## 2024-01-02 DIAGNOSIS — E11.42 TYPE 2 DIABETES MELLITUS WITH DIABETIC POLYNEUROPATHY, WITH LONG-TERM CURRENT USE OF INSULIN: Primary | ICD-10-CM

## 2024-01-02 NOTE — TELEPHONE ENCOUNTER
Unable to leave voicemail for pt to reschedule derm appointment due to provider being out of clinic at original time. Will leave portal message with additional info

## 2024-01-02 NOTE — PROGRESS NOTES
"PLACEMENT OF DEXCOM G6 PRO SENSOR  CONTINOUS GLUCOSE MONITORING SYSTEM (CGMS)     Patient is here in clinic today for placement of continuous glucose monitoring sensor.                Each patient verified that they were here for CGMS procedure ordered by their provider and that they have a working glucose meter and supplies at home.   Patient will be provided with a Dexcom G6 Pro sensor, transmitter, and a copy of the Continuous Glucose Monitoring Patient Log to fill out during the study.              A detailed  explanation of Continuous Glucose Monitoring was  provided. Patient informed that this is a blind procedure and that they will not actually see the blood sugar tracing in real time.    Instructed patient to check blood sugar using home glucometer and to record the following on provided patient log sheets:Blood sugar taken at home, Meals and snacks, Activity, and Diabetes medications taken and dosage               Patient was brought to a private location.  Site selected and prepared and allowed to dry. Glucose Transmitter Serial Number [id number]  was inserted to patient's abdomen.               The following forms  were given and reviewed in detail with patient and all questions answered.   Continuous Glucose Monitoring Patient Log   Dexcom G6 PRO Patient Handout "Blinded CGM Patient Handout"                 Instructions: Time: 15 min   Insertion of sensor:  Time: 5 minutes     "

## 2024-01-03 ENCOUNTER — OFFICE VISIT (OUTPATIENT)
Dept: FAMILY MEDICINE | Facility: CLINIC | Age: 59
End: 2024-01-03
Payer: COMMERCIAL

## 2024-01-03 VITALS
BODY MASS INDEX: 27.65 KG/M2 | SYSTOLIC BLOOD PRESSURE: 118 MMHG | DIASTOLIC BLOOD PRESSURE: 64 MMHG | TEMPERATURE: 98 F | HEIGHT: 72 IN | OXYGEN SATURATION: 97 % | HEART RATE: 82 BPM | WEIGHT: 204.13 LBS

## 2024-01-03 DIAGNOSIS — Z79.4 TYPE 2 DIABETES MELLITUS WITH BOTH EYES AFFECTED BY MILD NONPROLIFERATIVE RETINOPATHY WITHOUT MACULAR EDEMA, WITH LONG-TERM CURRENT USE OF INSULIN: ICD-10-CM

## 2024-01-03 DIAGNOSIS — E11.42 TYPE 2 DIABETES MELLITUS WITH DIABETIC POLYNEUROPATHY, WITH LONG-TERM CURRENT USE OF INSULIN: ICD-10-CM

## 2024-01-03 DIAGNOSIS — Z95.5 STENTED CORONARY ARTERY: Chronic | ICD-10-CM

## 2024-01-03 DIAGNOSIS — E11.69 HYPERLIPIDEMIA ASSOCIATED WITH TYPE 2 DIABETES MELLITUS: Chronic | ICD-10-CM

## 2024-01-03 DIAGNOSIS — E78.5 HYPERLIPIDEMIA ASSOCIATED WITH TYPE 2 DIABETES MELLITUS: Chronic | ICD-10-CM

## 2024-01-03 DIAGNOSIS — M79.10 MYALGIA: ICD-10-CM

## 2024-01-03 DIAGNOSIS — R20.0 LEFT FACIAL NUMBNESS: ICD-10-CM

## 2024-01-03 DIAGNOSIS — Z79.4 TYPE 2 DIABETES MELLITUS WITH DIABETIC POLYNEUROPATHY, WITH LONG-TERM CURRENT USE OF INSULIN: ICD-10-CM

## 2024-01-03 DIAGNOSIS — Z00.00 VISIT FOR WELL MAN HEALTH CHECK: Primary | ICD-10-CM

## 2024-01-03 DIAGNOSIS — E11.3293 TYPE 2 DIABETES MELLITUS WITH BOTH EYES AFFECTED BY MILD NONPROLIFERATIVE RETINOPATHY WITHOUT MACULAR EDEMA, WITH LONG-TERM CURRENT USE OF INSULIN: ICD-10-CM

## 2024-01-03 PROCEDURE — 3074F SYST BP LT 130 MM HG: CPT | Mod: CPTII,S$GLB,, | Performed by: FAMILY MEDICINE

## 2024-01-03 PROCEDURE — 1159F MED LIST DOCD IN RCRD: CPT | Mod: CPTII,S$GLB,, | Performed by: FAMILY MEDICINE

## 2024-01-03 PROCEDURE — 1160F RVW MEDS BY RX/DR IN RCRD: CPT | Mod: CPTII,S$GLB,, | Performed by: FAMILY MEDICINE

## 2024-01-03 PROCEDURE — 99999 PR PBB SHADOW E&M-EST. PATIENT-LVL V: CPT | Mod: PBBFAC,,, | Performed by: FAMILY MEDICINE

## 2024-01-03 PROCEDURE — 99396 PREV VISIT EST AGE 40-64: CPT | Mod: S$GLB,,, | Performed by: FAMILY MEDICINE

## 2024-01-03 PROCEDURE — 3078F DIAST BP <80 MM HG: CPT | Mod: CPTII,S$GLB,, | Performed by: FAMILY MEDICINE

## 2024-01-03 PROCEDURE — 3008F BODY MASS INDEX DOCD: CPT | Mod: CPTII,S$GLB,, | Performed by: FAMILY MEDICINE

## 2024-01-03 RX ORDER — ATORVASTATIN CALCIUM 80 MG/1
80 TABLET, FILM COATED ORAL DAILY
Qty: 90 TABLET | Refills: 3 | Status: SHIPPED | OUTPATIENT
Start: 2024-01-03 | End: 2025-01-02

## 2024-01-03 NOTE — PATIENT INSTRUCTIONS
Trial off of zetia  Contact me if leg cramps resolve  If it does go away, consider every other day zetia?  Will just continue statin  May consider adding fenofibrate  May consider referral back to cardiology for pcsk9 inhibitor  Continue coq10 and fishoil as well  Continue atorvastatin 80 mg   LDL Goal <70  If pain persists off zetia, consider trial of statin as well?  Update me in 2 weeks  If upper leg pain persists off both, consider MRI vs US LE Arteries.     Continue mounjaro 15 mg  Continue synjardy  jardiance component can help the heart  Insulin management per endocrine  Can continue starlix  F/u with endocrine    Continue metoprolol at 25 mg  Continue valsartan 320  Goal BP: 120/80 to 130/80    Continue brilinta, NO aspirin, per cardiology.     Continue weight loss  Increase exercise!!!    F/u 4 months, labs prior.

## 2024-01-03 NOTE — PROGRESS NOTES
"Subjective:       Patient ID: Yon Loyd is a 58 y.o. male.    Chief Complaint: Annual Exam      Yon Loyd is a 58 y.o. male who presents today for an annual exam    Labs: reviewed     Colon Cancer Screening: Last Colonoscopy completed on 10/29/2021     DM: seeing endocrinology. On mounjaro. Missed a few doses due to shortages. On synjardy, insulin, starlix, LA 26 U, SA 10 U BIDWM. Seeing Betzaida Rodriguez in endocrinology.   HTN: increased metoprolol to 50 mg 1/8/2022. Decreased to 25 mg on 9/22/2023. On valsartan 320 mg. Dizziness improved with lower metoprolol.   Proteinuria: on valsartan. Seeing renal. Proteinuria due to DM. Improving!  DLD: on statin and zetia. However having cramps. See below.   CAD: seeing Dr. De Souza  Obesity: weight is up from the last visit. About one pound    Reports he has leg cramps. Has been on zetia since 3/2023. Restarted atorvastatin around the same time. Now having cramps. They started around September. He reports describing "dakotah horses" BL that wake him up at night. This happens once every 3 weeks. The pain in the groin comes and goes, right groin. He just started coq10 and fishoil 3-4 days ago.     Had facial numbness after dental procedure in September. Yesterday, he has another episode of facial numbness. This was the eye to the chin. Right side. He did not notice any facial drooping or facial asymmetry. First episode. Still has some "ache" of the region.     Labs: reviewed and ordered     C-scope: Last Colonoscopy completed on 10/29/2021     PMHx: reviewed in EMR and updated  Meds: reviewed in EMR and updated  Shx: reviewed in EMR and updated  FMHx: reviewed in EMR and updated   Social: reviewed in EMR and updated          Review of Systems   Constitutional:  Negative for chills and fever.   Respiratory:  Negative for shortness of breath.    Cardiovascular:  Negative for chest pain.   Gastrointestinal:  Positive for nausea (chronic). Negative for abdominal pain, anal " bleeding, blood in stool and vomiting.   Musculoskeletal:  Positive for myalgias.   Neurological:  Positive for numbness. Negative for dizziness, tremors, syncope, speech difficulty, weakness and headaches.         Health Maintenance Due   Topic Date Due    HIV Screening  Never done    COVID-19 Vaccine (6 - 2023-24 season) 09/01/2023     Immunization History   Administered Date(s) Administered    COVID-19, MRNA, LN-S, PF (Pfizer) (Gray Cap) 03/01/2021, 03/22/2021    COVID-19, MRNA, LN-S, PF (Pfizer) (Purple Cap) 03/01/2021, 03/22/2021, 08/14/2021    Influenza 10/24/2012    Influenza - Intradermal - Quadrivalent - PF 09/16/2013, 12/03/2014    Influenza - Intradermal - Trivalent - PF 09/16/2013, 12/03/2014    Influenza - Quadrivalent - PF *Preferred* (6 months and older) 10/24/2012, 01/08/2022    Influenza Split 10/24/2012    Pneumococcal Conjugate - 13 Valent 03/19/2015    Pneumococcal Conjugate - 20 Valent 09/22/2023    Pneumococcal Polysaccharide - 23 Valent 06/09/2016    Tdap 03/31/2023    Zoster Recombinant 03/31/2023, 09/22/2023         Results for orders placed or performed in visit on 12/22/23   Hemoglobin A1C   Result Value Ref Range    Hemoglobin A1C 5.8 (H) 4.0 - 5.6 %    Estimated Avg Glucose 120 68 - 131 mg/dL   Comprehensive Metabolic Panel   Result Value Ref Range    Sodium 141 136 - 145 mmol/L    Potassium 4.6 3.5 - 5.1 mmol/L    Chloride 102 95 - 110 mmol/L    CO2 27 23 - 29 mmol/L    Glucose 105 70 - 110 mg/dL    BUN 18 2 - 20 mg/dL    Creatinine 1.05 0.50 - 1.40 mg/dL    Calcium 9.7 8.7 - 10.5 mg/dL    Total Protein 7.6 6.0 - 8.4 g/dL    Albumin 4.3 3.5 - 5.2 g/dL    Total Bilirubin 0.6 0.1 - 1.0 mg/dL    Alkaline Phosphatase 99 38 - 126 U/L    AST 32 15 - 46 U/L    ALT 37 10 - 44 U/L    Anion Gap 12 8 - 16 mmol/L    eGFR >60.0 >60 mL/min/1.73 m^2   Lipid Panel   Result Value Ref Range    Cholesterol 112 (L) 120 - 199 mg/dL    Triglycerides 158 (H) 30 - 150 mg/dL    HDL 43 40 - 75 mg/dL    LDL  Cholesterol 37.4 (L) 63.0 - 159.0 mg/dL    HDL/Cholesterol Ratio 38.4 20.0 - 50.0 %    Total Cholesterol/HDL Ratio 2.6 2.0 - 5.0    Non-HDL Cholesterol 69 mg/dL   PSA, Screening   Result Value Ref Range    PSA, Screen 0.99 0.00 - 4.00 ng/mL   CBC Auto Differential   Result Value Ref Range    WBC 7.73 3.90 - 12.70 K/uL    RBC 5.80 4.60 - 6.20 M/uL    Hemoglobin 18.1 (H) 14.0 - 18.0 g/dL    Hematocrit 55.0 (H) 40.0 - 54.0 %    MCV 95 82 - 98 fL    MCH 31.2 (H) 27.0 - 31.0 pg    MCHC 32.9 32.0 - 36.0 g/dL    RDW 12.7 11.5 - 14.5 %    Platelets 169 150 - 450 K/uL    MPV 9.7 9.2 - 12.9 fL    Immature Granulocytes 0.3 0.0 - 0.5 %    Gran # (ANC) 5.2 1.8 - 7.7 K/uL    Immature Grans (Abs) 0.02 0.00 - 0.04 K/uL    Lymph # 1.5 1.0 - 4.8 K/uL    Mono # 0.9 0.3 - 1.0 K/uL    Eos # 0.1 0.0 - 0.5 K/uL    Baso # 0.04 0.00 - 0.20 K/uL    nRBC 0 0 /100 WBC    Gran % 66.6 38.0 - 73.0 %    Lymph % 18.8 18.0 - 48.0 %    Mono % 12.0 4.0 - 15.0 %    Eosinophil % 1.8 0.0 - 8.0 %    Basophil % 0.5 0.0 - 1.9 %    Differential Method Automated        Objective:     Vitals:    01/03/24 1320   BP: 118/64   Pulse: 82   Temp: 97.6 °F (36.4 °C)   TempSrc: Oral   SpO2: 97%   Weight: 92.6 kg (204 lb 2.3 oz)   Height: 6' (1.829 m)        Physical Exam  Vitals and nursing note reviewed.   Constitutional:       General: He is not in acute distress.     Appearance: He is well-developed. He is not ill-appearing, toxic-appearing or diaphoretic.   HENT:      Head: Normocephalic and atraumatic.      Nose: No congestion or rhinorrhea.      Mouth/Throat:      Pharynx: No oropharyngeal exudate or posterior oropharyngeal erythema.      Comments: No obvious dental abscess or infection noted  Eyes:      Conjunctiva/sclera: Conjunctivae normal.      Pupils: Pupils are equal, round, and reactive to light.   Cardiovascular:      Rate and Rhythm: Normal rate and regular rhythm.      Heart sounds: Normal heart sounds.   Pulmonary:      Effort: Pulmonary effort is  normal.      Breath sounds: Normal breath sounds.   Abdominal:      General: Bowel sounds are normal. There is no distension.      Palpations: Abdomen is soft.   Musculoskeletal:         General: No tenderness.      Comments: No area of mass in upper right leg noted  There was tenderness to deep palpation.   No leg swelling.    Skin:     Findings: No rash.   Neurological:      Mental Status: He is alert and oriented to person, place, and time.      Cranial Nerves: No cranial nerve deficit.      Sensory: No sensory deficit.      Motor: No weakness or abnormal muscle tone.      Gait: Gait normal.      Comments: Finger to nose intact  Heel to shin intact  Strength and sensation grossly intact BL UE/LE  CN 2-12 grossly intact  PERRLA  Rapid alternating movement intact    Gait normal  Toe walk normal  Heel walk normal    Negative romberg    No facial sensation asymmetry reported on exam.      Psychiatric:         Speech: Speech normal.         Behavior: Behavior normal.         Thought Content: Thought content normal.         Judgment: Judgment normal.         Assessment:       1. Visit for [facility] health check    2. Type 2 diabetes mellitus with diabetic polyneuropathy, with long-term current use of insulin    3. Hyperlipidemia associated with type 2 diabetes mellitus    4. BMI 27.0-27.9,adult    5. Stented coronary artery    6. Left facial numbness    7. Myalgia    8. Type 2 diabetes mellitus with both eyes affected by mild nonproliferative retinopathy without macular edema, with long-term current use of insulin        Plan:       Trial off of zetia  Contact me if leg cramps resolve  If it does go away, consider every other day zetia?  Will just continue statin  May consider adding fenofibrate  May consider referral back to cardiology for pcsk9 inhibitor  Continue coq10 and fishoil as well  Continue atorvastatin 80 mg   LDL Goal <70  If pain persists off zetia, consider trial of statin as well?    If upper leg pain  persists off both, consider MRI vs US LE Arteries.     Continue mounjaro 15 mg  Continue synjardy  jardiance component can help the heart  Insulin management per endocrine  Can continue starlix  F/u with endocrine    Continue metoprolol at 25 mg  Continue valsartan 320  Goal BP: 120/80 to 130/80    Continue brilinta, NO aspirin, per cardiology.     MRI brain to evaluate numbness  If normal, consider dental evaluation vs neurology?    Continue weight loss  Increase exercise!!!    F/u 4 months, labs prior.     Visit for well man health check  -     TSH; Future; Expected date: 01/03/2024    Type 2 diabetes mellitus with diabetic polyneuropathy, with long-term current use of insulin  -     CBC Auto Differential; Future; Expected date: 01/03/2024  -     TSH; Future; Expected date: 01/03/2024    Hyperlipidemia associated with type 2 diabetes mellitus  -     atorvastatin (LIPITOR) 80 MG tablet; Take 1 tablet (80 mg total) by mouth once daily.  Dispense: 90 tablet; Refill: 3    BMI 27.0-27.9,adult    Stented coronary artery    Left facial numbness  -     MRI Brain Without Contrast; Future; Expected date: 01/03/2024    Myalgia    Type 2 diabetes mellitus with both eyes affected by mild nonproliferative retinopathy without macular edema, with long-term current use of insulin

## 2024-01-12 ENCOUNTER — TELEPHONE (OUTPATIENT)
Dept: FAMILY MEDICINE | Facility: CLINIC | Age: 59
End: 2024-01-12
Payer: COMMERCIAL

## 2024-01-12 NOTE — TELEPHONE ENCOUNTER
----- Message from Rosa Collins sent at 1/12/2024 10:16 AM CST -----  Regarding: Orders  Contact: Sabi from [facility]  An order was submitted for patient to complete  MRI Brain Without Contrast  Patient would like to have it completed at [facility] Fax : [phone]   Please fax orders     Sabi from [facility] can be reached at [phone] option 2 then option 3

## 2024-01-17 ENCOUNTER — PATIENT MESSAGE (OUTPATIENT)
Dept: FAMILY MEDICINE | Facility: CLINIC | Age: 59
End: 2024-01-17
Payer: COMMERCIAL

## 2024-01-19 ENCOUNTER — OFFICE VISIT (OUTPATIENT)
Dept: DERMATOLOGY | Facility: CLINIC | Age: 59
End: 2024-01-19
Payer: COMMERCIAL

## 2024-01-19 DIAGNOSIS — L82.1 SEBORRHEIC KERATOSES: ICD-10-CM

## 2024-01-19 DIAGNOSIS — Z85.828 HISTORY OF SKIN CANCER: ICD-10-CM

## 2024-01-19 DIAGNOSIS — D18.01 CHERRY ANGIOMA: ICD-10-CM

## 2024-01-19 DIAGNOSIS — Z85.828 HISTORY OF NONMELANOMA SKIN CANCER: ICD-10-CM

## 2024-01-19 DIAGNOSIS — Z12.83 SCREENING EXAM FOR SKIN CANCER: ICD-10-CM

## 2024-01-19 DIAGNOSIS — D22.9 MULTIPLE BENIGN NEVI: Primary | ICD-10-CM

## 2024-01-19 PROCEDURE — 99213 OFFICE O/P EST LOW 20 MIN: CPT | Mod: S$GLB,,, | Performed by: STUDENT IN AN ORGANIZED HEALTH CARE EDUCATION/TRAINING PROGRAM

## 2024-01-19 PROCEDURE — 1159F MED LIST DOCD IN RCRD: CPT | Mod: CPTII,S$GLB,, | Performed by: STUDENT IN AN ORGANIZED HEALTH CARE EDUCATION/TRAINING PROGRAM

## 2024-01-19 PROCEDURE — 1160F RVW MEDS BY RX/DR IN RCRD: CPT | Mod: CPTII,S$GLB,, | Performed by: STUDENT IN AN ORGANIZED HEALTH CARE EDUCATION/TRAINING PROGRAM

## 2024-01-19 PROCEDURE — 99999 PR PBB SHADOW E&M-EST. PATIENT-LVL III: CPT | Mod: PBBFAC,,, | Performed by: STUDENT IN AN ORGANIZED HEALTH CARE EDUCATION/TRAINING PROGRAM

## 2024-01-19 NOTE — PROGRESS NOTES
Subjective:      Patient ID:  Yon Loyd is a 58 y.o. male who presents for   Chief Complaint   Patient presents with    Skin Check     tbse     History of Present Illness: The patient presents for follow up of skin check.    The patient was last seen on: 7/18/23 for infected epidermoid cyst right axilla.  Other skin complaints: none    Hx of BCC to L shoulder       Problem List Items Addressed This Visit          Oncology    History of skin cancer    Overview     1/2023--BCC, left shoulder, s/p excision          Other Visit Diagnoses       Multiple benign nevi    -  Primary    Seborrheic keratoses        Cherry angioma        History of nonmelanoma skin cancer        Screening exam for skin cancer                Review of Systems   Skin:  Positive for sun sensitivity, activity-related sunscreen use and wears hat (once a a month). Negative for dry skin, daily sunscreen use and recent sunburn.   Hematologic/Lymphatic: Bruises/bleeds easily (bloodthinner).       Objective:   Physical Exam   Constitutional: He appears well-developed and well-nourished. No distress.   Neurological: He is alert and oriented to person, place, and time. He is not disoriented.   Psychiatric: He has a normal mood and affect.   Skin:   Areas Examined (abnormalities noted in diagram):   Scalp / Hair Palpated and Inspected  Head / Face Inspection Performed  Neck Inspection Performed  Chest / Axilla Inspection Performed  Abdomen Inspection Performed  Genitals / Buttocks / Groin Inspection Performed  Back Inspection Performed  RUE Inspected  LUE Inspection Performed  RLE Inspected  LLE Inspection Performed  Nails and Digits Inspection Performed                 Diagram Legend     Erythematous scaling macule/papule c/w actinic keratosis       Vascular papule c/w angioma      Pigmented verrucoid papule/plaque c/w seborrheic keratosis      Yellow umbilicated papule c/w sebaceous hyperplasia      Irregularly shaped tan macule c/w lentigo     1-2  mm smooth white papules consistent with Milia      Movable subcutaneous cyst with punctum c/w epidermal inclusion cyst      Subcutaneous movable cyst c/w pilar cyst      Firm pink to brown papule c/w dermatofibroma      Pedunculated fleshy papule(s) c/w skin tag(s)      Evenly pigmented macule c/w junctional nevus     Mildly variegated pigmented, slightly irregular-bordered macule c/w mildly atypical nevus      Flesh colored to evenly pigmented papule c/w intradermal nevus       Pink pearly papule/plaque c/w basal cell carcinoma      Erythematous hyperkeratotic cursted plaque c/w SCC      Surgical scar with no sign of skin cancer recurrence      Open and closed comedones      Inflammatory papules and pustules      Verrucoid papule consistent consistent with wart     Erythematous eczematous patches and plaques     Dystrophic onycholytic nail with subungual debris c/w onychomycosis     Umbilicated papule    Erythematous-base heme-crusted tan verrucoid plaque consistent with inflamed seborrheic keratosis     Erythematous Silvery Scaling Plaque c/w Psoriasis     See annotation      Assessment / Plan:        Multiple benign nevi  Seborrheic keratoses  Cherry angioma  Reassurance given to patient. No treatment is necessary.   Treatment of benign, asymptomatic lesions may be considered cosmetic.    History of nonmelanoma skin cancer  Screening exam for skin cancer  Area of previous BCC examined. Site well healed with no signs of recurrence.    Total body skin examination performed today including at least 12 points as noted in physical examination. No lesions suspicious for malignancy noted.    Recommend daily sun protection/avoidance, use of at least SPF 30, broad spectrum sunscreen (OTC drug), skin self examinations, and routine physician surveillance to optimize early detection             Follow up in about 6 months (around 7/19/2024) for TBSE.

## 2024-01-19 NOTE — PATIENT INSTRUCTIONS
Sunscreen Guidelines  Sunscreen protects your skin by absorbing and reflecting ultraviolet rays. All sunscreens have a sun protection factor (SPF) rating that indicates how long a sunscreen will remain effective on the skin.    Why protect your skin?  The sun's rays are composed of many different wavelengths, including UVA, UVB, and visible light that each affect the skin differently.    UVB: sunburn, photoaging, skin cancer (melanoma, basal cell, and squamous cell carcinomas) and modulation of the skin's immune system.    UVA: similar to above but thought to contribute more to aging; at the same dose of UVB it is less powerful however the sun has 10-20 times the levels of UVA as compared with UVB.  Visible light: implicated in causing unwanted darkening of skin, such as melasma and post-inflammatory hyperpigmentation in darker skin types     If I have dark skin, do I need to worry about the sun?    More darkly pigmented skin is more protected against UV-induced skin cancer, sunburn, and photoaging, though may still suffer from sun-related conditions, including melasma, hyperpigmentation, and other dark spots.    Sun avoidance  As a general rule, stay out of the sun as much as possible between 10 a.m. - 4 p.m.    Download the EPA UV index nae to track the UV index by hour in your zip code.      Which sunscreen should I choose?  The best sunscreen to use varies by individual. The one that feels best on your skin and fits your lifestyle will be the one you will likely use most regularly.   Active ingredients of sunscreen vary by , and may be a chemical (such as avobenzone or oxybenzone) or physical agent (such as zinc oxide or titanium dioxide). I recommend a physical agent.  A water-resistant sunscreen is one that maintains the SPF level after 40 minutes of water exposure. A very water-resistant sunscreen maintains the SPF level after 80 minutes of water exposure.    Sunscreen: this is the last layer in  "sun protection   Be generous: 1 shot glass of sunscreen for your body, ½ teaspoon for your face/neck  Reapply every 2 hours  Broad spectrum (provides UVA/UVB protection), SPF 50 or above  Avoid spray sunscreens: less effective and have been found to contain benzene (carcinogen)    Sun protective clothing  Although sunscreen helps minimize sun damage, no sunscreen completely blocks all wavelengths of UV light. Wearing sun protective clothing such as hats, rashguards or swim shirts, and long sleeves and/or pants, as well as avoiding sun exposure from 10 a.m. to 4 p.m. will help protect your skin from overexposure and minimize sun damage. Seek shade.  Long sleeved clothing, hats, and sunglasses: makes sun protection easier, more effective, and can even be more affordable, since sunscreen needs to be reapplied frequently.    Solumbra (www.sunprectautions.Abcodia)  [employer] (www.HourlyNerd)  Coolibar (www.Scanadu.Abcodia)  Land's end (www.[employer])  Hats from [employer] ([email or web address].[email or web address])    My Favorite Sunscreens:  Physical blockers: Can have a "white case" but in general are more effective  - Face: CeraVe tinted mineral sunscreen, Bare Minerals complexion rescue (20 shades), Elta MD (UV elements, UV physical, UV restore, etc), Tizo ultra zinc tinted, Cetaphil Sheer Mineral Face Liquid Sunscreen  - Body: Blue Lizard, Neutrogena Sheer Zinc, Eucerin Daily Protection, Aveeno Baby   "

## 2024-01-22 ENCOUNTER — TELEPHONE (OUTPATIENT)
Dept: FAMILY MEDICINE | Facility: CLINIC | Age: 59
End: 2024-01-22
Payer: COMMERCIAL

## 2024-01-22 ENCOUNTER — TELEPHONE (OUTPATIENT)
Dept: ENDOCRINOLOGY | Facility: CLINIC | Age: 59
End: 2024-01-22
Payer: COMMERCIAL

## 2024-01-22 DIAGNOSIS — R90.89 ABNORMAL FINDING ON MRI OF BRAIN: Primary | ICD-10-CM

## 2024-01-22 DIAGNOSIS — R90.82 WHITE MATTER ABNORMALITY ON MRI OF BRAIN: ICD-10-CM

## 2024-01-22 NOTE — TELEPHONE ENCOUNTER
Please call patient  I reviewed his MRI results  No obvious issues seen, however there is non specific white matter changes. This can be seen with chronic microvascular disease (essentially the same issue seen in his heart can be seen in the brain) or it could be another problem    They are recommending f/u MRI in one year. I have ordered that but if symptoms persist, we can get input from neurology.     Can you also scan report into the chart.

## 2024-01-22 NOTE — TELEPHONE ENCOUNTER
Spoke with patient about scheduling an appointment. I informed him he has to call back Feb 1st to get scheduled.

## 2024-01-22 NOTE — TELEPHONE ENCOUNTER
Called patient to go over recent MRI results per Dr Hua. Verbalized to patient, Dr Pond reviewed his MRI results. No obvious issues seen, however there is non specific white matter changes. This can be seen with chronic microvascular disease (essentially the same issue seen in his heart can be seen in the brain) or it could be another problem. They are recommending f/u MRI in one year. I have ordered that but if symptoms persist, we can get input from neurology Patient verbalized understanding. Patient stated he was having another symptom he wants to discuss with Dr Pond about, but he can't remember what it was. Patient stated he's going to return phone call to office when he remembers.

## 2024-01-30 DIAGNOSIS — E11.42 TYPE 2 DIABETES MELLITUS WITH DIABETIC POLYNEUROPATHY, WITH LONG-TERM CURRENT USE OF INSULIN: ICD-10-CM

## 2024-01-30 DIAGNOSIS — Z79.4 TYPE 2 DIABETES MELLITUS WITH DIABETIC POLYNEUROPATHY, WITH LONG-TERM CURRENT USE OF INSULIN: ICD-10-CM

## 2024-01-30 RX ORDER — TIRZEPATIDE 15 MG/.5ML
INJECTION, SOLUTION SUBCUTANEOUS
Qty: 4 PEN | Refills: 1 | Status: SHIPPED | OUTPATIENT
Start: 2024-01-30

## 2024-02-07 ENCOUNTER — TELEPHONE (OUTPATIENT)
Dept: OPTOMETRY | Facility: CLINIC | Age: 59
End: 2024-02-07
Payer: COMMERCIAL

## 2024-02-10 DIAGNOSIS — Z95.5 STENTED CORONARY ARTERY: Chronic | ICD-10-CM

## 2024-02-12 RX ORDER — TICAGRELOR 90 MG/1
90 TABLET ORAL 2 TIMES DAILY
Qty: 180 TABLET | Refills: 0 | Status: SHIPPED | OUTPATIENT
Start: 2024-02-12 | End: 2024-06-05

## 2024-02-16 ENCOUNTER — OFFICE VISIT (OUTPATIENT)
Dept: OPTOMETRY | Facility: CLINIC | Age: 59
End: 2024-02-16
Payer: COMMERCIAL

## 2024-02-16 DIAGNOSIS — E11.9 TYPE 2 DIABETES MELLITUS WITHOUT RETINOPATHY: Primary | ICD-10-CM

## 2024-02-16 DIAGNOSIS — E11.21 TYPE 2 DIABETES MELLITUS WITH DIABETIC NEPHROPATHY, WITH LONG-TERM CURRENT USE OF INSULIN: Chronic | ICD-10-CM

## 2024-02-16 DIAGNOSIS — E11.59 HYPERTENSION ASSOCIATED WITH DIABETES: Chronic | ICD-10-CM

## 2024-02-16 DIAGNOSIS — I15.2 HYPERTENSION ASSOCIATED WITH DIABETES: Chronic | ICD-10-CM

## 2024-02-16 DIAGNOSIS — H52.4 HYPEROPIA OF BOTH EYES WITH REGULAR ASTIGMATISM AND PRESBYOPIA: ICD-10-CM

## 2024-02-16 DIAGNOSIS — E11.36 TYPE 2 DIABETES MELLITUS WITH CATARACT: ICD-10-CM

## 2024-02-16 DIAGNOSIS — H52.223 HYPEROPIA OF BOTH EYES WITH REGULAR ASTIGMATISM AND PRESBYOPIA: ICD-10-CM

## 2024-02-16 DIAGNOSIS — H52.03 HYPEROPIA OF BOTH EYES WITH REGULAR ASTIGMATISM AND PRESBYOPIA: ICD-10-CM

## 2024-02-16 DIAGNOSIS — Z79.4 TYPE 2 DIABETES MELLITUS WITH DIABETIC NEPHROPATHY, WITH LONG-TERM CURRENT USE OF INSULIN: Chronic | ICD-10-CM

## 2024-02-16 PROCEDURE — 1159F MED LIST DOCD IN RCRD: CPT | Mod: CPTII,S$GLB,, | Performed by: OPTOMETRIST

## 2024-02-16 PROCEDURE — 92014 COMPRE OPH EXAM EST PT 1/>: CPT | Mod: S$GLB,,, | Performed by: OPTOMETRIST

## 2024-02-16 PROCEDURE — 99999 PR PBB SHADOW E&M-EST. PATIENT-LVL II: CPT | Mod: PBBFAC,,, | Performed by: OPTOMETRIST

## 2024-02-16 PROCEDURE — 92015 DETERMINE REFRACTIVE STATE: CPT | Mod: S$GLB,,, | Performed by: OPTOMETRIST

## 2024-02-16 PROCEDURE — 4010F ACE/ARB THERAPY RXD/TAKEN: CPT | Mod: CPTII,S$GLB,, | Performed by: OPTOMETRIST

## 2024-02-19 NOTE — PROGRESS NOTES
HPI    MITZY: 4/1/2022 Dr. Brown  Chief complaint (CC): Pt presents today for diabetic eye exam. Pt states   no vision or ocular complaints. Need updated prescription to purchase new   glasses  Glasses? +  Contacts? -  H/o eye surgery, injections or laser: -  H/o eye injury: -  Known eye conditions? Cataracts OU  Family h/o eye conditions? -  Eye gtts? -      (-) Flashes (+)  Floaters, occasional  (-) Mucous   (-)  Tearing (-) Itching (-) Burning   (-) Headaches (-) Eye Pain/discomfort (-) Irritation   (-)  Redness (-) Double vision (-) Blurry vision    Diabetic? +  A1c? Hemoglobin A1C       Date                     Value               Ref Range             Status                12/22/2023               5.8 (H)             4.0 - 5.6 %           Final                  08/18/2023               7.1 (H)             4.0 - 5.6 %           Final                   03/24/2023               6.2 (H)             4.0 - 5.6 %           Final                Last edited by Zully Osei MA on 2/16/2024  8:54 AM.            Assessment /Plan     For exam results, see Encounter Report.    Type 2 diabetes mellitus without retinopathy    Hypertension associated with diabetes    Type 2 diabetes mellitus with diabetic nephropathy, with long-term current use of insulin    Type 2 diabetes mellitus with cataract    Hyperopia of both eyes with regular astigmatism and presbyopia      MONITOR. ED PT  ON ALL EXAM FINDINGS  RX FINAL SPECS   TYPE 2 DM W/O RETINOPATHY OU; CONTINUE WITH PCP FOR GLYCEMIC AND BP CONTROL   MILD NS OU; PRESURGICAL; MONITOR. UV PROTECTION   RTC 1 YR//PRN FOR REE/DFE

## 2024-03-19 ENCOUNTER — TELEPHONE (OUTPATIENT)
Dept: ENDOCRINOLOGY | Facility: CLINIC | Age: 59
End: 2024-03-19
Payer: COMMERCIAL

## 2024-04-03 ENCOUNTER — PATIENT MESSAGE (OUTPATIENT)
Dept: ENDOCRINOLOGY | Facility: CLINIC | Age: 59
End: 2024-04-03
Payer: COMMERCIAL

## 2024-04-04 DIAGNOSIS — Z79.4 TYPE 2 DIABETES MELLITUS WITH DIABETIC POLYNEUROPATHY, WITH LONG-TERM CURRENT USE OF INSULIN: Primary | ICD-10-CM

## 2024-04-04 DIAGNOSIS — Z79.4 TYPE 2 DIABETES MELLITUS WITH DIABETIC POLYNEUROPATHY, WITH LONG-TERM CURRENT USE OF INSULIN: ICD-10-CM

## 2024-04-04 DIAGNOSIS — E11.42 TYPE 2 DIABETES MELLITUS WITH DIABETIC POLYNEUROPATHY, WITH LONG-TERM CURRENT USE OF INSULIN: Primary | ICD-10-CM

## 2024-04-04 DIAGNOSIS — E11.42 TYPE 2 DIABETES MELLITUS WITH DIABETIC POLYNEUROPATHY, WITH LONG-TERM CURRENT USE OF INSULIN: ICD-10-CM

## 2024-04-04 RX ORDER — SEMAGLUTIDE 2.68 MG/ML
2 INJECTION, SOLUTION SUBCUTANEOUS
Qty: 9 ML | Refills: 3 | Status: SHIPPED | OUTPATIENT
Start: 2024-04-04 | End: 2024-04-17 | Stop reason: SDUPTHER

## 2024-04-04 RX ORDER — SEMAGLUTIDE 2.68 MG/ML
2 INJECTION, SOLUTION SUBCUTANEOUS
Qty: 9 ML | Refills: 3 | Status: SHIPPED | OUTPATIENT
Start: 2024-04-04 | End: 2024-04-04 | Stop reason: SDUPTHER

## 2024-04-04 RX ORDER — EMPAGLIFLOZIN, METFORMIN HYDROCHLORIDE 12.5; 1 MG/1; MG/1
2 TABLET, EXTENDED RELEASE ORAL DAILY
Qty: 180 TABLET | Refills: 3 | Status: SHIPPED | OUTPATIENT
Start: 2024-04-04 | End: 2025-04-04

## 2024-04-05 DIAGNOSIS — E11.42 TYPE 2 DIABETES MELLITUS WITH DIABETIC POLYNEUROPATHY, WITH LONG-TERM CURRENT USE OF INSULIN: Primary | ICD-10-CM

## 2024-04-05 DIAGNOSIS — Z79.4 TYPE 2 DIABETES MELLITUS WITH DIABETIC POLYNEUROPATHY, WITH LONG-TERM CURRENT USE OF INSULIN: Primary | ICD-10-CM

## 2024-04-17 DIAGNOSIS — Z79.4 TYPE 2 DIABETES MELLITUS WITH DIABETIC POLYNEUROPATHY, WITH LONG-TERM CURRENT USE OF INSULIN: ICD-10-CM

## 2024-04-17 DIAGNOSIS — E11.42 TYPE 2 DIABETES MELLITUS WITH DIABETIC POLYNEUROPATHY, WITH LONG-TERM CURRENT USE OF INSULIN: ICD-10-CM

## 2024-04-17 RX ORDER — SEMAGLUTIDE 2.68 MG/ML
2 INJECTION, SOLUTION SUBCUTANEOUS
Qty: 9 ML | Refills: 3 | Status: SHIPPED | OUTPATIENT
Start: 2024-04-17 | End: 2024-05-24

## 2024-05-03 ENCOUNTER — LAB VISIT (OUTPATIENT)
Dept: LAB | Facility: HOSPITAL | Age: 59
End: 2024-05-03
Attending: NURSE PRACTITIONER
Payer: COMMERCIAL

## 2024-05-03 ENCOUNTER — TELEPHONE (OUTPATIENT)
Dept: NEPHROLOGY | Facility: CLINIC | Age: 59
End: 2024-05-03
Payer: COMMERCIAL

## 2024-05-03 DIAGNOSIS — Z00.00 VISIT FOR WELL MAN HEALTH CHECK: ICD-10-CM

## 2024-05-03 DIAGNOSIS — E11.42 TYPE 2 DIABETES MELLITUS WITH DIABETIC POLYNEUROPATHY, WITH LONG-TERM CURRENT USE OF INSULIN: Chronic | ICD-10-CM

## 2024-05-03 DIAGNOSIS — Z79.4 TYPE 2 DIABETES MELLITUS WITH DIABETIC POLYNEUROPATHY, WITH LONG-TERM CURRENT USE OF INSULIN: Chronic | ICD-10-CM

## 2024-05-03 LAB
ALBUMIN SERPL BCP-MCNC: 4.5 G/DL (ref 3.5–5.2)
ALP SERPL-CCNC: 111 U/L (ref 38–126)
ALT SERPL W/O P-5'-P-CCNC: 42 U/L (ref 10–44)
ANION GAP SERPL CALC-SCNC: 10 MMOL/L (ref 8–16)
AST SERPL-CCNC: 34 U/L (ref 15–46)
BASOPHILS # BLD AUTO: 0.05 K/UL (ref 0–0.2)
BASOPHILS NFR BLD: 0.8 % (ref 0–1.9)
BILIRUB SERPL-MCNC: 0.6 MG/DL (ref 0.1–1)
CALCIUM SERPL-MCNC: 9.6 MG/DL (ref 8.7–10.5)
CHLORIDE SERPL-SCNC: 104 MMOL/L (ref 95–110)
CO2 SERPL-SCNC: 26 MMOL/L (ref 23–29)
CREAT SERPL-MCNC: 1.09 MG/DL (ref 0.5–1.4)
DIFFERENTIAL METHOD BLD: ABNORMAL
EOSINOPHIL # BLD AUTO: 0.2 K/UL (ref 0–0.5)
EOSINOPHIL NFR BLD: 2.5 % (ref 0–8)
ERYTHROCYTE [DISTWIDTH] IN BLOOD BY AUTOMATED COUNT: 12.7 % (ref 11.5–14.5)
EST. GFR  (NO RACE VARIABLE): >60 ML/MIN/1.73 M^2
ESTIMATED AVG GLUCOSE: 200 MG/DL (ref 68–131)
GLUCOSE SERPL-MCNC: 163 MG/DL (ref 70–110)
HBA1C MFR BLD: 8.6 % (ref 4–5.6)
HCT VFR BLD AUTO: 51.9 % (ref 40–54)
HGB BLD-MCNC: 17.3 G/DL (ref 14–18)
IMM GRANULOCYTES # BLD AUTO: 0.1 K/UL (ref 0–0.04)
IMM GRANULOCYTES NFR BLD AUTO: 1.6 % (ref 0–0.5)
LYMPHOCYTES # BLD AUTO: 1.7 K/UL (ref 1–4.8)
LYMPHOCYTES NFR BLD: 27.2 % (ref 18–48)
MCH RBC QN AUTO: 31.5 PG (ref 27–31)
MCHC RBC AUTO-ENTMCNC: 33.3 G/DL (ref 32–36)
MCV RBC AUTO: 95 FL (ref 82–98)
MONOCYTES # BLD AUTO: 0.8 K/UL (ref 0.3–1)
MONOCYTES NFR BLD: 12.2 % (ref 4–15)
NEUTROPHILS # BLD AUTO: 3.5 K/UL (ref 1.8–7.7)
NEUTROPHILS NFR BLD: 55.7 % (ref 38–73)
NRBC BLD-RTO: 0 /100 WBC
PLATELET # BLD AUTO: 161 K/UL (ref 150–450)
PMV BLD AUTO: 10.2 FL (ref 9.2–12.9)
POTASSIUM SERPL-SCNC: 5.1 MMOL/L (ref 3.5–5.1)
PROT SERPL-MCNC: 7.2 G/DL (ref 6–8.4)
RBC # BLD AUTO: 5.49 M/UL (ref 4.6–6.2)
SODIUM SERPL-SCNC: 140 MMOL/L (ref 136–145)
TSH SERPL DL<=0.005 MIU/L-ACNC: 1.48 UIU/ML (ref 0.4–4)
UUN UR-MCNC: 17 MG/DL (ref 2–20)
WBC # BLD AUTO: 6.29 K/UL (ref 3.9–12.7)

## 2024-05-03 PROCEDURE — 85025 COMPLETE CBC W/AUTO DIFF WBC: CPT | Mod: PN | Performed by: FAMILY MEDICINE

## 2024-05-03 PROCEDURE — 36415 COLL VENOUS BLD VENIPUNCTURE: CPT | Mod: PN | Performed by: FAMILY MEDICINE

## 2024-05-03 PROCEDURE — 83036 HEMOGLOBIN GLYCOSYLATED A1C: CPT | Performed by: NURSE PRACTITIONER

## 2024-05-03 PROCEDURE — 84443 ASSAY THYROID STIM HORMONE: CPT | Mod: PN | Performed by: FAMILY MEDICINE

## 2024-05-03 PROCEDURE — 80053 COMPREHEN METABOLIC PANEL: CPT | Mod: PN | Performed by: NURSE PRACTITIONER

## 2024-05-10 ENCOUNTER — OFFICE VISIT (OUTPATIENT)
Dept: FAMILY MEDICINE | Facility: CLINIC | Age: 59
End: 2024-05-10
Payer: COMMERCIAL

## 2024-05-10 ENCOUNTER — OFFICE VISIT (OUTPATIENT)
Dept: SLEEP MEDICINE | Facility: CLINIC | Age: 59
End: 2024-05-10
Attending: PSYCHIATRY & NEUROLOGY
Payer: COMMERCIAL

## 2024-05-10 VITALS
SYSTOLIC BLOOD PRESSURE: 112 MMHG | HEIGHT: 72 IN | BODY MASS INDEX: 28.12 KG/M2 | WEIGHT: 207.63 LBS | HEART RATE: 65 BPM | DIASTOLIC BLOOD PRESSURE: 63 MMHG

## 2024-05-10 VITALS
BODY MASS INDEX: 28.19 KG/M2 | HEIGHT: 72 IN | WEIGHT: 208.13 LBS | OXYGEN SATURATION: 96 % | SYSTOLIC BLOOD PRESSURE: 110 MMHG | DIASTOLIC BLOOD PRESSURE: 70 MMHG | HEART RATE: 67 BPM | TEMPERATURE: 98 F

## 2024-05-10 DIAGNOSIS — I15.2 HYPERTENSION ASSOCIATED WITH DIABETES: Chronic | ICD-10-CM

## 2024-05-10 DIAGNOSIS — E11.59 HYPERTENSION ASSOCIATED WITH DIABETES: Chronic | ICD-10-CM

## 2024-05-10 DIAGNOSIS — G47.30 SLEEP APNEA, UNSPECIFIED TYPE: Primary | ICD-10-CM

## 2024-05-10 DIAGNOSIS — R06.83 SNORES: ICD-10-CM

## 2024-05-10 DIAGNOSIS — M79.605 PAIN IN BOTH LOWER EXTREMITIES: ICD-10-CM

## 2024-05-10 DIAGNOSIS — I25.2 HISTORY OF NON-ST ELEVATION MYOCARDIAL INFARCTION (NSTEMI): Chronic | ICD-10-CM

## 2024-05-10 DIAGNOSIS — Z79.4 TYPE 2 DIABETES MELLITUS WITH DIABETIC POLYNEUROPATHY, WITH LONG-TERM CURRENT USE OF INSULIN: Primary | Chronic | ICD-10-CM

## 2024-05-10 DIAGNOSIS — M79.604 PAIN IN BOTH LOWER EXTREMITIES: ICD-10-CM

## 2024-05-10 DIAGNOSIS — Z79.4 TYPE 2 DIABETES MELLITUS WITH DIABETIC NEPHROPATHY, WITH LONG-TERM CURRENT USE OF INSULIN: Chronic | ICD-10-CM

## 2024-05-10 DIAGNOSIS — E11.69 HYPERLIPIDEMIA ASSOCIATED WITH TYPE 2 DIABETES MELLITUS: Chronic | ICD-10-CM

## 2024-05-10 DIAGNOSIS — E11.42 TYPE 2 DIABETES MELLITUS WITH DIABETIC POLYNEUROPATHY, WITH LONG-TERM CURRENT USE OF INSULIN: Primary | Chronic | ICD-10-CM

## 2024-05-10 DIAGNOSIS — E11.29 PROTEINURIA DUE TO TYPE 2 DIABETES MELLITUS: Chronic | ICD-10-CM

## 2024-05-10 DIAGNOSIS — Z95.5 STENTED CORONARY ARTERY: Chronic | ICD-10-CM

## 2024-05-10 DIAGNOSIS — E78.49 OTHER HYPERLIPIDEMIA: ICD-10-CM

## 2024-05-10 DIAGNOSIS — R80.9 PROTEINURIA DUE TO TYPE 2 DIABETES MELLITUS: Chronic | ICD-10-CM

## 2024-05-10 DIAGNOSIS — E78.5 HYPERLIPIDEMIA ASSOCIATED WITH TYPE 2 DIABETES MELLITUS: Chronic | ICD-10-CM

## 2024-05-10 DIAGNOSIS — E11.21 TYPE 2 DIABETES MELLITUS WITH DIABETIC NEPHROPATHY, WITH LONG-TERM CURRENT USE OF INSULIN: Chronic | ICD-10-CM

## 2024-05-10 PROCEDURE — 3008F BODY MASS INDEX DOCD: CPT | Mod: CPTII,S$GLB,, | Performed by: NURSE PRACTITIONER

## 2024-05-10 PROCEDURE — 99214 OFFICE O/P EST MOD 30 MIN: CPT | Mod: S$GLB,,, | Performed by: FAMILY MEDICINE

## 2024-05-10 PROCEDURE — 99999 PR PBB SHADOW E&M-EST. PATIENT-LVL V: CPT | Mod: PBBFAC,,, | Performed by: FAMILY MEDICINE

## 2024-05-10 PROCEDURE — 3008F BODY MASS INDEX DOCD: CPT | Mod: CPTII,S$GLB,, | Performed by: FAMILY MEDICINE

## 2024-05-10 PROCEDURE — 3052F HG A1C>EQUAL 8.0%<EQUAL 9.0%: CPT | Mod: CPTII,S$GLB,, | Performed by: FAMILY MEDICINE

## 2024-05-10 PROCEDURE — 1159F MED LIST DOCD IN RCRD: CPT | Mod: CPTII,S$GLB,, | Performed by: FAMILY MEDICINE

## 2024-05-10 PROCEDURE — 3052F HG A1C>EQUAL 8.0%<EQUAL 9.0%: CPT | Mod: CPTII,S$GLB,, | Performed by: NURSE PRACTITIONER

## 2024-05-10 PROCEDURE — 3078F DIAST BP <80 MM HG: CPT | Mod: CPTII,S$GLB,, | Performed by: FAMILY MEDICINE

## 2024-05-10 PROCEDURE — 4010F ACE/ARB THERAPY RXD/TAKEN: CPT | Mod: CPTII,S$GLB,, | Performed by: FAMILY MEDICINE

## 2024-05-10 PROCEDURE — 3078F DIAST BP <80 MM HG: CPT | Mod: CPTII,S$GLB,, | Performed by: NURSE PRACTITIONER

## 2024-05-10 PROCEDURE — 99204 OFFICE O/P NEW MOD 45 MIN: CPT | Mod: S$GLB,,, | Performed by: NURSE PRACTITIONER

## 2024-05-10 PROCEDURE — 3074F SYST BP LT 130 MM HG: CPT | Mod: CPTII,S$GLB,, | Performed by: FAMILY MEDICINE

## 2024-05-10 PROCEDURE — 99999 PR PBB SHADOW E&M-EST. PATIENT-LVL III: CPT | Mod: PBBFAC,,, | Performed by: NURSE PRACTITIONER

## 2024-05-10 PROCEDURE — 3074F SYST BP LT 130 MM HG: CPT | Mod: CPTII,S$GLB,, | Performed by: NURSE PRACTITIONER

## 2024-05-10 PROCEDURE — 4010F ACE/ARB THERAPY RXD/TAKEN: CPT | Mod: CPTII,S$GLB,, | Performed by: NURSE PRACTITIONER

## 2024-05-10 RX ORDER — BLOOD-GLUCOSE SENSOR
EACH MISCELLANEOUS
Qty: 3 EACH | Refills: 11 | Status: SHIPPED | OUTPATIENT
Start: 2024-05-10 | End: 2024-05-10

## 2024-05-10 RX ORDER — ATORVASTATIN CALCIUM 80 MG/1
80 TABLET, FILM COATED ORAL DAILY
Qty: 90 TABLET | Refills: 3 | Status: SHIPPED | OUTPATIENT
Start: 2024-05-10 | End: 2025-05-10

## 2024-05-10 RX ORDER — METOPROLOL SUCCINATE 25 MG/1
25 TABLET, EXTENDED RELEASE ORAL DAILY
Qty: 90 TABLET | Refills: 3 | Status: SHIPPED | OUTPATIENT
Start: 2024-05-10

## 2024-05-10 NOTE — PROGRESS NOTES
"Referred by Dr. Pond    CHIEF COMPLAINT: Sleep evaluation    HISTORY OF PRESENT ILLNESS: He has never had a sleep study.Sleeps alone but been told he snores. Denies witnessed apneic pauses. Denies am headaches. Occasional sinus congestion, it can alternate each nostril. +disrupted sleep at times 0-3x. Keeps regular bedtime-wake time. ESS=9  HgBA1c 8.6  /63 (BP Location: Left arm, Patient Position: Sitting, BP Method: Large (Automatic))   Pulse 65   Ht 6' (1.829 m)   Wt 94.2 kg (207 lb 9.6 oz)   BMI 28.16 kg/m² 17" neck circumference, long tongue  +white matter changes recent brain MRI    FAMILY HISTORY: No known sleep disorders. Dad snored  SOCIAL HISTORY: single, construction      ASSESSMENT:   Unspecified Sleep Apnea, with symptoms of snoring,  disrupted sleep and daytime sleepiness,  with medical comorbidities of CAD/hx NSTEMI, DM and hypertension. Warrants further investigation for untreated sleep apnea.     PLAN:   1. Home Sleep Study, discussed plan of care (if + plan apap/nose with close adherence monitoring)   2. Discussed etiology of KIMBER and potential ramifications of untreated KIMBER, including stroke, heart disease, HTN.  We discussed potential treatment options, which could include continuous positive airway pressure (CPAP-definitive), mandibular advancement splint by dentist, or referral for surgical consideration.   3. See PCP re DM//HTN mgt/continue meds    Thank you for allowing me the opportunity to participate in the care of your patient        "

## 2024-05-10 NOTE — PROGRESS NOTES
Referred by Dr. Pond    CHIEF COMPLAINT: Sleep evaluation    HISTORY OF PRESENT ILLNESS: He has never had a sleep study.Sleeps alone but been told he snores. Denies witnessed apneic pauses. Denies am headaches. Occasional sinus congestion, it can alternate each nostril. +disrupted sleep at times 0-3x. Keeps regular bedtime-wake time. ESS=9  HgBA1c 8.6      FAMILY HISTORY: No known sleep disorders.   SOCIAL HISTORY:      EXAM:       ASSESSMENT:   Unspecified Sleep Apnea, with symptoms of disruptive snoring, witnessed apneic pauses, un-refreshing disrupted sleep and excessive daytime sleepiness,  with medical comorbidities of obesity, hypertension. Warrants further investigation for untreated sleep apnea.     PLAN:   1. Polysomnogram OR Laboratory sleep testing with polysomnograpy is warranted Home Sleep Study, discussed plan of care    2. Discussed etiology of KIMBER and potential ramifications of untreated KIMBER, including stroke, heart disease, HTN.  We discussed potential treatment options, which could include weight loss , continuous positive airway pressure (CPAP-definitive), mandibular advancement splint by dentist, or referral for surgical consideration.   3.     Thank you for allowing me the opportunity to participate in the care of your patient

## 2024-05-10 NOTE — PATIENT INSTRUCTIONS
Discuss mounjaro dosing with endocrine (7.5 vs 15)  Move from 7.5 to 15 mg  Can keep ozempic for emergency in the future.   Continue synjardy  jardiance component can help the heart  Insulin management per endocrine  Can continue starlix  F/u with endocrine    Continue metoprolol at 25 mg  Continue valsartan 320  Goal BP: 120/80 to 130/80    Continue brilinta, NO aspirin, per cardiology.     Continue off of zetia  Continue coq10 and fishoil as well  Continue atorvastatin 80 mg   LDL Goal <70    Continue weight loss  Increase exercise!!!     MRI in January 2025 at [facility].     F/u 3 months, labs prior.

## 2024-05-10 NOTE — PROGRESS NOTES
Subjective:       Patient ID: Yon Loyd is a 58 y.o. male.    Chief Complaint: Hypertension    Yon is a 58 y.o. male who presents today for f/u    DM: seeing endocrinology. On mounjaro. Missed many doses due to shortages. He was out of this for 2.5 months. Now on ozempic 2 mg and also has mounjaro 7.5 mg and mounjaro 15 mg at home. On synjardy, insulin, starlix, LA 26 U, SA 10 U BIDWM. Seeing Betzaida Rodriguez in endocrinology. He doesn't track his sugars at home.  HTN: increased metoprolol to 50 mg 1/8/2022. Decreased to 25 mg on 9/22/2023. On valsartan 320 mg. Dizziness improved with lower metoprolol. Doesn't check sugars at home.   Proteinuria: on valsartan. Seeing renal. Proteinuria due to DM. Improving!  DLD: on statin 80 mg.   CAD: seeing Dr. De Souza  Obesity: weight is up from the last visit. About one pound    He had thigh pain. He stopped zetia and pain is 90-95% better. Restarted atorvastatin around the same time. The pain in the groin comes and goes, right groin. He can walk, and pain doesn't worsen when he walks. He would like an US to rule out PAD. He is okay continuing statin at this time despite occasional pain.     Facial pain: MRI brain generally normal, done externally. Has f/u in 1 year.       Review of Systems   Constitutional:  Negative for chills and fever.   Cardiovascular:  Negative for chest pain.   Gastrointestinal:  Negative for nausea and vomiting.   Musculoskeletal:  Positive for arthralgias.   Neurological:  Negative for dizziness, light-headedness and headaches.               Objective:     Vitals:    05/10/24 0838   BP: 110/70   Pulse: 67   Temp: 98.2 °F (36.8 °C)   TempSrc: Oral   SpO2: 96%   Weight: 94.4 kg (208 lb 1.8 oz)   Height: 6' (1.829 m)        Physical Exam  Vitals and nursing note reviewed.   Constitutional:       General: He is not in acute distress.     Appearance: He is not ill-appearing, toxic-appearing or diaphoretic.   Cardiovascular:      Rate and Rhythm:  Normal rate and regular rhythm.   Pulmonary:      Effort: Pulmonary effort is normal.      Breath sounds: Normal breath sounds.   Abdominal:      Palpations: Abdomen is soft.      Tenderness: There is no abdominal tenderness.   Musculoskeletal:         General: No swelling.   Neurological:      General: No focal deficit present.      Mental Status: He is alert.   Psychiatric:         Mood and Affect: Mood normal.         Behavior: Behavior normal.         Thought Content: Thought content normal.         Judgment: Judgment normal.         Assessment:       1. Type 2 diabetes mellitus with diabetic polyneuropathy, with long-term current use of insulin    2. Type 2 diabetes mellitus with diabetic nephropathy, with long-term current use of insulin    3. Proteinuria due to type 2 diabetes mellitus    4. BMI 28.0-28.9,adult    5. Pain in both lower extremities    6. Other hyperlipidemia        Plan:       Discuss mounjaro dosing with endocrine (7.5 vs 15)  Move from 7.5 to 15 mg  Can keep ozempic for emergency in the future.   Continue synjardy  jardiance component can help the heart  Insulin management per endocrine  Can continue starlix  F/u with endocrine    Continue metoprolol at 25 mg  Continue valsartan 320  Goal BP: 120/80 to 130/80    Continue brilinta, NO aspirin, per cardiology.     Continue off of zetia  Continue coq10 and fishoil as well  Continue atorvastatin 80 mg   LDL Goal <70    Continue weight loss  Increase exercise!!!     MRI in January 2025 at [facility].     F/u 3-4 months, labs prior. Will fwd labs to endocrine.     Type 2 diabetes mellitus with diabetic polyneuropathy, with long-term current use of insulin  -     Discontinue: blood-glucose sensor (DEXCOM G7 SENSOR) Juanis; Use to check sugar 3-4 times a day due to 4 insulin injections/day.  Dispense: 3 each; Refill: 11  -     Cancel: Ambulatory referral/consult to Diabetes Education; Future; Expected date: 05/17/2024  -     [facility] Auto  Differential; Future; Expected date: 05/10/2024  -     Comprehensive Metabolic Panel; Future; Expected date: 05/10/2024  -     Hemoglobin A1C; Future; Expected date: 05/10/2024  -     Lipid Panel; Future; Expected date: 05/10/2024  -     Microalbumin/Creatinine Ratio, Urine; Future; Expected date: 05/10/2024    Type 2 diabetes mellitus with diabetic nephropathy, with long-term current use of insulin  -     Discontinue: blood-glucose sensor (DEXCOM G7 SENSOR) Juanis; Use to check sugar 3-4 times a day due to 4 insulin injections/day.  Dispense: 3 each; Refill: 11  -     Cancel: Ambulatory referral/consult to Diabetes Education; Future; Expected date: 05/17/2024  -     Hemoglobin A1C; Future; Expected date: 05/10/2024    Proteinuria due to type 2 diabetes mellitus  -     Microalbumin/Creatinine Ratio, Urine; Future; Expected date: 05/10/2024    BMI 28.0-28.9,adult    Pain in both lower extremities  -     US Lower Extremity Arteries Bilateral; Future; Expected date: 05/10/2024    Other hyperlipidemia  -     Lipid Panel; Future; Expected date: 05/10/2024            Warning signs discussed, patient to call with any further issues or worsening of symptoms. Above note may have utilized dictation, please excuse any transcription errors.          normal (ped)...

## 2024-05-14 NOTE — PROGRESS NOTES
HPI     Mr. Yon Loyd was referred by Tracey Hayes NP for diabetic eye   exam     He reports occasional floaters OU noticed rarely but possibly increasing   in frequency.   Patient complains of mild blur with glasses (Rx about 5 years old); he   thinks glasses may need to be updated. Pt requests refraction today.    (-)drops  (-)flashes  (+)floaters OU, longstanding and stable  (-)diplopia    Diabetic yes  Pt says blood glucose has been controlled and stable recently.  Hemoglobin A1C       Date                     Value               Ref Range             Status                08/01/2017               6.1 (H)             4.0 - 5.6 %           Final                  03/29/2017               6.9 (H)             4.5 - 6.2 %           Final                  01/03/2017               8.1 (H)             4.5 - 6.2 %           Final              ----------    OCULAR HISTORY  Last Eye Exam 5 years ago  (-)eye surgery   (-)diagnosed or treated for any eye conditions or diseases     FAMILY HISTORY  (-)Glaucoma         Last edited by Nathaly Malhotra, OD on 8/11/2017  3:33 PM. (History)            Assessment /Plan     For exam results, see Encounter Report.    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 Blood glucose has been much improved recently. Continue management of DM as directed.    Discussed potential for development of macular edema and vision impairment. Monitor in 6 months, or RTC immediately with any vision changes.    Nuclear sclerotic cataract of both eyes   Very mild OU. Not visually significant OU. Monitor.      Regular astigmatism of both eyes   Pt informed that if insurance does not cover refraction today then his fee would be about $40; he opts to proceed with refraction today.   Small change OU. New glasses prescription released, adaptation expected.  New  glasses optional.    Patient educated that refractive error changes with blood glucose fluctuations.   Eyeglass Final Rx     Eyeglass Final Rx       Sphere Cylinder Axis Dist VA Add    Right -0.75 +2.00 010 20/20 +1.75    Left -0.25 +1.75 167 20/20 +1.75    Type:  PAL    Expiration Date:  8/12/2018                 RTC 6 months to recheck diabetic retinopathy OU                  Yes

## 2024-05-17 ENCOUNTER — TELEPHONE (OUTPATIENT)
Dept: FAMILY MEDICINE | Facility: CLINIC | Age: 59
End: 2024-05-17
Payer: COMMERCIAL

## 2024-05-17 ENCOUNTER — HOSPITAL ENCOUNTER (OUTPATIENT)
Dept: RADIOLOGY | Facility: HOSPITAL | Age: 59
Discharge: HOME | End: 2024-05-17
Attending: FAMILY MEDICINE
Payer: COMMERCIAL

## 2024-05-17 DIAGNOSIS — I73.9 PAD (PERIPHERAL ARTERY DISEASE): ICD-10-CM

## 2024-05-17 DIAGNOSIS — I70.201 TIBIAL ARTERY STENOSIS, RIGHT: Primary | ICD-10-CM

## 2024-05-17 DIAGNOSIS — M79.604 PAIN IN BOTH LOWER EXTREMITIES: ICD-10-CM

## 2024-05-17 DIAGNOSIS — M79.605 PAIN IN BOTH LOWER EXTREMITIES: ICD-10-CM

## 2024-05-17 PROCEDURE — 93925 LOWER EXTREMITY STUDY: CPT | Mod: 26,,, | Performed by: RADIOLOGY

## 2024-05-17 PROCEDURE — 93925 LOWER EXTREMITY STUDY: CPT | Mod: TC,PN

## 2024-05-17 RX ORDER — CILOSTAZOL 50 MG/1
50 TABLET ORAL 2 TIMES DAILY
Qty: 180 TABLET | Refills: 3 | Status: SHIPPED | OUTPATIENT
Start: 2024-05-17 | End: 2025-05-17

## 2024-05-17 NOTE — PROGRESS NOTES
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  Usually we will treat this with medical therapy only unless he has significant ulcers.  I will recommend Pletal 50 mg twice a day along with the Brilinta.   Sincerely,  Randy De Souza MD.   Interventional Cardiologist  [facility], [place]

## 2024-05-21 ENCOUNTER — TELEPHONE (OUTPATIENT)
Dept: SLEEP MEDICINE | Facility: OTHER | Age: 59
End: 2024-05-21
Payer: COMMERCIAL

## 2024-05-21 NOTE — PROGRESS NOTES
Subjective:    05/24/2024     Patient ID: Yon Loyd is a 59 y.o. male.    Chief Complaint:  Diabetes    History of Present Illness  Yon Loyd with Type 2 DM complicated by CAD with 2 stents in Dec 2021, retinopathy, CKD and proteinuria, DLP and HTN presents today for follow up of T2DM. Previous patient of mine. Last visit with me in Jan 2024    Since his last visit, he has had problems obtaining Mounjaro. We were going to switch to Ozempic but unable to obtain from pharmacy. He was out of mounjaro for 2.5 months. He has doses of Mounjaro 7.5 mg 3 month supply, Mounjaro 15 mg and Ozempic 2.0 mg weekly     With regards to the diabetes:    Diagnosed with Type 2 diabetes in ~1993.  Been on insulin since ~2013. Started Trulicity 12/2016.   Family history of type 2 diabetes in mother and father.     DIABETES COMPLICATIONS:   Nephropathy +; sees nephrology -- follow up in one year per note  Retinopathy +; scheduled   Peripheral neuropathy +; tolerable; does not wish to see podiatry  CAD: +     Current regimen:  Synjardy 12.5/1000 mg twice daily  Mounjaro 7.5 mg weekly   Toujeo 13 units daily in the morning (24 hour insulin)  Lyumjev/Humalog/Novolog 10 units before breakfast and dinner (occasionally missing dinner dose)  Starlix 120 mg before lunch -occasionally missing    He is trying to give novolog 10 minutes prior to a meal. He is rotating injection sites. He is now changing needle every time.     He forgot to give novolog at dinner for about 3-4 weeks. Recently resumed.     Other medications tried:  Invokana but stopped due to fear of side effects    He is not taking blood sugars   DECLINES CGM but applied today   Hypoglycemic event-Denies and s/s of BG less than 70  Knows how to correct with 15 grams of carbs- juice, coke, or a peppermint.      Eats 3 meals a day.   Working on improving diet  Started using riced cauliflower.    He is doing better with diet since his last visit.    Exercise: walks often for  work. 1-1.8 miles daily    Education - last visit: none; 1/2023-politely declines   Glucagon: does not want     Diabetes Management Status  Statin: Taking atorvastatin 80 mg daily   ACE/ARB: Taking valsartan 320 mg daily  ASA: yes    Lab Results   Component Value Date    HGBA1C 8.6 (H) 05/03/2024    HGBA1C 5.8 (H) 12/22/2023    HGBA1C 7.1 (H) 08/18/2023     Screening or Prevention Patient's value Goal Complete/Controlled?   HgA1C Testing and Control   Lab Results   Component Value Date    HGBA1C 8.6 (H) 05/03/2024      Annually/Less than 8% Yes   Lipid profile : 12/22/2023 Annually Yes   LDL control Lab Results   Component Value Date    LDLCALC 37.4 (L) 12/22/2023    Annually/Less than 100 mg/dl  Yes   Nephropathy screening Lab Results   Component Value Date    LABMICR 46.0 05/08/2023     Lab Results   Component Value Date    PROTEINUA Negative 05/03/2024    Annually No   Blood pressure BP Readings from Last 1 Encounters:   05/24/24 115/64    Less than 140/90 No   Dilated retinal exam : 02/16/2024 Annually Yes   Foot exam   : 03/31/2023 Annually Yes     With regards to dyslipidemia,      He is on atorvastatin 80 mg daily    Latest Reference Range & Units 03/24/23 08:28 12/22/23 08:24   Cholesterol Total 120 - 199 mg/dL 179 112 (L)   HDL 40 - 75 mg/dL 46 43   HDL/Cholesterol Ratio 20.0 - 50.0 % 25.7 38.4   Non-HDL Cholesterol mg/dL 133 69   Total Cholesterol/HDL Ratio 2.0 - 5.0  3.9 2.6   Triglycerides 30 - 150 mg/dL 226 (H) 158 (H)   LDL Cholesterol 63.0 - 159.0 mg/dL 87.8 37.4 (L)   (L): Data is abnormally low  (H): Data is abnormally high    Hx of fatigue and snoring- declined sleep study at last visit.    Review of Systems   Constitutional:  Positive for fatigue. Negative for unexpected weight change.   Eyes:  Negative for visual disturbance.   Endocrine: Positive for polydipsia (occ), polyphagia (occ) and polyuria (occ).        Nocturia     Objective:   Constitutional:  Pleasant,  in no acute distress.   HENT:    Eyes:     No scleral icterus.   Respiratory:   Effort normal   Neurological:  normal speech  Psych:  Normal mood and affect.    Diabetes Foot Exam:  Shoes appropriate  Monofilament and vibratory sensation intact     Body mass index is 28.12 kg/m².  Wt Readings from Last 3 Encounters:   05/24/24 1329 94 kg (207 lb 5.5 oz)   05/10/24 1038 94.2 kg (207 lb 9.6 oz)   05/10/24 0838 94.4 kg (208 lb 1.8 oz)     Vitals:    05/24/24 1329   BP: 115/64   Pulse: 83     Wt Readings from Last 3 Encounters:   05/24/24 1329 94 kg (207 lb 5.5 oz)   05/10/24 1038 94.2 kg (207 lb 9.6 oz)   05/10/24 0838 94.4 kg (208 lb 1.8 oz)     Lab Review:   Lab Results   Component Value Date    HGBA1C 8.6 (H) 05/03/2024     Lab Results   Component Value Date    CHOL 112 (L) 12/22/2023    HDL 43 12/22/2023    LDLCALC 37.4 (L) 12/22/2023    TRIG 158 (H) 12/22/2023    CHOLHDL 38.4 12/22/2023     Lab Results   Component Value Date     05/03/2024    K 5.1 05/03/2024     05/03/2024    CO2 26 05/03/2024     (H) 05/03/2024    BUN 17 05/03/2024    CREATININE 1.09 05/03/2024    CALCIUM 9.6 05/03/2024    PROT 7.2 05/03/2024    ALBUMIN 4.5 05/03/2024    BILITOT 0.6 05/03/2024    ALKPHOS 111 05/03/2024    AST 34 05/03/2024    ALT 42 05/03/2024    ANIONGAP 10 05/03/2024    ESTGFRAFRICA >60.0 07/08/2022    EGFRNONAA >60.0 07/08/2022    TSH 1.480 05/03/2024     Assessment and Plan     1. Type 2 diabetes mellitus with both eyes affected by mild nonproliferative retinopathy without macular edema, with long-term current use of insulin  Hemoglobin A1C    Fructosamine    Comprehensive Metabolic Panel    Lipid Panel      2. Type 2 diabetes mellitus with diabetic polyneuropathy, with long-term current use of insulin        3. Hyperlipidemia associated with type 2 diabetes mellitus        4. Hypertension associated with diabetes          Type 2 diabetes mellitus with both eyes affected by mild nonproliferative retinopathy without macular edema, with  long-term current use of insulin  -- Reviewed goals of therapy are to get the best control we can without hypoglycemia  A1c above goal as he has been out of Mounjaro for the last 2.5 months due to supply issues. He currently has Mounjaro 7.5 mg 3 month supply, Mounjaro 15 mg and Ozempic 2.0 mg weekly. He is questioning if he should double up on prescription. Discussed I am unsure what the supply issue will be like in the next 3 months, better to stay the course and reassess with desirae 3. I applied desirae today in clinic.    He also returned professional CGM applied in Jan 2024  today.      Continue   Synjardy 12.5/1000 mg twice daily  Mounjaro 7.5 mg weekly   Toujeo 13 units daily in the morning (24 hour insulin)  Lyumjev/Humalog/Novolog 10 units before breakfast and dinner (occasionally missing dinner dose)  Starlix 120 mg before lunch -occasionally missing    SATNAM slightly +  -- Reviewed patient's current insulin regimen. Clarified proper insulin dose and timing in relation to meals, etc. Insulin injection sites and proper rotation instructed.    -- Advised frequent self blood glucose monitoring.  Patient encouraged to document glucose results and bring them to every clinic visit    -- Hypoglycemia precautions discussed. Instructed on precautions before driving.    -- Call for Bg repeatedly < 90 or > 180.   -- Close adherence to lifestyle changes recommended.   -- Periodic follow ups for eye evaluations, foot care and dental care suggested.    Type 2 diabetes mellitus with diabetic polyneuropathy, with long-term current use of insulin  DM foot exam done today    Hyperlipidemia associated with type 2 diabetes mellitus  On statin per ADA recommendations   LDL goal <70  Close to goal   On statin  Zetia caused intolerable SE -pain in groin that improved some with stopping  Check LP on RTC  May need to add PCSK9    Hypertension associated with diabetes  Blood pressure at goal in clinic today.  Continue current  medications which include ACEI/ARB.     Visit today included increased complexity associated with the care of episodic problems type 2 diabetes, hypertension, hyperlipidemia addressed and managing longitudinal care of the patient due to serious managed problems type 2 diabetes, hypertension, hyperlipidemia    Betzaida Rodriguez NP     Follow up in about 3 months (around 8/24/2024).  Labs on RTC

## 2024-05-23 NOTE — ASSESSMENT & PLAN NOTE
On statin per ADA recommendations   LDL goal <70  Close to goal   On statin  Zetia caused intolerable SE -pain in groin that improved some with stopping  Check LP on RTC  May need to add PCSK9

## 2024-05-23 NOTE — ASSESSMENT & PLAN NOTE
-- Reviewed goals of therapy are to get the best control we can without hypoglycemia  A1c above goal as he has been out of Mounjaro for the last 2.5 months due to supply issues. He currently has Mounjaro 7.5 mg 3 month supply, Mounjaro 15 mg and Ozempic 2.0 mg weekly. He is questioning if he should double up on prescription. Discussed I am unsure what the supply issue will be like in the next 3 months, better to stay the course and reassess with desirae 3. I applied desirae today in clinic.    He also returned professional CGM applied in Jan 2024  today.      Continue   Synjardy 12.5/1000 mg twice daily  Mounjaro 7.5 mg weekly   Toujeo 13 units daily in the morning (24 hour insulin)  Lyumjev/Humalog/Novolog 10 units before breakfast and dinner (occasionally missing dinner dose)  Starlix 120 mg before lunch -occasionally missing    SATNAM slightly +  -- Reviewed patient's current insulin regimen. Clarified proper insulin dose and timing in relation to meals, etc. Insulin injection sites and proper rotation instructed.    -- Advised frequent self blood glucose monitoring.  Patient encouraged to document glucose results and bring them to every clinic visit    -- Hypoglycemia precautions discussed. Instructed on precautions before driving.    -- Call for Bg repeatedly < 90 or > 180.   -- Close adherence to lifestyle changes recommended.   -- Periodic follow ups for eye evaluations, foot care and dental care suggested.

## 2024-05-24 ENCOUNTER — OFFICE VISIT (OUTPATIENT)
Dept: ENDOCRINOLOGY | Facility: CLINIC | Age: 59
End: 2024-05-24
Payer: COMMERCIAL

## 2024-05-24 VITALS
WEIGHT: 207.31 LBS | SYSTOLIC BLOOD PRESSURE: 115 MMHG | BODY MASS INDEX: 28.08 KG/M2 | HEART RATE: 83 BPM | HEIGHT: 72 IN | DIASTOLIC BLOOD PRESSURE: 64 MMHG | OXYGEN SATURATION: 96 %

## 2024-05-24 DIAGNOSIS — Z79.4 TYPE 2 DIABETES MELLITUS WITH DIABETIC POLYNEUROPATHY, WITH LONG-TERM CURRENT USE OF INSULIN: Chronic | ICD-10-CM

## 2024-05-24 DIAGNOSIS — E11.3293 TYPE 2 DIABETES MELLITUS WITH BOTH EYES AFFECTED BY MILD NONPROLIFERATIVE RETINOPATHY WITHOUT MACULAR EDEMA, WITH LONG-TERM CURRENT USE OF INSULIN: Primary | ICD-10-CM

## 2024-05-24 DIAGNOSIS — Z79.4 TYPE 2 DIABETES MELLITUS WITH BOTH EYES AFFECTED BY MILD NONPROLIFERATIVE RETINOPATHY WITHOUT MACULAR EDEMA, WITH LONG-TERM CURRENT USE OF INSULIN: Primary | ICD-10-CM

## 2024-05-24 DIAGNOSIS — I15.2 HYPERTENSION ASSOCIATED WITH DIABETES: Chronic | ICD-10-CM

## 2024-05-24 DIAGNOSIS — E11.69 HYPERLIPIDEMIA ASSOCIATED WITH TYPE 2 DIABETES MELLITUS: Chronic | ICD-10-CM

## 2024-05-24 DIAGNOSIS — E11.59 HYPERTENSION ASSOCIATED WITH DIABETES: Chronic | ICD-10-CM

## 2024-05-24 DIAGNOSIS — E11.42 TYPE 2 DIABETES MELLITUS WITH DIABETIC POLYNEUROPATHY, WITH LONG-TERM CURRENT USE OF INSULIN: Chronic | ICD-10-CM

## 2024-05-24 DIAGNOSIS — E78.5 HYPERLIPIDEMIA ASSOCIATED WITH TYPE 2 DIABETES MELLITUS: Chronic | ICD-10-CM

## 2024-05-24 PROCEDURE — 99999 PR PBB SHADOW E&M-EST. PATIENT-LVL V: CPT | Mod: PBBFAC,,, | Performed by: NURSE PRACTITIONER

## 2024-05-24 PROCEDURE — 3074F SYST BP LT 130 MM HG: CPT | Mod: CPTII,S$GLB,, | Performed by: NURSE PRACTITIONER

## 2024-05-24 PROCEDURE — 3008F BODY MASS INDEX DOCD: CPT | Mod: CPTII,S$GLB,, | Performed by: NURSE PRACTITIONER

## 2024-05-24 PROCEDURE — 4010F ACE/ARB THERAPY RXD/TAKEN: CPT | Mod: CPTII,S$GLB,, | Performed by: NURSE PRACTITIONER

## 2024-05-24 PROCEDURE — 99214 OFFICE O/P EST MOD 30 MIN: CPT | Mod: S$GLB,,, | Performed by: NURSE PRACTITIONER

## 2024-05-24 PROCEDURE — G2211 COMPLEX E/M VISIT ADD ON: HCPCS | Mod: S$GLB,,, | Performed by: NURSE PRACTITIONER

## 2024-05-24 PROCEDURE — 3078F DIAST BP <80 MM HG: CPT | Mod: CPTII,S$GLB,, | Performed by: NURSE PRACTITIONER

## 2024-05-24 PROCEDURE — 1160F RVW MEDS BY RX/DR IN RCRD: CPT | Mod: CPTII,S$GLB,, | Performed by: NURSE PRACTITIONER

## 2024-05-24 PROCEDURE — 1159F MED LIST DOCD IN RCRD: CPT | Mod: CPTII,S$GLB,, | Performed by: NURSE PRACTITIONER

## 2024-05-24 PROCEDURE — 3052F HG A1C>EQUAL 8.0%<EQUAL 9.0%: CPT | Mod: CPTII,S$GLB,, | Performed by: NURSE PRACTITIONER

## 2024-05-24 NOTE — PATIENT INSTRUCTIONS
Diabetes      A1c above goal as he has been out of Mounjaro for the last 2.5 months due to supply issues. He currently has Mounjaro 7.5 mg 3 month supply, Mounjaro 15 mg and Ozempic 2.0 mg weekly. He is questioning if he should double up on prescription. Discussed I am unsure what the supply issue will be like in the next 3 months, better to stay the course and reassess with desirae 3. I applied desirae today in clinic.    He also returned professional CGM applied in Jan 2024  today.      Continue   Synjardy 12.5/1000 mg twice daily  Mounjaro 7.5 mg weekly   Toujeo 13 units daily in the morning (24 hour insulin)  Lyumjev/Humalog/Novolog 10 units before breakfast and dinner (occasionally missing dinner dose)  Starlix 120 mg before lunch -occasionally missing    SATNAM slightly +     Cholesterol               Continue atorvastatin 80 mg daily               OTC CQ 10 100 mg twice daily                  BP               Continue valsartan 100 mg daily               Start checking blood pressure at home! And make log for PCP     Goal blood pressure is is less than 130/80     Vitamin D               OTC Vitamin D 2000 IU daily

## 2024-05-28 ENCOUNTER — PATIENT MESSAGE (OUTPATIENT)
Dept: ENDOCRINOLOGY | Facility: CLINIC | Age: 59
End: 2024-05-28
Payer: COMMERCIAL

## 2024-05-31 ENCOUNTER — CLINICAL SUPPORT (OUTPATIENT)
Dept: ENDOCRINOLOGY | Facility: CLINIC | Age: 59
End: 2024-05-31
Payer: COMMERCIAL

## 2024-05-31 ENCOUNTER — HOSPITAL ENCOUNTER (OUTPATIENT)
Dept: SLEEP MEDICINE | Facility: HOSPITAL | Age: 59
Discharge: HOME | End: 2024-05-31
Attending: NURSE PRACTITIONER
Payer: COMMERCIAL

## 2024-05-31 DIAGNOSIS — Z79.4 TYPE 2 DIABETES MELLITUS WITH BOTH EYES AFFECTED BY MILD NONPROLIFERATIVE RETINOPATHY WITHOUT MACULAR EDEMA, WITH LONG-TERM CURRENT USE OF INSULIN: Primary | ICD-10-CM

## 2024-05-31 DIAGNOSIS — G47.33 OSA (OBSTRUCTIVE SLEEP APNEA): Primary | ICD-10-CM

## 2024-05-31 DIAGNOSIS — E11.3293 TYPE 2 DIABETES MELLITUS WITH BOTH EYES AFFECTED BY MILD NONPROLIFERATIVE RETINOPATHY WITHOUT MACULAR EDEMA, WITH LONG-TERM CURRENT USE OF INSULIN: Primary | ICD-10-CM

## 2024-05-31 DIAGNOSIS — G47.30 SLEEP APNEA, UNSPECIFIED TYPE: ICD-10-CM

## 2024-05-31 PROCEDURE — 95800 SLP STDY UNATTENDED: CPT

## 2024-05-31 NOTE — PROGRESS NOTES
"PLACEMENT OF DEXCOM G6 PRO SENSOR  CONTINOUS GLUCOSE MONITORING SYSTEM (CGMS)     Patient is here in clinic today for placement of continuous glucose monitoring sensor.                Each patient verified that they were here for CGMS procedure ordered by their provider and that they have a working glucose meter and supplies at home.   Patient will be provided with a Dexcom G6 Pro sensor, transmitter, and a copy of the Continuous Glucose Monitoring Patient Log to fill out during the study.              A detailed  explanation of Continuous Glucose Monitoring was  provided. Patient informed that this is a blind procedure and that they will not actually see the blood sugar tracing in real time.    Instructed patient to check blood sugar using home glucometer and to record the following on provided patient log sheets:Blood sugar taken at home, Meals and snacks, Activity, and Diabetes medications taken and dosage               Patient was brought to a private location.  Site selected and prepared and allowed to dry. Glucose Transmitter Serial Number [id number]  was inserted to patient's abdomen.               The following forms  were given and reviewed in detail with patient and all questions answered.   Continuous Glucose Monitoring Patient Log   Dexcom G6 PRO Patient Handout "Blinded CGM Patient Handout"                 Instructions: Time: 15 min   Insertion of sensor:  Time: 5 minutes     "

## 2024-06-05 DIAGNOSIS — Z95.5 STENTED CORONARY ARTERY: Chronic | ICD-10-CM

## 2024-06-05 RX ORDER — TICAGRELOR 90 MG/1
90 TABLET ORAL 2 TIMES DAILY
Qty: 180 TABLET | Refills: 0 | Status: SHIPPED | OUTPATIENT
Start: 2024-06-05

## 2024-06-08 PROCEDURE — 95800 SLP STDY UNATTENDED: CPT | Mod: 26,,, | Performed by: PSYCHIATRY & NEUROLOGY

## 2024-06-10 PROBLEM — G47.30 SLEEP APNEA: Status: ACTIVE | Noted: 2024-06-10

## 2024-06-10 PROBLEM — G47.33 OSA (OBSTRUCTIVE SLEEP APNEA): Status: ACTIVE | Noted: 2024-06-10

## 2024-06-10 NOTE — PROCEDURES
"See imported Detailed Sleep Study Reports with raw data in  "Chart Review" under the "Media tab".      (This Sleep Study was interpreted by a Board Certified Sleep Specialist who conducted an epoch-by-epoch review of the entire raw data recording.)     (The indication for this sleep study was reviewed and deemed appropriate by [facility] Practice Parameters or other reasons by a Board Certified Sleep Specialist.)  ES Traceability: (SN: [id number] ) PatientStudyReportOutputGUID: [id number] ; v68  "

## 2024-06-12 ENCOUNTER — PATIENT MESSAGE (OUTPATIENT)
Dept: SLEEP MEDICINE | Facility: CLINIC | Age: 59
End: 2024-06-12
Payer: COMMERCIAL

## 2024-06-12 DIAGNOSIS — G47.33 OSA (OBSTRUCTIVE SLEEP APNEA): Primary | ICD-10-CM

## 2024-06-19 ENCOUNTER — HOSPITAL ENCOUNTER (EMERGENCY)
Facility: HOSPITAL | Age: 59
Discharge: HOME | End: 2024-06-19
Attending: EMERGENCY MEDICINE
Payer: COMMERCIAL

## 2024-06-19 VITALS
DIASTOLIC BLOOD PRESSURE: 82 MMHG | TEMPERATURE: 98 F | SYSTOLIC BLOOD PRESSURE: 135 MMHG | HEART RATE: 73 BPM | HEIGHT: 72 IN | WEIGHT: 205 LBS | BODY MASS INDEX: 27.77 KG/M2 | RESPIRATION RATE: 24 BRPM | OXYGEN SATURATION: 99 %

## 2024-06-19 DIAGNOSIS — R07.89 ATYPICAL CHEST PAIN: Primary | ICD-10-CM

## 2024-06-19 DIAGNOSIS — R07.9 CHEST PAIN: ICD-10-CM

## 2024-06-19 LAB
ALBUMIN SERPL BCP-MCNC: 4 G/DL (ref 3.5–5.2)
ALP SERPL-CCNC: 91 U/L (ref 55–135)
ALT SERPL W/O P-5'-P-CCNC: 31 U/L (ref 10–44)
ANION GAP SERPL CALC-SCNC: 10 MMOL/L (ref 8–16)
AST SERPL-CCNC: 25 U/L (ref 10–40)
BASOPHILS # BLD AUTO: 0.03 K/UL (ref 0–0.2)
BASOPHILS NFR BLD: 0.5 % (ref 0–1.9)
BILIRUB SERPL-MCNC: 0.5 MG/DL (ref 0.1–1)
BNP SERPL-MCNC: 32 PG/ML (ref 0–99)
BUN SERPL-MCNC: 12 MG/DL (ref 6–20)
CALCIUM SERPL-MCNC: 9.5 MG/DL (ref 8.7–10.5)
CHLORIDE SERPL-SCNC: 104 MMOL/L (ref 95–110)
CO2 SERPL-SCNC: 25 MMOL/L (ref 23–29)
CREAT SERPL-MCNC: 1.2 MG/DL (ref 0.5–1.4)
DIFFERENTIAL METHOD BLD: ABNORMAL
EOSINOPHIL # BLD AUTO: 0.1 K/UL (ref 0–0.5)
EOSINOPHIL NFR BLD: 1.6 % (ref 0–8)
ERYTHROCYTE [DISTWIDTH] IN BLOOD BY AUTOMATED COUNT: 12.9 % (ref 11.5–14.5)
EST. GFR  (NO RACE VARIABLE): >60 ML/MIN/1.73 M^2
GLUCOSE SERPL-MCNC: 183 MG/DL (ref 70–110)
HCT VFR BLD AUTO: 48.8 % (ref 40–54)
HGB BLD-MCNC: 16.5 G/DL (ref 14–18)
IMM GRANULOCYTES # BLD AUTO: 0.03 K/UL (ref 0–0.04)
IMM GRANULOCYTES NFR BLD AUTO: 0.5 % (ref 0–0.5)
LYMPHOCYTES # BLD AUTO: 1.5 K/UL (ref 1–4.8)
LYMPHOCYTES NFR BLD: 23.3 % (ref 18–48)
MCH RBC QN AUTO: 31.9 PG (ref 27–31)
MCHC RBC AUTO-ENTMCNC: 33.8 G/DL (ref 32–36)
MCV RBC AUTO: 94 FL (ref 82–98)
MONOCYTES # BLD AUTO: 0.7 K/UL (ref 0.3–1)
MONOCYTES NFR BLD: 11.1 % (ref 4–15)
NEUTROPHILS # BLD AUTO: 3.9 K/UL (ref 1.8–7.7)
NEUTROPHILS NFR BLD: 63 % (ref 38–73)
NRBC BLD-RTO: 0 /100 WBC
OHS QRS DURATION: 96 MS
OHS QTC CALCULATION: 421 MS
PLATELET # BLD AUTO: 155 K/UL (ref 150–450)
PMV BLD AUTO: 9.9 FL (ref 9.2–12.9)
POTASSIUM SERPL-SCNC: 4.3 MMOL/L (ref 3.5–5.1)
PROT SERPL-MCNC: 7.3 G/DL (ref 6–8.4)
RBC # BLD AUTO: 5.18 M/UL (ref 4.6–6.2)
SODIUM SERPL-SCNC: 139 MMOL/L (ref 136–145)
TROPONIN I SERPL DL<=0.01 NG/ML-MCNC: <0.006 NG/ML (ref 0–0.03)
TROPONIN I SERPL DL<=0.01 NG/ML-MCNC: <0.006 NG/ML (ref 0–0.03)
WBC # BLD AUTO: 6.23 K/UL (ref 3.9–12.7)

## 2024-06-19 PROCEDURE — 99285 EMERGENCY DEPT VISIT HI MDM: CPT | Mod: 25

## 2024-06-19 PROCEDURE — 84484 ASSAY OF TROPONIN QUANT: CPT | Mod: 91

## 2024-06-19 PROCEDURE — 96374 THER/PROPH/DIAG INJ IV PUSH: CPT

## 2024-06-19 PROCEDURE — 63600175 PHARM REV CODE 636 W HCPCS: Performed by: EMERGENCY MEDICINE

## 2024-06-19 PROCEDURE — 93010 ELECTROCARDIOGRAM REPORT: CPT | Mod: ,,, | Performed by: STUDENT IN AN ORGANIZED HEALTH CARE EDUCATION/TRAINING PROGRAM

## 2024-06-19 PROCEDURE — 93005 ELECTROCARDIOGRAM TRACING: CPT

## 2024-06-19 PROCEDURE — 83880 ASSAY OF NATRIURETIC PEPTIDE: CPT

## 2024-06-19 PROCEDURE — 25000003 PHARM REV CODE 250

## 2024-06-19 PROCEDURE — 80053 COMPREHEN METABOLIC PANEL: CPT

## 2024-06-19 PROCEDURE — 85025 COMPLETE CBC W/AUTO DIFF WBC: CPT

## 2024-06-19 RX ORDER — KETOROLAC TROMETHAMINE 30 MG/ML
15 INJECTION, SOLUTION INTRAMUSCULAR; INTRAVENOUS
Status: COMPLETED | OUTPATIENT
Start: 2024-06-19 | End: 2024-06-19

## 2024-06-19 RX ORDER — ASPIRIN 325 MG
325 TABLET ORAL
Status: COMPLETED | OUTPATIENT
Start: 2024-06-19 | End: 2024-06-19

## 2024-06-19 RX ADMIN — ASPIRIN 325 MG ORAL TABLET 325 MG: 325 PILL ORAL at 09:06

## 2024-06-19 RX ADMIN — KETOROLAC TROMETHAMINE 15 MG: 30 INJECTION, SOLUTION INTRAMUSCULAR; INTRAVENOUS at 12:06

## 2024-06-19 NOTE — NURSING
Pt seen in room, AAOx4, pleasant, responds to commands, states mild chest pain started approx 10pm 06/18/2024 also stated that he ate spicy gumbo in the evening then fell asleep.

## 2024-06-19 NOTE — DISCHARGE INSTRUCTIONS
Mr. Loyd,     Thank you for letting me care for you today! It was nice meeting you, and I hope you feel better soon.   If you would like access to your chart and what was done today please utilize the [facility].   Please don't hesitate to return if your symptoms worsen or you develop any other worrisome symptoms.    Our goal in the emergency department is to always give you outstanding care and exceptional service. You may receive a survey by mail or e-mail in the next week regarding your experience in our ED. We would greatly appreciate you completing and returning the survey. Your feedback provides us with a way to recognize our staff who give very good care and it helps us learn how to improve when your experience was below our aspiration of excellence.     Sincerely,    Sabrina Baeza PA-C  Emergency Department Physician Assistant  [facility], [place], and [facility]

## 2024-06-19 NOTE — ED PROVIDER NOTES
Encounter Date: 6/19/2024       History     Chief Complaint   Patient presents with    Chest Pain     Pt arrives with complaints of a constant chest pain. States his pain initially started last night while sleeping. 1 nitro taken for the pain today and an asparin was taken last night. Denies any recent trauma or injuries. No other complaints.      59-year-old male past medical history of type 2 diabetes, hyperlipidemia, hypertension, CKD  presents to the ED with chest pain since last night. Patient describes constant chest pain. Notes taking one nitro and an ASA for pain before going to sleep. Episodes are not associated with SOB, N/V, palpitations, diaphoresis.  Reports prior heart attack back in 2021, which he has stents placed. Of note, pt states he was leaning on a work bench over the weekend. Unsure if his symptoms are related to that. Has not had a stress test for many years. No other acute complaints today.      The history is provided by the patient.     Review of patient's allergies indicates:   Allergen Reactions    Ace inhibitors      Other reaction(s): cough     Past Medical History:   Diagnosis Date    Basal cell carcinoma (BCC) of skin of left upper extremity including shoulder 3/31/2023    Cataract     Chronic kidney disease     stones    Diabetes mellitus type II     Diabetic retinopathy     Heart attack 12/26/2021    Hyperlipidemia     Hyperlipidemia LDL goal <100 10/24/2012    Hypertension     Obesity (BMI 30-39.9) 12/23/2016    Sleep apnea 6/10/2024    Type 2 diabetes mellitus with hyperglycemia, with long-term current use of insulin 12/23/2016    Type 2 diabetes mellitus with microalbuminuria, with long-term current use of insulin 12/03/2014    Uncontrolled type 2 diabetes mellitus with diabetic polyneuropathy, with long-term current use of insulin 10/04/2016     Past Surgical History:   Procedure Laterality Date    COLONOSCOPY N/A 10/29/2021    Procedure: COLONOSCOPY;  Surgeon: Timothy Bravo,  MD;  Location: [facility] ENDO;  Service: Endoscopy;  Laterality: N/A;    CORONARY ANGIOGRAPHY N/A 2021    Procedure: ANGIOGRAM, CORONARY ARTERY;  Surgeon: Randy De Souza MD;  Location: [facility] CATH LAB/EP;  Service: Cardiology;  Laterality: N/A;    LEFT HEART CATHETERIZATION Left 2021    Procedure: Left heart cath;  Surgeon: Randy De Souza MD;  Location: [facility] CATH LAB/EP;  Service: Cardiology;  Laterality: Left;     Family History   Problem Relation Name Age of Onset    Cancer Mother      Diabetes Mother          early onset, type 2    Hypertension Mother      Breast cancer Mother      Diabetes Father      Diabetes Maternal Grandfather          early onset, type 2    Heart attack Maternal Grandfather       Social History     Tobacco Use    Smoking status: Former     Current packs/day: 0.00     Types: Cigarettes     Quit date:      Years since quittin.4    Smokeless tobacco: Never   Substance Use Topics    Alcohol use: No     Comment: 1-2 times yearly    Drug use: No     Review of Systems   Constitutional:  Negative for chills and fever.   Respiratory:  Negative for shortness of breath and wheezing.    Cardiovascular:  Positive for chest pain. Negative for palpitations.   Gastrointestinal:  Negative for abdominal distention, abdominal pain, nausea and vomiting.   Musculoskeletal:  Negative for neck pain and neck stiffness.       Physical Exam     Initial Vitals [24 0817]   BP Pulse Resp Temp SpO2   (!) 140/76 74 18 97.8 °F (36.6 °C) 98 %      MAP       --         Physical Exam    Vitals reviewed.  Constitutional: He appears well-developed and well-nourished. He is not diaphoretic. No distress.   HENT:   Head: Normocephalic and atraumatic.   Right Ear: External ear normal.   Left Ear: External ear normal.   Mouth/Throat: Oropharynx is clear and moist.   Eyes: EOM are normal.   Neck: Neck supple.   Normal range of motion.  Cardiovascular:  Normal rate and normal heart sounds.            Pulmonary/Chest: Breath sounds normal. No respiratory distress. He has no wheezes.   Abdominal: Abdomen is soft. Bowel sounds are normal. He exhibits no distension. There is no abdominal tenderness. There is no rebound.   Musculoskeletal:         General: Normal range of motion.      Cervical back: Normal range of motion and neck supple.     Neurological: He is alert and oriented to person, place, and time.   Skin: Skin is warm. Capillary refill takes less than 2 seconds.   Psychiatric: He has a normal mood and affect. His behavior is normal. Judgment and thought content normal.         ED Course   Procedures  Labs Reviewed   CBC W/ AUTO DIFFERENTIAL - Abnormal; Notable for the following components:       Result Value    MCH 31.9 (*)     All other components within normal limits   COMPREHENSIVE METABOLIC PANEL - Abnormal; Notable for the following components:    Glucose 183 (*)     All other components within normal limits   TROPONIN I   B-TYPE NATRIURETIC PEPTIDE   TROPONIN I        ECG Results              EKG 12-lead (In process)        Collection Time Result Time QRS Duration OHS QTC Calculation    06/19/24 08:17:06 06/19/24 11:02:03 96 421                     In process by Interface, Lab In [facility] (06/19/24 11:02:09)                   Narrative:    Test Reason : R07.9,    Vent. Rate : 078 BPM     Atrial Rate : 078 BPM     P-R Int : 234 ms          QRS Dur : 096 ms      QT Int : 370 ms       P-R-T Axes : 062 051 022 degrees     QTc Int : 421 ms    Sinus rhythm with 1st degree A-V block  Otherwise normal ECG  When compared with ECG of 27-DEC-2021 23:42,  Nonspecific T wave abnormality no longer evident in Anterior-lateral leads    Referred By: [email or web address]   SELF           Confirmed By:                                   Imaging Results              X-Ray Chest PA And Lateral (Final result)  Result time 06/19/24 09:00:57      Final result by Delano Rutherford III, MD (06/19/24 09:00:57)                    Impression:      No acute process seen.      Electronically signed by: Delano Rutherford MD  Date:    06/19/2024  Time:    09:00               Narrative:    EXAMINATION:  XR CHEST PA AND LATERAL    CLINICAL HISTORY:  Chest Pain;    FINDINGS:  Chest two views: Heart size is normal.  Lungs are clear and the bones bowel gas are noncontributory.  There is DJD and dish.                                       Medications   aspirin tablet 325 mg (325 mg Oral Given 6/19/24 0901)   ketorolac injection 15 mg (15 mg Intravenous Given 6/19/24 1212)     Medical Decision Making  Differential Diagnosis includes, but is not limited to:  ACS/MI, PE, aortic dissection, pneumothorax, cardiac tamponade, pericarditis/myocarditis, pneumonia, infection/abscess, lung mass, trauma/fracture, costochondritis/pleurisy, MSK pain/contusion, GERD, biliary disease, pancreatitis, anemia    ED management     59-year-old male past medical history of type 2 diabetes, hyperlipidemia, hypertension, CKD  presents to the ED with chest pain since last night. Patient is not toxic appearing, hemodynamically stable and resting comfortably on bed. Patient is well-appearing.  Awake and alert.  Afebrile with vitals WNL. No distress on exam. Patient presents with atypical chest pain.  Unlikely to be ACS as EKG is normal and symptoms are not consistent with ACS. HEART score of 4, moderate risk. No description of infectious etiology on history. Exam normal, lung sounds normal, unlikely to be pneumothorax. Symptoms not consistent with dissection and BP normal.  Cardiac workup unremarkable.  Chest x-ray without acute cardiopulmonary abnormalities.  Risk factors and test results discussed with pt. Medical decision-making- cardiac observation vs dispo home discussed with patient at bedside. Pt deferred from further testing and hospitalization. No chest pain throughout his ED visit.  Advised to follow-up with cardiology for routine cardiac stress test. The pt is comfortable  with this plan. After taking into careful account the historical factors and physical exam findings of the patient's presentation today, in conjunction with the empirical and objective data obtained on ED workup, no acute emergent medical condition requiring admission has been identified. The patient appears to be low risk for an emergent medical condition and I feel it is safe and appropriate at this time for the patient to be discharged to follow-up as detailed in their discharge instructions for reevaluation and possible continued outpatient workup and management. I have discussed the specifics of the workup with the patient and the patient has verbalized understanding of the details of the workup, the diagnosis, the treatment plan, and the need for outpatient follow-up.  Although the patient has no emergent etiology today this does not preclude the development of an emergent condition so in addition, I have advised the patient that they can return to the ED and/or activate EMS at any time with worsening of their symptoms, change of their symptoms, or with any other medical complaint.  The patient remained comfortable and stable during their visit in the ED.  Discharge and follow-up instructions discussed with the patient who expressed understanding and willingness to comply with my recommendations.    I have discussed the specifics of the workup with the patient and the patient has verbalized understanding of the details of the workup, the diagnosis, the treatment plan, and the need for outpatient follow-up with PCP. ED precautions given. Discussed with pt about returning to the ED, if symptoms fail to improve or worsen. Care of patient also discussed with Dr. Alvarez who agrees with assessment and plan.    RESULTS:  Documented in ED course.   Labs/ekg interpreted by myself and Dr. Alvarez.       Voice recognition software utilized in this note. Typographical and content errors may occur with this process. While  efforts are made to detect and correct such errors, in some cases errors will persist. For this reason, wording in this document should be considered in the proper context and not strictly verbatim.                      Amount and/or Complexity of Data Reviewed  Labs: ordered. Decision-making details documented in ED Course.  Radiology: ordered. Decision-making details documented in ED Course.    Risk  OTC drugs.  Prescription drug management.               ED Course as of 06/19/24 2100 Wed Jun 19, 2024   0914 X-Ray Chest PA And Lateral  Independently reviewed by myself and radiologist.  FINDINGS:  Chest two views: Heart size is normal.  Lungs are clear and the bones bowel gas are noncontributory.  There is DJD and dish.     Impression:     No acute process seen.   [NW]   0922 EKG interpretation by ED attending physician:  NSR, rate 78, no ST elevations, isolated TWI lead III, no other signs of acute ischemia, normal intervals.  Compared with prior EKG dated 12/2021, grossly stable without significant change.   [SS]   0931 CBC auto differential(!)  H&H stable.  Platelet count within normal limits.  No signs of leukocytosis. [NW]   0931 BP(!): 140/76 [NW]   0931 Temp: 97.8 °F (36.6 °C) [NW]   0931 Pulse: 74 [NW]   0931 Resp: 18 [NW]   0931 SpO2: 98 % [NW]   1027 BNP: 32 [NW]   1027 Troponin I: <0.006 [NW]   1316 Troponin I: <0.006  Within normal limits [NW]   1318 TTE back in 2021:    The left ventricle is normal in size with normal systolic function. The estimated ejection fraction is 65%. The wall thickness is normal. There is normal diastolic function. There is normal wall motion. [NW]      ED Course User Index  [NW] Sabrina Baeza PA-C  [SS] Regan Alvarez MD                           Clinical Impression:  Final diagnoses:  [R07.9] Chest pain  [R07.89] Atypical chest pain (Primary)          ED Disposition Condition    Discharge Stable          ED Prescriptions    None       Follow-up Information       Follow  Declined up With Specialties Details Why Contact Info    Lebron Pond, DO Family Medicine Schedule an appointment as soon as possible for a visit in 3 days As needed, If symptoms worsen [address] [facility]  [place] [id number] [phone]               Sabrina Baeza PA-C  06/19/24 2102

## 2024-07-05 ENCOUNTER — OFFICE VISIT (OUTPATIENT)
Dept: NEPHROLOGY | Facility: CLINIC | Age: 59
End: 2024-07-05
Payer: COMMERCIAL

## 2024-07-05 VITALS
DIASTOLIC BLOOD PRESSURE: 73 MMHG | BODY MASS INDEX: 26.94 KG/M2 | HEART RATE: 104 BPM | SYSTOLIC BLOOD PRESSURE: 108 MMHG | WEIGHT: 203.25 LBS | OXYGEN SATURATION: 96 % | HEIGHT: 73 IN

## 2024-07-05 DIAGNOSIS — I10 HYPERTENSION, UNSPECIFIED TYPE: ICD-10-CM

## 2024-07-05 DIAGNOSIS — N18.2 TYPE 2 DM WITH CKD STAGE 2 AND HYPERTENSION: ICD-10-CM

## 2024-07-05 DIAGNOSIS — I12.9 TYPE 2 DM WITH CKD STAGE 2 AND HYPERTENSION: ICD-10-CM

## 2024-07-05 DIAGNOSIS — N18.2 CHRONIC KIDNEY DISEASE (CKD) STAGE G2/A2, MILDLY DECREASED GLOMERULAR FILTRATION RATE (GFR) BETWEEN 60-89 ML/MIN/1.73 SQUARE METER AND ALBUMINURIA CREATININE RATIO BETWEEN 30-299 MG/G: Primary | ICD-10-CM

## 2024-07-05 DIAGNOSIS — E11.22 TYPE 2 DM WITH CKD STAGE 2 AND HYPERTENSION: ICD-10-CM

## 2024-07-05 PROCEDURE — 99999 PR PBB SHADOW E&M-EST. PATIENT-LVL V: CPT | Mod: PBBFAC,,, | Performed by: NURSE PRACTITIONER

## 2024-07-05 NOTE — PROGRESS NOTES
"Subjective:       Patient ID: Yon Loyd is a 59 y.o. White male who presents for follow up of No chief complaint on file.    HPI  Mr. Loyd is a 55 year old man with diabetes, hypertension presenting for follow up of microalbuminuria.  Patient with microalbuminuria up to ~530mg 12.16, now ~234mg.  Patient reports blood sugars well controlled since last visit in 2017.  He denies any NSAID use.  He otherwise denies any fever, chest pain, shortness of breath, abdominal pain, diarrhea, dysuria/hematuria.     Update 5/8/2023:  Last seen by Dr. Vasquez in Sept. 2020. New to me.  Presents for f/u of CKD, proteinuria.  Diagnosed with DM at age 28; HTN for about 10 years.  Baseline sCr: 1.0 mg/dL.  Home BPs: does not take regularly  Renal US: March 2022: kidneys on the large side. Right kidney has mildly increased echogenicity.   Denies NSAID use.    Update 7/5/24:  Presents for f/u of CKD, proteinuria.  Recent sCr of 1.0-1.1 mg/dL. Most recent sCr was 1.2 mg/dL.    Seen in ER with atypical chest pain 6/19/24.    Review of Systems   Respiratory:  Negative for shortness of breath.    Cardiovascular:  Negative for leg swelling.   Gastrointestinal:  Positive for nausea (relates to medication side effects). Negative for diarrhea and vomiting.   Genitourinary:  Positive for frequency (at baseline). Negative for difficulty urinating, dysuria, hematuria and urgency.   Neurological:  Negative for dizziness and weakness.   All other systems reviewed and are negative.      Objective:    /73   Pulse 104   Ht 6' 1" (1.854 m)   Wt 92.2 kg (203 lb 4.2 oz)   SpO2 96%   BMI 26.82 kg/m²     Physical Exam  Vitals reviewed.   Constitutional:       General: He is not in acute distress.     Appearance: He is well-developed.   Pulmonary:      Effort: Pulmonary effort is normal. No respiratory distress.   Abdominal:      Tenderness: There is no right CVA tenderness or left CVA tenderness.   Musculoskeletal:      Right lower leg: No " edema.      Left lower leg: No edema.   Neurological:      Mental Status: He is alert.   Psychiatric:         Mood and Affect: Mood normal.         Behavior: Behavior normal.         Thought Content: Thought content normal.         Judgment: Judgment normal.         Lab Results   Component Value Date    CREATININE 1.2 06/19/2024     Prot/Creat Ratio, Urine   Date Value Ref Range Status   05/03/2024 0.16 0.00 - 0.20 Final   05/08/2023 0.15 0.00 - 0.20 Final   12/02/2022 0.28 (H) 0.00 - 0.20 Final     Lab Results   Component Value Date     06/19/2024    K 4.3 06/19/2024    CO2 25 06/19/2024     06/19/2024     Lab Results   Component Value Date    PTH 38.4 05/08/2023    CALCIUM 9.5 06/19/2024    PHOS 2.9 05/08/2023     Lab Results   Component Value Date    HGB 16.5 06/19/2024    WBC 6.23 06/19/2024    HCT 48.8 06/19/2024      Lab Results   Component Value Date    HGBA1C 8.6 (H) 05/03/2024     06/19/2024    BUN 12 06/19/2024     Lab Results   Component Value Date    LDLCALC 37.4 (L) 12/22/2023       Assessment:       1. Chronic kidney disease (CKD) stage G2/A2, mildly decreased glomerular filtration rate (GFR) between 60-89 mL/min/1.73 square meter and albuminuria creatinine ratio between  mg/g    2. Type 2 DM with CKD stage 2 and hypertension    3. Hypertension, unspecified type          Plan:     CKD stage G2A2 -  Mr. Loyd is a 55 year old man with diabetes, hypertension presenting for follow of microalbuminuria/ CKD.   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  Educated patient to control BP, BG, remain well-hydrated, and avoid NSAIDs to prevent progression of CKD.    UPCR Proteinuria improved. On ARB. On empagliflozin-metformin.   Acid-base WNL   Renal osteodystrophy Ca okay.   Anemia Hgb WNL   DM Poorly-controlled. Present since at least 2009. Has had A1c over 12 previously. Retinopathy documented.   Lipid  Management On statin.   ESRD planning Reassurance provided.     HTN - okay today on metoprolol succinate 25 mg, valsartan 320 mg  - Goal is -130    All questions patient had were answered.  Asked if further questions. None. F/u in clinic in 12 mos with labs and urine prior to next visit or sooner if needed.  ER for emergency concerns.    Summary of Plan:  Report if home BP is frequently less than 110 systolic  avoid NSAID/ bactrim/ IV contrast/ gadolinium/ aminoglycoside where possible  RTC in 12 mos    Visit today included increased complexity associated with tmanaging the longitudinal care of the patient due to the serious and/or complex managed problem(s) CKD.

## 2024-07-15 DIAGNOSIS — E11.69 HYPERLIPIDEMIA ASSOCIATED WITH TYPE 2 DIABETES MELLITUS: Chronic | ICD-10-CM

## 2024-07-15 DIAGNOSIS — E78.5 HYPERLIPIDEMIA ASSOCIATED WITH TYPE 2 DIABETES MELLITUS: Chronic | ICD-10-CM

## 2024-07-15 RX ORDER — ATORVASTATIN CALCIUM 80 MG/1
80 TABLET, FILM COATED ORAL
Qty: 90 TABLET | Refills: 1 | Status: SHIPPED | OUTPATIENT
Start: 2024-07-15

## 2024-07-15 NOTE — TELEPHONE ENCOUNTER
No care due was identified.  Health [facility] Embedded Care Due Messages. Reference number: [id number].   7/15/2024 2:12:01 PM CDT

## 2024-07-16 NOTE — TELEPHONE ENCOUNTER
Refill Decision Note   Yon Loyd  is requesting a refill authorization.  Brief Assessment and Rationale for Refill:  Approve     Medication Therapy Plan:  Reviewed acute care/admission visit notes; No follow up with PCP recommended by acute care provider; Approved per protocol      Extended chart review required: Yes   Comments:     Note composed:10:06 PM 07/15/2024

## 2024-07-23 ENCOUNTER — PATIENT MESSAGE (OUTPATIENT)
Dept: ENDOCRINOLOGY | Facility: CLINIC | Age: 59
End: 2024-07-23
Payer: COMMERCIAL

## 2024-07-23 DIAGNOSIS — E11.42 TYPE 2 DIABETES MELLITUS WITH DIABETIC POLYNEUROPATHY, WITH LONG-TERM CURRENT USE OF INSULIN: ICD-10-CM

## 2024-07-23 DIAGNOSIS — Z79.4 TYPE 2 DIABETES MELLITUS WITH DIABETIC POLYNEUROPATHY, WITH LONG-TERM CURRENT USE OF INSULIN: ICD-10-CM

## 2024-07-24 DIAGNOSIS — Z79.4 TYPE 2 DIABETES MELLITUS WITH DIABETIC POLYNEUROPATHY, WITH LONG-TERM CURRENT USE OF INSULIN: ICD-10-CM

## 2024-07-24 DIAGNOSIS — Z79.4 TYPE 2 DIABETES MELLITUS WITH DIABETIC POLYNEUROPATHY, WITH LONG-TERM CURRENT USE OF INSULIN: Primary | ICD-10-CM

## 2024-07-24 DIAGNOSIS — E11.42 TYPE 2 DIABETES MELLITUS WITH DIABETIC POLYNEUROPATHY, WITH LONG-TERM CURRENT USE OF INSULIN: Primary | ICD-10-CM

## 2024-07-24 DIAGNOSIS — E11.42 TYPE 2 DIABETES MELLITUS WITH DIABETIC POLYNEUROPATHY, WITH LONG-TERM CURRENT USE OF INSULIN: ICD-10-CM

## 2024-07-24 RX ORDER — TIRZEPATIDE 15 MG/.5ML
INJECTION, SOLUTION SUBCUTANEOUS
Qty: 2 ML | Refills: 0 | Status: SHIPPED | OUTPATIENT
Start: 2024-07-24

## 2024-07-24 RX ORDER — TIRZEPATIDE 15 MG/.5ML
15 INJECTION, SOLUTION SUBCUTANEOUS WEEKLY
Qty: 12 PEN | Refills: 1 | Status: SHIPPED | OUTPATIENT
Start: 2024-07-24

## 2024-07-24 RX ORDER — TIRZEPATIDE 15 MG/.5ML
15 INJECTION, SOLUTION SUBCUTANEOUS WEEKLY
Qty: 4 PEN | Refills: 1 | Status: SHIPPED | OUTPATIENT
Start: 2024-07-24 | End: 2024-07-24 | Stop reason: SDUPTHER

## 2024-08-12 ENCOUNTER — PATIENT MESSAGE (OUTPATIENT)
Dept: ADMINISTRATIVE | Facility: HOSPITAL | Age: 59
End: 2024-08-12
Payer: COMMERCIAL

## 2024-08-27 ENCOUNTER — TELEPHONE (OUTPATIENT)
Dept: PHARMACY | Facility: CLINIC | Age: 59
End: 2024-08-27
Payer: COMMERCIAL

## 2024-08-27 ENCOUNTER — TELEPHONE (OUTPATIENT)
Dept: PHARMACY | Facility: CLINIC | Age: 59
End: 2024-08-27

## 2024-08-27 NOTE — TELEPHONE ENCOUNTER
[facility] Chart Review & Patient Outreach Details For Medication Adherence Project    Reason for Outreach Encounter: 3rd Party payor non-compliance report ([employer], [employer], C, etc)    2.  Patient Outreach Method: Snapthart message    3.   Medication in question:   Hypertension Medications               metoprolol succinate (TOPROL-XL) 25 MG 24 hr tablet Take 1 tablet (25 mg total) by mouth once daily. Take in place of 50 mg.    nitroGLYCERIN (NITROSTAT) 0.4 MG SL tablet Place 1 tablet (0.4 mg total) under the tongue every 5 (five) minutes as needed for Chest pain.    valsartan (DIOVAN) 320 MG tablet Take 1 tablet (320 mg total) by mouth once daily.               LAST FILLED: 5/22/24 for 90 day supply    4.  Reviewed and or Updates Made To: Patient Chart    5. Outreach Outcomes and/or actions taken: Sent inquiry to patient: Waiting for response    Additional Notes:

## 2024-08-27 NOTE — TELEPHONE ENCOUNTER
Antoinette Mr. Loyd,    Part of my job as a pharmacist at [facility] is to perform medication therapy management which includes looking at reports that your insurance company sends to us that show the dates of the last time any diabetic, cholesterol and certain high blood pressure medications were filled and checking with the patients to see if they are in need of any refills to be sent to their pharmacy.     This is a friendly reminder that you are due to  refills of your Valsartan.     It was last filled on 5/22/24 for a 90 day supply.  Would you like me to send in a refill to your pharmacy?      Thanks and have a great day,     Sommer Daniel  Clinical Pharmacist, [facility]

## 2024-09-05 ENCOUNTER — PATIENT OUTREACH (OUTPATIENT)
Dept: ADMINISTRATIVE | Facility: HOSPITAL | Age: 59
End: 2024-09-05
Payer: COMMERCIAL

## 2024-09-05 NOTE — PROGRESS NOTES
09/05/2024  VB chart audit performed. Care Everywhere updates requested and reviewed  Overdue HM topic chart audit and/or requested. LINKS triggered and reconciled. Media reviewed. Health Maintenance Topic(s) Outreach Outcomes & Actions Taken:  Diabetic Foot Exam - Outreach Outcomes & Actions Taken  : At office visit

## 2024-09-06 ENCOUNTER — TELEPHONE (OUTPATIENT)
Dept: PHARMACY | Facility: CLINIC | Age: 59
End: 2024-09-06
Payer: COMMERCIAL

## 2024-09-06 ENCOUNTER — LAB VISIT (OUTPATIENT)
Dept: LAB | Facility: HOSPITAL | Age: 59
End: 2024-09-06
Attending: FAMILY MEDICINE
Payer: COMMERCIAL

## 2024-09-06 DIAGNOSIS — R80.9 PROTEINURIA DUE TO TYPE 2 DIABETES MELLITUS: Chronic | ICD-10-CM

## 2024-09-06 DIAGNOSIS — Z79.4 TYPE 2 DIABETES MELLITUS WITH DIABETIC POLYNEUROPATHY, WITH LONG-TERM CURRENT USE OF INSULIN: Chronic | ICD-10-CM

## 2024-09-06 DIAGNOSIS — E11.42 TYPE 2 DIABETES MELLITUS WITH DIABETIC POLYNEUROPATHY, WITH LONG-TERM CURRENT USE OF INSULIN: Chronic | ICD-10-CM

## 2024-09-06 DIAGNOSIS — E11.29 PROTEINURIA DUE TO TYPE 2 DIABETES MELLITUS: Chronic | ICD-10-CM

## 2024-09-06 LAB
ALBUMIN/CREAT UR: 82.4 UG/MG (ref 0–30)
CREAT UR-MCNC: 51 MG/DL (ref 23–375)
MICROALBUMIN UR DL<=1MG/L-MCNC: 42 UG/ML

## 2024-09-06 PROCEDURE — 82570 ASSAY OF URINE CREATININE: CPT | Mod: PN | Performed by: FAMILY MEDICINE

## 2024-09-19 ENCOUNTER — TELEPHONE (OUTPATIENT)
Dept: FAMILY MEDICINE | Facility: CLINIC | Age: 59
End: 2024-09-19
Payer: COMMERCIAL

## 2024-09-20 ENCOUNTER — OFFICE VISIT (OUTPATIENT)
Dept: FAMILY MEDICINE | Facility: CLINIC | Age: 59
End: 2024-09-20
Payer: COMMERCIAL

## 2024-09-20 VITALS
HEART RATE: 81 BPM | SYSTOLIC BLOOD PRESSURE: 110 MMHG | OXYGEN SATURATION: 97 % | HEIGHT: 73 IN | WEIGHT: 207.88 LBS | BODY MASS INDEX: 27.55 KG/M2 | DIASTOLIC BLOOD PRESSURE: 70 MMHG

## 2024-09-20 DIAGNOSIS — Z00.00 VISIT FOR WELL MAN HEALTH CHECK: ICD-10-CM

## 2024-09-20 DIAGNOSIS — Z23 NEED FOR INFLUENZA VACCINATION: ICD-10-CM

## 2024-09-20 DIAGNOSIS — E11.69 HYPERLIPIDEMIA ASSOCIATED WITH TYPE 2 DIABETES MELLITUS: Chronic | ICD-10-CM

## 2024-09-20 DIAGNOSIS — Z79.899 ENCOUNTER FOR LONG-TERM CURRENT USE OF MEDICATION: ICD-10-CM

## 2024-09-20 DIAGNOSIS — Z79.4 TYPE 2 DIABETES MELLITUS WITH DIABETIC POLYNEUROPATHY, WITH LONG-TERM CURRENT USE OF INSULIN: Primary | ICD-10-CM

## 2024-09-20 DIAGNOSIS — I10 ESSENTIAL HYPERTENSION: ICD-10-CM

## 2024-09-20 DIAGNOSIS — I15.2 HYPERTENSION ASSOCIATED WITH DIABETES: Chronic | ICD-10-CM

## 2024-09-20 DIAGNOSIS — Z95.5 STENTED CORONARY ARTERY: Chronic | ICD-10-CM

## 2024-09-20 DIAGNOSIS — E11.59 HYPERTENSION ASSOCIATED WITH DIABETES: Chronic | ICD-10-CM

## 2024-09-20 DIAGNOSIS — I70.201 TIBIAL ARTERY STENOSIS, RIGHT: ICD-10-CM

## 2024-09-20 DIAGNOSIS — E78.5 HYPERLIPIDEMIA ASSOCIATED WITH TYPE 2 DIABETES MELLITUS: Chronic | ICD-10-CM

## 2024-09-20 DIAGNOSIS — Z12.5 SCREENING PSA (PROSTATE SPECIFIC ANTIGEN): ICD-10-CM

## 2024-09-20 DIAGNOSIS — E11.42 TYPE 2 DIABETES MELLITUS WITH DIABETIC POLYNEUROPATHY, WITH LONG-TERM CURRENT USE OF INSULIN: Primary | ICD-10-CM

## 2024-09-20 DIAGNOSIS — I73.9 PAD (PERIPHERAL ARTERY DISEASE): ICD-10-CM

## 2024-09-20 PROCEDURE — 99999 PR PBB SHADOW E&M-EST. PATIENT-LVL IV: CPT | Mod: PBBFAC,,, | Performed by: FAMILY MEDICINE

## 2024-09-20 RX ORDER — CILOSTAZOL 50 MG/1
50 TABLET ORAL 2 TIMES DAILY
Qty: 180 TABLET | Refills: 3 | Status: SHIPPED | OUTPATIENT
Start: 2024-09-20 | End: 2025-09-20

## 2024-09-20 RX ORDER — ATORVASTATIN CALCIUM 80 MG/1
80 TABLET, FILM COATED ORAL DAILY
Qty: 90 TABLET | Refills: 1 | Status: SHIPPED | OUTPATIENT
Start: 2024-09-20

## 2024-09-20 RX ORDER — METOPROLOL SUCCINATE 25 MG/1
25 TABLET, EXTENDED RELEASE ORAL DAILY
Qty: 90 TABLET | Refills: 3 | Status: SHIPPED | OUTPATIENT
Start: 2024-09-20

## 2024-09-20 RX ORDER — EMPAGLIFLOZIN, METFORMIN HYDROCHLORIDE 12.5; 1 MG/1; MG/1
2 TABLET, EXTENDED RELEASE ORAL DAILY
Qty: 180 TABLET | Refills: 3 | Status: SHIPPED | OUTPATIENT
Start: 2024-09-20 | End: 2025-09-20

## 2024-09-20 RX ORDER — VALSARTAN 320 MG/1
320 TABLET ORAL DAILY
Qty: 90 TABLET | Refills: 3 | Status: SHIPPED | OUTPATIENT
Start: 2024-09-20 | End: 2025-09-20

## 2024-09-20 RX ORDER — TIRZEPATIDE 15 MG/.5ML
15 INJECTION, SOLUTION SUBCUTANEOUS WEEKLY
Qty: 12 PEN | Refills: 1 | Status: SHIPPED | OUTPATIENT
Start: 2024-09-20

## 2024-09-20 NOTE — PATIENT INSTRUCTIONS
Continue mounjaro per endocrinology  Continue synjardy  jardiance component can help the heart  Insulin management per endocrine  Can continue starlix  F/u with endocrine     Continue metoprolol at 25 mg  Continue valsartan 320  Goal BP: 120/80 to 130/80     Continue brilinta, NO aspirin, per cardiology  PAD: continue pletal    DLD: off zetia due to leg pain. Continue atorvatatin. Goal LDL <70. Consider adding fenofibrate or having cardiology start PCSK9 inhibitor  Continue coq10 and fishoil as well     Continue weight loss  Increase exercise!!!     MRI in January 2025 at [facility].      F/u 3-4 months, labs prior. Will fwd labs to endocrine.

## 2024-09-20 NOTE — PROGRESS NOTES
Subjective:       Patient ID: Yon Loyd is a 59 y.o. male.    Chief Complaint: Diabetes    Yon is a 59 y.o. male who presents today for f/u    DM: On synjardy, insulin, starlix, LA 26 U, SA 10 U BIDWM. Also taking what mounjaro dose he can obtain, between 7.5 mg and 15 mg. A1c trending down but not quite at goal. No pain with urination, no abdominal pain. No trouble swallowing. Seeing Betzaida Rodriguez in endocrinology.   HTN: on metoprolol 25 mg xr and valsartan 320 mg.   Proteinuria: on valsartan. Seeing renal. Proteinuria due to DM. Improving!  DLD: on statin 80 mg. Thinks he is missing doses. Off zetia due to thigh pain. LDL > goal of 70.   PAD: on pletal, leg pain has improved.   CAD: seeing Dr. De Souza      Review of Systems   Constitutional:  Negative for chills and fever.   Respiratory:  Negative for chest tightness and shortness of breath.    Cardiovascular:  Negative for chest pain.   Gastrointestinal:  Negative for nausea and vomiting.   Neurological:  Negative for dizziness, light-headedness and headaches.             Results for orders placed or performed in visit on 09/06/24   CBC Auto Differential   Result Value Ref Range    WBC 5.18 3.90 - 12.70 K/uL    RBC 5.35 4.60 - 6.20 M/uL    Hemoglobin 16.9 14.0 - 18.0 g/dL    Hematocrit 49.8 40.0 - 54.0 %    MCV 93 82 - 98 fL    MCH 31.6 (H) 27.0 - 31.0 pg    MCHC 33.9 32.0 - 36.0 g/dL    RDW 12.6 11.5 - 14.5 %    Platelets 155 150 - 450 K/uL    MPV 10.1 9.2 - 12.9 fL    Immature Granulocytes 0.4 0.0 - 0.5 %    Gran # (ANC) 2.9 1.8 - 7.7 K/uL    Immature Grans (Abs) 0.02 0.00 - 0.04 K/uL    Lymph # 1.5 1.0 - 4.8 K/uL    Mono # 0.7 0.3 - 1.0 K/uL    Eos # 0.1 0.0 - 0.5 K/uL    Baso # 0.03 0.00 - 0.20 K/uL    nRBC 0 0 /100 WBC    Gran % 55.2 38.0 - 73.0 %    Lymph % 29.2 18.0 - 48.0 %    Mono % 12.7 4.0 - 15.0 %    Eosinophil % 1.9 0.0 - 8.0 %    Basophil % 0.6 0.0 - 1.9 %    Differential Method Automated    Comprehensive Metabolic Panel   Result Value Ref  "Range    Sodium 142 136 - 145 mmol/L    Potassium 4.6 3.5 - 5.1 mmol/L    Chloride 103 95 - 110 mmol/L    CO2 27 23 - 29 mmol/L    Glucose 257 (H) 70 - 110 mg/dL    BUN 17 2 - 20 mg/dL    Creatinine 0.98 0.50 - 1.40 mg/dL    Calcium 9.6 8.7 - 10.5 mg/dL    Total Protein 7.5 6.0 - 8.4 g/dL    Albumin 4.3 3.5 - 5.2 g/dL    Total Bilirubin 0.7 0.1 - 1.0 mg/dL    Alkaline Phosphatase 99 38 - 126 U/L    AST 30 15 - 46 U/L    ALT 38 10 - 44 U/L    Anion Gap 12 8 - 16 mmol/L    eGFR >60.0 >60 mL/min/1.73 m^2   Hemoglobin A1C   Result Value Ref Range    Hemoglobin A1C 7.2 (H) 4.0 - 5.6 %    Estimated Avg Glucose 160 (H) 68 - 131 mg/dL   Lipid Panel   Result Value Ref Range    Cholesterol 165 120 - 199 mg/dL    Triglycerides 152 (H) 30 - 150 mg/dL    HDL 51 40 - 75 mg/dL    LDL Cholesterol 83.6 63.0 - 159.0 mg/dL    HDL/Cholesterol Ratio 30.9 20.0 - 50.0 %    Total Cholesterol/HDL Ratio 3.2 2.0 - 5.0    Non-HDL Cholesterol 114 mg/dL       Objective:     Vitals:    09/20/24 0807   BP: 110/70   Pulse: 81   SpO2: 97%   Weight: 94.3 kg (207 lb 14.3 oz)   Height: 6' 1" (1.854 m)        Physical Exam  Vitals and nursing note reviewed.   Constitutional:       General: He is not in acute distress.     Appearance: He is not ill-appearing, toxic-appearing or diaphoretic.   Cardiovascular:      Rate and Rhythm: Normal rate and regular rhythm.   Pulmonary:      Effort: Pulmonary effort is normal.      Breath sounds: Normal breath sounds.   Abdominal:      Palpations: Abdomen is soft.      Tenderness: There is no abdominal tenderness.   Neurological:      General: No focal deficit present.      Mental Status: He is alert.   Psychiatric:         Mood and Affect: Mood normal.         Behavior: Behavior normal.         Thought Content: Thought content normal.         Judgment: Judgment normal.         Assessment:       1. Type 2 diabetes mellitus with diabetic polyneuropathy, with long-term current use of insulin    2. Stented coronary " artery    3. Tibial artery stenosis, right    4. PAD (peripheral artery disease)    5. Hyperlipidemia associated with type 2 diabetes mellitus    6. Hypertension associated with diabetes    7. Essential hypertension    8. Screening PSA (prostate specific antigen)    9. Encounter for long-term current use of medication    10. Need for influenza vaccination    11. Visit for well man health check        Plan:       Continue mounjaro per endocrinology  Continue synjardy  jardiance component can help the heart  Insulin management per endocrine  Can continue starlix  F/u with endocrine     Continue metoprolol at 25 mg  Continue valsartan 320  Goal BP: 120/80 to 130/80     Continue brilinta, NO aspirin, per cardiology  PAD: continue pletal    DLD: off zetia due to leg pain. Continue atorvatatin. Goal LDL <70. Consider adding fenofibrate or having cardiology start PCSK9 inhibitor  Continue coq10 and fishoil as well     Continue weight loss  Increase exercise!!!     MRI in January 2025 at [facility].      F/u 3-4 months, labs prior.       Type 2 diabetes mellitus with diabetic polyneuropathy, with long-term current use of insulin  -     empagliflozin-metformin (SYNJARDY XR) 12.5-1,000 mg TBph; Take 2 tablets by mouth once daily.  Dispense: 180 tablet; Refill: 3  -     tirzepatide (MOUNJARO) 15 mg/0.5 mL PnIj; Inject 15 mg into the skin once a week.  Dispense: 12 Pen; Refill: 1  -     CBC Auto Differential; Future; Expected date: 09/20/2024  -     Comprehensive Metabolic Panel; Future; Expected date: 09/20/2024  -     Hemoglobin A1C; Future; Expected date: 09/20/2024    Stented coronary artery  -     metoprolol succinate (TOPROL-XL) 25 MG 24 hr tablet; Take 1 tablet (25 mg total) by mouth once daily. Take in place of 50 mg.  Dispense: 90 tablet; Refill: 3  -     Lipid Panel; Future; Expected date: 09/20/2024    Tibial artery stenosis, right  -     cilostazoL (PLETAL) 50 MG Tab; Take 1 tablet (50 mg total) by mouth 2  (two) times daily.  Dispense: 180 tablet; Refill: 3  -     Lipid Panel; Future; Expected date: 09/20/2024    PAD (peripheral artery disease)  -     cilostazoL (PLETAL) 50 MG Tab; Take 1 tablet (50 mg total) by mouth 2 (two) times daily.  Dispense: 180 tablet; Refill: 3  -     Lipid Panel; Future; Expected date: 09/20/2024    Hyperlipidemia associated with type 2 diabetes mellitus  -     atorvastatin (LIPITOR) 80 MG tablet; Take 1 tablet (80 mg total) by mouth once daily.  Dispense: 90 tablet; Refill: 1  -     Lipid Panel; Future; Expected date: 09/20/2024    Hypertension associated with diabetes  -     metoprolol succinate (TOPROL-XL) 25 MG 24 hr tablet; Take 1 tablet (25 mg total) by mouth once daily. Take in place of 50 mg.  Dispense: 90 tablet; Refill: 3    Essential hypertension  -     valsartan (DIOVAN) 320 MG tablet; Take 1 tablet (320 mg total) by mouth once daily.  Dispense: 90 tablet; Refill: 3  -     TSH; Future; Expected date: 09/20/2024    Screening PSA (prostate specific antigen)  -     PSA, Screening; Future; Expected date: 09/20/2024    Encounter for long-term current use of medication  -     Vitamin B12; Future; Expected date: 09/20/2024  -     Vitamin D; Future; Expected date: 09/20/2024    Need for influenza vaccination  -     influenza (Egg-FREE) (Flucelvax) 45 mcg/0.5 mL IM vaccine (> or = 6 mo) 0.5 mL    Visit for well [facility] health check  -     CBC Auto Differential; Future; Expected date: 09/20/2024  -     Comprehensive Metabolic Panel; Future; Expected date: 09/20/2024  -     Hemoglobin A1C; Future; Expected date: 09/20/2024  -     Lipid Panel; Future; Expected date: 09/20/2024  -     TSH; Future; Expected date: 09/20/2024  -     Vitamin B12; Future; Expected date: 09/20/2024  -     Vitamin D; Future; Expected date: 09/20/2024  -     PSA, Screening; Future; Expected date: 09/20/2024

## 2024-09-23 NOTE — PROGRESS NOTES
Subjective:    09/27/2024     Patient ID: Yon Loyd is a 59 y.o. male.    Chief Complaint:  Diabetes    History of Present Illness  Yon Loyd with Type 2 DM complicated by CAD with 2 stents in Dec 2021, retinopathy, CKD and proteinuria, DLP and HTN presents today for follow up of T2DM. Previous patient of [email or web address]. Last visit with me in May 2024    Since his last visit he has been struggling with nausea and diarrhea. He stopped Synjardy for the last ~ 6 weeks and has been feeling better. He is also taking lower dose Mounjaro 10 mg weekly but has gotten larger supply of Mounjaro 15 mg weekly.     With regards to the diabetes:    Diagnosed with Type 2 diabetes in ~1993.  Been on insulin since ~2013. Started Trulicity 12/2016.   Family history of type 2 diabetes in mother and father.     DIABETES COMPLICATIONS:   Nephropathy +; sees nephrology -- follow up in one year per note  Retinopathy +; scheduled   Peripheral neuropathy +; tolerable; does not wish to see podiatry  CAD: +     Current regimen:  Mounjaro 10 mg weekly and plans to going up to 15 mg weekly next week  Toujeo 16 units daily in the morning (24 hour insulin)  Lyumjev/Humalog/Novolog 10 units before breakfast and dinner (occasionally missing dinner dose)  Starlix 120 mg before lunch -occasionally missing    He is trying to give novolog 10 minutes prior to a meal. He is rotating injection sites. He is now changing needle every time.     Other medications tried:  Invokana but stopped due to fear of side effects    He is not taking blood sugars   DECLINES PERSONAL CGM   Hypoglycemic event-Denies and s/s of BG less than 70  Knows how to correct with 15 grams of carbs- juice, coke, or a peppermint.      Eats 3 meals a day.   Working on improving diet  Started using riced cauliflower.      He is doing better with diet since his last visit.    Exercise: walks often for work. 1-1.8 miles daily    Education - last visit: none; 1/2023-politely declines    Glucagon: does not want     Diabetes Management Status  Statin: Taking atorvastatin 80 mg daily   ACE/ARB: Taking valsartan 320 mg daily  ASA: yes    Lab Results   Component Value Date    HGBA1C 7.2 (H) 09/06/2024    HGBA1C 8.6 (H) 05/03/2024    HGBA1C 5.8 (H) 12/22/2023     Screening or Prevention Patient's value Goal Complete/Controlled?   HgA1C Testing and Control   Lab Results   Component Value Date    HGBA1C 7.2 (H) 09/06/2024      Annually/Less than 8% Yes   Lipid profile : 09/06/2024 Annually Yes   LDL control Lab Results   Component Value Date    LDLCALC 83.6 09/06/2024    Annually/Less than 100 mg/dl  Yes   Nephropathy screening Lab Results   Component Value Date    LABMICR 42.0 09/06/2024     Lab Results   Component Value Date    PROTEINUA Negative 05/03/2024    Annually No   Blood pressure BP Readings from Last 1 Encounters:   09/27/24 114/64    Less than 140/90 No   Dilated retinal exam : 02/16/2024 Annually Yes   Foot exam   : 09/27/2024 Annually Yes     With regards to dyslipidemia,      He is on atorvastatin 80 mg daily -has recently restarted after running out and missed doses    Latest Reference Range & Units 09/06/24 08:40   Cholesterol Total 120 - 199 mg/dL 165   HDL 40 - 75 mg/dL 51   HDL/Cholesterol Ratio 20.0 - 50.0 % 30.9   Non-HDL Cholesterol mg/dL 114   Total Cholesterol/HDL Ratio 2.0 - 5.0  3.2   Triglycerides 30 - 150 mg/dL 152 (H)   LDL Cholesterol 63.0 - 159.0 mg/dL 83.6   (H): Data is abnormally high    Hx of fatigue and snoring- he has been to sleep medicine and declines mask.    Review of Systems   Constitutional:  Positive for fatigue. Negative for unexpected weight change.   Eyes:  Negative for visual disturbance.   Endocrine: Negative for polydipsia, polyphagia and polyuria.     Objective:   Constitutional:  Pleasant,  in no acute distress.   HENT:   Eyes:     No scleral icterus.   Respiratory:   Effort normal   Neurological:  normal speech  Psych:  Normal mood and affect.     Diabetes Foot Exam:  Feet no cuts or scratches  Shoes appropriate  sensation intact to vibration and monofilament      Body mass index is 26.92 kg/m².  Wt Readings from Last 3 Encounters:   09/27/24 0820 92.5 kg (204 lb 0.6 oz)   09/20/24 0807 94.3 kg (207 lb 14.3 oz)   07/05/24 0823 92.2 kg (203 lb 4.2 oz)     Vitals:    09/27/24 0820   BP: 114/64   Pulse: 88       Wt Readings from Last 3 Encounters:   09/27/24 0820 92.5 kg (204 lb 0.6 oz)   09/20/24 0807 94.3 kg (207 lb 14.3 oz)   07/05/24 0823 92.2 kg (203 lb 4.2 oz)     Lab Review:   Lab Results   Component Value Date    HGBA1C 7.2 (H) 09/06/2024     Lab Results   Component Value Date    CHOL 165 09/06/2024    HDL 51 09/06/2024    LDLCALC 83.6 09/06/2024    TRIG 152 (H) 09/06/2024    CHOLHDL 30.9 09/06/2024     Lab Results   Component Value Date     09/06/2024    K 4.6 09/06/2024     09/06/2024    CO2 27 09/06/2024     (H) 09/06/2024    BUN 17 09/06/2024    CREATININE 0.98 09/06/2024    CALCIUM 9.6 09/06/2024    PROT 7.5 09/06/2024    ALBUMIN 4.3 09/06/2024    BILITOT 0.7 09/06/2024    ALKPHOS 99 09/06/2024    AST 30 09/06/2024    ALT 38 09/06/2024    ANIONGAP 12 09/06/2024    ESTGFRAFRICA >60.0 07/08/2022    EGFRNONAA >60.0 07/08/2022    TSH 1.480 05/03/2024     Assessment and Plan     1. Type 2 diabetes mellitus with both eyes affected by mild nonproliferative retinopathy without macular edema, with long-term current use of insulin  Comprehensive Metabolic Panel    Hemoglobin A1C    Microalbumin/Creatinine Ratio, Urine    empagliflozin (JARDIANCE) 10 mg tablet    empagliflozin (JARDIANCE) 25 mg tablet      2. Hyperlipidemia associated with type 2 diabetes mellitus        3. Hypertension associated with diabetes          Type 2 diabetes mellitus with both eyes affected by mild nonproliferative retinopathy without macular edema, with long-term current use of insulin  -- Reviewed goals of therapy are to get the best control we can without  hypoglycemia  A1c above goal but improving     Medication Changes   Stop Metformin component of Synjardy due to diarrhea and nausea  Restart Jardiance 10 mg daily for one month and then increase to 25 mg daily  Increase Mounjaro 15 mg weekly  Decrease Toujeo 13 units daily   Continue  Lyumjev/Humalog/Novolog 10 units before breakfast and dinner (occasionally missing dinner dose)  Starlix 120 mg before lunch -occasionally missing    SATNAM slightly +  -- Reviewed patient's current insulin regimen. Clarified proper insulin dose and timing in relation to meals, etc. Insulin injection sites and proper rotation instructed.    -- Advised frequent self blood glucose monitoring.  Patient encouraged to document glucose results and bring them to every clinic visit    -- Hypoglycemia precautions discussed. Instructed on precautions before driving.    -- Call for Bg repeatedly < 90 or > 180.   -- Close adherence to lifestyle changes recommended.   -- Periodic follow ups for eye evaluations, foot care and dental care suggested.    Hyperlipidemia associated with type 2 diabetes mellitus  On statin per ADA recommendations   LDL goal <70  Not at goal as he was missing doses   May need to add PCSK9    Hypertension associated with diabetes  Blood pressure at goal in clinic today.  Continue current medications which include ACEI/ARB.     Visit today included increased complexity associated with the care of episodic problems type 2 diabetes, hypertension, hyperlipidemia addressed and managing longitudinal care of the patient due to serious managed problems type 2 diabetes, hypertension, hyperlipidemia    Betzaida Rodriguez NP     Follow up in about 4 months (around 1/27/2025).  Labs on RTC

## 2024-09-24 DIAGNOSIS — Z95.5 STENTED CORONARY ARTERY: Chronic | ICD-10-CM

## 2024-09-24 RX ORDER — TICAGRELOR 90 MG/1
90 TABLET ORAL 2 TIMES DAILY
Qty: 180 TABLET | Refills: 0 | Status: SHIPPED | OUTPATIENT
Start: 2024-09-24

## 2024-09-27 ENCOUNTER — PATIENT MESSAGE (OUTPATIENT)
Dept: ENDOCRINOLOGY | Facility: CLINIC | Age: 59
End: 2024-09-27

## 2024-09-27 ENCOUNTER — OFFICE VISIT (OUTPATIENT)
Dept: ENDOCRINOLOGY | Facility: CLINIC | Age: 59
End: 2024-09-27
Payer: COMMERCIAL

## 2024-09-27 VITALS
HEIGHT: 73 IN | HEART RATE: 88 BPM | DIASTOLIC BLOOD PRESSURE: 64 MMHG | WEIGHT: 204.06 LBS | BODY MASS INDEX: 27.04 KG/M2 | OXYGEN SATURATION: 95 % | SYSTOLIC BLOOD PRESSURE: 114 MMHG

## 2024-09-27 DIAGNOSIS — E78.5 HYPERLIPIDEMIA ASSOCIATED WITH TYPE 2 DIABETES MELLITUS: Chronic | ICD-10-CM

## 2024-09-27 DIAGNOSIS — I15.2 HYPERTENSION ASSOCIATED WITH DIABETES: Chronic | ICD-10-CM

## 2024-09-27 DIAGNOSIS — Z79.4 TYPE 2 DIABETES MELLITUS WITH BOTH EYES AFFECTED BY MILD NONPROLIFERATIVE RETINOPATHY WITHOUT MACULAR EDEMA, WITH LONG-TERM CURRENT USE OF INSULIN: Primary | ICD-10-CM

## 2024-09-27 DIAGNOSIS — E11.69 HYPERLIPIDEMIA ASSOCIATED WITH TYPE 2 DIABETES MELLITUS: Chronic | ICD-10-CM

## 2024-09-27 DIAGNOSIS — E11.59 HYPERTENSION ASSOCIATED WITH DIABETES: Chronic | ICD-10-CM

## 2024-09-27 DIAGNOSIS — E11.3293 TYPE 2 DIABETES MELLITUS WITH BOTH EYES AFFECTED BY MILD NONPROLIFERATIVE RETINOPATHY WITHOUT MACULAR EDEMA, WITH LONG-TERM CURRENT USE OF INSULIN: Primary | ICD-10-CM

## 2024-09-27 PROCEDURE — 99999 PR PBB SHADOW E&M-EST. PATIENT-LVL V: CPT | Mod: PBBFAC,,, | Performed by: NURSE PRACTITIONER

## 2024-09-27 NOTE — PATIENT INSTRUCTIONS
Inspire Sleep Apnea [facility] [place] - Obstructive Sleep Apnea Treatment     Inspire sleep device- watch video    Medication Changes   Stop Metformin component of Synjardy due to diarrhea and nausea  Restart Jardiance 10 mg daily for one month and then increase to 25 mg daily  Increase Mounjaro 15 mg weekly  Decrease Toujeo 13 units daily   Continue  Lyumjev/Humalog/Novolog 10 units before breakfast and dinner (occasionally missing dinner dose)  Starlix 120 mg before lunch -occasionally missing

## 2024-09-27 NOTE — ASSESSMENT & PLAN NOTE
On statin per ADA recommendations   LDL goal <70  Not at goal as he was missing doses   May need to add PCSK9

## 2024-09-27 NOTE — ASSESSMENT & PLAN NOTE
-- Reviewed goals of therapy are to get the best control we can without hypoglycemia  A1c above goal but improving     Medication Changes   Stop Metformin component of Synjardy due to diarrhea and nausea  Restart Jardiance 10 mg daily for one month and then increase to 25 mg daily  Increase Mounjaro 15 mg weekly  Decrease Toujeo 13 units daily   Continue  Lyumjev/Humalog/Novolog 10 units before breakfast and dinner (occasionally missing dinner dose)  Starlix 120 mg before lunch -occasionally missing    SATNAM slightly +  -- Reviewed patient's current insulin regimen. Clarified proper insulin dose and timing in relation to meals, etc. Insulin injection sites and proper rotation instructed.    -- Advised frequent self blood glucose monitoring.  Patient encouraged to document glucose results and bring them to every clinic visit    -- Hypoglycemia precautions discussed. Instructed on precautions before driving.    -- Call for Bg repeatedly < 90 or > 180.   -- Close adherence to lifestyle changes recommended.   -- Periodic follow ups for eye evaluations, foot care and dental care suggested.

## 2024-10-22 NOTE — PATIENT INSTRUCTIONS
Please check your blood sugar at least twice daily (every day fasting and then choose 1 other time of day which you can vary).  Please write down your blood sugar numbers on the sugar log and be sure to bring her log with you to all visits.    Okay to try and start the dexcom at home using videos from the dexcom website or youtube.  Be sure to bring your dexcom and reader to your next visit.    
No

## 2024-10-30 ENCOUNTER — TELEPHONE (OUTPATIENT)
Dept: PHARMACY | Facility: CLINIC | Age: 59
End: 2024-10-30
Payer: COMMERCIAL

## 2024-10-30 DIAGNOSIS — E11.3293 TYPE 2 DIABETES MELLITUS WITH BOTH EYES AFFECTED BY MILD NONPROLIFERATIVE RETINOPATHY WITHOUT MACULAR EDEMA, WITH LONG-TERM CURRENT USE OF INSULIN: ICD-10-CM

## 2024-10-30 DIAGNOSIS — Z79.4 TYPE 2 DIABETES MELLITUS WITH BOTH EYES AFFECTED BY MILD NONPROLIFERATIVE RETINOPATHY WITHOUT MACULAR EDEMA, WITH LONG-TERM CURRENT USE OF INSULIN: ICD-10-CM

## 2024-10-30 RX ORDER — INSULIN GLARGINE 300 U/ML
INJECTION, SOLUTION SUBCUTANEOUS
Qty: 12 ML | Refills: 0 | Status: SHIPPED | OUTPATIENT
Start: 2024-10-30

## 2025-01-02 ENCOUNTER — PATIENT MESSAGE (OUTPATIENT)
Dept: ENDOCRINOLOGY | Facility: CLINIC | Age: 60
End: 2025-01-02
Payer: COMMERCIAL

## 2025-01-06 DIAGNOSIS — Z79.4 TYPE 2 DIABETES MELLITUS WITH BOTH EYES AFFECTED BY MILD NONPROLIFERATIVE RETINOPATHY WITHOUT MACULAR EDEMA, WITH LONG-TERM CURRENT USE OF INSULIN: ICD-10-CM

## 2025-01-06 DIAGNOSIS — E11.3293 TYPE 2 DIABETES MELLITUS WITH BOTH EYES AFFECTED BY MILD NONPROLIFERATIVE RETINOPATHY WITHOUT MACULAR EDEMA, WITH LONG-TERM CURRENT USE OF INSULIN: ICD-10-CM

## 2025-01-06 RX ORDER — INSULIN ASPART 100 [IU]/ML
INJECTION, SOLUTION INTRAVENOUS; SUBCUTANEOUS
Qty: 15 ML | Refills: 2 | Status: SHIPPED | OUTPATIENT
Start: 2025-01-06

## 2025-01-07 DIAGNOSIS — E11.42 TYPE 2 DIABETES MELLITUS WITH DIABETIC POLYNEUROPATHY, WITH LONG-TERM CURRENT USE OF INSULIN: Chronic | ICD-10-CM

## 2025-01-07 DIAGNOSIS — Z79.4 TYPE 2 DIABETES MELLITUS WITH DIABETIC POLYNEUROPATHY, WITH LONG-TERM CURRENT USE OF INSULIN: Chronic | ICD-10-CM

## 2025-01-08 RX ORDER — NATEGLINIDE 120 MG/1
TABLET ORAL
Qty: 90 TABLET | Refills: 2 | Status: SHIPPED | OUTPATIENT
Start: 2025-01-08

## 2025-01-20 ENCOUNTER — PATIENT MESSAGE (OUTPATIENT)
Dept: CARDIOLOGY | Facility: CLINIC | Age: 60
End: 2025-01-20
Payer: COMMERCIAL

## 2025-01-20 DIAGNOSIS — Z95.5 STENTED CORONARY ARTERY: Chronic | ICD-10-CM

## 2025-01-24 ENCOUNTER — TELEPHONE (OUTPATIENT)
Dept: FAMILY MEDICINE | Facility: CLINIC | Age: 60
End: 2025-01-24
Payer: COMMERCIAL

## 2025-01-24 ENCOUNTER — LAB VISIT (OUTPATIENT)
Dept: LAB | Facility: HOSPITAL | Age: 60
End: 2025-01-24
Attending: FAMILY MEDICINE
Payer: COMMERCIAL

## 2025-01-24 DIAGNOSIS — Z12.5 SCREENING PSA (PROSTATE SPECIFIC ANTIGEN): ICD-10-CM

## 2025-01-24 DIAGNOSIS — Z79.4 TYPE 2 DIABETES MELLITUS WITH DIABETIC POLYNEUROPATHY, WITH LONG-TERM CURRENT USE OF INSULIN: ICD-10-CM

## 2025-01-24 DIAGNOSIS — E11.42 TYPE 2 DIABETES MELLITUS WITH DIABETIC POLYNEUROPATHY, WITH LONG-TERM CURRENT USE OF INSULIN: ICD-10-CM

## 2025-01-24 DIAGNOSIS — E11.3293 TYPE 2 DIABETES MELLITUS WITH BOTH EYES AFFECTED BY MILD NONPROLIFERATIVE RETINOPATHY WITHOUT MACULAR EDEMA, WITH LONG-TERM CURRENT USE OF INSULIN: ICD-10-CM

## 2025-01-24 DIAGNOSIS — E11.69 HYPERLIPIDEMIA ASSOCIATED WITH TYPE 2 DIABETES MELLITUS: Chronic | ICD-10-CM

## 2025-01-24 DIAGNOSIS — E78.5 HYPERLIPIDEMIA ASSOCIATED WITH TYPE 2 DIABETES MELLITUS: Chronic | ICD-10-CM

## 2025-01-24 DIAGNOSIS — Z79.4 TYPE 2 DIABETES MELLITUS WITH BOTH EYES AFFECTED BY MILD NONPROLIFERATIVE RETINOPATHY WITHOUT MACULAR EDEMA, WITH LONG-TERM CURRENT USE OF INSULIN: ICD-10-CM

## 2025-01-24 DIAGNOSIS — Z79.899 ENCOUNTER FOR LONG-TERM CURRENT USE OF MEDICATION: ICD-10-CM

## 2025-01-24 DIAGNOSIS — I70.201 TIBIAL ARTERY STENOSIS, RIGHT: ICD-10-CM

## 2025-01-24 DIAGNOSIS — I73.9 PAD (PERIPHERAL ARTERY DISEASE): ICD-10-CM

## 2025-01-24 DIAGNOSIS — Z95.5 STENTED CORONARY ARTERY: Chronic | ICD-10-CM

## 2025-01-24 DIAGNOSIS — Z00.00 VISIT FOR WELL MAN HEALTH CHECK: ICD-10-CM

## 2025-01-24 DIAGNOSIS — I10 ESSENTIAL HYPERTENSION: ICD-10-CM

## 2025-01-24 LAB
ALBUMIN SERPL BCP-MCNC: 4.6 G/DL (ref 3.5–5.2)
ALBUMIN/CREAT UR: 147.2 UG/MG (ref 0–30)
ALP SERPL-CCNC: 107 U/L (ref 38–126)
ALT SERPL W/O P-5'-P-CCNC: 45 U/L (ref 10–44)
ANION GAP SERPL CALC-SCNC: 8 MMOL/L (ref 8–16)
AST SERPL-CCNC: 40 U/L (ref 15–46)
BASOPHILS # BLD AUTO: 0.03 K/UL (ref 0–0.2)
BASOPHILS NFR BLD: 0.5 % (ref 0–1.9)
BILIRUB SERPL-MCNC: 0.6 MG/DL (ref 0.1–1)
CALCIUM SERPL-MCNC: 9.7 MG/DL (ref 8.7–10.5)
CHLORIDE SERPL-SCNC: 101 MMOL/L (ref 95–110)
CO2 SERPL-SCNC: 28 MMOL/L (ref 23–29)
CREAT SERPL-MCNC: 1.11 MG/DL (ref 0.5–1.4)
CREAT UR-MCNC: 53 MG/DL (ref 23–375)
DIFFERENTIAL METHOD BLD: ABNORMAL
EOSINOPHIL # BLD AUTO: 0.1 K/UL (ref 0–0.5)
EOSINOPHIL NFR BLD: 1.1 % (ref 0–8)
ERYTHROCYTE [DISTWIDTH] IN BLOOD BY AUTOMATED COUNT: 12.6 % (ref 11.5–14.5)
EST. GFR  (NO RACE VARIABLE): >60 ML/MIN/1.73 M^2
GLUCOSE SERPL-MCNC: 130 MG/DL (ref 70–110)
HCT VFR BLD AUTO: 57.1 % (ref 40–54)
HGB BLD-MCNC: 19.2 G/DL (ref 14–18)
IMM GRANULOCYTES # BLD AUTO: 0.02 K/UL (ref 0–0.04)
IMM GRANULOCYTES NFR BLD AUTO: 0.3 % (ref 0–0.5)
LYMPHOCYTES # BLD AUTO: 1.7 K/UL (ref 1–4.8)
LYMPHOCYTES NFR BLD: 26.8 % (ref 18–48)
MCH RBC QN AUTO: 32.1 PG (ref 27–31)
MCHC RBC AUTO-ENTMCNC: 33.6 G/DL (ref 32–36)
MCV RBC AUTO: 96 FL (ref 82–98)
MICROALBUMIN UR DL<=1MG/L-MCNC: 78 UG/ML
MONOCYTES # BLD AUTO: 0.7 K/UL (ref 0.3–1)
MONOCYTES NFR BLD: 10.7 % (ref 4–15)
NEUTROPHILS # BLD AUTO: 3.8 K/UL (ref 1.8–7.7)
NEUTROPHILS NFR BLD: 60.6 % (ref 38–73)
NRBC BLD-RTO: 0 /100 WBC
PLATELET # BLD AUTO: 173 K/UL (ref 150–450)
PMV BLD AUTO: 9.8 FL (ref 9.2–12.9)
POTASSIUM SERPL-SCNC: 4.6 MMOL/L (ref 3.5–5.1)
PROT SERPL-MCNC: 8.2 G/DL (ref 6–8.4)
RBC # BLD AUTO: 5.98 M/UL (ref 4.6–6.2)
SODIUM SERPL-SCNC: 137 MMOL/L (ref 136–145)
TSH SERPL DL<=0.005 MIU/L-ACNC: 0.92 UIU/ML (ref 0.4–4)
UUN UR-MCNC: 16 MG/DL (ref 2–20)
WBC # BLD AUTO: 6.34 K/UL (ref 3.9–12.7)

## 2025-01-24 PROCEDURE — 80053 COMPREHEN METABOLIC PANEL: CPT | Mod: PN | Performed by: FAMILY MEDICINE

## 2025-01-24 PROCEDURE — 84153 ASSAY OF PSA TOTAL: CPT | Performed by: FAMILY MEDICINE

## 2025-01-24 PROCEDURE — 82043 UR ALBUMIN QUANTITATIVE: CPT | Mod: PN | Performed by: NURSE PRACTITIONER

## 2025-01-24 PROCEDURE — 82985 ASSAY OF GLYCATED PROTEIN: CPT | Mod: PN | Performed by: NURSE PRACTITIONER

## 2025-01-24 PROCEDURE — 80061 LIPID PANEL: CPT | Performed by: FAMILY MEDICINE

## 2025-01-24 PROCEDURE — 36415 COLL VENOUS BLD VENIPUNCTURE: CPT | Mod: PN | Performed by: NURSE PRACTITIONER

## 2025-01-24 PROCEDURE — 84443 ASSAY THYROID STIM HORMONE: CPT | Mod: PN | Performed by: FAMILY MEDICINE

## 2025-01-24 PROCEDURE — 82607 VITAMIN B-12: CPT | Mod: PN | Performed by: FAMILY MEDICINE

## 2025-01-24 PROCEDURE — 83036 HEMOGLOBIN GLYCOSYLATED A1C: CPT | Performed by: FAMILY MEDICINE

## 2025-01-24 PROCEDURE — 85025 COMPLETE CBC W/AUTO DIFF WBC: CPT | Mod: PN | Performed by: FAMILY MEDICINE

## 2025-01-24 PROCEDURE — 82306 VITAMIN D 25 HYDROXY: CPT | Mod: PN | Performed by: FAMILY MEDICINE

## 2025-01-24 NOTE — TELEPHONE ENCOUNTER
----- Message from Sofy sent at 1/24/2025  2:17 PM CST -----  Type:  Needs Medical Advice    Who Called: pt  Would the patient rather a call back or a response via [facility]? call  Best Call Back Number:  [phone]  Additional Information: pt calling in regards to getting brain scan done and wanting to reschedule with office until he gets the scan done wanting call from office regarding speak with insurance

## 2025-01-24 NOTE — TELEPHONE ENCOUNTER
Pt phoned states he was calling to get a follow up 1 year mri scheduled before his appt in jan, per note from Jan 2024, asked pt if he is still having symptoms the dr will refer him to neurology, pt states he does not remember the symptoms, pt advised to keep appt to discuss with Dr Pond for recommendations, verb understanding

## 2025-01-25 LAB
25(OH)D3+25(OH)D2 SERPL-MCNC: 32 NG/ML (ref 30–96)
CHOLEST SERPL-MCNC: 165 MG/DL (ref 120–199)
CHOLEST/HDLC SERPL: 2.9 {RATIO} (ref 2–5)
COMPLEXED PSA SERPL-MCNC: 1.2 NG/ML (ref 0–4)
ESTIMATED AVG GLUCOSE: 163 MG/DL (ref 68–131)
HBA1C MFR BLD: 7.3 % (ref 4–5.6)
HDLC SERPL-MCNC: 57 MG/DL (ref 40–75)
HDLC SERPL: 34.5 % (ref 20–50)
LDLC SERPL CALC-MCNC: 84.8 MG/DL (ref 63–159)
NONHDLC SERPL-MCNC: 108 MG/DL
TRIGL SERPL-MCNC: 116 MG/DL (ref 30–150)
VIT B12 SERPL-MCNC: 1414 PG/ML (ref 210–950)

## 2025-01-27 LAB — FRUCTOSAMINE SERPL-SCNC: 415 UMOL /L (ref 151–300)

## 2025-01-31 ENCOUNTER — OFFICE VISIT (OUTPATIENT)
Dept: FAMILY MEDICINE | Facility: CLINIC | Age: 60
End: 2025-01-31
Payer: COMMERCIAL

## 2025-01-31 VITALS
TEMPERATURE: 99 F | SYSTOLIC BLOOD PRESSURE: 116 MMHG | HEIGHT: 73 IN | BODY MASS INDEX: 26.88 KG/M2 | WEIGHT: 202.81 LBS | DIASTOLIC BLOOD PRESSURE: 62 MMHG | HEART RATE: 71 BPM | OXYGEN SATURATION: 96 %

## 2025-01-31 DIAGNOSIS — I70.201 TIBIAL ARTERY STENOSIS, RIGHT: ICD-10-CM

## 2025-01-31 DIAGNOSIS — Z79.4 TYPE 2 DIABETES MELLITUS WITH DIABETIC POLYNEUROPATHY, WITH LONG-TERM CURRENT USE OF INSULIN: ICD-10-CM

## 2025-01-31 DIAGNOSIS — D58.2 ELEVATED HEMOGLOBIN: ICD-10-CM

## 2025-01-31 DIAGNOSIS — E11.59 HYPERTENSION ASSOCIATED WITH DIABETES: Chronic | ICD-10-CM

## 2025-01-31 DIAGNOSIS — E11.69 HYPERLIPIDEMIA ASSOCIATED WITH TYPE 2 DIABETES MELLITUS: Chronic | ICD-10-CM

## 2025-01-31 DIAGNOSIS — R90.82 WHITE MATTER ABNORMALITY ON MRI OF BRAIN: ICD-10-CM

## 2025-01-31 DIAGNOSIS — E78.49 OTHER HYPERLIPIDEMIA: ICD-10-CM

## 2025-01-31 DIAGNOSIS — I15.2 HYPERTENSION ASSOCIATED WITH DIABETES: Chronic | ICD-10-CM

## 2025-01-31 DIAGNOSIS — Z00.00 VISIT FOR WELL MAN HEALTH CHECK: Primary | ICD-10-CM

## 2025-01-31 DIAGNOSIS — I73.9 PAD (PERIPHERAL ARTERY DISEASE): ICD-10-CM

## 2025-01-31 DIAGNOSIS — Z95.5 STENTED CORONARY ARTERY: Chronic | ICD-10-CM

## 2025-01-31 DIAGNOSIS — E78.5 HYPERLIPIDEMIA ASSOCIATED WITH TYPE 2 DIABETES MELLITUS: Chronic | ICD-10-CM

## 2025-01-31 DIAGNOSIS — I10 ESSENTIAL HYPERTENSION: ICD-10-CM

## 2025-01-31 DIAGNOSIS — E11.42 TYPE 2 DIABETES MELLITUS WITH DIABETIC POLYNEUROPATHY, WITH LONG-TERM CURRENT USE OF INSULIN: ICD-10-CM

## 2025-01-31 PROBLEM — L98.9 ARM SKIN LESION, LEFT: Status: RESOLVED | Noted: 2022-03-18 | Resolved: 2025-01-31

## 2025-01-31 PROCEDURE — 3008F BODY MASS INDEX DOCD: CPT | Mod: CPTII,S$GLB,, | Performed by: FAMILY MEDICINE

## 2025-01-31 PROCEDURE — 3078F DIAST BP <80 MM HG: CPT | Mod: CPTII,S$GLB,, | Performed by: FAMILY MEDICINE

## 2025-01-31 PROCEDURE — 99396 PREV VISIT EST AGE 40-64: CPT | Mod: S$GLB,,, | Performed by: FAMILY MEDICINE

## 2025-01-31 PROCEDURE — 99999 PR PBB SHADOW E&M-EST. PATIENT-LVL V: CPT | Mod: PBBFAC,,, | Performed by: FAMILY MEDICINE

## 2025-01-31 PROCEDURE — 1160F RVW MEDS BY RX/DR IN RCRD: CPT | Mod: CPTII,S$GLB,, | Performed by: FAMILY MEDICINE

## 2025-01-31 PROCEDURE — 3060F POS MICROALBUMINURIA REV: CPT | Mod: CPTII,S$GLB,, | Performed by: FAMILY MEDICINE

## 2025-01-31 PROCEDURE — 3066F NEPHROPATHY DOC TX: CPT | Mod: CPTII,S$GLB,, | Performed by: FAMILY MEDICINE

## 2025-01-31 PROCEDURE — 3074F SYST BP LT 130 MM HG: CPT | Mod: CPTII,S$GLB,, | Performed by: FAMILY MEDICINE

## 2025-01-31 PROCEDURE — 1159F MED LIST DOCD IN RCRD: CPT | Mod: CPTII,S$GLB,, | Performed by: FAMILY MEDICINE

## 2025-01-31 PROCEDURE — 3051F HG A1C>EQUAL 7.0%<8.0%: CPT | Mod: CPTII,S$GLB,, | Performed by: FAMILY MEDICINE

## 2025-01-31 PROCEDURE — 4010F ACE/ARB THERAPY RXD/TAKEN: CPT | Mod: CPTII,S$GLB,, | Performed by: FAMILY MEDICINE

## 2025-01-31 RX ORDER — CILOSTAZOL 50 MG/1
50 TABLET ORAL 2 TIMES DAILY
Qty: 180 TABLET | Refills: 3 | Status: SHIPPED | OUTPATIENT
Start: 2025-01-31 | End: 2026-01-31

## 2025-01-31 RX ORDER — VALSARTAN 320 MG/1
320 TABLET ORAL DAILY
Qty: 90 TABLET | Refills: 3 | Status: SHIPPED | OUTPATIENT
Start: 2025-01-31 | End: 2026-01-31

## 2025-01-31 RX ORDER — FENOFIBRATE 160 MG/1
160 TABLET ORAL DAILY
Qty: 90 TABLET | Refills: 3 | Status: SHIPPED | OUTPATIENT
Start: 2025-01-31 | End: 2026-01-31

## 2025-01-31 RX ORDER — ATORVASTATIN CALCIUM 80 MG/1
80 TABLET, FILM COATED ORAL DAILY
Qty: 90 TABLET | Refills: 1 | Status: SHIPPED | OUTPATIENT
Start: 2025-01-31

## 2025-01-31 RX ORDER — METOPROLOL SUCCINATE 25 MG/1
25 TABLET, EXTENDED RELEASE ORAL DAILY
Qty: 90 TABLET | Refills: 3 | Status: SHIPPED | OUTPATIENT
Start: 2025-01-31

## 2025-01-31 NOTE — PROGRESS NOTES
Subjective:       Patient ID: Yon Loyd is a 59 y.o. male.    Chief Complaint: Diabetes and Annual Exam      Yon Loyd is a 58 y.o. male who presents today for an annual exam     Labs: reviewed      Colon Cancer Screening: Last Colonoscopy completed on 10/29/2021      DM: On jardiance 25 mg, insulin, starlix, LA 26 U, SA 10 U BIDWM. He has been on mounjaro 15 mg weekly. A1c trending up. Has been slacking on SA insulin. No pain with urination, no abdominal pain. No trouble swallowing. Seeing Betzaida Rodriguez in endocrinology.   HTN: on metoprolol 25 mg xr and valsartan 320 mg.   Proteinuria: on valsartan 320 and jardiance. Seeing renal. Proteinuria due to DM. worsening!  DLD: on statin 80 mg. Off zetia due to thigh pain. LDL > goal of 70. Will add fenofibrate on 1/31/2025.   PAD: on pletal, leg pain has improved.   CAD: seeing Dr. De Souza    Elevated hgb and elevated LFT noted: repeat in 1 month.      Labs: reviewed and ordered     C-scope: Last Colonoscopy completed on 10/29/2021     PMHx: reviewed in EMR and updated  Meds: reviewed in EMR and updated  Shx: reviewed in EMR and updated  FMHx: reviewed in EMR and updated   Social: reviewed in EMR and updated        Review of Systems   Constitutional:  Negative for chills and fever.   Respiratory:  Negative for shortness of breath.    Cardiovascular:  Negative for chest pain.   Gastrointestinal:  Negative for nausea and vomiting.   Neurological:  Negative for dizziness, light-headedness and headaches.         Health Maintenance Due   Topic Date Due    HIV Screening  Never done    Aspirin/Antiplatelet Therapy  Never done    RSV Vaccine (Age 60+ and Pregnant patients) (1 - Risk 60-74 years 1-dose series) Never done    COVID-19 Vaccine (4 - 2024-25 season) 09/01/2024    Diabetic Eye Exam  02/16/2025     Immunization History   Administered Date(s) Administered    COVID-19, MRNA, LN-S, PF (Pfizer) (Purple Cap) 03/01/2021, 03/22/2021, 08/14/2021    Influenza  10/24/2012    Influenza - Intradermal - Quadrivalent - PF 09/16/2013, 12/03/2014    Influenza - Intradermal - Trivalent - PF 09/16/2013, 12/03/2014    Influenza - Quadrivalent - PF *Preferred* (6 months and older) 10/24/2012, 01/08/2022    Influenza - Trivalent - Flucelvax - PF 09/20/2024    Influenza Split 10/24/2012    Pneumococcal Conjugate - 13 Valent 03/19/2015    Pneumococcal Conjugate - 20 Valent 09/22/2023    Pneumococcal Polysaccharide - 23 Valent 06/09/2016    Tdap 03/31/2023    Zoster Recombinant 03/31/2023, 09/22/2023           Results for orders placed or performed in visit on 01/24/25   Fructosamine    Collection Time: 01/24/25 11:08 AM   Result Value Ref Range    Fructosamine 415 (H) 151 - 300 umol /L   Hemoglobin A1C    Collection Time: 01/24/25 11:08 AM   Result Value Ref Range    Hemoglobin A1C 7.3 (H) 4.0 - 5.6 %    Estimated Avg Glucose 163 (H) 68 - 131 mg/dL   TSH    Collection Time: 01/24/25 11:08 AM   Result Value Ref Range    TSH 0.916 0.400 - 4.000 uIU/mL   Vitamin B12    Collection Time: 01/24/25 11:08 AM   Result Value Ref Range    Vitamin B-12 1414 (H) 210 - 950 pg/mL   Vitamin D    Collection Time: 01/24/25 11:08 AM   Result Value Ref Range    Vit D, 25-Hydroxy 32 30 - 96 ng/mL   PSA, Screening    Collection Time: 01/24/25 11:08 AM   Result Value Ref Range    PSA, Screen 1.2 0.00 - 4.00 ng/mL   CBC Auto Differential    Collection Time: 01/24/25 11:09 AM   Result Value Ref Range    WBC 6.34 3.90 - 12.70 K/uL    RBC 5.98 4.60 - 6.20 M/uL    Hemoglobin 19.2 (H) 14.0 - 18.0 g/dL    Hematocrit 57.1 (H) 40.0 - 54.0 %    MCV 96 82 - 98 fL    MCH 32.1 (H) 27.0 - 31.0 pg    MCHC 33.6 32.0 - 36.0 g/dL    RDW 12.6 11.5 - 14.5 %    Platelets 173 150 - 450 K/uL    MPV 9.8 9.2 - 12.9 fL    Immature Granulocytes 0.3 0.0 - 0.5 %    Gran # (ANC) 3.8 1.8 - 7.7 K/uL    Immature Grans (Abs) 0.02 0.00 - 0.04 K/uL    Lymph # 1.7 1.0 - 4.8 K/uL    Mono # 0.7 0.3 - 1.0 K/uL    Eos # 0.1 0.0 - 0.5 K/uL    Baso #  "0.03 0.00 - 0.20 K/uL    nRBC 0 0 /100 WBC    Gran % 60.6 38.0 - 73.0 %    Lymph % 26.8 18.0 - 48.0 %    Mono % 10.7 4.0 - 15.0 %    Eosinophil % 1.1 0.0 - 8.0 %    Basophil % 0.5 0.0 - 1.9 %    Differential Method Automated    Comprehensive Metabolic Panel    Collection Time: 01/24/25 11:09 AM   Result Value Ref Range    Sodium 137 136 - 145 mmol/L    Potassium 4.6 3.5 - 5.1 mmol/L    Chloride 101 95 - 110 mmol/L    CO2 28 23 - 29 mmol/L    Glucose 130 (H) 70 - 110 mg/dL    BUN 16 2 - 20 mg/dL    Creatinine 1.11 0.50 - 1.40 mg/dL    Calcium 9.7 8.7 - 10.5 mg/dL    Total Protein 8.2 6.0 - 8.4 g/dL    Albumin 4.6 3.5 - 5.2 g/dL    Total Bilirubin 0.6 0.1 - 1.0 mg/dL    Alkaline Phosphatase 107 38 - 126 U/L    AST 40 15 - 46 U/L    ALT 45 (H) 10 - 44 U/L    Anion Gap 8 8 - 16 mmol/L    eGFR >60.0 >60 mL/min/1.73 m^2   Lipid Panel    Collection Time: 01/24/25 11:09 AM   Result Value Ref Range    Cholesterol 165 120 - 199 mg/dL    Triglycerides 116 30 - 150 mg/dL    HDL 57 40 - 75 mg/dL    LDL Cholesterol 84.8 63.0 - 159.0 mg/dL    HDL/Cholesterol Ratio 34.5 20.0 - 50.0 %    Total Cholesterol/HDL Ratio 2.9 2.0 - 5.0    Non-HDL Cholesterol 108 mg/dL   Microalbumin/Creatinine Ratio, Urine    Collection Time: 01/24/25 11:15 AM   Result Value Ref Range    Microalbumin, Urine 78.0 ug/mL    Creatinine, Urine 53.0 23.0 - 375.0 mg/dL    Microalb/Creat Ratio 147.2 (H) 0.0 - 30.0 ug/mg       Objective:     Vitals:    01/31/25 0952   BP: 116/62   BP Location: Left arm   Patient Position: Sitting   Pulse: 71   Temp: 98.5 °F (36.9 °C)   SpO2: 96%   Weight: 92 kg (202 lb 13.2 oz)   Height: 6' 1" (1.854 m)        Physical Exam  Vitals and nursing note reviewed.   Constitutional:       General: He is not in acute distress.     Appearance: He is not ill-appearing, toxic-appearing or diaphoretic.   Cardiovascular:      Rate and Rhythm: Normal rate and regular rhythm.   Pulmonary:      Effort: Pulmonary effort is normal.      Breath " sounds: Normal breath sounds.   Abdominal:      Palpations: Abdomen is soft.      Tenderness: There is no abdominal tenderness.   Musculoskeletal:         General: No swelling.   Neurological:      General: No focal deficit present.      Mental Status: He is alert.   Psychiatric:         Mood and Affect: Mood normal.         Behavior: Behavior normal.         Thought Content: Thought content normal.         Judgment: Judgment normal.         Assessment:       1. Visit for well man health check    2. Hyperlipidemia associated with type 2 diabetes mellitus    3. Stented coronary artery    4. Hypertension associated with diabetes    5. Essential hypertension    6. Type 2 diabetes mellitus with diabetic polyneuropathy, with long-term current use of insulin    7. Other hyperlipidemia    8. Tibial artery stenosis, right    9. PAD (peripheral artery disease)    10. White matter abnormality on MRI of brain    11. Elevated hemoglobin        Plan:         Continue mounjaro per endocrinology  Continue jardiance 25 mg.   Insulin management per endocrine  Can continue starlix  Recommend taking night time dose of SA insulin and bringing log with this change to endocrine apt  If sugars still high, consider restarting metformin.   F/u with endocrine     Continue metoprolol at 25 mg  Continue valsartan 320  Goal BP: 120/80 to 130/80     Continue brilinta, NO aspirin, per cardiology  PAD: continue pletal     DLD: off zetia due to leg pain. Continue atorvatatin. Goal LDL <70. Add fenofibrate. Discuss cholesterol and leg pain with cardiology. They may need to consider starting PCSK9 inhibitor.   Consider decreasing statin due to persisting leg pain. Defer to cardiology on this.   Continue coq10 and fishoil as well      Continue weight loss  Increase exercise!!!    Repeat hgb in 1 month  If still high will place e consult  Patient is aware and acceptable to this.      MRI in January 2025 at [facility].   Ordered again  today  Consider neurology if abnormal imaging noted again.      F/u 3-4 months, labs prior.       Visit for well man health check    Hyperlipidemia associated with type 2 diabetes mellitus  -     atorvastatin (LIPITOR) 80 MG tablet; Take 1 tablet (80 mg total) by mouth once daily.  Dispense: 90 tablet; Refill: 1    Stented coronary artery  -     metoprolol succinate (TOPROL-XL) 25 MG 24 hr tablet; Take 1 tablet (25 mg total) by mouth once daily. Take in place of 50 mg.  Dispense: 90 tablet; Refill: 3    Hypertension associated with diabetes  -     metoprolol succinate (TOPROL-XL) 25 MG 24 hr tablet; Take 1 tablet (25 mg total) by mouth once daily. Take in place of 50 mg.  Dispense: 90 tablet; Refill: 3    Essential hypertension  -     valsartan (DIOVAN) 320 MG tablet; Take 1 tablet (320 mg total) by mouth once daily.  Dispense: 90 tablet; Refill: 3    Type 2 diabetes mellitus with diabetic polyneuropathy, with long-term current use of insulin  -     Comprehensive Metabolic Panel; Future; Expected date: 01/31/2025  -     Hemoglobin A1C; Future; Expected date: 01/31/2025  -     Ambulatory referral/consult to Optometry; Future; Expected date: 02/07/2025    Other hyperlipidemia  -     fenofibrate 160 MG Tab; Take 1 tablet (160 mg total) by mouth once daily.  Dispense: 90 tablet; Refill: 3    Tibial artery stenosis, right  -     cilostazoL (PLETAL) 50 MG Tab; Take 1 tablet (50 mg total) by mouth 2 (two) times daily.  Dispense: 180 tablet; Refill: 3    PAD (peripheral artery disease)  -     cilostazoL (PLETAL) 50 MG Tab; Take 1 tablet (50 mg total) by mouth 2 (two) times daily.  Dispense: 180 tablet; Refill: 3    White matter abnormality on MRI of brain  -     MRI Brain W WO Contrast; Future; Expected date: 01/31/2025    Elevated hemoglobin  -     CBC Auto Differential; Future; Expected date: 01/31/2025  -     Comprehensive Metabolic Panel; Future; Expected date: 01/31/2025  -     Iron and TIBC; Future; Expected date:  01/31/2025  -     FERRITIN; Future; Expected date: 01/31/2025

## 2025-01-31 NOTE — PATIENT INSTRUCTIONS
Continue mounjaro per endocrinology  Continue jardiance 25 mg.   Insulin management per endocrine  Can continue starlix  Recommend taking night time dose of SA insulin and bringing low with this change to endocrine apt  If sugars still high, consider restarting metformin.   F/u with endocrine     Continue metoprolol at 25 mg  Continue valsartan 320  Goal BP: 120/80 to 130/80     Continue brilinta, NO aspirin, per cardiology  PAD: continue pletal     DLD: off zetia due to leg pain. Continue atorvatatin. Goal LDL <70. Add fenofibrate. Discuss cholesterol and leg pain with cardiology. They may need to consider starting PCSK9 inhibitor.   Consider decreasing statin due to persisting leg pain. Defer to cardiology on this.   Continue coq10 and fishoil as well      Continue weight loss  Increase exercise!!!    Repeat hgb in 1 month  If still high will place e consult  Patient is aware and acceptable to this.      MRI in January 2025 at [facility].      F/u 3-4 months, labs prior.       RSV vaccines are licensed by the U.S. Food and Drug Administration for use in adults ages 60 and older in the [place].  CDC recommends everyone ages 75 and older get an RSV vaccine.  CDC recommends adults ages 60-74 who are at increased risk of severe RSV disease get an RSV vaccine.    Conditions that increase your risk for severe illness include:  Chronic heart or lung disease  Weakened immune system  Certain other medical conditions, including some people with diabetes and some people with obesity  Living in a nursing home

## 2025-02-03 NOTE — PROGRESS NOTES
Subjective:    02/04/2025     Patient ID: Yon Loyd is a 59 y.o. male.    Chief Complaint:  No chief complaint on file.    History of Present Illness  Yon Loyd with Type 2 DM complicated by CAD with 2 stents in Dec 2021, retinopathy, CKD and proteinuria, DLP and HTN presents today for follow up of T2DM. Previous patient of [facility]. Last visit with me in Sept 2024    At his last visit we increased Mounjaro and stopped Synjardy but restarted Jardiance alone due to nausea with metformin. He is feeling better today.     Since his last visit he has missed doses of nighttime insulin with dinner.     With regards to the diabetes:    Diagnosed with Type 2 diabetes in ~1993.  Been on insulin since ~2013. Started Trulicity 12/2016.   Family history of type 2 diabetes in mother and father.     DIABETES COMPLICATIONS:   Nephropathy +; sees nephrology -- follow up in one year per note  Retinopathy +; has scheduled in May 2025   Peripheral neuropathy +; tolerable; does not wish to see podiatry  CAD: +     Current regimen:  Mounjaro 15 mg weekly   Toujeo 13 units daily in the morning (24 hour insulin)  Lyumjev/Humalog/Novolog 10 units before breakfast and dinner   Starlix 120 mg before lunch -still missing and has alarm on phone     At his last visit we increased Mounjaro and stopped Synjardy but restarted Jardiance alone due to nausea with metformin. He is feeling better today.     Since his last visit he has missed doses of nighttime insulin with dinner.     He is trying to give novolog 10 minutes prior to a meal. He is rotating injection sites. He is now changing needle every time.     Other medications tried:  Invokana but stopped due to fear of side effects  Metformin- nausea    He is not taking blood sugars   DECLINES PERSONAL CGM   Hypoglycemic event- had one episode since last visit at 66; states very rare   Knows how to correct with 15 grams of carbs- juice, coke, or a peppermint.      Eats 3 meals a day.    Working on improving diet  Started using riced cauliflower.      He is doing better with diet since his last visit.    Exercise: walks often for work. 1-1.8 miles daily    Education - last visit: none; 1/2023-politely declines   Glucagon: does not want     Diabetes Management Status  Statin: Taking atorvastatin 80 mg daily   ACE/ARB: Taking valsartan 320 mg daily  ASA: yes    Lab Results   Component Value Date    HGBA1C 7.3 (H) 01/24/2025    HGBA1C 7.2 (H) 09/06/2024    HGBA1C 8.6 (H) 05/03/2024     Screening or Prevention Patient's value Goal Complete/Controlled?   HgA1C Testing and Control   Lab Results   Component Value Date    HGBA1C 7.3 (H) 01/24/2025      Annually/Less than 8% Yes   Lipid profile : 01/24/2025 Annually Yes   LDL control Lab Results   Component Value Date    LDLCALC 84.8 01/24/2025    Annually/Less than 100 mg/dl  Yes   Nephropathy screening Lab Results   Component Value Date    LABMICR 78.0 01/24/2025     Lab Results   Component Value Date    PROTEINUA Negative 05/03/2024    Annually No   Blood pressure BP Readings from Last 1 Encounters:   02/04/25 124/70    Less than 140/90 No   Dilated retinal exam : 02/16/2024 Annually Yes   Foot exam   : 09/27/2024 Annually Yes     With regards to dyslipidemia,      He is on atorvastatin 80 mg daily  He had intolerable SE to zetia     Latest Reference Range & Units 09/06/24 08:40 01/24/25 11:09   Cholesterol Total 120 - 199 mg/dL 165 165   HDL 40 - 75 mg/dL 51 57   HDL/Cholesterol Ratio 20.0 - 50.0 % 30.9 34.5   Non-HDL Cholesterol mg/dL 114 108   Total Cholesterol/HDL Ratio 2.0 - 5.0  3.2 2.9   Triglycerides 30 - 150 mg/dL 152 (H) 116   LDL Cholesterol 63.0 - 159.0 mg/dL 83.6 84.8   (H): Data is abnormally high    Hx of fatigue and snoring- he has been to sleep medicine and declines mask.  We discussed INSPIRE and he is considering    FIB-4 Calculation: 2.07 at 1/24/2025 11:09 AM  Calculated from:  SGOT/AST: 40 U/L at 1/24/2025 11:09 AM  SGPT/ALT: 45  U/L at 1/24/2025 11:09 AM  Platelets: 173 K/uL at 1/24/2025 11:09 AM  Age: 59 years     FIB-4 below 1.30 is considered as low-risk for advanced fibrosis  FIB-4 over 2.67 is considered as high-risk for advanced fibrosis  FIB-4 values between 1.30 and 2.67 are considered as intermediate-risk of advanced fibrosis for ages 36-64.     For ages > 64 the cut-off for low-risk goes to < 2.  This is a screening tool and clinical judgement should be used in the interpretation of these results.    Review of Systems   Constitutional:  Positive for fatigue. Negative for unexpected weight change.   Eyes:  Negative for visual disturbance.   Endocrine: Negative for polydipsia, polyphagia and polyuria.     Objective:   Constitutional:  Pleasant,  in no acute distress.   HENT:   Eyes:     No scleral icterus.   Respiratory:   Effort normal   Neurological:  normal speech  Psych:  Normal mood and affect.    Diabetes Foot Exam:  Feet no cuts or scratches  Shoes appropriate  sensation intact to vibration and monofilament    Body mass index is 27.9 kg/m².  Wt Readings from Last 3 Encounters:   02/04/25 1004 93.3 kg (205 lb 11 oz)   01/31/25 0952 92 kg (202 lb 13.2 oz)   09/27/24 0820 92.5 kg (204 lb 0.6 oz)     Vitals:    02/04/25 1004   BP: 124/70   Pulse: 89     Wt Readings from Last 3 Encounters:   02/04/25 1004 93.3 kg (205 lb 11 oz)   01/31/25 0952 92 kg (202 lb 13.2 oz)   09/27/24 0820 92.5 kg (204 lb 0.6 oz)     Lab Review:   Lab Results   Component Value Date    HGBA1C 7.3 (H) 01/24/2025     Lab Results   Component Value Date    CHOL 165 01/24/2025    HDL 57 01/24/2025    LDLCALC 84.8 01/24/2025    TRIG 116 01/24/2025    CHOLHDL 34.5 01/24/2025     Lab Results   Component Value Date     01/24/2025    K 4.6 01/24/2025     01/24/2025    CO2 28 01/24/2025     (H) 01/24/2025    BUN 16 01/24/2025    CREATININE 1.11 01/24/2025    CALCIUM 9.7 01/24/2025    PROT 8.2 01/24/2025    ALBUMIN 4.6 01/24/2025    BILITOT 0.6  01/24/2025    ALKPHOS 107 01/24/2025    AST 40 01/24/2025    ALT 45 (H) 01/24/2025    ANIONGAP 8 01/24/2025    ESTGFRAFRICA >60.0 07/08/2022    EGFRNONAA >60.0 07/08/2022    TSH 0.916 01/24/2025     Assessment and Plan     1. Type 2 diabetes mellitus with both eyes affected by mild nonproliferative retinopathy without macular edema, with long-term current use of insulin  FibroScan Otoe (Vibration Controlled Transient Elastography)    Comprehensive Metabolic Panel    Hemoglobin A1C    Lipid Panel    evolocumab (REPATHA SURECLICK) 140 mg/mL PnIj      2. Proteinuria due to type 2 diabetes mellitus        3. Hyperlipidemia associated with type 2 diabetes mellitus  evolocumab (REPATHA SURECLICK) 140 mg/mL PnIj      4. Hypertension associated with diabetes        5. At risk for impaired function of liver          Type 2 diabetes mellitus with both eyes affected by mild nonproliferative retinopathy without macular edema, with long-term current use of insulin  -- Reviewed goals of therapy are to get the best control we can without hypoglycemia  SATNAM slightly + but Cpeptide still normal.    Declines CGM  A1c above goal likely due to missed doses of nighttime insulin.  Reports low glucose event last night after dinner when he had low carb meal.   Check labs on RTC    Medication Changes   Continue Jardiance 25 mg daily   Continue Mounjaro 15 mg weekly  Continue Toujeo 13 units daily   Continue   Lyumjev/Humalog/Novolog 10 units before breakfast and dinner BUT if you are having a low carb meal; decrease to 7 units before that meal   Continue   Starlix 120 mg before lunch     -- Reviewed patient's current insulin regimen. Clarified proper insulin dose and timing in relation to meals, etc. Insulin injection sites and proper rotation instructed.    -- Advised frequent self blood glucose monitoring.  Patient encouraged to document glucose results and bring them to every clinic visit    -- Hypoglycemia precautions discussed.  Instructed on precautions before driving.    -- Call for Bg repeatedly < 90 or > 180.   -- Close adherence to lifestyle changes recommended.   -- Periodic follow ups for eye evaluations, foot care and dental care suggested.    Proteinuria due to type 2 diabetes mellitus  Improving  Sees nephrology     Hyperlipidemia associated with type 2 diabetes mellitus  On statin per ADA recommendations   LDL above goal of <70  He has tried zetia but stopped due to leg pain  Continue atorvastatin 80 mg daily   Start Repatha 140 mg every 2 weeks     Hypertension associated with diabetes  Blood pressure at goal in clinic today.  Continue current medications which include ACEI/ARB.     At risk for impaired function of liver  Check Fibroscan     Visit today included increased complexity associated with the care of episodic problems type 2 diabetes, hypertension, hyperlipidemia addressed and managing longitudinal care of the patient due to serious managed problems type 2 diabetes, hypertension, hyperlipidemia    Betzaida Rodriguez NP     Follow up in about 4 months (around 6/4/2025).  Labs on RTC

## 2025-02-04 ENCOUNTER — OFFICE VISIT (OUTPATIENT)
Dept: ENDOCRINOLOGY | Facility: CLINIC | Age: 60
End: 2025-02-04
Payer: COMMERCIAL

## 2025-02-04 VITALS
SYSTOLIC BLOOD PRESSURE: 124 MMHG | HEIGHT: 72 IN | DIASTOLIC BLOOD PRESSURE: 70 MMHG | WEIGHT: 205.69 LBS | HEART RATE: 89 BPM | OXYGEN SATURATION: 97 % | BODY MASS INDEX: 27.86 KG/M2

## 2025-02-04 DIAGNOSIS — E11.59 HYPERTENSION ASSOCIATED WITH DIABETES: Chronic | ICD-10-CM

## 2025-02-04 DIAGNOSIS — E11.29 PROTEINURIA DUE TO TYPE 2 DIABETES MELLITUS: Chronic | ICD-10-CM

## 2025-02-04 DIAGNOSIS — E11.69 HYPERLIPIDEMIA ASSOCIATED WITH TYPE 2 DIABETES MELLITUS: Chronic | ICD-10-CM

## 2025-02-04 DIAGNOSIS — Z91.89 AT RISK FOR IMPAIRED FUNCTION OF LIVER: ICD-10-CM

## 2025-02-04 DIAGNOSIS — E11.3293 TYPE 2 DIABETES MELLITUS WITH BOTH EYES AFFECTED BY MILD NONPROLIFERATIVE RETINOPATHY WITHOUT MACULAR EDEMA, WITH LONG-TERM CURRENT USE OF INSULIN: Primary | ICD-10-CM

## 2025-02-04 DIAGNOSIS — R80.9 PROTEINURIA DUE TO TYPE 2 DIABETES MELLITUS: Chronic | ICD-10-CM

## 2025-02-04 DIAGNOSIS — E78.5 HYPERLIPIDEMIA ASSOCIATED WITH TYPE 2 DIABETES MELLITUS: Chronic | ICD-10-CM

## 2025-02-04 DIAGNOSIS — Z79.4 TYPE 2 DIABETES MELLITUS WITH BOTH EYES AFFECTED BY MILD NONPROLIFERATIVE RETINOPATHY WITHOUT MACULAR EDEMA, WITH LONG-TERM CURRENT USE OF INSULIN: Primary | ICD-10-CM

## 2025-02-04 DIAGNOSIS — I15.2 HYPERTENSION ASSOCIATED WITH DIABETES: Chronic | ICD-10-CM

## 2025-02-04 PROCEDURE — 1159F MED LIST DOCD IN RCRD: CPT | Mod: CPTII,S$GLB,, | Performed by: NURSE PRACTITIONER

## 2025-02-04 PROCEDURE — 4010F ACE/ARB THERAPY RXD/TAKEN: CPT | Mod: CPTII,S$GLB,, | Performed by: NURSE PRACTITIONER

## 2025-02-04 PROCEDURE — 99999 PR PBB SHADOW E&M-EST. PATIENT-LVL IV: CPT | Mod: PBBFAC,,, | Performed by: NURSE PRACTITIONER

## 2025-02-04 PROCEDURE — 3066F NEPHROPATHY DOC TX: CPT | Mod: CPTII,S$GLB,, | Performed by: NURSE PRACTITIONER

## 2025-02-04 PROCEDURE — 1160F RVW MEDS BY RX/DR IN RCRD: CPT | Mod: CPTII,S$GLB,, | Performed by: NURSE PRACTITIONER

## 2025-02-04 PROCEDURE — 3008F BODY MASS INDEX DOCD: CPT | Mod: CPTII,S$GLB,, | Performed by: NURSE PRACTITIONER

## 2025-02-04 PROCEDURE — 99214 OFFICE O/P EST MOD 30 MIN: CPT | Mod: S$GLB,,, | Performed by: NURSE PRACTITIONER

## 2025-02-04 PROCEDURE — 3060F POS MICROALBUMINURIA REV: CPT | Mod: CPTII,S$GLB,, | Performed by: NURSE PRACTITIONER

## 2025-02-04 PROCEDURE — 3078F DIAST BP <80 MM HG: CPT | Mod: CPTII,S$GLB,, | Performed by: NURSE PRACTITIONER

## 2025-02-04 PROCEDURE — 3074F SYST BP LT 130 MM HG: CPT | Mod: CPTII,S$GLB,, | Performed by: NURSE PRACTITIONER

## 2025-02-04 PROCEDURE — 3051F HG A1C>EQUAL 7.0%<8.0%: CPT | Mod: CPTII,S$GLB,, | Performed by: NURSE PRACTITIONER

## 2025-02-04 PROCEDURE — G2211 COMPLEX E/M VISIT ADD ON: HCPCS | Mod: S$GLB,,, | Performed by: NURSE PRACTITIONER

## 2025-02-04 RX ORDER — EVOLOCUMAB 140 MG/ML
140 INJECTION, SOLUTION SUBCUTANEOUS
Qty: 6 ML | Refills: 3 | Status: ACTIVE | OUTPATIENT
Start: 2025-02-04 | End: 2026-02-04

## 2025-02-04 RX ORDER — EZETIMIBE 10 MG/1
10 TABLET ORAL DAILY
Qty: 90 TABLET | Refills: 3 | Status: CANCELLED | OUTPATIENT
Start: 2025-02-04 | End: 2026-02-04

## 2025-02-04 NOTE — PATIENT INSTRUCTIONS
Please make appointment with TERE Grewal in July 2025- kidney doctor    Elevated LFT   Check fibroscan -liver     GO GET SLEEP STUDY [facility] [place] - Obstructive Sleep Apnea Treatment     Diabetes  Declines CGM  A1c above goal likely due to missed doses of nighttime insulin.  Reports low glucose event last night after dinner when he had low carb meal.   Check labs on RTC    Medication Changes   Continue Jardiance 25 mg daily   Continue Mounjaro 15 mg weekly  Continue Toujeo 13 units daily   Continue   Lyumjev/Humalog/Novolog 10 units before breakfast and dinner BUT if you are having a low carb meal; decrease to 7 units before that meal   Continue   Starlix 120 mg before lunch     Cholesterol   LDL above goal of <70  He has tried zetia but stopped due to leg pain  Continue atorvastatin 80 mg daily   Start Repatha 140 mg every 2 weeks     Hypoglycemia - Low Blood Sugar    What can you do?  Rule of 15:   Test your blood sugar  If glucose is between 50-70 mg/dL then ingest 15 grams of fast-acting carbs  If glucose is less than 50 mg/dL then ingest 30 grams of fast-acting carbs  Ingest 15 grams of fast-acting carbohydrate - such as:   3-4 glucose tablets  4 oz juice  ½ can regular soda pop  15 skittles or mini jelly beans   Re-check your blood sugar in 15 minutes. If its less than 70mg/dl, repeat steps 2 and 3.  If your next meal is more than 1 hour away, eat an additional 15 grams of carbohydrate and 1 ounce of protein (examples include crackers with cheese or one-half of a sandwich with peanut butter). It is important not to eat too much because this can raise your blood sugar above the target level.      After your blood sugar has normalized, think about why you went low. If you notice a pattern of low blood sugars, contact your Diabetes Team. We may need to adjust your medication.

## 2025-02-04 NOTE — ASSESSMENT & PLAN NOTE
On statin per ADA recommendations   LDL above goal of <70  He has tried zetia but stopped due to leg pain  Continue atorvastatin 80 mg daily   Start Repatha 140 mg every 2 weeks

## 2025-02-04 NOTE — ASSESSMENT & PLAN NOTE
-- Reviewed goals of therapy are to get the best control we can without hypoglycemia  SATNAM slightly + but Cpeptide still normal.    Declines CGM  A1c above goal likely due to missed doses of nighttime insulin.  Reports low glucose event last night after dinner when he had low carb meal.   Check labs on RTC    Medication Changes   Continue Jardiance 25 mg daily   Continue Mounjaro 15 mg weekly  Continue Toujeo 13 units daily   Continue   Lyumjev/Humalog/Novolog 10 units before breakfast and dinner BUT if you are having a low carb meal; decrease to 7 units before that meal   Continue   Starlix 120 mg before lunch     -- Reviewed patient's current insulin regimen. Clarified proper insulin dose and timing in relation to meals, etc. Insulin injection sites and proper rotation instructed.    -- Advised frequent self blood glucose monitoring.  Patient encouraged to document glucose results and bring them to every clinic visit    -- Hypoglycemia precautions discussed. Instructed on precautions before driving.    -- Call for Bg repeatedly < 90 or > 180.   -- Close adherence to lifestyle changes recommended.   -- Periodic follow ups for eye evaluations, foot care and dental care suggested.

## 2025-02-05 DIAGNOSIS — I25.2 HISTORY OF NON-ST ELEVATION MYOCARDIAL INFARCTION (NSTEMI): Primary | Chronic | ICD-10-CM

## 2025-02-05 DIAGNOSIS — G47.33 OSA (OBSTRUCTIVE SLEEP APNEA): ICD-10-CM

## 2025-02-07 ENCOUNTER — PROCEDURE VISIT (OUTPATIENT)
Dept: HEPATOLOGY | Facility: CLINIC | Age: 60
End: 2025-02-07
Payer: COMMERCIAL

## 2025-02-07 ENCOUNTER — HOSPITAL ENCOUNTER (OUTPATIENT)
Dept: CARDIOLOGY | Facility: HOSPITAL | Age: 60
Discharge: HOME | End: 2025-02-07
Attending: INTERNAL MEDICINE
Payer: COMMERCIAL

## 2025-02-07 ENCOUNTER — HOSPITAL ENCOUNTER (OUTPATIENT)
Dept: CARDIOLOGY | Facility: CLINIC | Age: 60
Discharge: HOME | End: 2025-02-07
Payer: COMMERCIAL

## 2025-02-07 VITALS
BODY MASS INDEX: 27.47 KG/M2 | WEIGHT: 202.81 LBS | HEIGHT: 72 IN | HEART RATE: 88 BPM | SYSTOLIC BLOOD PRESSURE: 139 MMHG | DIASTOLIC BLOOD PRESSURE: 69 MMHG

## 2025-02-07 DIAGNOSIS — I25.2 HISTORY OF NON-ST ELEVATION MYOCARDIAL INFARCTION (NSTEMI): Chronic | ICD-10-CM

## 2025-02-07 DIAGNOSIS — G47.33 OSA (OBSTRUCTIVE SLEEP APNEA): ICD-10-CM

## 2025-02-07 DIAGNOSIS — E11.3293 TYPE 2 DIABETES MELLITUS WITH BOTH EYES AFFECTED BY MILD NONPROLIFERATIVE RETINOPATHY WITHOUT MACULAR EDEMA, WITH LONG-TERM CURRENT USE OF INSULIN: ICD-10-CM

## 2025-02-07 DIAGNOSIS — Z79.4 TYPE 2 DIABETES MELLITUS WITH BOTH EYES AFFECTED BY MILD NONPROLIFERATIVE RETINOPATHY WITHOUT MACULAR EDEMA, WITH LONG-TERM CURRENT USE OF INSULIN: ICD-10-CM

## 2025-02-07 LAB
ASCENDING AORTA: 3.39 CM
AV AREA BY CONTINUOUS VTI: 2.6 CM2
AV INDEX (PROSTH): 0.95
AV LVOT MEAN GRADIENT: 2 MMHG
AV LVOT PEAK GRADIENT: 4 MMHG
AV MEAN GRADIENT: 5 MMHG
AV PEAK GRADIENT: 7 MMHG
AV VALVE AREA BY VELOCITY RATIO: 2.7 CM²
AV VALVE AREA: 3.3 CM²
AV VELOCITY RATIO: 0.77
BSA FOR ECHO PROCEDURE: 2.16 M2
CV ECHO LV RWT: 0.35 CM
DOP CALC AO PEAK VEL: 1.3 M/S
DOP CALC AO VTI: 23.8 CM
DOP CALC LVOT AREA: 3.5 CM2
DOP CALC LVOT DIAMETER: 2.1 CM
DOP CALC LVOT PEAK VEL: 1 M/S
DOP CALC LVOT STROKE VOLUME: 78.2 CM3
DOP CALCLVOT PEAK VEL VTI: 22.6 CM
E WAVE DECELERATION TIME: 282 MS
E/A RATIO: 0.71
E/E' RATIO: 10 M/S
ECHO EF ESTIMATED: 67 %
ECHO LV POSTERIOR WALL: 0.7 CM (ref 0.6–1.1)
FRACTIONAL SHORTENING: 37.5 % (ref 28–44)
INTERVENTRICULAR SEPTUM: 1 CM (ref 0.6–1.1)
IVC DIAMETER: 0.78 CM
LA MAJOR: 5.6 CM
LA MINOR: 5.6 CM
LA WIDTH: 3 CM
LEFT ATRIUM SIZE: 3.1 CM
LEFT ATRIUM VOLUME INDEX MOD: 22 ML/M2
LEFT ATRIUM VOLUME INDEX: 21 ML/M2
LEFT ATRIUM VOLUME MOD: 48 ML
LEFT ATRIUM VOLUME: 44 CM3
LEFT INTERNAL DIMENSION IN SYSTOLE: 2.5 CM (ref 2.1–4)
LEFT VENTRICLE DIASTOLIC VOLUME INDEX: 32.85 ML/M2
LEFT VENTRICLE DIASTOLIC VOLUME: 70.3 ML
LEFT VENTRICLE MASS INDEX: 47.4 G/M2
LEFT VENTRICLE SYSTOLIC VOLUME INDEX: 10.8 ML/M2
LEFT VENTRICLE SYSTOLIC VOLUME: 23.04 ML
LEFT VENTRICULAR INTERNAL DIMENSION IN DIASTOLE: 4 CM (ref 3.5–6)
LEFT VENTRICULAR MASS: 101.4 G
LV LATERAL E/E' RATIO: 8.9
LV SEPTAL E/E' RATIO: 11.4
MV A" WAVE DURATION": 105.61 MS
MV PEAK A VEL: 1.13 M/S
MV PEAK E VEL: 0.8 M/S
MV PEAK GRADIENT: 2 MMHG
OHS CV RV/LV RATIO: 0.98 CM
OHS QRS DURATION: 96 MS
OHS QTC CALCULATION: 421 MS
PULM VEIN A" WAVE DURATION": 105.61 MS
PULM VEIN S/D RATIO: 1.54
PULMONIC VEIN PEAK A VELOCITY: 0.2 M/S
PV PEAK D VEL: 0.37 M/S
PV PEAK S VEL: 0.57 M/S
RA MAJOR: 4 CM
RA WIDTH: 2.72 CM
RIGHT ATRIAL AREA: 9.5 CM2
RIGHT VENTRICLE DIASTOLIC BASEL DIMENSION: 3.9 CM
SINUS: 3.62 CM
STJ: 3.08 CM
TDI LATERAL: 0.09 M/S
TDI SEPTAL: 0.07 M/S
TDI: 0.08 M/S
TRICUSPID ANNULAR PLANE SYSTOLIC EXCURSION: 2.3 CM
Z-SCORE OF LEFT VENTRICULAR DIMENSION IN END DIASTOLE: -5.43
Z-SCORE OF LEFT VENTRICULAR DIMENSION IN END SYSTOLE: -4.09

## 2025-02-07 PROCEDURE — 93306 TTE W/DOPPLER COMPLETE: CPT | Mod: 26,,, | Performed by: INTERNAL MEDICINE

## 2025-02-07 PROCEDURE — 93306 TTE W/DOPPLER COMPLETE: CPT

## 2025-02-07 PROCEDURE — 93005 ELECTROCARDIOGRAM TRACING: CPT | Mod: S$GLB,,, | Performed by: INTERNAL MEDICINE

## 2025-02-07 PROCEDURE — 93010 ELECTROCARDIOGRAM REPORT: CPT | Mod: S$GLB,,, | Performed by: INTERNAL MEDICINE

## 2025-02-07 PROCEDURE — 91200 LIVER ELASTOGRAPHY: CPT | Mod: S$GLB,,, | Performed by: NURSE PRACTITIONER

## 2025-02-07 NOTE — PROCEDURES
FibroScan Theresa (Vibration Controlled Transient Elastography)    Date/Time: 2/7/2025 11:00 AM    Performed by: Tracey Hayes NP  Authorized by: Betzaida Rodriguez NP    Diagnosis:  Other    Probe:  XL    Universal Protocol: Patient's identity, procedure and site were verified, confirmatory pause was performed.  Discussed procedure including risks and potential complications.  Questions answered.  Patient verbalizes understanding and wishes to proceed with VCTE.     Procedure: After providing explanations of the procedure, patient was placed in the supine position with right arm in maximum abduction to allow optimal exposure of right lateral abdomen.  Patient was briefly assessed, Testing was performed in the mid-axillary location, 50Hz Shear Wave pulses were applied and the resulting Shear Wave and Propagation Speed detected with a 3.5 MHz ultrasonic signal, using the FibroScan probe, Skin to liver capsule distance and liver parenchyma were accessed during the entire examination with the FibroScan probe, Patient was instructed to breathe normally and to abstain from sudden movements during the procedure, allowing for random measurements of liver stiffness. At least 10 Shear Waves were produced, Individual measurements of each Shear Wave were calculated.  Patient tolerated the procedure well with no complications.  Meets discharge criteria as was dismissed.  Rates pain 0 out of 10.  Patient will follow up with ordering provider to review results.    Findings  Median liver stiffness score:  3.7  CAP Reading: dB/m:  246    IQR/med %:  16  Interpretation  Fibrosis interpretation is based on medial liver stiffness - Kilopascal (kPa).    Fibrosis Stage:  F 0-1  Steatosis interpretation is based on controlled attenuation parameter - (dB/m).    Steatosis Grade:  <S1

## 2025-02-09 NOTE — PROGRESS NOTES
Subjective:   @Patient ID:  Yon Loyd is a 59 y.o. male who presents for evaluation of CAD    HPI:   2/10/2025:  F/U.  ER visit few months back with with chest pain.  Troponins negative.  Chest pain reminded him by his cardiac symptoms.  No recurrent chest pain since.  Zetia stopped due to musculoskeletal side effects.  Was started on cilostazol for PVD and his claudication improved.  Repatha ordered which will be started soon      12/2023:  Here for follow-up.  He has been doing okay since last time.  Main complaint today is right thigh pain that started about 2 months ago.  Seems ezetimibe was started in September.  He is already on a high dose of atorvastatin.  Muscle aches are not that bad he would like to monitor for now.  We discussed about PCSK9 inhibitor.  Blood pressure is okay.  He is on aspirin Brilinta without any bleeding issues    1/2023: Here for f/u. He is doing well. He missed statin for 3 months, that's why LDL significantly elevated. He is back on statin now.  He didn't do the cardiac rehab. Occasional minor chest pain. Last was 2 months ago. Lasts for seconds/mins. Just 2 times over the last 1.5 years.        Stopped tobacco  Compliant with DAPT. No bleeding issues        Prior cardiovascular  Hx  --------------------------------    - Heart Catheterization    12/27/2021  The ejection fraction was greater than 55% by visual estimate.  The pre-procedure left ventricular end diastolic pressure was 8.  The Mid LAD lesion was 99% stenosed with 0% stenosis post-intervention.  A STENT RESOLUTE DONI [id number] stent was successfully placed. Post dilated with 3.5 NC balloon. IVUS guided  The Mid Cx to Dist Cx lesion was 95% stenosed with 0% stenosis post-intervention.  A STENT RESOLUTE DONI [id number] stent was successfully placed to mid/distal LCX post dilated with 3.0 NC balloon. IVUS guided  The RPDA lesion was distal 99% stenosed. Small to moderate caliber vs. Very distal lesion. Will be treated  medically.  The estimated blood loss was none.     - ASA/Brilinta  - Statin   - Add Coreg   - Risk factors modifications  - Medical therapy for residual distal small Rt PDA lesion.     - NORIS 1/2022  Normal resting and exercise NORIS         - ECHO 12/27/2021  The left ventricle is normal in size with normal systolic function.  The estimated ejection fraction is 65%.  Normal left ventricular diastolic function.  Normal right ventricular size with normal right ventricular systolic function.  Mild mitral regurgitation.  Intermediate central venous pressure (8 mmHg).  The estimated PA systolic pressure is 22 mmHg.          Patient Active Problem List    Diagnosis Date Noted    At risk for impaired function of liver 02/04/2025    Sleep apnea 06/10/2024    KIMBER (obstructive sleep apnea) 06/10/2024    Tibial artery stenosis, right 05/17/2024    PAD (peripheral artery disease) 05/17/2024    History of skin cancer 01/19/2024 1/2023--BCC, left shoulder, s/p excision      Snores 12/29/2023    Chronic kidney disease (CKD) stage G2/A2, mildly decreased glomerular filtration rate (GFR) between 60-89 mL/min/1.73 square meter and albuminuria creatinine ratio between  mg/g 05/08/2023    Basal cell carcinoma (BCC) of skin of left upper extremity including shoulder 03/31/2023    Other hyperlipidemia 02/04/2022    Stented coronary artery 01/18/2022    History of non-ST elevation myocardial infarction (NSTEMI) 12/27/2021    Umbilical hernia without obstruction and without gangrene 07/30/2021    Diastasis recti 07/30/2021    Type 2 diabetes mellitus with both eyes affected by mild nonproliferative retinopathy without macular edema, with long-term current use of insulin 08/11/2017    BMI 26.0-26.9,adult 12/23/2016    Proteinuria due to type 2 diabetes mellitus 12/23/2016    Type 2 diabetes mellitus with diabetic polyneuropathy, with long-term current use of insulin 10/04/2016    Type 2 diabetes mellitus with diabetic nephropathy,  with long-term current use of insulin 12/03/2014    Hypertension associated with diabetes 10/24/2012    Hyperlipidemia associated with type 2 diabetes mellitus 10/24/2012     Lab Results   Component Value Date    LDLCALC 63.6 01/28/2022                        LAST HbA1c  Lab Results   Component Value Date    HGBA1C 7.3 (H) 01/24/2025       Lipid panel  Lab Results   Component Value Date    CHOL 165 01/24/2025    CHOL 165 09/06/2024    CHOL 112 (L) 12/22/2023     Lab Results   Component Value Date    HDL 57 01/24/2025    HDL 51 09/06/2024    HDL 43 12/22/2023     Lab Results   Component Value Date    LDLCALC 84.8 01/24/2025    LDLCALC 83.6 09/06/2024    LDLCALC 37.4 (L) 12/22/2023     Lab Results   Component Value Date    TRIG 116 01/24/2025    TRIG 152 (H) 09/06/2024    TRIG 158 (H) 12/22/2023     Lab Results   Component Value Date    CHOLHDL 34.5 01/24/2025    CHOLHDL 30.9 09/06/2024    CHOLHDL 38.4 12/22/2023            Review of Systems   Constitutional: Negative for chills and fever.   HENT:  Negative for hearing loss and nosebleeds.    Eyes:  Negative for blurred vision.   Cardiovascular:  Negative for chest pain, leg swelling and palpitations.   Respiratory:  Negative for hemoptysis and shortness of breath.    Hematologic/Lymphatic: Negative for bleeding problem.   Skin:  Negative for itching.   Musculoskeletal:  Negative for falls.   Gastrointestinal:  Negative for abdominal pain and hematochezia.   Genitourinary:  Negative for hematuria.   Neurological:  Negative for dizziness and loss of balance.   Psychiatric/Behavioral:  Negative for altered mental status and depression.        Objective:   Physical Exam  Constitutional:       Appearance: He is well-developed.   HENT:      Head: Normocephalic and atraumatic.   Eyes:      Conjunctiva/sclera: Conjunctivae normal.   Neck:      Vascular: No carotid bruit or JVD.   Cardiovascular:      Rate and Rhythm: Normal rate and regular rhythm.      Pulses:            Carotid pulses are 2+ on the right side and 2+ on the left side.       Radial pulses are 2+ on the right side and 2+ on the left side.      Heart sounds: Normal heart sounds. No murmur heard.     No friction rub. No gallop.   Pulmonary:      Effort: Pulmonary effort is normal. No respiratory distress.      Breath sounds: Normal breath sounds. No stridor. No wheezing.   Musculoskeletal:      Cervical back: Neck supple.   Skin:     General: Skin is warm and dry.   Neurological:      Mental Status: He is alert and oriented to person, place, and time.   Psychiatric:         Behavior: Behavior normal.         Assessment:     1. History of non-ST elevation myocardial infarction (NSTEMI)    2. Hyperlipidemia associated with type 2 diabetes mellitus    3. Hypertension associated with diabetes    4. PAD (peripheral artery disease)    5. Stented coronary artery    6. Other hyperlipidemia    7. Atherosclerosis of native coronary artery of native heart with stable angina pectoris            Plan:   1. Coronary artery disease   s/p PCI as above  Residual small vs disease in PDA. Medical tx  Now with a recurrent chest pain  We will proceed with a exercise nuclear stress test  Continue Brilinta  Continue atorvastatin and Repatha  LDL goal lower than 70      2. Hypertension  BP well-controlled  Continue current regimen    3. Hyperlipidemia  As above  He was initiated on fenofibrate        I spent 5-10 minutes asking, assessing, assisting, arranging and advising heart healthy diet improvements. This included low-salt meals, portion control and health food alternatives. I also encourage 30 minutes of moderate exercise 3-4x a week.              Pertinent cardiac images and EKG reviewed independently.    Continue with current medical plan and lifestyle changes.  Return sooner for concerns or questions. If symptoms persist go to the ED  I have reviewed all pertinent data including patient's medical history in detail and updated the  computerized patient record.     Orders Placed This Encounter   Procedures    NM Myocardial Perfusion Spect Multi Exer     Standing Status:   Future     Standing Expiration Date:   2/10/2026     Order Specific Question:   May the Radiologist modify the order per protocol to meet the clinical needs of the patient?     Answer:   Yes     Order Specific Question:   Will a Cardiologist read this study?     Answer:   No    Nuclear Stress Test     Standing Status:   Future     Standing Expiration Date:   2/10/2026     Order Specific Question:   Which stress agent will be used?     Answer:   Exercise     Order Specific Question:   Release to patient     Answer:   Immediate       Follow up as scheduled.     He expressed verbal understanding and agreed with the plan    Patient's Medications   New Prescriptions    No medications on file   Previous Medications    ATORVASTATIN (LIPITOR) 80 MG TABLET    Take 1 tablet (80 mg total) by mouth once daily.    BLOOD SUGAR DIAGNOSTIC STRP    To check BG 4 times daily, to use with insurance preferred meter    BLOOD-GLUCOSE METER KIT    To check BG 4 times daily, to use with insurance preferred meter    CILOSTAZOL (PLETAL) 50 MG TAB    Take 1 tablet (50 mg total) by mouth 2 (two) times daily.    COENZYME Q10 200 MG CAPSULE    Take 200 mg by mouth once daily.    EMPAGLIFLOZIN (JARDIANCE) 25 MG TABLET    Take 1 tablet (25 mg total) by mouth once daily. 10 mg daily for one month then increase 25 mg daily    EVOLOCUMAB (REPATHA SURECLICK) 140 MG/ML PNIJ    Inject 1 mL (140 mg total) into the skin every 14 (fourteen) days.    FENOFIBRATE 160 MG TAB    Take 1 tablet (160 mg total) by mouth once daily.    INSULIN ASPART U-100 (NOVOLOG FLEXPEN U-100 INSULIN) 100 UNIT/ML (3 ML) INPN PEN    INJECT 10 UNITS UNDER THE SKIN TWICE DAILY BEFORE BREAKFAST AND DINNER (75 DAYS SUPPLY)    LANCETS MISC    To check BG 4 times daily, to use with insurance preferred meter    METOPROLOL SUCCINATE (TOPROL-XL) 25  "MG 24 HR TABLET    Take 1 tablet (25 mg total) by mouth once daily. Take in place of 50 mg.    MOUNJARO 15 MG/0.5 ML PNIJ    INJECT 15MG INTO THE SKIN EVERY 7 DAYS    MULTIVITAMIN-MINERALS-LUTEIN TAB    Take 1 tablet by mouth once daily.    NATEGLINIDE (STARLIX) 120 MG TABLET    TAKE 1 TABLET BY MOUTH EVERY DAY WITH LUNCH    NITROGLYCERIN (NITROSTAT) 0.4 MG SL TABLET    Place 1 tablet (0.4 mg total) under the tongue every 5 (five) minutes as needed for Chest pain.    OMEGA-3 FATTY ACIDS-FISH -1,000 MG CAP    Take 1 capsule by mouth once daily.    PEN NEEDLE, DIABETIC (BD ULTRA-FINE BLACK PEN NEEDLE) 32 GAUGE X 5/32" NDLE    Uses 2  times daily, on injections. Dispense 4 mm needles only    TIRZEPATIDE (MOUNJARO) 15 MG/0.5 ML PNIJ    Inject 15 mg into the skin once a week.    TOUJEO MAX U-300 SOLOSTAR 300 UNIT/ML (3 ML) INSULIN PEN    INJECT 26 UNITS SUBCUTANEOUSLY EVERY DAY -- 69 DAY SUPPLY    VALSARTAN (DIOVAN) 320 MG TABLET    Take 1 tablet (320 mg total) by mouth once daily.   Modified Medications    Modified Medication Previous Medication    TICAGRELOR (BRILINTA) 90 MG TABLET ticagrelor (BRILINTA) 90 mg tablet       Take 1 tablet (90 mg total) by mouth 2 (two) times daily.    Take 1 tablet (90 mg total) by mouth 2 (two) times daily.   Discontinued Medications    No medications on file              "

## 2025-02-10 ENCOUNTER — OFFICE VISIT (OUTPATIENT)
Dept: CARDIOLOGY | Facility: CLINIC | Age: 60
End: 2025-02-10
Payer: COMMERCIAL

## 2025-02-10 VITALS
BODY MASS INDEX: 27.2 KG/M2 | DIASTOLIC BLOOD PRESSURE: 74 MMHG | SYSTOLIC BLOOD PRESSURE: 115 MMHG | HEART RATE: 84 BPM | WEIGHT: 205.25 LBS | OXYGEN SATURATION: 95 % | HEIGHT: 73 IN

## 2025-02-10 DIAGNOSIS — Z95.5 STENTED CORONARY ARTERY: Chronic | ICD-10-CM

## 2025-02-10 DIAGNOSIS — I73.9 PAD (PERIPHERAL ARTERY DISEASE): ICD-10-CM

## 2025-02-10 DIAGNOSIS — I15.2 HYPERTENSION ASSOCIATED WITH DIABETES: Chronic | ICD-10-CM

## 2025-02-10 DIAGNOSIS — E11.59 HYPERTENSION ASSOCIATED WITH DIABETES: Chronic | ICD-10-CM

## 2025-02-10 DIAGNOSIS — I25.118 ATHEROSCLEROSIS OF NATIVE CORONARY ARTERY OF NATIVE HEART WITH STABLE ANGINA PECTORIS: ICD-10-CM

## 2025-02-10 DIAGNOSIS — E78.49 OTHER HYPERLIPIDEMIA: ICD-10-CM

## 2025-02-10 DIAGNOSIS — I25.2 HISTORY OF NON-ST ELEVATION MYOCARDIAL INFARCTION (NSTEMI): Primary | Chronic | ICD-10-CM

## 2025-02-10 DIAGNOSIS — E78.5 HYPERLIPIDEMIA ASSOCIATED WITH TYPE 2 DIABETES MELLITUS: Chronic | ICD-10-CM

## 2025-02-10 DIAGNOSIS — E11.69 HYPERLIPIDEMIA ASSOCIATED WITH TYPE 2 DIABETES MELLITUS: Chronic | ICD-10-CM

## 2025-02-10 PROCEDURE — 3051F HG A1C>EQUAL 7.0%<8.0%: CPT | Mod: CPTII,S$GLB,, | Performed by: INTERNAL MEDICINE

## 2025-02-10 PROCEDURE — 3078F DIAST BP <80 MM HG: CPT | Mod: CPTII,S$GLB,, | Performed by: INTERNAL MEDICINE

## 2025-02-10 PROCEDURE — 99214 OFFICE O/P EST MOD 30 MIN: CPT | Mod: S$GLB,,, | Performed by: INTERNAL MEDICINE

## 2025-02-10 PROCEDURE — 3074F SYST BP LT 130 MM HG: CPT | Mod: CPTII,S$GLB,, | Performed by: INTERNAL MEDICINE

## 2025-02-10 PROCEDURE — 3060F POS MICROALBUMINURIA REV: CPT | Mod: CPTII,S$GLB,, | Performed by: INTERNAL MEDICINE

## 2025-02-10 PROCEDURE — 4010F ACE/ARB THERAPY RXD/TAKEN: CPT | Mod: CPTII,S$GLB,, | Performed by: INTERNAL MEDICINE

## 2025-02-10 PROCEDURE — 3008F BODY MASS INDEX DOCD: CPT | Mod: CPTII,S$GLB,, | Performed by: INTERNAL MEDICINE

## 2025-02-10 PROCEDURE — 3066F NEPHROPATHY DOC TX: CPT | Mod: CPTII,S$GLB,, | Performed by: INTERNAL MEDICINE

## 2025-02-10 PROCEDURE — 99999 PR PBB SHADOW E&M-EST. PATIENT-LVL III: CPT | Mod: PBBFAC,,, | Performed by: INTERNAL MEDICINE

## 2025-02-20 ENCOUNTER — TELEPHONE (OUTPATIENT)
Dept: FAMILY MEDICINE | Facility: CLINIC | Age: 60
End: 2025-02-20
Payer: COMMERCIAL

## 2025-02-20 NOTE — TELEPHONE ENCOUNTER
----- Message from Chelle sent at 2/20/2025  2:31 PM CST -----  Type:  Patient Returning CallWho Called:Pt Would the patient rather a call back or a response via [employer]? Call back Best Call Back Number:[phone] Information: PT is at [facility] and they need a PA for the MRI brain with/ without contrast

## 2025-02-20 NOTE — TELEPHONE ENCOUNTER
Called patient. Spoke to patient. Informed patient that his insurance will have to send PA for Dr Pond to fill out. Also message referral coordinator. She tried reaching out to patient multiple times in regards to PA. No answer.

## 2025-02-28 ENCOUNTER — LAB VISIT (OUTPATIENT)
Dept: LAB | Facility: HOSPITAL | Age: 60
End: 2025-02-28
Attending: FAMILY MEDICINE
Payer: COMMERCIAL

## 2025-02-28 DIAGNOSIS — D58.2 ELEVATED HEMOGLOBIN: ICD-10-CM

## 2025-02-28 LAB
ALBUMIN SERPL BCP-MCNC: 4.6 G/DL (ref 3.5–5.2)
ALP SERPL-CCNC: 102 U/L (ref 38–126)
ALT SERPL W/O P-5'-P-CCNC: 29 U/L (ref 10–44)
ANION GAP SERPL CALC-SCNC: 9 MMOL/L (ref 8–16)
AST SERPL-CCNC: 33 U/L (ref 15–46)
BASOPHILS # BLD AUTO: 0.03 K/UL (ref 0–0.2)
BASOPHILS NFR BLD: 0.6 % (ref 0–1.9)
BILIRUB SERPL-MCNC: 0.7 MG/DL (ref 0.1–1)
CALCIUM SERPL-MCNC: 9.8 MG/DL (ref 8.7–10.5)
CHLORIDE SERPL-SCNC: 102 MMOL/L (ref 95–110)
CO2 SERPL-SCNC: 29 MMOL/L (ref 23–29)
CREAT SERPL-MCNC: 1.66 MG/DL (ref 0.5–1.4)
DIFFERENTIAL METHOD BLD: ABNORMAL
EOSINOPHIL # BLD AUTO: 0.1 K/UL (ref 0–0.5)
EOSINOPHIL NFR BLD: 2.1 % (ref 0–8)
ERYTHROCYTE [DISTWIDTH] IN BLOOD BY AUTOMATED COUNT: 12.9 % (ref 11.5–14.5)
EST. GFR  (NO RACE VARIABLE): 46.9 ML/MIN/1.73 M^2
FERRITIN SERPL-MCNC: 57 NG/ML (ref 20–300)
GLUCOSE SERPL-MCNC: 188 MG/DL (ref 70–110)
HCT VFR BLD AUTO: 54.2 % (ref 40–54)
HGB BLD-MCNC: 18.3 G/DL (ref 14–18)
IMM GRANULOCYTES # BLD AUTO: 0.02 K/UL (ref 0–0.04)
IMM GRANULOCYTES NFR BLD AUTO: 0.4 % (ref 0–0.5)
IRON SERPL-MCNC: 102 UG/DL (ref 45–160)
LYMPHOCYTES # BLD AUTO: 1.5 K/UL (ref 1–4.8)
LYMPHOCYTES NFR BLD: 28.2 % (ref 18–48)
MCH RBC QN AUTO: 32 PG (ref 27–31)
MCHC RBC AUTO-ENTMCNC: 33.8 G/DL (ref 32–36)
MCV RBC AUTO: 95 FL (ref 82–98)
MONOCYTES # BLD AUTO: 0.7 K/UL (ref 0.3–1)
MONOCYTES NFR BLD: 14.4 % (ref 4–15)
NEUTROPHILS # BLD AUTO: 2.8 K/UL (ref 1.8–7.7)
NEUTROPHILS NFR BLD: 54.3 % (ref 38–73)
NRBC BLD-RTO: 0 /100 WBC
PLATELET # BLD AUTO: 184 K/UL (ref 150–450)
PMV BLD AUTO: 10.2 FL (ref 9.2–12.9)
POTASSIUM SERPL-SCNC: 4.9 MMOL/L (ref 3.5–5.1)
PROT SERPL-MCNC: 8.3 G/DL (ref 6–8.4)
RBC # BLD AUTO: 5.72 M/UL (ref 4.6–6.2)
SATURATED IRON: 25 % (ref 20–50)
SODIUM SERPL-SCNC: 140 MMOL/L (ref 136–145)
TOTAL IRON BINDING CAPACITY: 408 UG/DL (ref 250–450)
TRANSFERRIN SERPL-MCNC: 276 MG/DL (ref 200–375)
UUN UR-MCNC: 25 MG/DL (ref 2–20)
WBC # BLD AUTO: 5.15 K/UL (ref 3.9–12.7)

## 2025-02-28 PROCEDURE — 85025 COMPLETE CBC W/AUTO DIFF WBC: CPT | Mod: PN | Performed by: FAMILY MEDICINE

## 2025-02-28 PROCEDURE — 80053 COMPREHEN METABOLIC PANEL: CPT | Mod: PN | Performed by: FAMILY MEDICINE

## 2025-02-28 PROCEDURE — 83540 ASSAY OF IRON: CPT | Mod: PN | Performed by: FAMILY MEDICINE

## 2025-02-28 PROCEDURE — 36415 COLL VENOUS BLD VENIPUNCTURE: CPT | Mod: PN | Performed by: FAMILY MEDICINE

## 2025-02-28 PROCEDURE — 82728 ASSAY OF FERRITIN: CPT | Performed by: FAMILY MEDICINE

## 2025-03-12 ENCOUNTER — PATIENT MESSAGE (OUTPATIENT)
Dept: ADMINISTRATIVE | Facility: OTHER | Age: 60
End: 2025-03-12
Payer: COMMERCIAL

## 2025-03-24 ENCOUNTER — APPOINTMENT (OUTPATIENT)
Dept: LAB | Facility: HOSPITAL | Age: 60
End: 2025-03-24
Attending: FAMILY MEDICINE
Payer: COMMERCIAL

## 2025-03-24 ENCOUNTER — RESULTS FOLLOW-UP (OUTPATIENT)
Dept: FAMILY MEDICINE | Facility: CLINIC | Age: 60
End: 2025-03-24

## 2025-03-28 ENCOUNTER — HOSPITAL ENCOUNTER (OUTPATIENT)
Dept: CARDIOLOGY | Facility: HOSPITAL | Age: 60
Discharge: HOME | End: 2025-03-28
Attending: INTERNAL MEDICINE
Payer: COMMERCIAL

## 2025-03-28 ENCOUNTER — HOSPITAL ENCOUNTER (OUTPATIENT)
Dept: RADIOLOGY | Facility: HOSPITAL | Age: 60
Discharge: HOME | End: 2025-03-28
Attending: INTERNAL MEDICINE
Payer: COMMERCIAL

## 2025-03-28 DIAGNOSIS — I25.118 ATHEROSCLEROSIS OF NATIVE CORONARY ARTERY OF NATIVE HEART WITH STABLE ANGINA PECTORIS: ICD-10-CM

## 2025-03-28 DIAGNOSIS — I25.2 HISTORY OF NON-ST ELEVATION MYOCARDIAL INFARCTION (NSTEMI): Chronic | ICD-10-CM

## 2025-03-28 DIAGNOSIS — Z95.5 STENTED CORONARY ARTERY: Chronic | ICD-10-CM

## 2025-03-28 DIAGNOSIS — I15.2 HYPERTENSION ASSOCIATED WITH DIABETES: Chronic | ICD-10-CM

## 2025-03-28 DIAGNOSIS — I73.9 PAD (PERIPHERAL ARTERY DISEASE): ICD-10-CM

## 2025-03-28 DIAGNOSIS — E78.5 HYPERLIPIDEMIA ASSOCIATED WITH TYPE 2 DIABETES MELLITUS: Chronic | ICD-10-CM

## 2025-03-28 DIAGNOSIS — E11.59 HYPERTENSION ASSOCIATED WITH DIABETES: Chronic | ICD-10-CM

## 2025-03-28 DIAGNOSIS — E11.69 HYPERLIPIDEMIA ASSOCIATED WITH TYPE 2 DIABETES MELLITUS: Chronic | ICD-10-CM

## 2025-03-28 DIAGNOSIS — E78.49 OTHER HYPERLIPIDEMIA: ICD-10-CM

## 2025-03-28 PROCEDURE — 93016 CV STRESS TEST SUPVJ ONLY: CPT | Mod: ,,, | Performed by: INTERNAL MEDICINE

## 2025-03-28 PROCEDURE — 93018 CV STRESS TEST I&R ONLY: CPT | Mod: ,,, | Performed by: INTERNAL MEDICINE

## 2025-03-28 PROCEDURE — 93017 CV STRESS TEST TRACING ONLY: CPT

## 2025-03-28 PROCEDURE — A9502 TC99M TETROFOSMIN: HCPCS | Performed by: INTERNAL MEDICINE

## 2025-03-28 PROCEDURE — 78451 HT MUSCLE IMAGE SPECT SING: CPT | Mod: 26,,, | Performed by: INTERNAL MEDICINE

## 2025-03-28 RX ADMIN — TETROFOSMIN 30 MILLICURIE: 1.38 INJECTION, POWDER, LYOPHILIZED, FOR SOLUTION INTRAVENOUS at 09:03

## 2025-03-28 RX ADMIN — TETROFOSMIN 10 MILLICURIE: 1.38 INJECTION, POWDER, LYOPHILIZED, FOR SOLUTION INTRAVENOUS at 09:03

## 2025-03-29 LAB
CV STRESS BASE HR: 78 BPM
DIASTOLIC BLOOD PRESSURE: 74 MMHG
OHS CV CPX 1 MINUTE RECOVERY HEART RATE: 134 BPM
OHS CV CPX 85 PERCENT MAX PREDICTED HEART RATE MALE: 136
OHS CV CPX ESTIMATED METS: 8.7
OHS CV CPX MAX PREDICTED HEART RATE: 160
OHS CV CPX PATIENT IS FEMALE: 0
OHS CV CPX PATIENT IS MALE: 1
OHS CV CPX PEAK DIASTOLIC BLOOD PRESSURE: 81 MMHG
OHS CV CPX PEAK HEAR RATE: 155 BPM
OHS CV CPX PEAK RATE PRESSURE PRODUCT: NORMAL
OHS CV CPX PEAK SYSTOLIC BLOOD PRESSURE: 216 MMHG
OHS CV CPX PERCENT MAX PREDICTED HEART RATE ACHIEVED: 97
OHS CV CPX RATE PRESSURE PRODUCT PRESENTING: NORMAL
OHS CV INITIAL DOSE: 10 MCG/KG/MIN
OHS CV PEAK DOSE: 30 MCG/KG/MIN
STRESS ECHO POST EXERCISE DUR MIN: 7 MINUTES
STRESS ECHO POST EXERCISE DUR SEC: 6 SECONDS
STRESS ST DEPRESSION: 1 MM
SYSTOLIC BLOOD PRESSURE: 136 MMHG

## 2025-04-01 DIAGNOSIS — E11.3293 TYPE 2 DIABETES MELLITUS WITH BOTH EYES AFFECTED BY MILD NONPROLIFERATIVE RETINOPATHY WITHOUT MACULAR EDEMA, WITH LONG-TERM CURRENT USE OF INSULIN: ICD-10-CM

## 2025-04-01 DIAGNOSIS — Z79.4 TYPE 2 DIABETES MELLITUS WITH BOTH EYES AFFECTED BY MILD NONPROLIFERATIVE RETINOPATHY WITHOUT MACULAR EDEMA, WITH LONG-TERM CURRENT USE OF INSULIN: ICD-10-CM

## 2025-04-02 RX ORDER — EMPAGLIFLOZIN 25 MG/1
25 TABLET, FILM COATED ORAL
Qty: 30 TABLET | Refills: 2 | Status: SHIPPED | OUTPATIENT
Start: 2025-04-02

## 2025-04-07 ENCOUNTER — PATIENT MESSAGE (OUTPATIENT)
Dept: FAMILY MEDICINE | Facility: CLINIC | Age: 60
End: 2025-04-07
Payer: COMMERCIAL

## 2025-04-07 ENCOUNTER — HOSPITAL ENCOUNTER (EMERGENCY)
Facility: HOSPITAL | Age: 60
Discharge: HOME | End: 2025-04-07
Attending: FAMILY MEDICINE
Payer: COMMERCIAL

## 2025-04-07 ENCOUNTER — PATIENT MESSAGE (OUTPATIENT)
Dept: ADMINISTRATIVE | Facility: OTHER | Age: 60
End: 2025-04-07
Payer: COMMERCIAL

## 2025-04-07 VITALS
OXYGEN SATURATION: 98 % | HEART RATE: 74 BPM | DIASTOLIC BLOOD PRESSURE: 80 MMHG | TEMPERATURE: 98 F | SYSTOLIC BLOOD PRESSURE: 150 MMHG | WEIGHT: 210 LBS | HEIGHT: 72 IN | BODY MASS INDEX: 28.44 KG/M2 | RESPIRATION RATE: 19 BRPM

## 2025-04-07 DIAGNOSIS — S86.812A STRAIN OF CALF MUSCLE, LEFT, INITIAL ENCOUNTER: Primary | ICD-10-CM

## 2025-04-07 PROCEDURE — 99284 EMERGENCY DEPT VISIT MOD MDM: CPT | Mod: ER

## 2025-04-07 RX ORDER — CYCLOBENZAPRINE HCL 10 MG
10 TABLET ORAL 3 TIMES DAILY PRN
Qty: 15 TABLET | Refills: 0 | Status: SHIPPED | OUTPATIENT
Start: 2025-04-07 | End: 2025-04-12

## 2025-04-07 RX ORDER — HYDROCODONE BITARTRATE AND ACETAMINOPHEN 7.5; 325 MG/1; MG/1
1 TABLET ORAL EVERY 6 HOURS PRN
Qty: 12 TABLET | Refills: 0 | Status: SHIPPED | OUTPATIENT
Start: 2025-04-07 | End: 2025-04-11

## 2025-04-07 NOTE — Clinical Note
"Yon Menon" Rach was seen and treated in our emergency department on 4/7/2025.  He may return to work on 04/14/2025.       If you have any questions or concerns, please don't hesitate to call.      Magdalena Morocho PA-C"

## 2025-04-07 NOTE — DISCHARGE INSTRUCTIONS
Follow up with Orthopedics.  A referral has been placed.  They will contact you with an appointment time.  Take Flexeril as needed 3 times daily for muscle spasms.  Take Norco as needed for severe pain--do not drive or operate heavy machinery on this medication as it can make you drowsy

## 2025-04-07 NOTE — ED PROVIDER NOTES
Encounter Date: 4/7/2025       History     Chief Complaint   Patient presents with    Leg Pain     Left calf pain that started yesterday. PT reports he stepped down and felt something pull. PT reports then he stepped off a stool and felt a pop to left calf     Yon Loyd is a 59 y.o. male who has a past medical history of BCC, CKD, T2DM, HTN, HLD presenting to the Emergency Department for left calf injury.  Patient reports he was taking a step down out of his garage yesterday when he felt a pull in the left calf.  He took it easy for a few hours and the pain had gotten significantly better. He was then standing on a 15 inch step stool and had recurrent injury to the left calf when stepping down, notes an audible pop with significant pain. He is now having significant difficulty with ambulation due to pain in the left proximal calf. He can actively dorsi and plantar flex the foot noting extreme pain with dorsiflexion. No prior injury.         The history is provided by the patient.     Review of patient's allergies indicates:   Allergen Reactions    Ace inhibitors      Other reaction(s): cough     Past Medical History:   Diagnosis Date    Basal cell carcinoma (BCC) of skin of left upper extremity including shoulder 3/31/2023    Cataract     Chronic kidney disease     stones    Diabetes mellitus type II     Diabetic retinopathy     Heart attack 12/26/2021    Hyperlipidemia     Hyperlipidemia LDL goal <100 10/24/2012    Hypertension     Obesity (BMI 30-39.9) 12/23/2016    Sleep apnea 6/10/2024    Type 2 diabetes mellitus with hyperglycemia, with long-term current use of insulin 12/23/2016    Type 2 diabetes mellitus with microalbuminuria, with long-term current use of insulin 12/03/2014    Uncontrolled type 2 diabetes mellitus with diabetic polyneuropathy, with long-term current use of insulin 10/04/2016     Past Surgical History:   Procedure Laterality Date    COLONOSCOPY N/A 10/29/2021    Procedure: COLONOSCOPY;   Surgeon: Timothy Bravo MD;  Location: [facility] ENDO;  Service: Endoscopy;  Laterality: N/A;    CORONARY ANGIOGRAPHY N/A 12/27/2021    Procedure: ANGIOGRAM, CORONARY ARTERY;  Surgeon: Randy De Souza MD;  Location: [facility] CATH LAB/EP;  Service: Cardiology;  Laterality: N/A;    LEFT HEART CATHETERIZATION Left 12/27/2021    Procedure: Left heart cath;  Surgeon: Randy De Souza MD;  Location: [facility] CATH LAB/EP;  Service: Cardiology;  Laterality: Left;     Family History   Problem Relation Name Age of Onset    Cancer Mother      Diabetes Mother          early onset, type 2    Hypertension Mother      Breast cancer Mother      Diabetes Father      Diabetes Maternal Grandfather          early onset, type 2    Heart attack Maternal Grandfather       Social History[1]  Review of Systems   Constitutional:  Negative for chills and fever.   Musculoskeletal:  Positive for gait problem (antalgic) and myalgias. Negative for joint swelling.   Skin:  Negative for color change, rash and wound.   Psychiatric/Behavioral:  Negative for agitation and confusion.        Physical Exam     Initial Vitals [04/07/25 1118]   BP Pulse Resp Temp SpO2   (!) 150/80 74 19 98.4 °F (36.9 °C) 98 %      MAP       --         Physical Exam    Nursing note and vitals reviewed.  Constitutional: He appears well-developed and well-nourished. He is not diaphoretic.  Non-toxic appearance. No distress.   HENT:   Head: Normocephalic and atraumatic.   Right Ear: External ear normal.   Left Ear: External ear normal.   Eyes: EOM are normal.   Neck: Neck supple.   Normal range of motion.  Cardiovascular:  Normal rate.           Pulmonary/Chest: No respiratory distress.   Abdominal: He exhibits no distension.   Musculoskeletal:         General: Normal range of motion.      Cervical back: Normal range of motion and neck supple.      Comments: Full range of motion about left lower extremity noting significant pain to the left proximal calf, especially with dorsiflexion.  Patient can plantar and dorsiflex the left foot. Tenderness to proximal left calf, more to lateral aspect. No palpable defect. No edema. Negative Kim's test. Antalgic gait secondary to left calf pain.  2+ DP pulses. Sensation intact. Compartments soft and compressible.     Neurological: He is alert and oriented to person, place, and time. GCS score is 15. GCS eye subscore is 4. GCS verbal subscore is 5. GCS motor subscore is 6.   Skin: Skin is dry.   Psychiatric: He has a normal mood and affect. His behavior is normal. Judgment and thought content normal.         ED Course   Procedures  Labs Reviewed - No data to display       Imaging Results    None          Medications - No data to display  Medical Decision Making  Differential Diagnosis includes, but is not limited to:  Fracture, dislocation, compartment syndrome, nerve injury/palsy, vascular injury, DVT, rhabdomyolysis, hemarthrosis, septic joint, cellulitis, bursitis, muscle strain, ligament tear/sprain, laceration, foreign body, abrasion, soft tissue contusion, osteoarthritis.        Problems Addressed:  Strain of calf muscle, left, initial encounter: acute illness or injury    Risk  Prescription drug management.               ED Course as of 04/07/25 1404   Mon Apr 07, 2025   1149 Called Ortho Dr. Oreilly, left VM [CS]   1212 Secure chat message sent. Dr. Oreilly is in a procedure [CS]   1235 Discussed patient's presentation with Dr. Oreilly. No emergent imaging indicated. Recommended outpatient orthopedic referral within one week with Dr. Callahan.  [CS]   1240 Discussed clinical findings and orthopedic consultation with patient. My attending Dr. Velez was present and participated in the discussion. Patient has point tenderness to the left proximal calf, more to lateral aspect, suggestive of muscle strain. Pain with active and passive dorsiflexion also suggestive of strain. I am unable to rule out a complete tear, but there is no appreciated palpable defect.  Negative Kim's test and FROM, doubt Achilles rupture. NVI.     We had an extensive discussion regarding our capability in the ED. We do not have MRI capability at this facility and advanced imaging is not indicated emergently for the patient's presentation. Because of this, I am unable to diagnose his injury with certainty which is understandably frustrating for the patient. I discussed the case with orthopedics Dr. Oreilly who recommends outpatient orthopedic follow up within one week. Patient does not require emergent transfer, surgery or imaging. Patient will be discharged with pain control, crutches, NWB to the E. Phone number to orthopedics has been provided to patient. Additionally, I have no considerable suspicion for emergent condition such as compartment syndrome, DVT, septic joint, fracture, ischemic limb.  [CS]      ED Course User Index  [CS] Magdalena Morocho PA-C                           Clinical Impression:  Final diagnoses:  [S86.053Z] Strain of calf muscle, left, initial encounter (Primary)          ED Disposition Condition    Discharge Stable          ED Prescriptions       Medication Sig Dispense Start Date End Date Auth. Provider    HYDROcodone-acetaminophen (NORCO) 7.5-325 mg per tablet Take 1 tablet by mouth every 6 (six) hours as needed for Pain. 12 tablet 4/7/2025 4/11/2025 Magdalena Morocho PA-C    cyclobenzaprine (FLEXERIL) 10 MG tablet Take 1 tablet (10 mg total) by mouth 3 (three) times daily as needed for Muscle spasms. 15 tablet 4/7/2025 4/12/2025 Magdalena Morocho PA-C          Follow-up Information       Follow up With Specialties Details Why Contact Info    Mark Callahan MD Orthopedic Surgery Schedule an appointment as soon as possible for a visit in 2 days  [address]  [address]  [facility] [address]  [phone]                 [1]   Social History  Tobacco Use    Smoking status: Former     Current packs/day: 0.00     Types: Cigarettes     Quit date: 2007      Years since quittin.2    Smokeless tobacco: Never   Substance Use Topics    Alcohol use: No     Comment: 1-2 times yearly    Drug use: No        Magdalena Morocho, PAPhillipC  25 1409

## 2025-04-09 DIAGNOSIS — M79.662 BILATERAL CALF PAIN: Primary | ICD-10-CM

## 2025-04-09 DIAGNOSIS — M79.661 BILATERAL CALF PAIN: Primary | ICD-10-CM

## 2025-04-11 ENCOUNTER — RESULTS FOLLOW-UP (OUTPATIENT)
Dept: CARDIOLOGY | Facility: CLINIC | Age: 60
End: 2025-04-11

## 2025-04-11 DIAGNOSIS — R94.31 ABNORMAL ECG DURING EXERCISE STRESS TEST: Primary | ICD-10-CM

## 2025-04-11 DIAGNOSIS — Z95.5 STENTED CORONARY ARTERY: ICD-10-CM

## 2025-04-11 DIAGNOSIS — I20.89 ANGINAL EQUIVALENT: ICD-10-CM

## 2025-04-11 RX ORDER — DIPHENHYDRAMINE HCL 50 MG
50 CAPSULE ORAL ONCE
OUTPATIENT
Start: 2025-04-11 | End: 2025-04-11

## 2025-04-11 RX ORDER — SODIUM CHLORIDE 9 MG/ML
INJECTION, SOLUTION INTRAVENOUS CONTINUOUS
OUTPATIENT
Start: 2025-04-11 | End: 2025-04-11

## 2025-04-11 NOTE — PROGRESS NOTES
Discussed the stress test.  Still with angina equivalent.  EKG part is abnormal with concerning ischemic changes.  He has significant history of CAD post 2 JOE.  After shared decision we elected to proceed with coronary angiogram

## 2025-04-14 ENCOUNTER — TELEPHONE (OUTPATIENT)
Dept: PHARMACY | Facility: CLINIC | Age: 60
End: 2025-04-14
Payer: COMMERCIAL

## 2025-04-14 ENCOUNTER — PATIENT MESSAGE (OUTPATIENT)
Dept: FAMILY MEDICINE | Facility: CLINIC | Age: 60
End: 2025-04-14
Payer: COMMERCIAL

## 2025-04-14 DIAGNOSIS — Z79.4 TYPE 2 DIABETES MELLITUS WITH DIABETIC POLYNEUROPATHY, WITH LONG-TERM CURRENT USE OF INSULIN: ICD-10-CM

## 2025-04-14 DIAGNOSIS — E11.42 TYPE 2 DIABETES MELLITUS WITH DIABETIC POLYNEUROPATHY, WITH LONG-TERM CURRENT USE OF INSULIN: ICD-10-CM

## 2025-04-14 RX ORDER — TIRZEPATIDE 15 MG/.5ML
15 INJECTION, SOLUTION SUBCUTANEOUS WEEKLY
Qty: 6 ML | Refills: 1 | Status: SHIPPED | OUTPATIENT
Start: 2025-04-14 | End: 2025-09-29

## 2025-04-14 NOTE — TELEPHONE ENCOUNTER
No care due was identified.  [facility] Embedded Care Due Messages. Reference number: [id number].   4/14/2025 8:54:43 AM CDT

## 2025-04-14 NOTE — TELEPHONE ENCOUNTER
Refill Decision Note   Yon Rach  is requesting a refill authorization.  Brief Assessment and Rationale for Refill:  Approve     Medication Therapy Plan:         Comments:     Note composed:1:25 PM 04/14/2025

## 2025-04-14 NOTE — TELEPHONE ENCOUNTER
[facility]/Population Health Chart Review & Patient Outreach Details For Medication Adherence Project    Reason for Outreach Encounter: 3rd Party payor non-compliance report ([employer], [employer], C, etc)  2.  Patient Outreach Method: Reviewed Patient Chart  3.   Medication in question: valsartan   LAST FILLED: 3/24/25 for 90 day supply  Hypertension Medications              metoprolol succinate (TOPROL-XL) 25 MG 24 hr tablet Take 1 tablet (25 mg total) by mouth once daily. Take in place of 50 mg.    nitroGLYCERIN (NITROSTAT) 0.4 MG SL tablet Place 1 tablet (0.4 mg total) under the tongue every 5 (five) minutes as needed for Chest pain.    valsartan (DIOVAN) 320 MG tablet Take 1 tablet (320 mg total) by mouth once daily.              4.  Reviewed and or Updates Made To: Patient Chart  5. Outreach Outcomes and/or actions taken: Patient filled medication and is on track to be adherent

## 2025-04-15 ENCOUNTER — OFFICE VISIT (OUTPATIENT)
Dept: ORTHOPEDICS | Facility: CLINIC | Age: 60
End: 2025-04-15
Payer: COMMERCIAL

## 2025-04-15 VITALS — BODY MASS INDEX: 28.46 KG/M2 | HEIGHT: 72 IN | WEIGHT: 210.13 LBS

## 2025-04-15 DIAGNOSIS — S86.812A STRAIN OF CALF MUSCLE, LEFT, INITIAL ENCOUNTER: ICD-10-CM

## 2025-04-15 PROCEDURE — 1159F MED LIST DOCD IN RCRD: CPT | Mod: CPTII,S$GLB,, | Performed by: SURGERY

## 2025-04-15 PROCEDURE — 3051F HG A1C>EQUAL 7.0%<8.0%: CPT | Mod: CPTII,S$GLB,, | Performed by: SURGERY

## 2025-04-15 PROCEDURE — 3066F NEPHROPATHY DOC TX: CPT | Mod: CPTII,S$GLB,, | Performed by: SURGERY

## 2025-04-15 PROCEDURE — 3008F BODY MASS INDEX DOCD: CPT | Mod: CPTII,S$GLB,, | Performed by: SURGERY

## 2025-04-15 PROCEDURE — 99999 PR PBB SHADOW E&M-EST. PATIENT-LVL IV: CPT | Mod: PBBFAC,,, | Performed by: SURGERY

## 2025-04-15 PROCEDURE — 3060F POS MICROALBUMINURIA REV: CPT | Mod: CPTII,S$GLB,, | Performed by: SURGERY

## 2025-04-15 PROCEDURE — 99204 OFFICE O/P NEW MOD 45 MIN: CPT | Mod: S$GLB,,, | Performed by: SURGERY

## 2025-04-15 PROCEDURE — 4010F ACE/ARB THERAPY RXD/TAKEN: CPT | Mod: CPTII,S$GLB,, | Performed by: SURGERY

## 2025-04-15 NOTE — PROGRESS NOTES
SUBJECTIVE:    Mr. Loyd is here today for a follow up visit.  He has been having pain in the left calf since he had an injury stepping off a step roughly 8 days ago.  He has been feeling much better since that time.  He is ambulating in normal shoes.  He is not using any assistive devices.  He presents for further management.    Past Medical History:   Diagnosis Date    Basal cell carcinoma (BCC) of skin of left upper extremity including shoulder 3/31/2023    Cataract     Chronic kidney disease     stones    Diabetes mellitus type II     Diabetic retinopathy     Heart attack 12/26/2021    Hyperlipidemia     Hyperlipidemia LDL goal <100 10/24/2012    Hypertension     Obesity (BMI 30-39.9) 12/23/2016    Sleep apnea 6/10/2024    Type 2 diabetes mellitus with hyperglycemia, with long-term current use of insulin 12/23/2016    Type 2 diabetes mellitus with microalbuminuria, with long-term current use of insulin 12/03/2014    Uncontrolled type 2 diabetes mellitus with diabetic polyneuropathy, with long-term current use of insulin 10/04/2016       OBJECTIVE:      Vitals:    04/15/25 0836   Weight: 95.3 kg (210 lb 1.6 oz)   Height: 6' (1.829 m)   PainSc:   4       Lower Extremity Exam  Alert and oriented.  Kim's test elicits inappropriate plantar flexion response.  Resting dorsiflexion tension is appropriate to the other side.  There was no swelling or erythema about the posterior calf.  There is tenderness to palpation about the calf he is neurovascularly intact.     DIAGNOSTIC STUDIES:  X-rays of the tibia and fibula, independently reviewed by me, from previous are negative for any acute orthopedic fractures or other injuries.    ASSESSMENT:   1. Left calf strain      PLAN:  Yon was seen today for injury.    Diagnoses and all orders for this visit:    Strain of calf muscle, left, initial encounter  -     Ambulatory referral/consult to Orthopedics        I discussed with him his pathology my proposed treatment.   I also discussed with him that sometimes DVTs and venous thromboembolism can present with similar findings, and although he is not having any calf pain, swelling, or other symptoms other than tenderness to palpation, if he has any calf or chest pain, rapid heart rate, difficulty breathing, or any other chest symptoms he needs report to the emergency room immediately to rule out a venous thromboembolism.  He expressed understanding.    He also is having some shoulder pain.  We gave him a list of rotator cuff exercises.  We discussed potential medications for this, he is going to pursue over-the-counter anti-inflammatories.  He can follow up with us as needed.    Mark Callahan MD  [facility]  Orthopedic Surgery      This note was done with voice recognition software. Please excuse any errors missed in proof reading.

## 2025-04-22 ENCOUNTER — TELEPHONE (OUTPATIENT)
Dept: CARDIOLOGY | Facility: CLINIC | Age: 60
End: 2025-04-22
Payer: COMMERCIAL

## 2025-04-22 NOTE — TELEPHONE ENCOUNTER
Rehana Deleon  Good Morning,    I am reaching out about this pt case for sx on 04/25/2025  per the pt insurance this case is denied, it does not meet medical criteria. The provider can do a Peer to Peer if he chooses to do so. I will list all the [facility] info below. Please keep me updated on what the provider will do moving forward.        [facility]  Phone: [phone]  urgent request [phone]  Auth# [id number]    Denial reason  Clinical Rationale:  Your doctor told us that you have heart disease. Your doctor ordered a test that uses dye and special x-rays to show the insides of the blood vessels in your heart. This test is needed when heart disease symptoms continue despite optimal treatment. Optimal treatment includes certain medications and changes in lifestyle. We reviewed the notes we have. The notes do not show that you are receiving optimal treatment. The notes do not show that you cannot have such treatment. Based on the information we have, this test is not medically necessary. We used [facility] Medical Benefits Management Clinical Guideline titled Diagnostic Coronary Angiography to make this decision.        Thank you

## 2025-04-22 NOTE — TELEPHONE ENCOUNTER
We will reschedule his procedure till later date  Let us try to reschedule the peer to peer for Tuesday afternoon if possible.  Thanks

## 2025-04-24 ENCOUNTER — TELEPHONE (OUTPATIENT)
Dept: CARDIOLOGY | Facility: CLINIC | Age: 60
End: 2025-04-24
Payer: COMMERCIAL

## 2025-05-02 ENCOUNTER — TELEPHONE (OUTPATIENT)
Dept: FAMILY MEDICINE | Facility: CLINIC | Age: 60
End: 2025-05-02
Payer: COMMERCIAL

## 2025-05-02 NOTE — TELEPHONE ENCOUNTER
Called patient multiple times o get appointment with  rescheduled. No answer and could not leave voicemail because it is not set up. Changed patient's appointment to June 3rd at 9:00am.

## 2025-05-02 NOTE — TELEPHONE ENCOUNTER
Called patient to reschedule appointment on 6/6 due to Dr Pond being out of town. No answer. Unable to leave voicemail.

## 2025-05-07 ENCOUNTER — LAB VISIT (OUTPATIENT)
Dept: LAB | Facility: HOSPITAL | Age: 60
End: 2025-05-07
Attending: INTERNAL MEDICINE
Payer: COMMERCIAL

## 2025-05-07 DIAGNOSIS — Z01.810 PREOPERATIVE CARDIOVASCULAR EXAMINATION: ICD-10-CM

## 2025-05-07 LAB
ABSOLUTE EOSINOPHIL (OHS): 0.12 K/UL
ABSOLUTE MONOCYTE (OHS): 1.08 K/UL (ref 0.3–1)
ABSOLUTE NEUTROPHIL COUNT (OHS): 6.92 K/UL (ref 1.8–7.7)
ANION GAP (OHS): 12 MMOL/L (ref 8–16)
BASOPHILS # BLD AUTO: 0.03 K/UL
BASOPHILS NFR BLD AUTO: 0.3 %
BUN SERPL-MCNC: 24 MG/DL (ref 2–20)
CALCIUM SERPL-MCNC: 9.8 MG/DL (ref 8.7–10.5)
CHLORIDE SERPL-SCNC: 103 MMOL/L (ref 95–110)
CO2 SERPL-SCNC: 24 MMOL/L (ref 23–29)
CREAT SERPL-MCNC: 1.6 MG/DL (ref 0.5–1.4)
ERYTHROCYTE [DISTWIDTH] IN BLOOD BY AUTOMATED COUNT: 13.1 % (ref 11.5–14.5)
GFR SERPLBLD CREATININE-BSD FMLA CKD-EPI: 49 ML/MIN/1.73/M2
GLUCOSE SERPL-MCNC: 142 MG/DL (ref 70–110)
HCT VFR BLD AUTO: 55.6 % (ref 40–54)
HGB BLD-MCNC: 18.4 GM/DL (ref 14–18)
IMM GRANULOCYTES # BLD AUTO: 0.04 K/UL (ref 0–0.04)
IMM GRANULOCYTES NFR BLD AUTO: 0.4 % (ref 0–0.5)
LYMPHOCYTES # BLD AUTO: 1.45 K/UL (ref 1–4.8)
MCH RBC QN AUTO: 31.6 PG (ref 27–31)
MCHC RBC AUTO-ENTMCNC: 33.1 G/DL (ref 32–36)
MCV RBC AUTO: 95 FL (ref 82–98)
NUCLEATED RBC (/100WBC) (OHS): 0 /100 WBC
PLATELET # BLD AUTO: 183 K/UL (ref 150–450)
PMV BLD AUTO: 10.1 FL (ref 9.2–12.9)
POTASSIUM SERPL-SCNC: 4.4 MMOL/L (ref 3.5–5.1)
RBC # BLD AUTO: 5.83 M/UL (ref 4.6–6.2)
RELATIVE EOSINOPHIL (OHS): 1.2 %
RELATIVE LYMPHOCYTE (OHS): 15 % (ref 18–48)
RELATIVE MONOCYTE (OHS): 11.2 % (ref 4–15)
RELATIVE NEUTROPHIL (OHS): 71.9 % (ref 38–73)
SODIUM SERPL-SCNC: 139 MMOL/L (ref 136–145)
WBC # BLD AUTO: 9.64 K/UL (ref 3.9–12.7)

## 2025-05-07 PROCEDURE — 36415 COLL VENOUS BLD VENIPUNCTURE: CPT | Mod: PN

## 2025-05-07 PROCEDURE — 85025 COMPLETE CBC W/AUTO DIFF WBC: CPT | Mod: PN

## 2025-05-07 PROCEDURE — 82374 ASSAY BLOOD CARBON DIOXIDE: CPT | Mod: PN

## 2025-05-08 ENCOUNTER — TELEPHONE (OUTPATIENT)
Dept: OPTOMETRY | Facility: CLINIC | Age: 60
End: 2025-05-08
Payer: COMMERCIAL

## 2025-05-09 ENCOUNTER — HOSPITAL ENCOUNTER (OUTPATIENT)
Facility: HOSPITAL | Age: 60
Discharge: HOME | End: 2025-05-09
Attending: INTERNAL MEDICINE | Admitting: INTERNAL MEDICINE
Payer: COMMERCIAL

## 2025-05-09 ENCOUNTER — TELEPHONE (OUTPATIENT)
Dept: ADMINISTRATIVE | Facility: CLINIC | Age: 60
End: 2025-05-09
Payer: COMMERCIAL

## 2025-05-09 ENCOUNTER — TELEPHONE (OUTPATIENT)
Dept: CARDIOLOGY | Facility: CLINIC | Age: 60
End: 2025-05-09
Payer: COMMERCIAL

## 2025-05-09 VITALS
RESPIRATION RATE: 23 BRPM | TEMPERATURE: 98 F | DIASTOLIC BLOOD PRESSURE: 65 MMHG | BODY MASS INDEX: 28.44 KG/M2 | SYSTOLIC BLOOD PRESSURE: 109 MMHG | HEART RATE: 73 BPM | HEIGHT: 72 IN | OXYGEN SATURATION: 97 % | WEIGHT: 210 LBS

## 2025-05-09 DIAGNOSIS — Z98.890 STATUS POST CARDIAC CATHETERIZATION: ICD-10-CM

## 2025-05-09 DIAGNOSIS — R94.31 ABNORMAL ECG DURING EXERCISE STRESS TEST: ICD-10-CM

## 2025-05-09 DIAGNOSIS — Z95.5 STENTED CORONARY ARTERY: ICD-10-CM

## 2025-05-09 DIAGNOSIS — I20.89 ANGINAL EQUIVALENT: ICD-10-CM

## 2025-05-09 DIAGNOSIS — Z01.810 PREOPERATIVE CARDIOVASCULAR EXAMINATION: Primary | ICD-10-CM

## 2025-05-09 DIAGNOSIS — I73.9 PAD (PERIPHERAL ARTERY DISEASE): ICD-10-CM

## 2025-05-09 DIAGNOSIS — Z01.818 PRE-OP EVALUATION: ICD-10-CM

## 2025-05-09 DIAGNOSIS — I70.201 TIBIAL ARTERY STENOSIS, RIGHT: ICD-10-CM

## 2025-05-09 LAB
POC ACTIVATED CLOTTING TIME K: 389 SEC (ref 74–137)
POCT GLUCOSE: 208 MG/DL (ref 70–110)
SAMPLE: ABNORMAL

## 2025-05-09 PROCEDURE — 27201423 OPTIME MED/SURG SUP & DEVICES STERILE SUPPLY: Performed by: INTERNAL MEDICINE

## 2025-05-09 PROCEDURE — C1753 CATH, INTRAVAS ULTRASOUND: HCPCS | Performed by: INTERNAL MEDICINE

## 2025-05-09 PROCEDURE — 25000242 PHARM REV CODE 250 ALT 637 W/ HCPCS: Performed by: INTERNAL MEDICINE

## 2025-05-09 PROCEDURE — 99152 MOD SED SAME PHYS/QHP 5/>YRS: CPT | Mod: ,,, | Performed by: INTERNAL MEDICINE

## 2025-05-09 PROCEDURE — C1769 GUIDE WIRE: HCPCS | Performed by: INTERNAL MEDICINE

## 2025-05-09 PROCEDURE — C1725 CATH, TRANSLUMIN NON-LASER: HCPCS | Performed by: INTERNAL MEDICINE

## 2025-05-09 PROCEDURE — C1894 INTRO/SHEATH, NON-LASER: HCPCS | Performed by: INTERNAL MEDICINE

## 2025-05-09 PROCEDURE — 93571 IV DOP VEL&/PRESS C FLO 1ST: CPT | Mod: 26,52,LC, | Performed by: INTERNAL MEDICINE

## 2025-05-09 PROCEDURE — C9600 PERC DRUG-EL COR STENT SING: HCPCS | Mod: LD | Performed by: INTERNAL MEDICINE

## 2025-05-09 PROCEDURE — C1874 STENT, COATED/COV W/DEL SYS: HCPCS | Performed by: INTERNAL MEDICINE

## 2025-05-09 PROCEDURE — 63600175 PHARM REV CODE 636 W HCPCS: Performed by: INTERNAL MEDICINE

## 2025-05-09 PROCEDURE — 25500020 PHARM REV CODE 255: Performed by: INTERNAL MEDICINE

## 2025-05-09 PROCEDURE — C1887 CATHETER, GUIDING: HCPCS | Performed by: INTERNAL MEDICINE

## 2025-05-09 PROCEDURE — 99152 MOD SED SAME PHYS/QHP 5/>YRS: CPT | Performed by: INTERNAL MEDICINE

## 2025-05-09 PROCEDURE — 93458 L HRT ARTERY/VENTRICLE ANGIO: CPT | Mod: 26,51,XU, | Performed by: INTERNAL MEDICINE

## 2025-05-09 PROCEDURE — 92928 PRQ TCAT PLMT NTRAC ST 1 LES: CPT | Mod: LD,,, | Performed by: INTERNAL MEDICINE

## 2025-05-09 PROCEDURE — 93005 ELECTROCARDIOGRAM TRACING: CPT

## 2025-05-09 PROCEDURE — 99153 MOD SED SAME PHYS/QHP EA: CPT | Performed by: INTERNAL MEDICINE

## 2025-05-09 PROCEDURE — 85347 COAGULATION TIME ACTIVATED: CPT | Performed by: INTERNAL MEDICINE

## 2025-05-09 PROCEDURE — 93799 UNLISTED CV SVC/PROCEDURE: CPT | Performed by: INTERNAL MEDICINE

## 2025-05-09 PROCEDURE — 92978 ENDOLUMINL IVUS OCT C 1ST: CPT | Mod: 26,LD,, | Performed by: INTERNAL MEDICINE

## 2025-05-09 PROCEDURE — 93458 L HRT ARTERY/VENTRICLE ANGIO: CPT | Mod: XU | Performed by: INTERNAL MEDICINE

## 2025-05-09 PROCEDURE — 25000003 PHARM REV CODE 250: Performed by: INTERNAL MEDICINE

## 2025-05-09 PROCEDURE — 92978 ENDOLUMINL IVUS OCT C 1ST: CPT | Mod: LD | Performed by: INTERNAL MEDICINE

## 2025-05-09 DEVICE — EVEROLIMUS-ELUTING PLATINUM CHROMIUM CORONARY STENT SYSTEM
Type: IMPLANTABLE DEVICE | Site: CORONARY | Status: FUNCTIONAL
Brand: SYNERGY™ XD

## 2025-05-09 RX ORDER — ASPIRIN 81 MG/1
TABLET ORAL
Status: DISCONTINUED | OUTPATIENT
Start: 2025-05-09 | End: 2025-05-09 | Stop reason: HOSPADM

## 2025-05-09 RX ORDER — DIPHENHYDRAMINE HCL 25 MG
50 CAPSULE ORAL ONCE
Status: DISCONTINUED | OUTPATIENT
Start: 2025-05-09 | End: 2025-05-09

## 2025-05-09 RX ORDER — LIDOCAINE HYDROCHLORIDE 10 MG/ML
INJECTION, SOLUTION EPIDURAL; INFILTRATION; INTRACAUDAL; PERINEURAL
Status: DISCONTINUED | OUTPATIENT
Start: 2025-05-09 | End: 2025-05-09 | Stop reason: HOSPADM

## 2025-05-09 RX ORDER — HEPARIN SODIUM 200 [USP'U]/100ML
INJECTION, SOLUTION INTRAVENOUS
Status: DISCONTINUED | OUTPATIENT
Start: 2025-05-09 | End: 2025-05-09 | Stop reason: HOSPADM

## 2025-05-09 RX ORDER — VERAPAMIL HYDROCHLORIDE 2.5 MG/ML
INJECTION INTRAVENOUS
Status: DISCONTINUED | OUTPATIENT
Start: 2025-05-09 | End: 2025-05-09 | Stop reason: HOSPADM

## 2025-05-09 RX ORDER — ACETAMINOPHEN 325 MG/1
650 TABLET ORAL EVERY 6 HOURS PRN
Status: DISCONTINUED | OUTPATIENT
Start: 2025-05-09 | End: 2025-05-09 | Stop reason: HOSPADM

## 2025-05-09 RX ORDER — NITROGLYCERIN 0.4 MG/1
0.4 TABLET SUBLINGUAL EVERY 5 MIN PRN
Status: DISCONTINUED | OUTPATIENT
Start: 2025-05-09 | End: 2025-05-09 | Stop reason: HOSPADM

## 2025-05-09 RX ORDER — MIDAZOLAM HYDROCHLORIDE 1 MG/ML
INJECTION, SOLUTION INTRAMUSCULAR; INTRAVENOUS
Status: DISCONTINUED | OUTPATIENT
Start: 2025-05-09 | End: 2025-05-09 | Stop reason: HOSPADM

## 2025-05-09 RX ORDER — SODIUM CHLORIDE 9 MG/ML
INJECTION, SOLUTION INTRAVENOUS CONTINUOUS
Status: ACTIVE | OUTPATIENT
Start: 2025-05-09 | End: 2025-05-09

## 2025-05-09 RX ORDER — HEPARIN SODIUM 1000 [USP'U]/ML
INJECTION, SOLUTION INTRAVENOUS; SUBCUTANEOUS
Status: DISCONTINUED | OUTPATIENT
Start: 2025-05-09 | End: 2025-05-09 | Stop reason: HOSPADM

## 2025-05-09 RX ORDER — FENTANYL CITRATE 50 UG/ML
INJECTION, SOLUTION INTRAMUSCULAR; INTRAVENOUS
Status: DISCONTINUED | OUTPATIENT
Start: 2025-05-09 | End: 2025-05-09 | Stop reason: HOSPADM

## 2025-05-09 RX ORDER — ASPIRIN 81 MG/1
81 TABLET ORAL DAILY
COMMUNITY
Start: 2025-05-09 | End: 2026-05-09

## 2025-05-09 RX ORDER — IODIXANOL 320 MG/ML
INJECTION, SOLUTION INTRAVASCULAR
Status: DISCONTINUED | OUTPATIENT
Start: 2025-05-09 | End: 2025-05-09 | Stop reason: HOSPADM

## 2025-05-09 RX ADMIN — SODIUM CHLORIDE 100 ML/HR: 0.9 INJECTION, SOLUTION INTRAVENOUS at 07:05

## 2025-05-09 RX ADMIN — NITROGLYCERIN 0.4 MG: 0.4 TABLET, ORALLY DISINTEGRATING SUBLINGUAL at 12:05

## 2025-05-09 RX ADMIN — ACETAMINOPHEN 650 MG: 325 TABLET ORAL at 12:05

## 2025-05-09 NOTE — TELEPHONE ENCOUNTER
----- Message from Michelle sent at 5/9/2025  4:09 PM CDT -----  Appointment RequestName of Caller:PtSymptoms or reason for appointment:post op follow up needed in 2 weeksBest Call Back Number:[phone]

## 2025-05-09 NOTE — PLAN OF CARE
Patient resting quietly and tightness in chest slowly getting better and no other symptoms reported; Napping between care. Attempted to remove 2 ml air from right radial band and replaced due to seeing a flash of blood at site. Will continue to monitor; patient still has band tightness but tolerable.

## 2025-05-09 NOTE — PLAN OF CARE
Remaining air removed from right radial vasc band. Gauze and tegaderm dressing applied c.d.i. No drainage or shadowing noted. No bleeding or hematoma noted around site. +2 calvin radial pulses palpated. Skin normal in color, warm to touch, < 3 sec cap refill. Pt tolerated well.  Will continue to monitor pt. discharge instructions to bedside and reviewed w/ pt and questions answered. Patient cleared for home per md. Just a mild soreness to right wrist site and no headache and no chest pain. Belongings at bedside. Patients ride is here and he updated.

## 2025-05-09 NOTE — PLAN OF CARE
Post EKG ordered. Pt resting quietly in no distress. Patient reports slightly better tightness to ant chest and right wrist from band and 1 more ml air removed form band. Will continue to monitor.

## 2025-05-09 NOTE — TELEPHONE ENCOUNTER
Made patient an appointment to see Stacey Li N.p  on the day f will be here too.  Post Hospital D/c .    Nw

## 2025-05-09 NOTE — PLAN OF CARE
Medicated for a headache w/ tylenol---rated 3-4 of 10 and I NTG sl given for the chest tightness and will monitor. Slowly taking air ourt of right radial band w/o any bleeding, no hematoma, still reports a tightness to right wrist but getting better from arrival to recovery, good circ check and sensation, fingers wm/pk, palpable pulse, no hematoma, no bleeding.

## 2025-05-09 NOTE — TELEPHONE ENCOUNTER
Mr. Larson is currently taking atorvastatin 80 mg hs + fenofibrate 160 mg daily + Repatha 140 mg SQ every 14 days. No changes at this time.    ----- Message from SARA Sagastume sent at 2025  7:18 AM CDT -----  Regarding: Order for ALMAS LARSON    Patient Name: ALMAS LARSON([id number])  Sex: Male  : 1965      PCP: DARLENE MERCHANT    Center: [facility] CENTRAL BILLING OFFICE     Types of orders made on 2025: Diet, ECG, Medications, Nursing, Transfer    Order Date:2025  Ordering User:FÉLIX DE SOUZA [[id number]]  Attending Provider:Félix De Souza MD [[id number]]  Authorizing Provider: Félix De Souza MD [[id number]]  Department:[facility] CATH LAB/EP[[id number]]    Order Specific Information  Order: Notify [facility] Team [Custom: [id number]]  Order #: [id number]: 1    Priority: Routine  Class: Hospital Performed    Associated Diagnoses      R94.31 Abnormal ECG during exercise stress test      Z95.5 Stented coronary artery      I20.89 Anginal equivalent    Released on: 2025  7:18 AM        Priority: Routine  Class: Hospital Performed    Associated Diagnoses      R94.31 Abnormal ECG during exercise stress test      Z95.5 Stented coronary artery      I20.89 Anginal equivalent    Released on: 2025  7:18 AM

## 2025-05-09 NOTE — PLAN OF CARE
DR Rodriguezsef at bedside to speak to pt; pt carter, brad and reports a slight discomfort in chest only when takes a deep breath. Right wrist site discomfort resolving well. Will continue to monitor. Pt updated his sister in lobby.

## 2025-05-09 NOTE — PLAN OF CARE
Patient transferred to recovery cath lab via stretcher with side rails up x2 per nurse Estelita and bedside report received. .  Pt is alert and able to follow commands. Pt is stable when connecting to cardiac monitors and has some upper anterior chest pressure as discussed in hand off and not changed and rates 6 at present.  VSS and sinus on monitor and in no distress. Right radial vasc band in place with 12ml of air in band c.d.i. no drainage or noted, No redness, bruising, or hematoma noted around site. +2 calvin radial pulses palpated and reporting tightness at site and 1 ml air removed for comfort. bilateral pedal pulses palpable.  Skin normal in color and warm to touch, <3 sec cap refill.  Post procedure instructions given and pt verbalizes understanding. NS infusing in hand off at 100 ml per hour. Awaiting recovery orders. Will continue to monitor. Requested pt report any changes in pain immediately to nurse.             I have reviewed and confirmed nurses' notes...

## 2025-05-09 NOTE — H&P
Subjective:   @Patient ID:  Yon Loyd is a 59 y.o. male who presents for evaluation of CAD    HPI:   5/9/2025:  Here for elective coronary angiogram.  Underwent stress test with concerning EKG.  Given his recurrent angina and his cardiac history we elected to proceed with coronary angiogram    2/10/2025:  F/U.  ER visit few months back with with chest pain.  Troponins negative.  Chest pain reminded him by his cardiac symptoms.  No recurrent chest pain since.  Zetia stopped due to musculoskeletal side effects.  Was started on cilostazol for PVD and his claudication improved.  Repatha ordered which will be started soon      12/2023:  Here for follow-up.  He has been doing okay since last time.  Main complaint today is right thigh pain that started about 2 months ago.  Seems ezetimibe was started in September.  He is already on a high dose of atorvastatin.  Muscle aches are not that bad he would like to monitor for now.  We discussed about PCSK9 inhibitor.  Blood pressure is okay.  He is on aspirin Brilinta without any bleeding issues    1/2023: Here for f/u. He is doing well. He missed statin for 3 months, that's why LDL significantly elevated. He is back on statin now.  He didn't do the cardiac rehab. Occasional minor chest pain. Last was 2 months ago. Lasts for seconds/mins. Just 2 times over the last 1.5 years.        Stopped tobacco  Compliant with DAPT. No bleeding issues        Prior cardiovascular  Hx  --------------------------------    - Heart Catheterization    12/27/2021  The ejection fraction was greater than 55% by visual estimate.  The pre-procedure left ventricular end diastolic pressure was 8.  The Mid LAD lesion was 99% stenosed with 0% stenosis post-intervention.  A STENT RESOLUTE DONI [id number] stent was successfully placed. Post dilated with 3.5 NC balloon. IVUS guided  The Mid Cx to Dist Cx lesion was 95% stenosed with 0% stenosis post-intervention.  A STENT RESOLUTE DONI [id number] stent was  successfully placed to mid/distal LCX post dilated with 3.0 NC balloon. IVUS guided  The RPDA lesion was distal 99% stenosed. Small to moderate caliber vs. Very distal lesion. Will be treated medically.  The estimated blood loss was none.     - ASA/Brilinta  - Statin   - Add Coreg   - Risk factors modifications  - Medical therapy for residual distal small Rt PDA lesion.     - NORIS 1/2022  Normal resting and exercise NORIS         - ECHO 12/27/2021  The left ventricle is normal in size with normal systolic function.  The estimated ejection fraction is 65%.  Normal left ventricular diastolic function.  Normal right ventricular size with normal right ventricular systolic function.  Mild mitral regurgitation.  Intermediate central venous pressure (8 mmHg).  The estimated PA systolic pressure is 22 mmHg.          Patient Active Problem List    Diagnosis Date Noted    At risk for impaired function of liver 02/04/2025    Sleep apnea 06/10/2024    KIMBER (obstructive sleep apnea) 06/10/2024    Tibial artery stenosis, right 05/17/2024    PAD (peripheral artery disease) 05/17/2024    History of skin cancer 01/19/2024 1/2023--BCC, left shoulder, s/p excision      Snores 12/29/2023    Chronic kidney disease (CKD) stage G2/A2, mildly decreased glomerular filtration rate (GFR) between 60-89 mL/min/1.73 square meter and albuminuria creatinine ratio between  mg/g 05/08/2023    Basal cell carcinoma (BCC) of skin of left upper extremity including shoulder 03/31/2023    Other hyperlipidemia 02/04/2022    Stented coronary artery 01/18/2022    History of non-ST elevation myocardial infarction (NSTEMI) 12/27/2021    Umbilical hernia without obstruction and without gangrene 07/30/2021    Diastasis recti 07/30/2021    Type 2 diabetes mellitus with both eyes affected by mild nonproliferative retinopathy without macular edema, with long-term current use of insulin 08/11/2017    BMI 26.0-26.9,adult 12/23/2016    Proteinuria due to type 2  diabetes mellitus 12/23/2016    Type 2 diabetes mellitus with diabetic polyneuropathy, with long-term current use of insulin 10/04/2016    Type 2 diabetes mellitus with diabetic nephropathy, with long-term current use of insulin 12/03/2014    Hypertension associated with diabetes 10/24/2012    Hyperlipidemia associated with type 2 diabetes mellitus 10/24/2012     Lab Results   Component Value Date    LDLCALC 63.6 01/28/2022                        LAST HbA1c  Lab Results   Component Value Date    HGBA1C 7.3 (H) 01/24/2025       Lipid panel  Lab Results   Component Value Date    CHOL 165 01/24/2025    CHOL 165 09/06/2024    CHOL 112 (L) 12/22/2023     Lab Results   Component Value Date    HDL 57 01/24/2025    HDL 51 09/06/2024    HDL 43 12/22/2023     Lab Results   Component Value Date    LDLCALC 84.8 01/24/2025    LDLCALC 83.6 09/06/2024    LDLCALC 37.4 (L) 12/22/2023     Lab Results   Component Value Date    TRIG 116 01/24/2025    TRIG 152 (H) 09/06/2024    TRIG 158 (H) 12/22/2023     Lab Results   Component Value Date    CHOLHDL 34.5 01/24/2025    CHOLHDL 30.9 09/06/2024    CHOLHDL 38.4 12/22/2023            Review of Systems   Constitutional: Negative for chills and fever.   HENT:  Negative for hearing loss and nosebleeds.    Eyes:  Negative for blurred vision.   Cardiovascular:  Negative for chest pain, leg swelling and palpitations.   Respiratory:  Negative for hemoptysis and shortness of breath.    Hematologic/Lymphatic: Negative for bleeding problem.   Skin:  Negative for itching.   Musculoskeletal:  Negative for falls.   Gastrointestinal:  Negative for abdominal pain and hematochezia.   Genitourinary:  Negative for hematuria.   Neurological:  Negative for dizziness and loss of balance.   Psychiatric/Behavioral:  Negative for altered mental status and depression.        Objective:   Physical Exam  Constitutional:       Appearance: He is well-developed.   HENT:      Head: Normocephalic and atraumatic.   Eyes:       Conjunctiva/sclera: Conjunctivae normal.   Neck:      Vascular: No carotid bruit or JVD.   Cardiovascular:      Rate and Rhythm: Normal rate and regular rhythm.      Pulses:           Carotid pulses are 2+ on the right side and 2+ on the left side.       Radial pulses are 2+ on the right side and 2+ on the left side.      Heart sounds: Normal heart sounds. No murmur heard.     No friction rub. No gallop.   Pulmonary:      Effort: Pulmonary effort is normal. No respiratory distress.      Breath sounds: Normal breath sounds. No stridor. No wheezing.   Musculoskeletal:      Cervical back: Neck supple.   Skin:     General: Skin is warm and dry.   Neurological:      Mental Status: He is alert and oriented to person, place, and time.   Psychiatric:         Behavior: Behavior normal.         Assessment:     1. Preoperative cardiovascular examination    2. Abnormal ECG during exercise stress test    3. Stented coronary artery    4. Anginal equivalent    5. Pre-op evaluation            Plan:   1. Coronary artery disease   s/p PCI as above  Residual small vs disease in PDA. Medical tx  Now with a recurrent chest pain and concerning EKG stress test.  We will proceed with coronary angiogram  Continue Brilinta  Continue atorvastatin and Repatha  LDL goal lower than 70      2. Hypertension  BP well-controlled  Continue current regimen    3. Hyperlipidemia  As above  He was initiated on fenofibrate        I spent 5-10 minutes asking, assessing, assisting, arranging and advising heart healthy diet improvements. This included low-salt meals, portion control and health food alternatives. I also encourage 30 minutes of moderate exercise 3-4x a week.              Pertinent cardiac images and EKG reviewed independently.    Continue with current medical plan and lifestyle changes.  Return sooner for concerns or questions. If symptoms persist go to the ED  I have reviewed all pertinent data including patient's medical history in  detail and updated the computerized patient record.     Orders Placed This Encounter   Procedures    CBC Auto Differential     Standing Status:   Future     Number of Occurrences:   1     Expected Date:   4/23/2025     Expiration Date:   6/13/2026     Send normal result to authorizing provider's In Basket if patient is active on MyChart::   Yes    Basic Metabolic Panel     Standing Status:   Future     Number of Occurrences:   1     Expected Date:   4/23/2025     Expiration Date:   6/13/2026     Send normal result to authorizing provider's In Basket if patient is active on MyChart::   Yes    Diet NPO Except for: Medication     Standing Status:   Standing     Number of Occurrences:   1     Except for::   Medication    Vital signs     Standing Status:   Standing     Number of Occurrences:   1    Verify informed consent, place on front of chart     Standing Status:   Standing     Number of Occurrences:   1    Void on call to Cath Lab     Standing Status:   Standing     Number of Occurrences:   1    Notify [facility] Team     Standing Status:   Standing     Number of Occurrences:   1    EKG 12-lead     Pre-procedure evaluation, obtain an EKG if not in 30 days     Standing Status:   Standing     Number of Occurrences:   1     Diagnosis:   Pre-op evaluation [[id number]]    Place in Outpatient     Standing Status:   Standing     Number of Occurrences:   1     Diagnosis:   Abnormal ECG during exercise stress test [[id number]]     Is the Future Attending Known?:   No       Follow up as scheduled.     He expressed verbal understanding and agreed with the plan    Current Discharge Medication List        CONTINUE these medications which have NOT CHANGED    Details   atorvastatin (LIPITOR) 80 MG tablet Take 1 tablet (80 mg total) by mouth once daily.  Qty: 90 tablet, Refills: 1    Comments: 07/15/2024 2:09:31 PM  Associated Diagnoses: Hyperlipidemia associated with type 2 diabetes mellitus      cilostazoL (PLETAL) 50 MG Tab Take 1  tablet (50 mg total) by mouth 2 (two) times daily.  Qty: 180 tablet, Refills: 3    Associated Diagnoses: Tibial artery stenosis, right; PAD (peripheral artery disease)      evolocumab (REPATHA SURECLICK) 140 mg/mL PnIj Inject 1 mL (140 mg total) into the skin every 14 (fourteen) days.  Qty: 6 mL, Refills: 3    Associated Diagnoses: Type 2 diabetes mellitus with both eyes affected by mild nonproliferative retinopathy without macular edema, with long-term current use of insulin; Hyperlipidemia associated with type 2 diabetes mellitus      fenofibrate 160 MG Tab Take 1 tablet (160 mg total) by mouth once daily.  Qty: 90 tablet, Refills: 3    Associated Diagnoses: Other hyperlipidemia      insulin aspart U-100 (NOVOLOG FLEXPEN U-100 INSULIN) 100 unit/mL (3 mL) InPn pen INJECT 10 UNITS UNDER THE SKIN TWICE DAILY BEFORE BREAKFAST AND DINNER (75 DAYS SUPPLY)  Qty: 15 mL, Refills: 2    Comments: 11/29/2023 9:55:31 AM  Associated Diagnoses: Type 2 diabetes mellitus with both eyes affected by mild nonproliferative retinopathy without macular edema, with long-term current use of insulin      JARDIANCE 25 mg tablet TAKE 1 TABLET BY MOUTH EVERY DAY  Qty: 30 tablet, Refills: 2    Associated Diagnoses: Type 2 diabetes mellitus with both eyes affected by mild nonproliferative retinopathy without macular edema, with long-term current use of insulin      metoprolol succinate (TOPROL-XL) 25 MG 24 hr tablet Take 1 tablet (25 mg total) by mouth once daily. Take in place of 50 mg.  Qty: 90 tablet, Refills: 3    Comments: 04/11/2022 12:07:31 PM  Associated Diagnoses: Stented coronary artery; Hypertension associated with diabetes      multivitamin-minerals-lutein Tab Take 1 tablet by mouth once daily.      nateglinide (STARLIX) 120 MG tablet TAKE 1 TABLET BY MOUTH EVERY DAY WITH LUNCH  Qty: 90 tablet, Refills: 2    Comments: 01/07/2025 5:07:20 PM  Associated Diagnoses: Type 2 diabetes mellitus with diabetic polyneuropathy, with long-term  current use of insulin      omega-3 fatty acids-fish oil 340-1,000 mg Cap Take 1 capsule by mouth once daily.  Qty: 90 capsule, Refills: 3      ticagrelor (BRILINTA) 90 mg tablet Take 1 tablet (90 mg total) by mouth 2 (two) times daily.  Qty: 180 tablet, Refills: 3    Associated Diagnoses: Stented coronary artery      TOUJEO MAX U-300 SOLOSTAR 300 unit/mL (3 mL) insulin pen INJECT 26 UNITS SUBCUTANEOUSLY EVERY DAY -- 69 DAY SUPPLY  Qty: 12 mL, Refills: 0    Comments: * * N O T I C E * * Last quantity doesn't match original quantity  Associated Diagnoses: Type 2 diabetes mellitus with both eyes affected by mild nonproliferative retinopathy without macular edema, with long-term current use of insulin      valsartan (DIOVAN) 320 MG tablet Take 1 tablet (320 mg total) by mouth once daily.  Qty: 90 tablet, Refills: 3    Comments: .  Associated Diagnoses: Essential hypertension      blood sugar diagnostic Strp To check BG 4 times daily, to use with insurance preferred meter  Qty: 100 strip, Refills: 0    Associated Diagnoses: Type 2 diabetes mellitus with diabetic nephropathy, with long-term current use of insulin      blood-glucose meter kit To check BG 4 times daily, to use with insurance preferred meter  Qty: 1 each, Refills: 0    Associated Diagnoses: Type 2 diabetes mellitus with diabetic nephropathy, with long-term current use of insulin      coenzyme Q10 200 mg capsule Take 200 mg by mouth once daily.      lancets Misc To check BG 4 times daily, to use with insurance preferred meter  Qty: 100 each, Refills: 0    Associated Diagnoses: Type 2 diabetes mellitus with diabetic nephropathy, with long-term current use of insulin      !! MOUNJARO 15 mg/0.5 mL PnIj INJECT 15MG INTO THE SKIN EVERY 7 DAYS  Qty: 2 mL, Refills: 0    Associated Diagnoses: Type 2 diabetes mellitus with diabetic polyneuropathy, with long-term current use of insulin      nitroGLYCERIN (NITROSTAT) 0.4 MG SL tablet Place 1 tablet (0.4 mg total) under  "the tongue every 5 (five) minutes as needed for Chest pain.  Qty: 20 tablet, Refills: 12    Associated Diagnoses: Stented coronary artery      pen needle, diabetic (BD ULTRA-FINE BLACK PEN NEEDLE) 32 gauge x 5/32" Ndle Uses 2  times daily, on injections. Dispense 4 mm needles only  Qty: 200 each, Refills: 3    Associated Diagnoses: Type 2 diabetes mellitus with diabetic nephropathy, with long-term current use of insulin      !! tirzepatide (MOUNJARO) 15 mg/0.5 mL PnIj Inject 15 mg into the skin once a week.  Qty: 6 mL, Refills: 1    Associated Diagnoses: Type 2 diabetes mellitus with diabetic polyneuropathy, with long-term current use of insulin       !! - Potential duplicate medications found. Please discuss with provider.                 "

## 2025-05-09 NOTE — PLAN OF CARE
Patient sitting up in bed eating meal tray and po fluids to bedside. Patient reports no chest pain and no headache and right wrist tightness is resolving and feeling much improved; slowly removing air from right radial band.

## 2025-05-09 NOTE — DISCHARGE SUMMARY
[facility] (Hospital)  Discharge Note  Short Stay    Procedure(s) (LRB):  Left heart cath (Left)  Angioplasty-coronary  IVUS, Coronary  INSERTION, STENT, CORONARY ARTERY (N/A)      OUTCOME: Patient tolerated treatment/procedure well without complication and is now ready for discharge.    DISPOSITION: Home or Self Care    FINAL DIAGNOSIS:  <principal problem not specified>    FOLLOWUP: In clinic    DISCHARGE INSTRUCTIONS:    Discharge Procedure Orders   CBC Auto Differential   Standing Status: Future Number of Occurrences: 1 Standing Exp. Date: 06/13/26     Order Specific Question Answer Comments   Send normal result to authorizing provider's In Basket if patient is active on MyChart: Yes      Basic Metabolic Panel   Standing Status: Future Number of Occurrences: 1 Standing Exp. Date: 06/13/26     Order Specific Question Answer Comments   Send normal result to authorizing provider's In Basket if patient is active on MyChart: Yes          Clinical Reference Documents Added to Patient Instructions         Document    CARDIAC CATHETERIZATION - DISCHARGE INSTRUCTIONS (ENGLISH)    CORONARY ANGIOPLASTY DISCHARGE INSTRUCTIONS (ENGLISH)    STENTING FOR THE HEART - DISCHARGE INSTRUCTIONS (ENGLISH)            TIME SPENT ON DISCHARGE: 25 minutes

## 2025-05-09 NOTE — PLAN OF CARE
2 ml air removed from right radial band, no bleeding, no hematoma. Chest tightness is a 1/2 rating. MD made aware and will plan to give a ntg tab and reevaluate

## 2025-05-09 NOTE — PLAN OF CARE
Patient discharged to home and escorted per [facility] w/ nurse Jenna to sister per [facility]. Patient AAO, in no distress and no report of chest pain and just a soreness to right wrist. Pt has instructions. Right radial drsg cdi, no swelling, no bleeding, brisk cap refill, good sensations, fingers wm/pk; pt wearing glasses and has phone.

## 2025-05-10 LAB
OHS QRS DURATION: 100 MS
OHS QTC CALCULATION: 424 MS

## 2025-05-13 LAB
OHS QRS DURATION: 100 MS
OHS QTC CALCULATION: 419 MS

## 2025-05-19 ENCOUNTER — OFFICE VISIT (OUTPATIENT)
Dept: CARDIOLOGY | Facility: CLINIC | Age: 60
End: 2025-05-19
Payer: COMMERCIAL

## 2025-05-19 VITALS
SYSTOLIC BLOOD PRESSURE: 111 MMHG | DIASTOLIC BLOOD PRESSURE: 65 MMHG | HEART RATE: 75 BPM | WEIGHT: 204.81 LBS | OXYGEN SATURATION: 96 % | HEIGHT: 72 IN | BODY MASS INDEX: 27.74 KG/M2

## 2025-05-19 DIAGNOSIS — I25.10 CORONARY ARTERY DISEASE INVOLVING NATIVE CORONARY ARTERY OF NATIVE HEART WITHOUT ANGINA PECTORIS: Primary | Chronic | ICD-10-CM

## 2025-05-19 DIAGNOSIS — N18.2 CHRONIC KIDNEY DISEASE (CKD) STAGE G2/A2, MILDLY DECREASED GLOMERULAR FILTRATION RATE (GFR) BETWEEN 60-89 ML/MIN/1.73 SQUARE METER AND ALBUMINURIA CREATININE RATIO BETWEEN 30-299 MG/G: ICD-10-CM

## 2025-05-19 DIAGNOSIS — G47.33 OSA (OBSTRUCTIVE SLEEP APNEA): ICD-10-CM

## 2025-05-19 DIAGNOSIS — I15.2 HYPERTENSION ASSOCIATED WITH DIABETES: Chronic | ICD-10-CM

## 2025-05-19 DIAGNOSIS — I73.9 PAD (PERIPHERAL ARTERY DISEASE): ICD-10-CM

## 2025-05-19 DIAGNOSIS — Z79.4 TYPE 2 DIABETES MELLITUS WITH DIABETIC POLYNEUROPATHY, WITH LONG-TERM CURRENT USE OF INSULIN: Chronic | ICD-10-CM

## 2025-05-19 DIAGNOSIS — Z95.5 STENTED CORONARY ARTERY: Chronic | ICD-10-CM

## 2025-05-19 DIAGNOSIS — E11.59 HYPERTENSION ASSOCIATED WITH DIABETES: Chronic | ICD-10-CM

## 2025-05-19 DIAGNOSIS — E78.5 HYPERLIPIDEMIA LDL GOAL <55: ICD-10-CM

## 2025-05-19 DIAGNOSIS — I25.2 HISTORY OF NON-ST ELEVATION MYOCARDIAL INFARCTION (NSTEMI): Chronic | ICD-10-CM

## 2025-05-19 DIAGNOSIS — E11.42 TYPE 2 DIABETES MELLITUS WITH DIABETIC POLYNEUROPATHY, WITH LONG-TERM CURRENT USE OF INSULIN: Chronic | ICD-10-CM

## 2025-05-19 PROCEDURE — 99999 PR PBB SHADOW E&M-EST. PATIENT-LVL V: CPT | Mod: PBBFAC,,,

## 2025-05-19 NOTE — PROGRESS NOTES
Cardiology Clinic note    Subjective:   Patient ID:  Yon Loyd is a 60 y.o. male who presents for follow-up of CAD, [facility] s/p PCI D1      HPI:     5/19/2025: Previous patient of Dr. De Souza as below, initial visit for me. Here F/U [facility] s/p PCI D1. Seen in clinic unaccompanied, reports overall doing well. Staying active, no recurrent CV s/s. Though does not exercise as he should be. Intermittent claiducation. Patient denies CP, SOB/HERRERA, orthopnea, PND, syncope, palpitations, LE edema. Reports compliance and tolerance with all medications.    5/9/2025:  Here for elective coronary angiogram.  Underwent stress test with concerning EKG.  Given his recurrent angina and his cardiac history we elected to proceed with coronary angiogram     2/10/2025:  F/U.  ER visit few months back with with chest pain.  Troponins negative.  Chest pain reminded him by his cardiac symptoms.  No recurrent chest pain since.  Zetia stopped due to musculoskeletal side effects.  Was started on cilostazol for PVD and his claudication improved.  Repatha ordered which will be started soon        12/2023:  Here for follow-up.  He has been doing okay since last time.  Main complaint today is right thigh pain that started about 2 months ago.  Seems ezetimibe was started in September.  He is already on a high dose of atorvastatin.  Muscle aches are not that bad he would like to monitor for now.  We discussed about PCSK9 inhibitor.  Blood pressure is okay.  He is on aspirin Brilinta without any bleeding issues     1/2023: Here for f/u. He is doing well. He missed statin for 3 months, that's why LDL significantly elevated. He is back on statin now.  He didn't do the cardiac rehab. Occasional minor chest pain. Last was 2 months ago. Lasts for seconds/mins. Just 2 times over the last 1.5 years.         Stopped tobacco  Compliant with DAPT. No bleeding issues           Prior cardiovascular  Hx  --------------------------------     [facility] 5/9/2025    The 1st  Diag lesion was 80-90% stenosed with 0% stenosis post-intervention.    The RPDA lesion was 100% stenosed.  Left-to-right collaterals    The pre-procedure left ventricular end diastolic pressure was 14.    Successful PTCA and stenting to D1 with 3.0x28 mm JOE post dilated 3.5 NC balloon.  Like IVUS guided with excellent results    IFR mid circ 1.0.  Not significant    - Heart Catheterization    12/27/2021  The ejection fraction was greater than 55% by visual estimate.  The pre-procedure left ventricular end diastolic pressure was 8.  The Mid LAD lesion was 99% stenosed with 0% stenosis post-intervention.  A STENT RESOLUTE DONI [id number] stent was successfully placed. Post dilated with 3.5 NC balloon. IVUS guided  The Mid Cx to Dist Cx lesion was 95% stenosed with 0% stenosis post-intervention.  A STENT RESOLUTE DONI [id number] stent was successfully placed to mid/distal LCX post dilated with 3.0 NC balloon. IVUS guided  The RPDA lesion was distal 99% stenosed. Small to moderate caliber vs. Very distal lesion. Will be treated medically.  The estimated blood loss was none.     - ASA/Brilinta  - Statin   - Add Coreg   - Risk factors modifications  - Medical therapy for residual distal small Rt PDA lesion.      - NORIS 1/2022  Normal resting and exercise NORIS           - ECHO 12/27/2021  The left ventricle is normal in size with normal systolic function.  The estimated ejection fraction is 65%.  Normal left ventricular diastolic function.  Normal right ventricular size with normal right ventricular systolic function.  Mild mitral regurgitation.  Intermediate central venous pressure (8 mmHg).  The estimated PA systolic pressure is 22 mmHg.          Past Medical History:   Diagnosis Date    Basal cell carcinoma (BCC) of skin of left upper extremity including shoulder 3/31/2023    Cataract     Chronic kidney disease     stones    Coronary artery disease involving native coronary artery of native heart without angina pectoris  5/19/2025    Diabetes mellitus type II     Diabetic retinopathy     Heart attack 12/26/2021    Hyperlipidemia     Hyperlipidemia LDL goal <100 10/24/2012    Hypertension     Obesity (BMI 30-39.9) 12/23/2016    Sleep apnea 6/10/2024    Type 2 diabetes mellitus with hyperglycemia, with long-term current use of insulin 12/23/2016    Type 2 diabetes mellitus with microalbuminuria, with long-term current use of insulin 12/03/2014    Uncontrolled type 2 diabetes mellitus with diabetic polyneuropathy, with long-term current use of insulin 10/04/2016          Patient Active Problem List    Diagnosis Date Noted    Coronary artery disease involving native coronary artery of native heart without angina pectoris 05/19/2025    At risk for impaired function of liver 02/04/2025    Sleep apnea 06/10/2024    KIMBER (obstructive sleep apnea) 06/10/2024    Tibial artery stenosis, right 05/17/2024    PAD (peripheral artery disease) 05/17/2024    History of skin cancer 01/19/2024 1/2023--BCC, left shoulder, s/p excision      Snores 12/29/2023    Chronic kidney disease (CKD) stage G2/A2, mildly decreased glomerular filtration rate (GFR) between 60-89 mL/min/1.73 square meter and albuminuria creatinine ratio between  mg/g 05/08/2023    Basal cell carcinoma (BCC) of skin of left upper extremity including shoulder 03/31/2023    Hyperlipidemia LDL goal <55 02/04/2022    Stented coronary artery 01/18/2022    History of non-ST elevation myocardial infarction (NSTEMI) 12/27/2021    Umbilical hernia without obstruction and without gangrene 07/30/2021    Diastasis recti 07/30/2021    Type 2 diabetes mellitus with both eyes affected by mild nonproliferative retinopathy without macular edema, with long-term current use of insulin 08/11/2017    BMI 26.0-26.9,adult 12/23/2016    Proteinuria due to type 2 diabetes mellitus 12/23/2016    Type 2 diabetes mellitus with diabetic polyneuropathy, with long-term current use of insulin 10/04/2016     Type 2 diabetes mellitus with diabetic nephropathy, with long-term current use of insulin 12/03/2014    Hypertension associated with diabetes 10/24/2012    Hyperlipidemia associated with type 2 diabetes mellitus 10/24/2012     Lab Results   Component Value Date    LDLCALC 63.6 01/28/2022             Patient's Medications   New Prescriptions    No medications on file   Previous Medications    ASPIRIN (ECOTRIN) 81 MG EC TABLET    Take 1 tablet (81 mg total) by mouth once daily.    ATORVASTATIN (LIPITOR) 80 MG TABLET    Take 1 tablet (80 mg total) by mouth once daily.    BLOOD SUGAR DIAGNOSTIC STRP    To check BG 4 times daily, to use with insurance preferred meter    BLOOD-GLUCOSE METER KIT    To check BG 4 times daily, to use with insurance preferred meter    COENZYME Q10 200 MG CAPSULE    Take 200 mg by mouth once daily.    EVOLOCUMAB (REPATHA SURECLICK) 140 MG/ML PNIJ    Inject 1 mL (140 mg total) into the skin every 14 (fourteen) days.    FENOFIBRATE 160 MG TAB    Take 1 tablet (160 mg total) by mouth once daily.    INSULIN ASPART U-100 (NOVOLOG FLEXPEN U-100 INSULIN) 100 UNIT/ML (3 ML) INPN PEN    INJECT 10 UNITS UNDER THE SKIN TWICE DAILY BEFORE BREAKFAST AND DINNER (75 DAYS SUPPLY)    JARDIANCE 25 MG TABLET    TAKE 1 TABLET BY MOUTH EVERY DAY    LANCETS MISC    To check BG 4 times daily, to use with insurance preferred meter    METOPROLOL SUCCINATE (TOPROL-XL) 25 MG 24 HR TABLET    Take 1 tablet (25 mg total) by mouth once daily. Take in place of 50 mg.    MOUNJARO 15 MG/0.5 ML PNIJ    INJECT 15MG INTO THE SKIN EVERY 7 DAYS    MULTIVITAMIN-MINERALS-LUTEIN TAB    Take 1 tablet by mouth once daily.    NATEGLINIDE (STARLIX) 120 MG TABLET    TAKE 1 TABLET BY MOUTH EVERY DAY WITH LUNCH    NITROGLYCERIN (NITROSTAT) 0.4 MG SL TABLET    Place 1 tablet (0.4 mg total) under the tongue every 5 (five) minutes as needed for Chest pain.    OMEGA-3 FATTY ACIDS-FISH -1,000 MG CAP    Take 1 capsule by mouth once daily.  "   PEN NEEDLE, DIABETIC (BD ULTRA-FINE BLACK PEN NEEDLE) 32 GAUGE X 5/32" NDLE    Uses 2  times daily, on injections. Dispense 4 mm needles only    TICAGRELOR (BRILINTA) 90 MG TABLET    Take 1 tablet (90 mg total) by mouth 2 (two) times daily.    TIRZEPATIDE (MOUNJARO) 15 MG/0.5 ML PNIJ    Inject 15 mg into the skin once a week.    TOUJEO MAX U-300 SOLOSTAR 300 UNIT/ML (3 ML) INSULIN PEN    INJECT 26 UNITS SUBCUTANEOUSLY EVERY DAY -- 69 DAY SUPPLY    VALSARTAN (DIOVAN) 320 MG TABLET    Take 1 tablet (320 mg total) by mouth once daily.   Modified Medications    No medications on file   Discontinued Medications    No medications on file        Review of Systems   Constitutional: Negative for chills, decreased appetite, diaphoresis, malaise/fatigue, weight gain and weight loss.   Cardiovascular:  Positive for claudication. Negative for chest pain, dyspnea on exertion, irregular heartbeat, leg swelling, near-syncope, orthopnea, palpitations, paroxysmal nocturnal dyspnea and syncope.   Respiratory:  Negative for cough, hemoptysis, shortness of breath and snoring.    Gastrointestinal:  Negative for bloating, abdominal pain, nausea and vomiting.   Neurological:  Negative for light-headedness and weakness.       Family History   Problem Relation Name Age of Onset    Cancer Mother      Diabetes Mother          early onset, type 2    Hypertension Mother      Breast cancer Mother      Diabetes Father      Diabetes Maternal Grandfather          early onset, type 2    Heart attack Maternal Grandfather         Social History     Socioeconomic History    Marital status: Single   Occupational History    Occupation:  in construction     Employer: [employer]   Tobacco Use    Smoking status: Former     Current packs/day: 0.00     Types: Cigarettes     Quit date:      Years since quittin.3    Smokeless tobacco: Never   Substance and Sexual Activity    Alcohol use: No     Comment: 1-2 times yearly    " Drug use: No    Sexual activity: Never   Social History Narrative    1/3/24: Lives by himself. Has 2 kids. No pets.             Objective:   Vitals  Vitals:    05/19/25 1421 05/19/25 1424   BP: 109/69 111/65   Pulse: 75    SpO2: 96%    Weight: 92.9 kg (204 lb 12.9 oz)    Height: 6' (1.829 m)           Physical Exam  Vitals and nursing note reviewed.   Constitutional:       Appearance: Normal appearance.   Cardiovascular:      Rate and Rhythm: Normal rate and regular rhythm.      Heart sounds: No murmur heard.     No gallop.   Pulmonary:      Effort: Pulmonary effort is normal.      Breath sounds: Normal breath sounds. No wheezing or rales.   Abdominal:      General: Bowel sounds are normal. There is no distension.      Palpations: Abdomen is soft.      Tenderness: There is no abdominal tenderness.   Musculoskeletal:      Right lower leg: No edema.      Left lower leg: No edema.   Skin:     General: Skin is warm and dry.      Comments: R wrist CDI   Neurological:      Mental Status: He is alert and oriented to person, place, and time.           Lab Results    Lab Results   Component Value Date    WBC 9.64 05/07/2025    HGB 18.4 (H) 05/07/2025    HGB 18.3 (H) 02/28/2025    HCT 55.6 (H) 05/07/2025    HCT 54.2 (H) 02/28/2025    MCV 95 05/07/2025    MCV 95 02/28/2025       Lab Results   Component Value Date     05/07/2025     02/28/2025    INR 1.0 12/27/2021       Lab Results   Component Value Date    K 4.4 05/07/2025    K 4.9 02/28/2025    MG 1.9 12/28/2021    BUN 24 (H) 05/07/2025    CREATININE 1.6 (H) 05/07/2025       Lab Results   Component Value Date     (H) 05/07/2025     (H) 02/28/2025    HGBA1C 7.3 (H) 01/24/2025       Lab Results   Component Value Date    AST 32 03/24/2025    AST 33 02/28/2025    ALT 27 03/24/2025    ALT 29 02/28/2025    ALBUMIN 4.4 03/24/2025    ALBUMIN 4.6 02/28/2025    PROT 7.5 03/24/2025    PROT 8.3 02/28/2025       Lab Results   Component Value Date    CHOL 165  01/24/2025    HDL 57 01/24/2025    LDLCALC 84.8 01/24/2025    TRIG 116 01/24/2025       Lab Results   Component Value Date    BNP 32 06/19/2024         Assessment:     1. Coronary artery disease involving native coronary artery of native heart without angina pectoris    2. History of non-ST elevation myocardial infarction (NSTEMI)    3. Stented coronary artery    4. Hyperlipidemia LDL goal <55    5. KIMBER (obstructive sleep apnea)    6. Hypertension associated with diabetes    7. PAD (peripheral artery disease)    8. Chronic kidney disease (CKD) stage G2/A2, mildly decreased glomerular filtration rate (GFR) between 60-89 mL/min/1.73 square meter and albuminuria creatinine ratio between  mg/g    9. Type 2 diabetes mellitus with diabetic polyneuropathy, with long-term current use of insulin        Plan:     1. Coronary artery disease  - s/p PCI D1 5/2025  -stable, no recurrent DV s/s  - Residual small vs disease in PDA. Medical tx  - Continue asa, Brilinta; tolerating well  - Continue atorvastatin and Repatha  -LDL goal lower than 55     2. Hypertension  BP well-controlled  Continue current regimen     3. Hyperlipidemia  As above  He was initiated on fenofibrate  -recheck lipid panel, LFTs ~ 6 weeks  -mediterranean diet education  -mod intensity exercise 30m/day 3-4x/wk; weight loss; strict walking program    4. PAD  -stable, intermittent claudication  -repeat NORIS for monitoring  -med management as above  -walking program; weight loss    5. KIMBER  -continue CPAP    6. CKD  -stable  -avoid NSIADs, nephrotoxic agents  -monitor    7. DM2  -stable, A1C 7.3  -tight control given hx as above  -F/U with endo as scheduled           I spent 5-10 minutes asking, assessing, assisting, arranging and advising heart healthy diet improvements. This included low-salt meals, portion control and health food alternatives. I also encourage 30 minutes of moderate exercise 3-4x a week.      -F/U ~ 6 weeks or sooner PRN    The ASCVD Risk  score (Jose Angel THOMSON, et al., 2019) failed to calculate for the following reasons:    Risk score cannot be calculated because patient has a medical history suggesting prior/existing ASCVD    Orders Placed This Encounter   Procedures    Lipid Panel     Standing Status:   Future     Expected Date:   6/19/2025     Expiration Date:   8/17/2026     Send normal result to authorizing provider's In Basket if patient is active on MyChart::   Yes    Comprehensive Metabolic Panel     Standing Status:   Future     Expected Date:   6/19/2025     Expiration Date:   8/17/2026     Send normal result to authorizing provider's In Basket if patient is active on MyChart::   Yes    Ankle Brachial Indices (NORIS)     Standing Status:   Future     Expiration Date:   5/19/2026     Exam Type::   Exercise with toe tracings     Release to patient:   Immediate       He expressed verbal understanding and agreed with the plan      Pertinent cardiac images and EKG reviewed independently.    Continue with current medical plan and lifestyle changes.  Return sooner for concerns or questions. If symptoms persist go to the ED.  I have reviewed all pertinent data including patient's medical history in detail and updated the computerized patient record.     Counseling included discussion regarding imaging findings, diagnosis, possibilities, treatment options, risks and benefits.    Thank you for the opportunity to care for this patient. Will be available for questions if needed.        Signed:  Guillermo Ibarra DNP  05/19/2025

## 2025-05-22 ENCOUNTER — OFFICE VISIT (OUTPATIENT)
Dept: OPTOMETRY | Facility: CLINIC | Age: 60
End: 2025-05-22
Payer: COMMERCIAL

## 2025-05-22 DIAGNOSIS — H25.13 NUCLEAR SCLEROSIS OF BOTH EYES: ICD-10-CM

## 2025-05-22 DIAGNOSIS — E11.9 TYPE 2 DIABETES MELLITUS WITHOUT RETINOPATHY: Primary | ICD-10-CM

## 2025-05-22 DIAGNOSIS — E11.42 TYPE 2 DIABETES MELLITUS WITH DIABETIC POLYNEUROPATHY, WITH LONG-TERM CURRENT USE OF INSULIN: ICD-10-CM

## 2025-05-22 DIAGNOSIS — Z79.4 TYPE 2 DIABETES MELLITUS WITH DIABETIC POLYNEUROPATHY, WITH LONG-TERM CURRENT USE OF INSULIN: ICD-10-CM

## 2025-05-22 DIAGNOSIS — H52.7 REFRACTIVE ERROR: ICD-10-CM

## 2025-05-22 PROCEDURE — 99999 PR PBB SHADOW E&M-EST. PATIENT-LVL II: CPT | Mod: PBBFAC,,, | Performed by: OPTOMETRIST

## 2025-05-22 NOTE — PROGRESS NOTES
HPI    CC: 59 yo M presents today for diabetic eye exam. Pt reports no vision   complaints.     MITZY: 2/16/2024    (-) Changes in vision   (-) Pain  (-) Irritation   (-) Itching   (-) Flashes  (-) Floaters  (+) Glasses wearer  (-) CL wearer  (-) Uses eye gtts    Does patient want a refraction today? yes    (-) Eye injury  (-) Eye surgery   (-)POHx   Nuclear sclerosis, bilateral    Presbyopia  (-)FOHx    (+)DM  Hemoglobin A1C       Date                     Value               Ref Range             Status                01/24/2025               7.3 (H)             4.0 - 5.6 %           Final                  09/06/2024               7.2 (H)             4.0 - 5.6 %           Final                  05/03/2024               8.6 (H)             4.0 - 5.6 %           Final                   Last edited by Zully Osei MA on 5/22/2025  2:57 PM.            Assessment /Plan     For exam results, see Encounter Report.    Type 2 diabetes mellitus without retinopathy    Type 2 diabetes mellitus with diabetic polyneuropathy, with long-term current use of insulin  -     Ambulatory referral/consult to Optometry    Nuclear sclerosis of both eyes    Refractive error      1-2. No retinopathy noted, OU. Continue proper BS control and annual diabetic eye exams. Monitor yearly.      3. Educated pt on findings. Not visually significant. No need for removal at this time. Monitor yearly.      4. Updated SRx. Monitor yearly.        RTC in 1 year for annual eye exam or sooner if needed.

## 2025-05-30 ENCOUNTER — HOSPITAL ENCOUNTER (OUTPATIENT)
Dept: CARDIOLOGY | Facility: HOSPITAL | Age: 60
Discharge: HOME | End: 2025-05-30
Payer: COMMERCIAL

## 2025-05-30 DIAGNOSIS — I73.9 PAD (PERIPHERAL ARTERY DISEASE): ICD-10-CM

## 2025-05-30 PROCEDURE — 93924 LWR XTR VASC STDY BILAT: CPT | Mod: PN

## 2025-05-30 PROCEDURE — 93924 LWR XTR VASC STDY BILAT: CPT | Mod: 26,,, | Performed by: INTERNAL MEDICINE

## 2025-05-31 LAB
IMMEDIATE ARM BP: 138 MMHG
IMMEDIATE LEFT ABI: 1.05
IMMEDIATE LEFT TIBIAL: 145 MMHG
IMMEDIATE RIGHT ABI: 0.99
IMMEDIATE RIGHT TIBIAL: 136 MMHG
LEFT ABI: 1.13
LEFT ARM BP: 107 MMHG
LEFT DORSALIS PEDIS: 116 MMHG
LEFT POSTERIOR TIBIAL: 121 MMHG
LEFT TBI: 0.73
LEFT TOE PRESSURE: 78 MMHG
RIGHT ABI: 1.14
RIGHT ARM BP: 107 MMHG
RIGHT DORSALIS PEDIS: 110 MMHG
RIGHT POSTERIOR TIBIAL: 122 MMHG
RIGHT TBI: 0.72
RIGHT TOE PRESSURE: 77 MMHG
TREADMILL GRADE: 12 %
TREADMILL SPEED: 2 MPH
TREADMILL TIME: 5 MIN

## 2025-06-02 ENCOUNTER — RESULTS FOLLOW-UP (OUTPATIENT)
Dept: ENDOCRINOLOGY | Facility: CLINIC | Age: 60
End: 2025-06-02

## 2025-06-02 ENCOUNTER — RESULTS FOLLOW-UP (OUTPATIENT)
Dept: CARDIOLOGY | Facility: CLINIC | Age: 60
End: 2025-06-02

## 2025-06-03 ENCOUNTER — OFFICE VISIT (OUTPATIENT)
Dept: FAMILY MEDICINE | Facility: CLINIC | Age: 60
End: 2025-06-03
Payer: COMMERCIAL

## 2025-06-03 ENCOUNTER — OFFICE VISIT (OUTPATIENT)
Dept: ENDOCRINOLOGY | Facility: CLINIC | Age: 60
End: 2025-06-03
Payer: COMMERCIAL

## 2025-06-03 VITALS
HEART RATE: 69 BPM | OXYGEN SATURATION: 97 % | HEIGHT: 72 IN | SYSTOLIC BLOOD PRESSURE: 122 MMHG | BODY MASS INDEX: 27.83 KG/M2 | WEIGHT: 205.5 LBS | DIASTOLIC BLOOD PRESSURE: 82 MMHG

## 2025-06-03 VITALS
WEIGHT: 207.69 LBS | TEMPERATURE: 97 F | HEIGHT: 72 IN | BODY MASS INDEX: 28.13 KG/M2 | OXYGEN SATURATION: 97 % | HEART RATE: 79 BPM | SYSTOLIC BLOOD PRESSURE: 106 MMHG | DIASTOLIC BLOOD PRESSURE: 62 MMHG

## 2025-06-03 DIAGNOSIS — R80.9 PROTEINURIA DUE TO TYPE 2 DIABETES MELLITUS: Chronic | ICD-10-CM

## 2025-06-03 DIAGNOSIS — E11.42 TYPE 2 DIABETES MELLITUS WITH DIABETIC POLYNEUROPATHY, WITH LONG-TERM CURRENT USE OF INSULIN: ICD-10-CM

## 2025-06-03 DIAGNOSIS — Z95.5 STENTED CORONARY ARTERY: Chronic | ICD-10-CM

## 2025-06-03 DIAGNOSIS — E11.29 PROTEINURIA DUE TO TYPE 2 DIABETES MELLITUS: Chronic | ICD-10-CM

## 2025-06-03 DIAGNOSIS — G47.30 SLEEP APNEA, UNSPECIFIED TYPE: ICD-10-CM

## 2025-06-03 DIAGNOSIS — E78.5 HYPERLIPIDEMIA LDL GOAL <55: ICD-10-CM

## 2025-06-03 DIAGNOSIS — E11.3293 TYPE 2 DIABETES MELLITUS WITH BOTH EYES AFFECTED BY MILD NONPROLIFERATIVE RETINOPATHY WITHOUT MACULAR EDEMA, WITH LONG-TERM CURRENT USE OF INSULIN: Primary | ICD-10-CM

## 2025-06-03 DIAGNOSIS — R90.82 WHITE MATTER ABNORMALITY ON MRI OF BRAIN: ICD-10-CM

## 2025-06-03 DIAGNOSIS — Z79.4 TYPE 2 DIABETES MELLITUS WITH BOTH EYES AFFECTED BY MILD NONPROLIFERATIVE RETINOPATHY WITHOUT MACULAR EDEMA, WITH LONG-TERM CURRENT USE OF INSULIN: Primary | ICD-10-CM

## 2025-06-03 DIAGNOSIS — E11.69 HYPERLIPIDEMIA ASSOCIATED WITH TYPE 2 DIABETES MELLITUS: Chronic | ICD-10-CM

## 2025-06-03 DIAGNOSIS — M54.12 CERVICAL RADICULOPATHY: ICD-10-CM

## 2025-06-03 DIAGNOSIS — I15.2 HYPERTENSION ASSOCIATED WITH DIABETES: Chronic | ICD-10-CM

## 2025-06-03 DIAGNOSIS — E11.59 HYPERTENSION ASSOCIATED WITH DIABETES: Chronic | ICD-10-CM

## 2025-06-03 DIAGNOSIS — E78.5 HYPERLIPIDEMIA ASSOCIATED WITH TYPE 2 DIABETES MELLITUS: Chronic | ICD-10-CM

## 2025-06-03 DIAGNOSIS — I10 ESSENTIAL HYPERTENSION: ICD-10-CM

## 2025-06-03 DIAGNOSIS — N18.31 CHRONIC KIDNEY DISEASE, STAGE 3A: ICD-10-CM

## 2025-06-03 DIAGNOSIS — Z79.4 TYPE 2 DIABETES MELLITUS WITH DIABETIC POLYNEUROPATHY, WITH LONG-TERM CURRENT USE OF INSULIN: ICD-10-CM

## 2025-06-03 PROCEDURE — 4010F ACE/ARB THERAPY RXD/TAKEN: CPT | Mod: CPTII,S$GLB,, | Performed by: FAMILY MEDICINE

## 2025-06-03 PROCEDURE — 99214 OFFICE O/P EST MOD 30 MIN: CPT | Mod: S$GLB,,, | Performed by: FAMILY MEDICINE

## 2025-06-03 PROCEDURE — 3074F SYST BP LT 130 MM HG: CPT | Mod: CPTII,S$GLB,, | Performed by: NURSE PRACTITIONER

## 2025-06-03 PROCEDURE — 3052F HG A1C>EQUAL 8.0%<EQUAL 9.0%: CPT | Mod: CPTII,S$GLB,, | Performed by: FAMILY MEDICINE

## 2025-06-03 PROCEDURE — 3008F BODY MASS INDEX DOCD: CPT | Mod: CPTII,S$GLB,, | Performed by: FAMILY MEDICINE

## 2025-06-03 PROCEDURE — 1160F RVW MEDS BY RX/DR IN RCRD: CPT | Mod: CPTII,S$GLB,, | Performed by: NURSE PRACTITIONER

## 2025-06-03 PROCEDURE — 99214 OFFICE O/P EST MOD 30 MIN: CPT | Mod: S$GLB,,, | Performed by: NURSE PRACTITIONER

## 2025-06-03 PROCEDURE — G2211 COMPLEX E/M VISIT ADD ON: HCPCS | Mod: S$GLB,,, | Performed by: NURSE PRACTITIONER

## 2025-06-03 PROCEDURE — 3079F DIAST BP 80-89 MM HG: CPT | Mod: CPTII,S$GLB,, | Performed by: NURSE PRACTITIONER

## 2025-06-03 PROCEDURE — 3008F BODY MASS INDEX DOCD: CPT | Mod: CPTII,S$GLB,, | Performed by: NURSE PRACTITIONER

## 2025-06-03 PROCEDURE — 3066F NEPHROPATHY DOC TX: CPT | Mod: CPTII,S$GLB,, | Performed by: NURSE PRACTITIONER

## 2025-06-03 PROCEDURE — 3066F NEPHROPATHY DOC TX: CPT | Mod: CPTII,S$GLB,, | Performed by: FAMILY MEDICINE

## 2025-06-03 PROCEDURE — 3060F POS MICROALBUMINURIA REV: CPT | Mod: CPTII,S$GLB,, | Performed by: NURSE PRACTITIONER

## 2025-06-03 PROCEDURE — 3074F SYST BP LT 130 MM HG: CPT | Mod: CPTII,S$GLB,, | Performed by: FAMILY MEDICINE

## 2025-06-03 PROCEDURE — 3052F HG A1C>EQUAL 8.0%<EQUAL 9.0%: CPT | Mod: CPTII,S$GLB,, | Performed by: NURSE PRACTITIONER

## 2025-06-03 PROCEDURE — 99999 PR PBB SHADOW E&M-EST. PATIENT-LVL V: CPT | Mod: PBBFAC,,, | Performed by: NURSE PRACTITIONER

## 2025-06-03 PROCEDURE — 1159F MED LIST DOCD IN RCRD: CPT | Mod: CPTII,S$GLB,, | Performed by: NURSE PRACTITIONER

## 2025-06-03 PROCEDURE — 4010F ACE/ARB THERAPY RXD/TAKEN: CPT | Mod: CPTII,S$GLB,, | Performed by: NURSE PRACTITIONER

## 2025-06-03 PROCEDURE — 99999 PR PBB SHADOW E&M-EST. PATIENT-LVL V: CPT | Mod: PBBFAC,,, | Performed by: FAMILY MEDICINE

## 2025-06-03 PROCEDURE — 3078F DIAST BP <80 MM HG: CPT | Mod: CPTII,S$GLB,, | Performed by: FAMILY MEDICINE

## 2025-06-03 PROCEDURE — 3060F POS MICROALBUMINURIA REV: CPT | Mod: CPTII,S$GLB,, | Performed by: FAMILY MEDICINE

## 2025-06-03 RX ORDER — TIRZEPATIDE 15 MG/.5ML
15 INJECTION, SOLUTION SUBCUTANEOUS WEEKLY
Qty: 6 ML | Refills: 1 | Status: SHIPPED | OUTPATIENT
Start: 2025-06-03 | End: 2025-11-18

## 2025-06-03 RX ORDER — GABAPENTIN 100 MG/1
100 CAPSULE ORAL NIGHTLY
Qty: 90 CAPSULE | Refills: 3 | Status: SHIPPED | OUTPATIENT
Start: 2025-06-03 | End: 2026-06-03

## 2025-06-03 RX ORDER — BLOOD-GLUCOSE SENSOR
EACH MISCELLANEOUS
Qty: 2 EACH | Refills: 11 | Status: SHIPPED | OUTPATIENT
Start: 2025-06-03

## 2025-06-03 RX ORDER — METOPROLOL SUCCINATE 25 MG/1
25 TABLET, EXTENDED RELEASE ORAL DAILY
Qty: 90 TABLET | Refills: 3 | Status: SHIPPED | OUTPATIENT
Start: 2025-06-03

## 2025-06-03 RX ORDER — ATORVASTATIN CALCIUM 80 MG/1
80 TABLET, FILM COATED ORAL DAILY
Qty: 90 TABLET | Refills: 1 | Status: SHIPPED | OUTPATIENT
Start: 2025-06-03 | End: 2025-06-03

## 2025-06-03 RX ORDER — VALSARTAN 320 MG/1
320 TABLET ORAL DAILY
Qty: 90 TABLET | Refills: 3 | Status: SHIPPED | OUTPATIENT
Start: 2025-06-03 | End: 2026-06-03

## 2025-06-03 RX ORDER — ATORVASTATIN CALCIUM 40 MG/1
40 TABLET, FILM COATED ORAL DAILY
Qty: 90 TABLET | Refills: 3 | Status: SHIPPED | OUTPATIENT
Start: 2025-06-03 | End: 2026-06-03

## 2025-06-04 ENCOUNTER — PATIENT OUTREACH (OUTPATIENT)
Dept: ADMINISTRATIVE | Facility: HOSPITAL | Age: 60
End: 2025-06-04
Payer: COMMERCIAL

## 2025-06-10 ENCOUNTER — PATIENT MESSAGE (OUTPATIENT)
Dept: ENDOCRINOLOGY | Facility: CLINIC | Age: 60
End: 2025-06-10
Payer: COMMERCIAL

## 2025-06-12 DIAGNOSIS — Z79.4 TYPE 2 DIABETES MELLITUS WITH BOTH EYES AFFECTED BY MILD NONPROLIFERATIVE RETINOPATHY WITHOUT MACULAR EDEMA, WITH LONG-TERM CURRENT USE OF INSULIN: ICD-10-CM

## 2025-06-12 DIAGNOSIS — E11.3293 TYPE 2 DIABETES MELLITUS WITH BOTH EYES AFFECTED BY MILD NONPROLIFERATIVE RETINOPATHY WITHOUT MACULAR EDEMA, WITH LONG-TERM CURRENT USE OF INSULIN: ICD-10-CM

## 2025-06-16 RX ORDER — INSULIN GLARGINE 300 U/ML
INJECTION, SOLUTION SUBCUTANEOUS
Qty: 12 ML | Refills: 0 | Status: SHIPPED | OUTPATIENT
Start: 2025-06-16

## 2025-06-27 ENCOUNTER — APPOINTMENT (OUTPATIENT)
Dept: LAB | Facility: HOSPITAL | Age: 60
End: 2025-06-27
Attending: NURSE PRACTITIONER
Payer: COMMERCIAL

## 2025-07-02 NOTE — PROGRESS NOTES
Cardiology Clinic note    Subjective:   Patient ID:  Yon Loyd is a 60 y.o. male who presents for follow-up of CAD, [facility] s/p PCI D1      HPI:     7/8/2025: Routine F/U. Seen in clinic unaccompanied, reports overall doing well. Staying active, no recurrent CV s/s. NORIS stable and reviewed with patient as below. Tolerating DAPT, no reported bleeding. LDLc at goal. Patient denies CP, SOB/HERRERA, orthopnea, PND, syncope, palpitations, LE edema. Reports compliance and tolerance with all medications.    5/19/2025: Previous patient of Dr. De Souza as below, initial visit for me. Here F/U [facility] s/p PCI D1. Seen in clinic unaccompanied, reports overall doing well. Staying active, no recurrent CV s/s. Though does not exercise as he should be. Intermittent claiducation. Patient denies CP, SOB/HERRERA, orthopnea, PND, syncope, palpitations, LE edema. Reports compliance and tolerance with all medications.    5/9/2025:  Here for elective coronary angiogram.  Underwent stress test with concerning EKG.  Given his recurrent angina and his cardiac history we elected to proceed with coronary angiogram     2/10/2025:  F/U.  ER visit few months back with with chest pain.  Troponins negative.  Chest pain reminded him by his cardiac symptoms.  No recurrent chest pain since.  Zetia stopped due to musculoskeletal side effects.  Was started on cilostazol for PVD and his claudication improved.  Repatha ordered which will be started soon        12/2023:  Here for follow-up.  He has been doing okay since last time.  Main complaint today is right thigh pain that started about 2 months ago.  Seems ezetimibe was started in September.  He is already on a high dose of atorvastatin.  Muscle aches are not that bad he would like to monitor for now.  We discussed about PCSK9 inhibitor.  Blood pressure is okay.  He is on aspirin Brilinta without any bleeding issues     1/2023: Here for f/u. He is doing well. He missed statin for 3 months, that's why LDL  significantly elevated. He is back on statin now.  He didn't do the cardiac rehab. Occasional minor chest pain. Last was 2 months ago. Lasts for seconds/mins. Just 2 times over the last 1.5 years.         Stopped tobacco  Compliant with DAPT. No bleeding issues           Prior cardiovascular  Hx  --------------------------------     NORIS 5/2025    Right NORIS 1.14 left NORIS 1.13    Right TBI 0.72 left TBI 0.73    No exercise-induced symptoms.  No significant drop in NORIS with exercise.    [facility] 5/9/2025    The 1st Diag lesion was 80-90% stenosed with 0% stenosis post-intervention.    The RPDA lesion was 100% stenosed.  Left-to-right collaterals    The pre-procedure left ventricular end diastolic pressure was 14.    Successful PTCA and stenting to D1 with 3.0x28 mm JOE post dilated 3.5 NC balloon.  Like IVUS guided with excellent results    IFR mid circ 1.0.  Not significant    - Heart Catheterization    12/27/2021  The ejection fraction was greater than 55% by visual estimate.  The pre-procedure left ventricular end diastolic pressure was 8.  The Mid LAD lesion was 99% stenosed with 0% stenosis post-intervention.  A STENT RESOLUTE DONI [id number] stent was successfully placed. Post dilated with 3.5 NC balloon. IVUS guided  The Mid Cx to Dist Cx lesion was 95% stenosed with 0% stenosis post-intervention.  A STENT RESOLUTE DONI [id number] stent was successfully placed to mid/distal LCX post dilated with 3.0 NC balloon. IVUS guided  The RPDA lesion was distal 99% stenosed. Small to moderate caliber vs. Very distal lesion. Will be treated medically.  The estimated blood loss was none.     - ASA/Brilinta  - Statin   - Add Coreg   - Risk factors modifications  - Medical therapy for residual distal small Rt PDA lesion.      - NORIS 1/2022  Normal resting and exercise NORIS           - ECHO 12/27/2021  The left ventricle is normal in size with normal systolic function.  The estimated ejection fraction is 65%.  Normal left ventricular  diastolic function.  Normal right ventricular size with normal right ventricular systolic function.  Mild mitral regurgitation.  Intermediate central venous pressure (8 mmHg).  The estimated PA systolic pressure is 22 mmHg.          Past Medical History:   Diagnosis Date    Basal cell carcinoma (BCC) of skin of left upper extremity including shoulder 3/31/2023    Cataract     Chronic kidney disease     stones    Coronary artery disease involving native coronary artery of native heart without angina pectoris 5/19/2025    Diabetes mellitus type II     Diabetic retinopathy     Heart attack 12/26/2021    Hyperlipidemia     Hyperlipidemia LDL goal <100 10/24/2012    Hypertension     Obesity (BMI 30-39.9) 12/23/2016    Sleep apnea 6/10/2024    Type 2 diabetes mellitus with hyperglycemia, with long-term current use of insulin 12/23/2016    Type 2 diabetes mellitus with microalbuminuria, with long-term current use of insulin 12/03/2014    Uncontrolled type 2 diabetes mellitus with diabetic polyneuropathy, with long-term current use of insulin 10/04/2016          Patient Active Problem List    Diagnosis Date Noted    Chronic kidney disease, stage 3a 06/03/2025    Coronary artery disease involving native coronary artery of native heart without angina pectoris 05/19/2025    At risk for impaired function of liver 02/04/2025    Sleep apnea 06/10/2024    KIMBER (obstructive sleep apnea) 06/10/2024    Tibial artery stenosis, right 05/17/2024    PAD (peripheral artery disease) 05/17/2024    History of skin cancer 01/19/2024 1/2023--BCC, left shoulder, s/p excision      Snores 12/29/2023    Chronic kidney disease (CKD) stage G2/A2, mildly decreased glomerular filtration rate (GFR) between 60-89 mL/min/1.73 square meter and albuminuria creatinine ratio between  mg/g 05/08/2023    Basal cell carcinoma (BCC) of skin of left upper extremity including shoulder 03/31/2023    Hyperlipidemia LDL goal <55 02/04/2022    Stented  coronary artery 01/18/2022    History of non-ST elevation myocardial infarction (NSTEMI) 12/27/2021    Umbilical hernia without obstruction and without gangrene 07/30/2021    Diastasis recti 07/30/2021    Type 2 diabetes mellitus with both eyes affected by mild nonproliferative retinopathy without macular edema, with long-term current use of insulin 08/11/2017    BMI 26.0-26.9,adult 12/23/2016    Proteinuria due to type 2 diabetes mellitus 12/23/2016    Type 2 diabetes mellitus with diabetic polyneuropathy, with long-term current use of insulin 10/04/2016    Type 2 diabetes mellitus with diabetic nephropathy, with long-term current use of insulin 12/03/2014    Hypertension associated with diabetes 10/24/2012    Hyperlipidemia associated with type 2 diabetes mellitus 10/24/2012     Lab Results   Component Value Date    LDLCALC 63.6 01/28/2022             Patient's Medications   New Prescriptions    No medications on file   Previous Medications    ASPIRIN (ECOTRIN) 81 MG EC TABLET    Take 1 tablet (81 mg total) by mouth once daily.    ATORVASTATIN (LIPITOR) 40 MG TABLET    Take 1 tablet (40 mg total) by mouth once daily.    BLOOD SUGAR DIAGNOSTIC STRP    To check BG 4 times daily, to use with insurance preferred meter    BLOOD-GLUCOSE METER KIT    To check BG 4 times daily, to use with insurance preferred meter    BLOOD-GLUCOSE SENSOR (FREESTYLE JERO 3 PLUS SENSOR) MACIEJ    Apply and change every 15 days.    COENZYME Q10 200 MG CAPSULE    Take 200 mg by mouth once daily.    EMPAGLIFLOZIN (JARDIANCE) 25 MG TABLET    Take 1 tablet (25 mg total) by mouth once daily.    EVOLOCUMAB (REPATHA SURECLICK) 140 MG/ML PNIJ    Inject 1 mL (140 mg total) into the skin every 14 (fourteen) days.    GABAPENTIN (NEURONTIN) 100 MG CAPSULE    Take 1 capsule (100 mg total) by mouth every evening.    INSULIN ASPART U-100 (NOVOLOG FLEXPEN U-100 INSULIN) 100 UNIT/ML (3 ML) INPN PEN    INJECT 10 UNITS UNDER THE SKIN TWICE DAILY BEFORE  "BREAKFAST AND DINNER (75 DAYS SUPPLY)    LANCETS MISC    To check BG 4 times daily, to use with insurance preferred meter    METOPROLOL SUCCINATE (TOPROL-XL) 25 MG 24 HR TABLET    Take 1 tablet (25 mg total) by mouth once daily. Take in place of 50 mg.    MULTIVITAMIN-MINERALS-LUTEIN TAB    Take 1 tablet by mouth once daily.    NATEGLINIDE (STARLIX) 120 MG TABLET    TAKE 1 TABLET BY MOUTH EVERY DAY WITH LUNCH    NITROGLYCERIN (NITROSTAT) 0.4 MG SL TABLET    Place 1 tablet (0.4 mg total) under the tongue every 5 (five) minutes as needed for Chest pain.    OMEGA-3 FATTY ACIDS-FISH -1,000 MG CAP    Take 1 capsule by mouth once daily.    PEN NEEDLE, DIABETIC (BD ULTRA-FINE BLACK PEN NEEDLE) 32 GAUGE X 5/32" NDLE    Uses 2  times daily, on injections. Dispense 4 mm needles only    TICAGRELOR (BRILINTA) 90 MG TABLET    Take 1 tablet (90 mg total) by mouth 2 (two) times daily.    TIRZEPATIDE (MOUNJARO) 15 MG/0.5 ML PNIJ    Inject 15 mg into the skin once a week.    TOUJEO MAX U-300 SOLOSTAR 300 UNIT/ML (3 ML) INSULIN PEN    INJECT 26 UNITS SUBCUTANEOUSLY EVERY DAY -- 69 DAY SUPPLY    VALSARTAN (DIOVAN) 320 MG TABLET    Take 1 tablet (320 mg total) by mouth once daily.   Modified Medications    No medications on file   Discontinued Medications    No medications on file        Review of Systems   Constitutional: Negative for chills, decreased appetite, diaphoresis, malaise/fatigue, weight gain and weight loss.   Cardiovascular:  Positive for claudication. Negative for chest pain, dyspnea on exertion, irregular heartbeat, leg swelling, near-syncope, orthopnea, palpitations, paroxysmal nocturnal dyspnea and syncope.   Respiratory:  Negative for cough, hemoptysis, shortness of breath and snoring.    Gastrointestinal:  Negative for bloating, abdominal pain, nausea and vomiting.   Neurological:  Negative for light-headedness and weakness.       Family History   Problem Relation Name Age of Onset    Cancer Mother      " Diabetes Mother          early onset, type 2    Hypertension Mother      Breast cancer Mother      Diabetes Father      Diabetes Maternal Grandfather          early onset, type 2    Heart attack Maternal Grandfather         Social History     Socioeconomic History    Marital status: Single   Occupational History    Occupation:  in construction     Employer: [employer]   Tobacco Use    Smoking status: Former     Current packs/day: 0.00     Types: Cigarettes     Quit date:      Years since quittin.5    Smokeless tobacco: Never   Substance and Sexual Activity    Alcohol use: No     Comment: 1-2 times yearly    Drug use: No    Sexual activity: Never   Social History Narrative    1/3/24: Lives by himself. Has 2 kids. No pets.             Objective:   Vitals  Vitals:    25 1342 25 1344   BP: 113/76 (!) 140/77   Pulse: 69    SpO2: 97%    Weight: 94.7 kg (208 lb 12.4 oz)    Height: 6' (1.829 m)             Physical Exam  Vitals and nursing note reviewed.   Constitutional:       Appearance: Normal appearance.   Cardiovascular:      Rate and Rhythm: Normal rate and regular rhythm.      Heart sounds: No murmur heard.     No gallop.   Pulmonary:      Effort: Pulmonary effort is normal.      Breath sounds: Normal breath sounds. No wheezing or rales.   Abdominal:      General: Bowel sounds are normal. There is no distension.      Palpations: Abdomen is soft.      Tenderness: There is no abdominal tenderness.   Musculoskeletal:      Right lower leg: No edema.      Left lower leg: No edema.   Skin:     General: Skin is warm and dry.      Comments: R wrist CDI   Neurological:      Mental Status: He is alert and oriented to person, place, and time.           Lab Results    Lab Results   Component Value Date    WBC 6.40 2025    HGB 18.4 (H) 2025    HGB 18.3 (H) 2025    HCT 54.8 (H) 2025    HCT 54.2 (H) 2025    MCV 95 2025    MCV 95 2025        Lab Results   Component Value Date     06/27/2025     02/28/2025    INR 1.0 12/27/2021       Lab Results   Component Value Date    K 5.1 06/27/2025    K 4.9 02/28/2025    MG 1.9 12/28/2021    BUN 25 (H) 06/27/2025    CREATININE 1.3 06/27/2025       Lab Results   Component Value Date     (H) 06/27/2025     (H) 02/28/2025    HGBA1C 8.0 (H) 05/30/2025    HGBA1C 7.3 (H) 01/24/2025       Lab Results   Component Value Date    AST 31 06/27/2025    AST 33 02/28/2025    ALT 46 (H) 06/27/2025    ALT 29 02/28/2025    ALBUMIN 4.5 06/27/2025    ALBUMIN 4.6 02/28/2025    PROT 7.8 06/27/2025    PROT 8.3 02/28/2025       Lab Results   Component Value Date    CHOL 87 (L) 06/27/2025    CHOL 165 01/24/2025    HDL 48 06/27/2025    LDLCALC 20.8 (L) 06/27/2025    TRIG 91 06/27/2025    TRIG 116 01/24/2025       Lab Results   Component Value Date    BNP 32 06/19/2024         Assessment:     1. Coronary artery disease involving native coronary artery of native heart without angina pectoris    2. Hypertension associated with diabetes    3. Hyperlipidemia LDL goal <55    4. Hyperlipidemia associated with type 2 diabetes mellitus    5. History of non-ST elevation myocardial infarction (NSTEMI)    6. PAD (peripheral artery disease)    7. Stented coronary artery    8. Tibial artery stenosis, right    9. Chronic kidney disease, stage 3a    10. KIMBER (obstructive sleep apnea)          Plan:     1. Coronary artery disease  - s/p PCI D1 5/2025  -stable, no recurrent CV s/s  - Residual small vs disease in PDA. Medical tx  - Continue asa, Brilinta; tolerating well  - Continue atorvastatin and Repatha  -LDL goal lower than 55     2. Hypertension  BP well-controlled  Continue current regimen     3. Hyperlipidemia  As above  He was initiated on fenofibrate  -recheck lipid panel, LFTs ~ 6 weeks  -mediterranean diet education  -mod intensity exercise 30m/day 3-4x/wk; weight loss; strict walking program    4. PAD  -stable,  intermittent claudication  -med management as above  -walking program; weight loss    5. KIMBER  -continue CPAP    6. CKD  -stable  -avoid NSIADs, nephrotoxic agents  -monitor    7. DM2  -stable, A1C 7.3  -tight control given hx as above  -F/U with endo as scheduled           I spent 5-10 minutes asking, assessing, assisting, arranging and advising heart healthy diet improvements. This included low-salt meals, portion control and health food alternatives. I also encourage 30 minutes of moderate exercise 3-4x a week.      -F/U ~ 6m or sooner PRN    The ASCVD Risk score (Jose Angel THOMSON, et al., 2019) failed to calculate for the following reasons:    Risk score cannot be calculated because patient has a medical history suggesting prior/existing ASCVD    Orders Placed This Encounter   Procedures    Comprehensive Metabolic Panel     Standing Status:   Future     Expected Date:   1/8/2026     Expiration Date:   10/6/2026     Send normal result to authorizing provider's In Basket if patient is active on MyChart::   Yes    Lipid Panel     Standing Status:   Future     Expected Date:   1/8/2026     Expiration Date:   10/6/2026     Send normal result to authorizing provider's In Basket if patient is active on MyChart::   Yes       He expressed verbal understanding and agreed with the plan      Pertinent cardiac images and EKG reviewed independently.    Continue with current medical plan and lifestyle changes.  Return sooner for concerns or questions. If symptoms persist go to the ED.  I have reviewed all pertinent data including patient's medical history in detail and updated the computerized patient record.     Counseling included discussion regarding imaging findings, diagnosis, possibilities, treatment options, risks and benefits.    Thank you for the opportunity to care for this patient. Will be available for questions if needed.        Signed:  Guillermo Ibarra DNP  07/08/2025

## 2025-07-03 ENCOUNTER — TELEPHONE (OUTPATIENT)
Dept: FAMILY MEDICINE | Facility: CLINIC | Age: 60
End: 2025-07-03
Payer: COMMERCIAL

## 2025-07-03 ENCOUNTER — PATIENT MESSAGE (OUTPATIENT)
Dept: FAMILY MEDICINE | Facility: CLINIC | Age: 60
End: 2025-07-03
Payer: COMMERCIAL

## 2025-07-03 DIAGNOSIS — R90.89 ABNORMAL FINDING ON MRI OF BRAIN: Primary | ICD-10-CM

## 2025-07-03 NOTE — TELEPHONE ENCOUNTER
"Hi Dr. Logan,   Patient is seeing you 7/28    Initially had MRI due to facial numbness    MRI showed "scattered area of increased T2 and Lair singlan in the bilateral periventricular and deep white matter and in the rad"    They recommended repeat in a year and that scan (below) showed unchanged finding but the findings are non specific and can be seen with chronic microvascular ischemic change or demyelinating process.    Radiology is recommending continued monitoring. I would appreciate your input to see if this is really necessary. Or if there is concern for demyelinating process, should further testing be done now?    Numbness has resolved.         "

## 2025-07-03 NOTE — TELEPHONE ENCOUNTER
Nathaly,   Can you set patient up with neurology and then send me the name of who it was so I can also message them    He has had an abnormal MRI, its in the chart

## 2025-07-08 ENCOUNTER — OFFICE VISIT (OUTPATIENT)
Dept: CARDIOLOGY | Facility: CLINIC | Age: 60
End: 2025-07-08
Payer: COMMERCIAL

## 2025-07-08 VITALS
HEART RATE: 69 BPM | HEIGHT: 72 IN | BODY MASS INDEX: 28.27 KG/M2 | SYSTOLIC BLOOD PRESSURE: 140 MMHG | WEIGHT: 208.75 LBS | OXYGEN SATURATION: 97 % | DIASTOLIC BLOOD PRESSURE: 77 MMHG

## 2025-07-08 DIAGNOSIS — E11.69 HYPERLIPIDEMIA ASSOCIATED WITH TYPE 2 DIABETES MELLITUS: Chronic | ICD-10-CM

## 2025-07-08 DIAGNOSIS — Z95.5 STENTED CORONARY ARTERY: Chronic | ICD-10-CM

## 2025-07-08 DIAGNOSIS — I15.2 HYPERTENSION ASSOCIATED WITH DIABETES: Chronic | ICD-10-CM

## 2025-07-08 DIAGNOSIS — I73.9 PAD (PERIPHERAL ARTERY DISEASE): ICD-10-CM

## 2025-07-08 DIAGNOSIS — E78.5 HYPERLIPIDEMIA LDL GOAL <55: ICD-10-CM

## 2025-07-08 DIAGNOSIS — I25.2 HISTORY OF NON-ST ELEVATION MYOCARDIAL INFARCTION (NSTEMI): Chronic | ICD-10-CM

## 2025-07-08 DIAGNOSIS — G47.33 OSA (OBSTRUCTIVE SLEEP APNEA): ICD-10-CM

## 2025-07-08 DIAGNOSIS — E11.59 HYPERTENSION ASSOCIATED WITH DIABETES: Chronic | ICD-10-CM

## 2025-07-08 DIAGNOSIS — E78.5 HYPERLIPIDEMIA ASSOCIATED WITH TYPE 2 DIABETES MELLITUS: Chronic | ICD-10-CM

## 2025-07-08 DIAGNOSIS — I25.10 CORONARY ARTERY DISEASE INVOLVING NATIVE CORONARY ARTERY OF NATIVE HEART WITHOUT ANGINA PECTORIS: Primary | Chronic | ICD-10-CM

## 2025-07-08 DIAGNOSIS — I70.201 TIBIAL ARTERY STENOSIS, RIGHT: ICD-10-CM

## 2025-07-08 DIAGNOSIS — N18.31 CHRONIC KIDNEY DISEASE, STAGE 3A: ICD-10-CM

## 2025-07-08 PROCEDURE — 3066F NEPHROPATHY DOC TX: CPT | Mod: CPTII,S$GLB,,

## 2025-07-08 PROCEDURE — 3008F BODY MASS INDEX DOCD: CPT | Mod: CPTII,S$GLB,,

## 2025-07-08 PROCEDURE — 99214 OFFICE O/P EST MOD 30 MIN: CPT | Mod: S$GLB,,,

## 2025-07-08 PROCEDURE — 3060F POS MICROALBUMINURIA REV: CPT | Mod: CPTII,S$GLB,,

## 2025-07-08 PROCEDURE — 3052F HG A1C>EQUAL 8.0%<EQUAL 9.0%: CPT | Mod: CPTII,S$GLB,,

## 2025-07-08 PROCEDURE — 99999 PR PBB SHADOW E&M-EST. PATIENT-LVL V: CPT | Mod: PBBFAC,,,

## 2025-07-08 PROCEDURE — 4010F ACE/ARB THERAPY RXD/TAKEN: CPT | Mod: CPTII,S$GLB,,

## 2025-07-08 PROCEDURE — 3078F DIAST BP <80 MM HG: CPT | Mod: CPTII,S$GLB,,

## 2025-07-08 PROCEDURE — 1159F MED LIST DOCD IN RCRD: CPT | Mod: CPTII,S$GLB,,

## 2025-07-08 PROCEDURE — 3074F SYST BP LT 130 MM HG: CPT | Mod: CPTII,S$GLB,,

## 2025-07-11 ENCOUNTER — OFFICE VISIT (OUTPATIENT)
Dept: NEPHROLOGY | Facility: CLINIC | Age: 60
End: 2025-07-11
Payer: COMMERCIAL

## 2025-07-11 VITALS
HEART RATE: 66 BPM | WEIGHT: 205 LBS | HEIGHT: 72 IN | SYSTOLIC BLOOD PRESSURE: 115 MMHG | BODY MASS INDEX: 27.77 KG/M2 | DIASTOLIC BLOOD PRESSURE: 75 MMHG

## 2025-07-11 DIAGNOSIS — E11.22 TYPE 2 DM WITH CKD STAGE 2 AND HYPERTENSION: ICD-10-CM

## 2025-07-11 DIAGNOSIS — I10 HYPERTENSION, UNSPECIFIED TYPE: ICD-10-CM

## 2025-07-11 DIAGNOSIS — N18.2 CHRONIC KIDNEY DISEASE (CKD) STAGE G2/A2, MILDLY DECREASED GLOMERULAR FILTRATION RATE (GFR) BETWEEN 60-89 ML/MIN/1.73 SQUARE METER AND ALBUMINURIA CREATININE RATIO BETWEEN 30-299 MG/G: Primary | ICD-10-CM

## 2025-07-11 DIAGNOSIS — R80.9 PROTEINURIA, UNSPECIFIED TYPE: ICD-10-CM

## 2025-07-11 DIAGNOSIS — I12.9 TYPE 2 DM WITH CKD STAGE 2 AND HYPERTENSION: ICD-10-CM

## 2025-07-11 DIAGNOSIS — N18.2 TYPE 2 DM WITH CKD STAGE 2 AND HYPERTENSION: ICD-10-CM

## 2025-07-11 PROCEDURE — 99999 PR PBB SHADOW E&M-EST. PATIENT-LVL V: CPT | Mod: PBBFAC,,, | Performed by: NURSE PRACTITIONER

## 2025-07-11 NOTE — PATIENT INSTRUCTIONS
"[facility]    What is Chronic Kidney disease?    Healthy kidneys have a lot of jobs: They clean (filter) the blood, make urine, help control your blood pressure, keep your bones healthy, and they send hormones (signals) to the body to tell your body to make more blood.      Chronic kidney disease means your kidneys are not able to do these jobs as well as they should.  In early kidney disease, you should feel fine. The only way to know you have kidney disease is by doing labwork. Bloodwork checks the creatinine and eGFR (filtration rate), and urine tests look for protein in the urine.     With kidney disease, creatine goes up and eGFR goes down.  There may or may not be protein in the urine as a sign of kidney damage. Protein in the urine is NOT from eating too much protein. Protein in the urine occurs when the kidney is not filtering effectively. The good protein that should stay in the blood "leaks" into the urine.    There are 5 stages of CKD, but stages 1 and 2 do not show up int the bloodwork.  Most people first hear about chronic kidney disease with stage 3.  Stage 1 has an eGFR of greater than 90  Stage 2 has an eGFR 60-89  Stage 3 has an eGFR 45-59  Stage 4 has an eGFR 15-29  Stage 5 has an eGFR of less than 15    CKD is chronic, which means you will have it for the rest of your life. It is also progressive, which means that it does slowly get worse over time.  While we cannot make your kidneys better, but we can work together to keep the kidneys stable. Stable kidney function means it is not actively getting worse or progressing.     The best way to treat kidney disease is by treating the underlying cause - often high blood pressure and/or diabetes. Depending your situation, other medications may need to be added.    Many people can live their whole lives with kidney disease and never need dialysis. When someone does need dialysis, it is important to understand that does not fix the kidneys. Dialysis " is used to replace the kidney when the kidneys stop working well enough to keep you alive. This is usually an eGFR of 5-10.    In kidney disease, diet recommendations are highly individualized. Please follow up with your provider if you have questions about diet. You may also be able to see a dietician.     [facility] has a class about chronic kidney disease that talks more about what kidney disease is, how you know you have it, and what you can do to keep your kidneys working as long as possible. Please let me know if you are interested in attending, and I can put in a referral.

## 2025-07-11 NOTE — PROGRESS NOTES
Subjective:       Patient ID: Yon Loyd is a 60 y.o. White male who presents for follow up of No chief complaint on file.    HPI  Mr. Loyd is a 55 year old man with diabetes, hypertension presenting for follow up of microalbuminuria.  Patient with microalbuminuria up to ~530mg 12.16, now ~234mg.  Patient reports blood sugars well controlled since last visit in 2017.  He denies any NSAID use.  He otherwise denies any fever, chest pain, shortness of breath, abdominal pain, diarrhea, dysuria/hematuria.     Update 5/8/2023:  Last seen by Dr. Vasquez in Sept. 2020. New to me.  Presents for f/u of CKD, proteinuria.  Diagnosed with DM at age 28; HTN for about 10 years.  Baseline sCr: 1.0 mg/dL.  Home BPs: does not take regularly  Renal US: March 2022: kidneys on the large side. Right kidney has mildly increased echogenicity.   Denies NSAID use.    Update 7/5/24:  Presents for f/u of CKD, proteinuria.  Recent sCr of 1.0-1.1 mg/dL. Most recent sCr was 1.2 mg/dL.    Seen in ER with atypical chest pain 6/19/24.    Update 7/11/25:  Presents for f/u of CKD, proteinuria.  Jardiance started Sept. 2025.  Still on Mounjaro.  Home BPs: does not take  Had NAHOMY in Feb 2025 c sCr 1.5-1.6 mg/dL, which was attributed to dehydration. Drinking more water now.  New baseline sCr 1.3 vs. 1.5-1.6 mg/dL.      Review of Systems   Respiratory:  Negative for shortness of breath.    Cardiovascular:  Negative for leg swelling.   Gastrointestinal:  Negative for diarrhea, nausea and vomiting.   Genitourinary:  Positive for frequency (at baseline; nocturia). Negative for difficulty urinating, dysuria and hematuria.   All other systems reviewed and are negative.      Objective:    /75 (Patient Position: Sitting)   Pulse 66   Ht 6' (1.829 m)   Wt 93 kg (205 lb 0.4 oz)   BMI 27.81 kg/m²     Physical Exam  Vitals reviewed.   Constitutional:       General: He is not in acute distress.     Appearance: He is well-developed.   Pulmonary:       Effort: Pulmonary effort is normal. No respiratory distress.   Abdominal:      Tenderness: There is no right CVA tenderness or left CVA tenderness.   Musculoskeletal:      Right lower leg: No edema.      Left lower leg: No edema.   Neurological:      Mental Status: He is alert.   Psychiatric:         Mood and Affect: Mood normal.         Behavior: Behavior normal.         Thought Content: Thought content normal.         Judgment: Judgment normal.         Lab Results   Component Value Date    CREATININE 1.3 06/27/2025     Urine Protein/Creatinine Ratio   Date Value Ref Range Status   06/27/2025   Final     Comment:     UNABLE TO CALCULATE     Prot/Creat Ratio, Urine   Date Value Ref Range Status   05/03/2024 0.16 0.00 - 0.20 Final   05/08/2023 0.15 0.00 - 0.20 Final   12/02/2022 0.28 (H) 0.00 - 0.20 Final     Lab Results   Component Value Date     06/27/2025    K 5.1 06/27/2025    CO2 27 06/27/2025     06/27/2025     Lab Results   Component Value Date    PTH 36.1 06/27/2025    CALCIUM 9.2 06/27/2025    PHOS 3.6 06/27/2025     Lab Results   Component Value Date    HGB 18.4 (H) 06/27/2025    WBC 6.40 06/27/2025    HCT 54.8 (H) 06/27/2025      Lab Results   Component Value Date    HGBA1C 8.0 (H) 05/30/2025     06/27/2025    BUN 25 (H) 06/27/2025     Lab Results   Component Value Date    LDLCALC 20.8 (L) 06/27/2025       Assessment:       1. Chronic kidney disease (CKD) stage G2/A2, mildly decreased glomerular filtration rate (GFR) between 60-89 mL/min/1.73 square meter and albuminuria creatinine ratio between  mg/g    2. Type 2 DM with CKD stage 2 and hypertension    3. Hypertension, unspecified type    4. Proteinuria, unspecified type            Plan:     CKD stage G2A2 -  Mr. Loyd is a 55 year old man with diabetes, hypertension presenting for follow of microalbuminuria/ CKD.   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  In Spring 2025, sCr higher - now back to 1.3.  This sCr level or 1.3. may be an outlier.    Educated patient to control BP, BG, remain well-hydrated, and avoid NSAIDs to prevent progression of CKD.    Thinking about CKD ed. Did not want order today.    UPCR Proteinuric. On ARB. On Jardiance, Mounjaro.   Acid-base WNL   Renal osteodystrophy Ca okay.   Anemia Hgb elevated. Unclear why. Discuss with PCP   DM Poorly-controlled. Present since at least 2009. Has had A1c over 12 previously. Retinopathy documented.   Lipid Management On statin.   ESRD planning Reassurance provided.    Kidney Failure Risk Equation (Tangri)    Kidney Failure Risk at 2 years: 0.1%    Kidney Failure Risk at 5 years: 0.3%    Lab Results   Component Value Date    MICALBCREAT 34.6 (H) 06/27/2025    CREATININE 1.3 06/27/2025          HTN - okay today on metoprolol succinate 25 mg, valsartan 320 mg  - Goal is -130    All questions patient had were answered.  Asked if further questions. None. F/u in clinic in 12 mos with labs and urine prior to next visit or sooner if needed.  ER for emergency concerns.    Summary of Plan:  Report if home BP is frequently less than 110 systolic  avoid NSAID/ bactrim/ IV contrast/ gadolinium/ aminoglycoside where possible  Discuss elevated Hgb with PCP  RTC in 12 mos      Visit today included increased complexity associated with tmanaging the longitudinal care of the patient due to the serious and/or complex managed problem(s) CKD.

## 2025-07-14 ENCOUNTER — TELEPHONE (OUTPATIENT)
Dept: FAMILY MEDICINE | Facility: CLINIC | Age: 60
End: 2025-07-14
Payer: COMMERCIAL

## 2025-07-14 DIAGNOSIS — D58.2 ELEVATED HEMOGLOBIN: Primary | ICD-10-CM

## 2025-07-14 NOTE — TELEPHONE ENCOUNTER
----- Message from Xiao Grewal NP sent at 7/14/2025  1:05 PM CDT -----  I'm not planning to see him again for a year, so would you mind doing the econsult?    Thanks!  ----- Message -----  From: Lebron Pond DO  Sent: 7/13/2025   1:15 PM CDT  To: Xiao Grewal NP    I was not. Do you want me to place the e consult?  Or are you going to talk to him and f/u? Let me know!  And thanks for messaging  ----- Message -----  From: Xiao Grewal NP  Sent: 7/11/2025   3:56 PM CDT  To: Lebron Pond, DO    I don't know of any OTC supplements and was thinking about e-consult but didn't want to if you were already working on it in the background.  ----- Message -----  From: Lebron Pond DO  Sent: 7/11/2025   1:40 PM CDT  To: Xiao Grewal NP    Hey thanks for the message    When is saw him he had an NAHOMY, and I had chalked it up to possibly 2/2 dehydration  But since then it looks like his hgb has been persistently high    Do you know if he's taking any OTC T supplementation? Or other supplement?    If not, we should consider e consult to hematology to evaluate    JM  ----- Message -----  From: Xiao Grewal NP  Sent: 7/11/2025  12:14 PM CDT  To: Lebron Pond, DO    Hi Dr. Pond,  Do you have any thoughts on his elevated Hgb?   Thanks,  Xiao

## 2025-07-14 NOTE — TELEPHONE ENCOUNTER
Spoke w pt In regards to hemoglobin lab work , pt states he is not taking any supplement besides multi vitamin .. Pt also stated he is okay with e consult placed

## 2025-07-14 NOTE — TELEPHONE ENCOUNTER
Can you call patient  Let him know his renal doctor contacted me that his hemoglobin was trending higher  Ask if he is taking any supplements we are not aware of? If he is, tell him to stop    If not, ask if we can place an e consult to have a hematologist review his labs and give recommendations

## 2025-07-16 ENCOUNTER — E-CONSULT (OUTPATIENT)
Dept: HEMATOLOGY/ONCOLOGY | Facility: CLINIC | Age: 60
End: 2025-07-16
Payer: COMMERCIAL

## 2025-07-16 DIAGNOSIS — D75.1 POLYCYTHEMIA: Primary | ICD-10-CM

## 2025-07-16 PROCEDURE — 99451 NTRPROF PH1/NTRNET/EHR 5/>: CPT | Mod: S$GLB,,, | Performed by: INTERNAL MEDICINE

## 2025-07-16 NOTE — CONSULTS
[facility] - Hematology [facility]  Response for E-Consult     Patient Name: Yon Loyd  MRN: [id number]  Primary Care Provider: Lebron Pond DO   Requesting Provider: Lebron Pond DO  E-Consult to Hemonc  Consult performed by: Semaj Moreno MD  Consult ordered by: Lebron Pond DO  Reason for consult: elevated hgb, not on T. not taking any supplements. not a smoker. has been elevated for a few months now. what further workup if any is needed  Assessment/Recommendations: Lab review shows chronic polycythemia with hmct 54-57% over past 5 months.           Recommendation: check erythropoietin level and mpn reflex cascade ([id number]). If EPO is low, or if any of the mpn mutations are positive please refer to heme/onc for further eval/management. If the EPO is normal, this is likely a physiologic erythrocytosis--I see that he is diagnosed with KIMBER--worth noting that untreated or suboptimally treated KIMBER can result in polycythemia.     Additional future steps to consider: na    Total time of Consultation: 10 minute    I did not speak to the requesting provider verbally about this.     *This eConsult is based on the clinical data available to me and is furnished without benefit of a physical examination. The eConsult will need to be interpreted in light of any clinical issues or changes in patient status not available to me at the time of filing this eConsults. Significant changes in patient condition or level of acuity should result in immediate formal consultation and reevaluation. Please alert me if you have further questions.    Thank you for this eConsult referral.     Semaj Moreno MD  [facility] - Hematology [facility]

## 2025-07-17 ENCOUNTER — PATIENT MESSAGE (OUTPATIENT)
Dept: FAMILY MEDICINE | Facility: CLINIC | Age: 60
End: 2025-07-17
Payer: COMMERCIAL

## 2025-07-17 DIAGNOSIS — D58.2 ELEVATED HEMOGLOBIN: Primary | ICD-10-CM

## 2025-07-17 NOTE — TELEPHONE ENCOUNTER
"Please call patient    Per hematology:     "Recommendation: check erythropoietin level and mpn reflex cascade ([id number]). If EPO is low, or if any of the mpn mutations are positive please refer to heme/onc for further eval/management. If the EPO is normal, this is likely a physiologic erythrocytosis--I see that he is diagnosed with KIMBER--worth noting that untreated or suboptimally treated KIMBER can result in polycythemia.     I have ordered 2 labs, can you schedule?    Also can you ask patient if he is using his cpap? And if he thinks its working?  "

## 2025-07-18 ENCOUNTER — PATIENT MESSAGE (OUTPATIENT)
Dept: FAMILY MEDICINE | Facility: CLINIC | Age: 60
End: 2025-07-18
Payer: COMMERCIAL

## 2025-07-28 ENCOUNTER — OFFICE VISIT (OUTPATIENT)
Dept: NEUROLOGY | Facility: CLINIC | Age: 60
End: 2025-07-28
Payer: COMMERCIAL

## 2025-07-28 DIAGNOSIS — I67.9 SMALL VESSEL DISEASE, CEREBROVASCULAR: Primary | ICD-10-CM

## 2025-07-28 DIAGNOSIS — R90.89 ABNORMAL FINDING ON MRI OF BRAIN: ICD-10-CM

## 2025-07-28 NOTE — PROGRESS NOTES
Neurology Telemedicine Note     Name: Yon Loyd  MRN: [id number]   CSN: [id number]      Date: 07/28/2025    The patient location is: Home  The chief complaint leading to consultation is: Abnormal MRI brain  Visit type: Virtual visit with synchronous audio and video    TOTAL TIME SPENT: 25 mins    Each patient to whom I provide medical services by telemedicine is:  (1) informed of the relationship between the physician and patient and the respective role of any other health care provider with respect to management of the patient; and (2) notified that they may decline to receive medical services by telemedicine and may withdraw from such care at any time.    History of Present Illness (HPI):  Patient is a 60-year-old male with of difficult to control diabetes, hypertension history artery disease status post stents who presents kg clinic due to MRI results.  It seems that patient has had microvascular ischemia noted on prior MRIs and recently got a new MRI of the brain.  MRI of the brain shows continued small-vessel disease with not much change in the amount or sizes of his lesions.  Recommended to have a yearly follow up.  Patient states that he is working as a  in his not making any errors although he notices that has typing has slowed down.  Patient denies any overt memory loss, changes in personality or getting lost while driving.  We discussed that if his diabetes and blood pressure is not under better control, small-vessel disease can progress and eventually lead to major neurocognitive issues.  Continue to recommend vascular risk factor optimization.  Can follow back in Neurology Clinic on an as-needed basis.              Nonmotor/Premotor ROS: as per HPI, and all other systems are negative    Past Medical History: The patient  has a past medical history of Basal cell carcinoma (BCC) of skin of left upper extremity including shoulder (3/31/2023), Cataract, Chronic kidney disease, Coronary artery  disease involving native coronary artery of native heart without angina pectoris (5/19/2025), Diabetes mellitus type II, Diabetic retinopathy, Heart attack (12/26/2021), Hyperlipidemia, Hyperlipidemia LDL goal <100 (10/24/2012), Hypertension, Obesity (BMI 30-39.9) (12/23/2016), Sleep apnea (6/10/2024), Type 2 diabetes mellitus with hyperglycemia, with long-term current use of insulin (12/23/2016), Type 2 diabetes mellitus with microalbuminuria, with long-term current use of insulin (12/03/2014), and Uncontrolled type 2 diabetes mellitus with diabetic polyneuropathy, with long-term current use of insulin (10/04/2016).    Social History: The patient  reports that he quit smoking about 18 years ago. His smoking use included cigarettes. He has never used smokeless tobacco. He reports that he does not drink alcohol and does not use drugs.    Family History: Their family history includes Breast cancer in his mother; Cancer in his mother; Diabetes in his father, maternal grandfather, and mother; Heart attack in his maternal grandfather; Hypertension in his mother.    Allergies: Ace inhibitors     Meds: Medications Ordered Prior to Encounter[1]    Exam:  Vital Signs deferred with home visit    Constitutional  Well-developed, well-nourished, appears stated age   Eyes  No scleral icterus   ENT  Moist oral mucosa   Cardiovascular  No lower extremity edema    Respiratory  No labored breathing    Skin  No rashes   Hematologic  No bruising   Psychiatric  Normal mood and affect   Other  GI/ deferred    Neurological     * Mental status  Alert and oriented to person, place, time, and situation; no dysarthria; no aphasia; normal recent and remote memory; follows commands   * Cranial nerves     - CN II  Pupils midposition and symmetric   - CN III, IV, VI  Extraocular movements full, no nystagmus visualized   - CN V  Unable to test   - CN VII  Face strong and symmetric bilaterally    - CN VIII  Hearing grossly intact with  conversation    - CN IX, X  Palate raises midline and symmetric    - CN XI  Strong shoulder shrug B    - CN XII  Tongue appears midline    * Motor  Normal bulk by appearance, no drift    * Sensory  Not tested objectively, no changes described by the patient   * Deep tendon reflexes  Not tested   * Coord/Movemt/Gait No hypophonic speech.  No facial masking.  No B bradykinesia.  No tremor with rest, posture, kinesis, or intention.   No chorea, athetosis, dystonia, myoclonus, or tics.   No motor impersistence.  Gait is deferred for safety.       Medical Record Review:  - Lab Results:  Lab Visit on 06/27/2025   Component Date Value Ref Range Status    PTH Intact 06/27/2025 36.1  9.0 - 77.0 pg/mL Final    Cholesterol Total 06/27/2025 87 (L)  120 - 199 mg/dL Final    Triglyceride 06/27/2025 91  30 - 150 mg/dL Final    HDL Cholesterol 06/27/2025 48  40 - 75 mg/dL Final    LDL Cholesterol 06/27/2025 20.8 (L)  63.0 - 159.0 mg/dL Final    HDL/Cholesterol Ratio 06/27/2025 55.2 (H)  20.0 - 50.0 % Final    Cholesterol/HDL Ratio 06/27/2025 1.8 (L)  2.0 - 5.0 Final    Non HDL Cholesterol 06/27/2025 39  mg/dL Final    Sodium 06/27/2025 138  136 - 145 mmol/L Final    Potassium 06/27/2025 5.1  3.5 - 5.1 mmol/L Final    Chloride 06/27/2025 102  95 - 110 mmol/L Final    CO2 06/27/2025 27  23 - 29 mmol/L Final    Glucose 06/27/2025 176 (H)  70 - 110 mg/dL Final    BUN 06/27/2025 25 (H)  2 - 20 mg/dL Final    Creatinine 06/27/2025 1.3  0.5 - 1.4 mg/dL Final    Calcium 06/27/2025 9.2  8.7 - 10.5 mg/dL Final    Protein Total 06/27/2025 7.8  6.0 - 8.4 gm/dL Final    Albumin 06/27/2025 4.5  3.5 - 5.2 g/dL Final    Bilirubin Total 06/27/2025 0.7  0.1 - 1.0 mg/dL Final    ALP 06/27/2025 93  38 - 126 unit/L Final    AST 06/27/2025 31  15 - 46 unit/L Final    ALT 06/27/2025 46 (H)  10 - 44 unit/L Final    Anion Gap 06/27/2025 9  8 - 16 mmol/L Final    eGFR 06/27/2025 >60  >60 mL/min/1.73/m2 Final    WBC 06/27/2025 6.40  3.90 - 12.70 K/uL Final     RBC 06/27/2025 5.80  4.60 - 6.20 M/uL Final    HGB 06/27/2025 18.4 (H)  14.0 - 18.0 gm/dL Final    HCT 06/27/2025 54.8 (H)  40.0 - 54.0 % Final    MCV 06/27/2025 95  82 - 98 fL Final    MCH 06/27/2025 31.7 (H)  27.0 - 31.0 pg Final    MCHC 06/27/2025 33.6  32.0 - 36.0 g/dL Final    RDW 06/27/2025 12.9  11.5 - 14.5 % Final    Platelet Count 06/27/2025 152  150 - 450 K/uL Final    MPV 06/27/2025 9.4  9.2 - 12.9 fL Final    Nucleated RBC 06/27/2025 0  <=0 /100 WBC Final    Neut % 06/27/2025 60.1  38 - 73 % Final    Lymph % 06/27/2025 24.2  18 - 48 % Final    Mono % 06/27/2025 12.5  4 - 15 % Final    Eos % 06/27/2025 1.9  <=8 % Final    Basophil % 06/27/2025 0.8  <=1.9 % Final    Imm Grans % 06/27/2025 0.5  0.0 - 0.5 % Final    Neut # 06/27/2025 3.85  1.8 - 7.7 K/uL Final    Lymph # 06/27/2025 1.55  1 - 4.8 K/uL Final    Mono # 06/27/2025 0.80  0.3 - 1 K/uL Final    Eos # 06/27/2025 0.12  <=0.5 K/uL Final    Baso # 06/27/2025 0.05  <=0.2 K/uL Final    Imm Grans # 06/27/2025 0.03  0.00 - 0.04 K/uL Final    Phosphorus Level 06/27/2025 3.6  2.7 - 4.5 mg/dL Final    Urine Creatinine 06/27/2025 52.6  23.0 - 375.0 mg/dL Final    Urine Protein 06/27/2025 <7  <=15 mg/dL Final    Urine Protein/Creatinine Ratio 06/27/2025    Final    Color, UA 06/27/2025 Yellow  Straw, Evangel[place], Yellow, Light-Orange Final    Appearance, UA 06/27/2025 Clear  Clear Final    pH, UA 06/27/2025 6.0  5.0 - 8.0 Final    Spec Grav UA 06/27/2025 1.010  1.005 - 1.030 Final    Protein, UA 06/27/2025 Negative  Negative Final    Glucose, UA 06/27/2025 3+ (A)  Negative Final    Ketones, UA 06/27/2025 Negative  Negative Final    Bilirubin, UA 06/27/2025 Negative  Negative Final    Blood, UA 06/27/2025 Negative  Negative Final    Nitrites, UA 06/27/2025 Negative  Negative Final    Urobilinogen, UA 06/27/2025 Negative  <2.0 EU/dL Final    Leukocyte Esterase, UA 06/27/2025 Negative  Negative Final    Urine Microalbumin 06/27/2025 18.0    ug/mL Final    Urine  Creatinine 06/27/2025 52.0  23.0 - 375.0 mg/dL Final    Microalbumin/Creatinine Ratio Urine 06/27/2025 34.6 (H)  <=30.0 ug/mg Final    Bacteria, UA 06/27/2025 Rare  None, Rare, Occasional /HPF Final    Yeast, UA 06/27/2025 Rare (A)  None /HPF Final    Microscopic Comment 06/27/2025    Final   Hospital Outpatient Visit on 05/30/2025   Component Date Value Ref Range Status    Right arm BP 05/30/2025 107.00  mmHg Final    Right posterior tibial 05/30/2025 122.00  mmHg Final    Right dorsalis pedis 05/30/2025 110.00  mmHg Final    Right NORIS 05/30/2025 1.14   Final    Left arm BP 05/30/2025 107.00  mmHg Final    Left posterior tibial 05/30/2025 121.00  mmHg Final    Left dorsalis pedis 05/30/2025 116.00  mmHg Final    Left NORIS 05/30/2025 1.13   Final    Right toe pressure 05/30/2025 77.00  mmHg Final    Right TBI 05/30/2025 0.72   Final    Left toe pressure 05/30/2025 78.00  mmHg Final    Left TBI 05/30/2025 0.73   Final    Treadmill speed 05/30/2025 2.00  mph Final    Treadmill grade 05/30/2025 12  % Final    Treadmill time 05/30/2025 5.00  min Final    Immediate arm BP 05/30/2025 138.00  mmHg Final    Immediate right tibial 05/30/2025 136.00  mmHg Final    Immediate right NORIS 05/30/2025 0.99   Final    Immediate left tibial 05/30/2025 145.00  mmHg Final    Immediate left NORIS 05/30/2025 1.05   Final   Lab Visit on 05/30/2025   Component Date Value Ref Range Status    Cholesterol Total 05/30/2025 78 (L)  120 - 199 mg/dL Final    Triglyceride 05/30/2025 115  30 - 150 mg/dL Final    HDL Cholesterol 05/30/2025 48  40 - 75 mg/dL Final    LDL Cholesterol 05/30/2025 7.0 (L)  63.0 - 159.0 mg/dL Final    HDL/Cholesterol Ratio 05/30/2025 61.5 (H)  20.0 - 50.0 % Final    Cholesterol/HDL Ratio 05/30/2025 1.6 (L)  2.0 - 5.0 Final    Non HDL Cholesterol 05/30/2025 30  mg/dL Final    Sodium 05/30/2025 140  136 - 145 mmol/L Final    Potassium 05/30/2025 4.1  3.5 - 5.1 mmol/L Final    Chloride 05/30/2025 106  95 - 110 mmol/L Final     CO2 05/30/2025 21 (L)  23 - 29 mmol/L Final    Glucose 05/30/2025 147 (H)  70 - 110 mg/dL Final    BUN 05/30/2025 26 (H)  2 - 20 mg/dL Final    Creatinine 05/30/2025 1.6 (H)  0.5 - 1.4 mg/dL Final    Calcium 05/30/2025 8.7  8.7 - 10.5 mg/dL Final    Protein Total 05/30/2025 7.1  6.0 - 8.4 gm/dL Final    Albumin 05/30/2025 4.2  3.5 - 5.2 g/dL Final    Bilirubin Total 05/30/2025 0.5  0.1 - 1.0 mg/dL Final    ALP 05/30/2025 77  38 - 126 unit/L Final    AST 05/30/2025 25  15 - 46 unit/L Final    ALT 05/30/2025 26  10 - 44 unit/L Final    Anion Gap 05/30/2025 13  8 - 16 mmol/L Final    eGFR 05/30/2025 49 (L)  >60 mL/min/1.73/m2 Final    Hemoglobin A1c 05/30/2025 8.0 (H)  4.0 - 5.6 % Final    Estimated Average Glucose 05/30/2025 183 (H)  68 - 131 mg/dL Final   Admission on 05/09/2025, Discharged on 05/09/2025   Component Date Value Ref Range Status    QRS Duration 05/09/2025 100  ms Final    [facility] QTC Calculation 05/09/2025 419  ms Final    POCT Glucose 05/09/2025 208 (H)  70 - 110 mg/dL Final    POC ACTIVATED CLOTTING TIME K 05/09/2025 389 (H)  74 - 137 sec Final    Sample 05/09/2025 ARTERIAL   Final    QRS Duration 05/09/2025 100  ms Final    [facility] QTC Calculation 05/09/2025 424  ms Final   Lab Visit on 05/07/2025   Component Date Value Ref Range Status    Sodium 05/07/2025 139  136 - 145 mmol/L Final    Potassium 05/07/2025 4.4  3.5 - 5.1 mmol/L Final    Chloride 05/07/2025 103  95 - 110 mmol/L Final    CO2 05/07/2025 24  23 - 29 mmol/L Final    Glucose 05/07/2025 142 (H)  70 - 110 mg/dL Final    BUN 05/07/2025 24 (H)  2 - 20 mg/dL Final    Creatinine 05/07/2025 1.6 (H)  0.5 - 1.4 mg/dL Final    Calcium 05/07/2025 9.8  8.7 - 10.5 mg/dL Final    Anion Gap 05/07/2025 12  8 - 16 mmol/L Final    eGFR 05/07/2025 49 (L)  >60 mL/min/1.73/m2 Final    WBC 05/07/2025 9.64  3.90 - 12.70 K/uL Final    RBC 05/07/2025 5.83  4.60 - 6.20 M/uL Final    HGB 05/07/2025 18.4 (H)  14.0 - 18.0 gm/dL Final    HCT 05/07/2025 55.6 (H)  40.0 - 54.0  % Final    MCV 05/07/2025 95  82 - 98 fL Final    MCH 05/07/2025 31.6 (H)  27.0 - 31.0 pg Final    MCHC 05/07/2025 33.1  32.0 - 36.0 g/dL Final    RDW 05/07/2025 13.1  11.5 - 14.5 % Final    Platelet Count 05/07/2025 183  150 - 450 K/uL Final    MPV 05/07/2025 10.1  9.2 - 12.9 fL Final    Nucleated RBC 05/07/2025 0  <=0 /100 WBC Final    Neut % 05/07/2025 71.9  38 - 73 % Final    Lymph % 05/07/2025 15.0 (L)  18 - 48 % Final    Mono % 05/07/2025 11.2  4 - 15 % Final    Eos % 05/07/2025 1.2  <=8 % Final    Basophil % 05/07/2025 0.3  <=1.9 % Final    Imm Grans % 05/07/2025 0.4  0.0 - 0.5 % Final    Neut # 05/07/2025 6.92  1.8 - 7.7 K/uL Final    Lymph # 05/07/2025 1.45  1 - 4.8 K/uL Final    Mono # 05/07/2025 1.08 (H)  0.3 - 1 K/uL Final    Eos # 05/07/2025 0.12  <=0.5 K/uL Final    Baso # 05/07/2025 0.03  <=0.2 K/uL Final    Imm Grans # 05/07/2025 0.04  0.00 - 0.04 K/uL Final       Diagnoses:   Patient is a 60-year-old male with of difficult to control diabetes, hypertension history artery disease status post stents who presents kg clinic due to MRI results.  It seems that patient has had microvascular ischemia noted on prior MRIs and recently got a new MRI of the brain.  MRI of the brain shows continued small-vessel disease with not much change in the amount or sizes of his lesions.  Recommended to have a yearly follow up.  Patient states that he is working as a  in his not making any errors although he notices that has typing has slowed down.  Patient denies any overt memory loss, changes in personality or getting lost while driving.  We discussed that if his diabetes and blood pressure is not under better control, small-vessel disease can progress and eventually lead to major neurocognitive issues.  Continue to recommend vascular risk factor optimization.  Can follow back in Neurology Clinic on an as-needed basis.      I spent 25 minutes with the patient with >50% of the time spent with counseling  and education regarding:    This is a consult performed through audio-visual using [employer] nae. Pt and provider are in different locations. History and physical exam are limited due to the nature of this encounter.       Tim Logan MD             [1]   Current Outpatient Medications on File Prior to Visit   Medication Sig Dispense Refill    aspirin (ECOTRIN) 81 MG EC tablet Take 1 tablet (81 mg total) by mouth once daily.      atorvastatin (LIPITOR) 40 MG tablet Take 1 tablet (40 mg total) by mouth once daily. 90 tablet 3    blood sugar diagnostic Strp To check BG 4 times daily, to use with insurance preferred meter 100 strip 0    blood-glucose meter kit To check BG 4 times daily, to use with insurance preferred meter 1 each 0    blood-glucose sensor (FREESTYLE JERO 3 PLUS SENSOR) Juanis Apply and change every 15 days. 2 each 11    coenzyme Q10 200 mg capsule Take 200 mg by mouth once daily.      empagliflozin (JARDIANCE) 25 mg tablet Take 1 tablet (25 mg total) by mouth once daily. 90 tablet 2    evolocumab (REPATHA SURECLICK) 140 mg/mL PnIj Inject 1 mL (140 mg total) into the skin every 14 (fourteen) days. 6 mL 3    gabapentin (NEURONTIN) 100 MG capsule Take 1 capsule (100 mg total) by mouth every evening. 90 capsule 3    insulin aspart U-100 (NOVOLOG FLEXPEN U-100 INSULIN) 100 unit/mL (3 mL) InPn pen INJECT 10 UNITS UNDER THE SKIN TWICE DAILY BEFORE BREAKFAST AND DINNER (75 DAYS SUPPLY) 15 mL 2    lancets Misc To check BG 4 times daily, to use with insurance preferred meter 100 each 0    metoprolol succinate (TOPROL-XL) 25 MG 24 hr tablet Take 1 tablet (25 mg total) by mouth once daily. Take in place of 50 mg. 90 tablet 3    multivitamin-minerals-lutein Tab Take 1 tablet by mouth once daily.      nateglinide (STARLIX) 120 MG tablet TAKE 1 TABLET BY MOUTH EVERY DAY WITH LUNCH 90 tablet 2    nitroGLYCERIN (NITROSTAT) 0.4 MG SL tablet Place 1 tablet (0.4 mg total) under the tongue every 5 (five)  "minutes as needed for Chest pain. 20 tablet 12    omega-3 fatty acids-fish oil 340-1,000 mg Cap Take 1 capsule by mouth once daily. 90 capsule 3    pen needle, diabetic (BD ULTRA-FINE BLACK PEN NEEDLE) 32 gauge x 5/32" Ndle Uses 2  times daily, on injections. Dispense 4 mm needles only 200 each 3    ticagrelor (BRILINTA) 90 mg tablet Take 1 tablet (90 mg total) by mouth 2 (two) times daily. 180 tablet 3    tirzepatide (MOUNJARO) 15 mg/0.5 mL PnIj Inject 15 mg into the skin once a week. 6 mL 1    TOUJEO MAX U-300 SOLOSTAR 300 unit/mL (3 mL) insulin pen INJECT 26 UNITS SUBCUTANEOUSLY EVERY DAY -- 69 DAY SUPPLY 12 mL 0    valsartan (DIOVAN) 320 MG tablet Take 1 tablet (320 mg total) by mouth once daily. 90 tablet 3     No current facility-administered medications on file prior to visit.     "

## (undated) DEVICE — GUIDE LAUNCHER 6FR JL 3.5

## (undated) DEVICE — SPIKE SHORT LG BORE 1-WAY 2IN

## (undated) DEVICE — KIT LEFT HEART MANIFOLD CUSTOM

## (undated) DEVICE — CATH OPTITORQUE TIGER 5F 100CM

## (undated) DEVICE — COVER PROBE US 5.5X58L NON LTX

## (undated) DEVICE — CATH EMERGE MR 12 X 3.00

## (undated) DEVICE — CATH IMPULSE 5F 100CM FR4

## (undated) DEVICE — GUIDEWIRE RUNTHROUGH EF 180CM

## (undated) DEVICE — VALVE CONTROL COPILOT

## (undated) DEVICE — Device

## (undated) DEVICE — COVER BAND BAG 40 X 40

## (undated) DEVICE — GUIDEWIRE WHOLEY STD STR 260CM

## (undated) DEVICE — PAD DEFIB CADENCE ADULT R2

## (undated) DEVICE — CONTRAST VISIPAQUE 150ML

## (undated) DEVICE — INFLATOR ENCORE 26 BLLN INFL

## (undated) DEVICE — GUIDE LAUNCHER 6FR EBU 3.75

## (undated) DEVICE — HEMOSTAT VASC BAND REG 24CM

## (undated) DEVICE — CATH NC EMERGE MR 3.5X12MM

## (undated) DEVICE — CATH IMPULSE 5FR PIGTAIL 125CM

## (undated) DEVICE — CATH NC QUANTUM APEX MR 3X12

## (undated) DEVICE — KIT GLIDESHEATH SLEND 6FR 10CM

## (undated) DEVICE — GUIDEWIRE EMERALD .035IN 260CM

## (undated) DEVICE — CATH EAGLE EYE PLATINUM

## (undated) DEVICE — KIT INTRODUCER W/GUIDEWIRE

## (undated) DEVICE — GUIDEWIRE OMNI J TIP 185CM

## (undated) DEVICE — CATH JACKY RADIAL 3.5 110CM

## (undated) DEVICE — DRAPE ANGIO BRACH 38X44IN

## (undated) DEVICE — VISE RADIFOCUS MULTI TORQUE

## (undated) DEVICE — CATH EMERGE MR 15 X 3.00

## (undated) DEVICE — SEE MEDLINE ITEM 157187

## (undated) DEVICE — CATH EMERGE MR 12 X 2.50

## (undated) DEVICE — CATH EMERGE MR 8 X 3.00

## (undated) DEVICE — EVEROLIMUS-ELUTING PLATINUM CHROMIUM CORONARY STENT SYSTEM
Type: IMPLANTABLE DEVICE | Site: CORONARY | Status: NON-FUNCTIONAL
Brand: SYNERGY™ XD

## (undated) DEVICE — VISIPAQUE 320 200ML +PK